# Patient Record
Sex: MALE | Race: BLACK OR AFRICAN AMERICAN | NOT HISPANIC OR LATINO | URBAN - METROPOLITAN AREA
[De-identification: names, ages, dates, MRNs, and addresses within clinical notes are randomized per-mention and may not be internally consistent; named-entity substitution may affect disease eponyms.]

---

## 2017-03-24 ENCOUNTER — EMERGENCY (EMERGENCY)
Facility: HOSPITAL | Age: 62
LOS: 1 days | Discharge: HOME | End: 2017-03-24
Attending: EMERGENCY MEDICINE
Payer: MEDICAID

## 2017-03-24 VITALS
RESPIRATION RATE: 18 BRPM | HEART RATE: 86 BPM | OXYGEN SATURATION: 98 % | DIASTOLIC BLOOD PRESSURE: 86 MMHG | SYSTOLIC BLOOD PRESSURE: 120 MMHG | TEMPERATURE: 98 F

## 2017-03-24 VITALS
SYSTOLIC BLOOD PRESSURE: 104 MMHG | HEIGHT: 76 IN | RESPIRATION RATE: 16 BRPM | DIASTOLIC BLOOD PRESSURE: 68 MMHG | TEMPERATURE: 98 F | HEART RATE: 103 BPM | OXYGEN SATURATION: 95 % | WEIGHT: 265 LBS

## 2017-03-24 DIAGNOSIS — E11.65 TYPE 2 DIABETES MELLITUS WITH HYPERGLYCEMIA: ICD-10-CM

## 2017-03-24 DIAGNOSIS — I10 ESSENTIAL (PRIMARY) HYPERTENSION: ICD-10-CM

## 2017-03-24 DIAGNOSIS — E78.5 HYPERLIPIDEMIA, UNSPECIFIED: ICD-10-CM

## 2017-03-24 DIAGNOSIS — F10.129 ALCOHOL ABUSE WITH INTOXICATION, UNSPECIFIED: ICD-10-CM

## 2017-03-24 LAB
ALBUMIN SERPL ELPH-MCNC: 3.8 G/DL — SIGNIFICANT CHANGE UP (ref 3.5–5)
ALP SERPL-CCNC: 126 U/L — HIGH (ref 40–120)
ALT FLD-CCNC: 49 U/L DA — SIGNIFICANT CHANGE UP (ref 10–60)
ANION GAP SERPL CALC-SCNC: 12 MMOL/L — SIGNIFICANT CHANGE UP (ref 5–17)
AST SERPL-CCNC: 22 U/L — SIGNIFICANT CHANGE UP (ref 10–40)
BILIRUB SERPL-MCNC: 0.3 MG/DL — SIGNIFICANT CHANGE UP (ref 0.2–1.2)
BUN SERPL-MCNC: 31 MG/DL — HIGH (ref 7–18)
CALCIUM SERPL-MCNC: 8.9 MG/DL — SIGNIFICANT CHANGE UP (ref 8.4–10.5)
CHLORIDE SERPL-SCNC: 98 MMOL/L — SIGNIFICANT CHANGE UP (ref 96–108)
CO2 SERPL-SCNC: 25 MMOL/L — SIGNIFICANT CHANGE UP (ref 22–31)
CREAT SERPL-MCNC: 1.67 MG/DL — HIGH (ref 0.5–1.3)
GLUCOSE SERPL-MCNC: 430 MG/DL — HIGH (ref 70–99)
HCT VFR BLD CALC: 43 % — SIGNIFICANT CHANGE UP (ref 39–50)
HGB BLD-MCNC: 14.3 G/DL — SIGNIFICANT CHANGE UP (ref 13–17)
MAGNESIUM SERPL-MCNC: 1.9 MG/DL — SIGNIFICANT CHANGE UP (ref 1.8–2.4)
MCHC RBC-ENTMCNC: 29.1 PG — SIGNIFICANT CHANGE UP (ref 27–34)
MCHC RBC-ENTMCNC: 33.2 GM/DL — SIGNIFICANT CHANGE UP (ref 32–36)
MCV RBC AUTO: 87.4 FL — SIGNIFICANT CHANGE UP (ref 80–100)
PLATELET # BLD AUTO: 228 K/UL — SIGNIFICANT CHANGE UP (ref 150–400)
POTASSIUM SERPL-MCNC: 3.7 MMOL/L — SIGNIFICANT CHANGE UP (ref 3.5–5.3)
POTASSIUM SERPL-SCNC: 3.7 MMOL/L — SIGNIFICANT CHANGE UP (ref 3.5–5.3)
PROT SERPL-MCNC: 7.6 G/DL — SIGNIFICANT CHANGE UP (ref 6–8.3)
RBC # BLD: 4.92 M/UL — SIGNIFICANT CHANGE UP (ref 4.2–5.8)
RBC # FLD: 12 % — SIGNIFICANT CHANGE UP (ref 10.3–14.5)
SODIUM SERPL-SCNC: 135 MMOL/L — SIGNIFICANT CHANGE UP (ref 135–145)
WBC # BLD: 5.5 K/UL — SIGNIFICANT CHANGE UP (ref 3.8–10.5)
WBC # FLD AUTO: 5.5 K/UL — SIGNIFICANT CHANGE UP (ref 3.8–10.5)

## 2017-03-24 PROCEDURE — 99285 EMERGENCY DEPT VISIT HI MDM: CPT | Mod: 25

## 2017-03-24 PROCEDURE — 80053 COMPREHEN METABOLIC PANEL: CPT

## 2017-03-24 PROCEDURE — 83735 ASSAY OF MAGNESIUM: CPT

## 2017-03-24 PROCEDURE — 96372 THER/PROPH/DIAG INJ SC/IM: CPT

## 2017-03-24 PROCEDURE — 93005 ELECTROCARDIOGRAM TRACING: CPT

## 2017-03-24 PROCEDURE — 99284 EMERGENCY DEPT VISIT MOD MDM: CPT | Mod: 25

## 2017-03-24 PROCEDURE — 85027 COMPLETE CBC AUTOMATED: CPT

## 2017-03-24 RX ORDER — INSULIN HUMAN 100 [IU]/ML
2 INJECTION, SOLUTION SUBCUTANEOUS ONCE
Qty: 0 | Refills: 0 | Status: COMPLETED | OUTPATIENT
Start: 2017-03-24 | End: 2017-03-24

## 2017-03-24 RX ORDER — SODIUM CHLORIDE 9 MG/ML
1000 INJECTION INTRAMUSCULAR; INTRAVENOUS; SUBCUTANEOUS ONCE
Qty: 0 | Refills: 0 | Status: COMPLETED | OUTPATIENT
Start: 2017-03-24 | End: 2017-03-24

## 2017-03-24 RX ADMIN — SODIUM CHLORIDE 1000 MILLILITER(S): 9 INJECTION INTRAMUSCULAR; INTRAVENOUS; SUBCUTANEOUS at 06:14

## 2017-03-24 RX ADMIN — INSULIN HUMAN 2 UNIT(S): 100 INJECTION, SOLUTION SUBCUTANEOUS at 07:18

## 2017-03-24 NOTE — ED ADULT NURSE NOTE - OBJECTIVE STATEMENT
Patient picked up by police on street and brought to [address]. Patient is intoxicated, not agitated, cooperative, face flushed.

## 2017-03-24 NOTE — ED PROVIDER NOTE - NS ED MD SCRIBE ATTENDING SCRIBE SECTIONS
PAST MEDICAL/SURGICAL/SOCIAL HISTORY/HIV/VITAL SIGNS( Pullset)/PHYSICAL EXAM/REVIEW OF SYSTEMS/DISPOSITION/HISTORY OF PRESENT ILLNESS

## 2017-03-24 NOTE — ED PROVIDER NOTE - OBJECTIVE STATEMENT
62 y/o M pt with PMHx HTN, DM, HLD BIB police c/o ETOH intoxication. Pt reports not feeling well in the precinct and was brought into ED for further evaluation. Pt denies fever, chills, pain, trauma, or any  other complaints. NKDA.

## 2018-03-12 ENCOUNTER — EMERGENCY (EMERGENCY)
Facility: HOSPITAL | Age: 63
LOS: 0 days | Discharge: HOME | End: 2018-03-12
Attending: EMERGENCY MEDICINE
Payer: MEDICAID

## 2018-03-12 VITALS
TEMPERATURE: 98 F | RESPIRATION RATE: 18 BRPM | SYSTOLIC BLOOD PRESSURE: 145 MMHG | HEIGHT: 76 IN | WEIGHT: 250 LBS | DIASTOLIC BLOOD PRESSURE: 97 MMHG | HEART RATE: 85 BPM | OXYGEN SATURATION: 99 %

## 2018-03-12 DIAGNOSIS — I10 ESSENTIAL (PRIMARY) HYPERTENSION: ICD-10-CM

## 2018-03-12 DIAGNOSIS — E78.5 HYPERLIPIDEMIA, UNSPECIFIED: ICD-10-CM

## 2018-03-12 DIAGNOSIS — M25.532 PAIN IN LEFT WRIST: ICD-10-CM

## 2018-03-12 DIAGNOSIS — M79.643 PAIN IN UNSPECIFIED HAND: ICD-10-CM

## 2018-03-12 DIAGNOSIS — E11.9 TYPE 2 DIABETES MELLITUS WITHOUT COMPLICATIONS: ICD-10-CM

## 2018-03-12 PROCEDURE — 93010 ELECTROCARDIOGRAM REPORT: CPT

## 2018-03-12 PROCEDURE — 99283 EMERGENCY DEPT VISIT LOW MDM: CPT

## 2018-03-12 PROCEDURE — 73110 X-RAY EXAM OF WRIST: CPT | Mod: 26,LT

## 2018-03-12 RX ORDER — OXYCODONE AND ACETAMINOPHEN 5; 325 MG/1; MG/1
1 TABLET ORAL ONCE
Qty: 0 | Refills: 0 | Status: DISCONTINUED | OUTPATIENT
Start: 2018-03-12 | End: 2018-03-12

## 2018-03-12 RX ADMIN — OXYCODONE AND ACETAMINOPHEN 1 TABLET(S): 5; 325 TABLET ORAL at 12:01

## 2018-03-12 NOTE — ED PROVIDER NOTE - CONSTITUTIONAL, MLM
normal... Well appearing, well nourished, awake, alert, oriented to person, place, time/situation and in no apparent distress. Speaking in clear full sentences no nasal flaring no shoulders retractions, holding his left wrist

## 2018-03-12 NOTE — ED PROVIDER NOTE - UPPER EXTREMITY EXAM, LEFT
no swelling no deformity no erythema, + tender to palp left 1st metacarpal, Pt is able to make strong fist/TENDERNESS

## 2018-03-12 NOTE — ED PROVIDER NOTE - OBJECTIVE STATEMENT
62 years old male here c/o pain to left wrist radiates up to left arm for two weeks increases pain to move left wrist. Pt denies associated dizziness, sob, chest pain, cough, nausea, vomiting. Pt also denies recent hx of trauma, headache, blurred visions, light sensitivities, focal/distal weakness or numbness, fever, chills, abd pain.

## 2018-09-19 ENCOUNTER — EMERGENCY (EMERGENCY)
Facility: HOSPITAL | Age: 63
LOS: 0 days | Discharge: HOME | End: 2018-09-19
Attending: EMERGENCY MEDICINE
Payer: MEDICAID

## 2018-09-19 VITALS
DIASTOLIC BLOOD PRESSURE: 83 MMHG | OXYGEN SATURATION: 99 % | RESPIRATION RATE: 18 BRPM | HEART RATE: 99 BPM | SYSTOLIC BLOOD PRESSURE: 126 MMHG

## 2018-09-19 VITALS
RESPIRATION RATE: 18 BRPM | TEMPERATURE: 98 F | HEIGHT: 76 IN | OXYGEN SATURATION: 98 % | DIASTOLIC BLOOD PRESSURE: 98 MMHG | WEIGHT: 265 LBS | SYSTOLIC BLOOD PRESSURE: 134 MMHG | HEART RATE: 101 BPM

## 2018-09-19 DIAGNOSIS — S61.412A LACERATION WITHOUT FOREIGN BODY OF LEFT HAND, INITIAL ENCOUNTER: ICD-10-CM
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
## 2018-09-19 DIAGNOSIS — Z79.4 LONG TERM (CURRENT) USE OF INSULIN: ICD-10-CM

## 2018-09-19 DIAGNOSIS — I10 ESSENTIAL (PRIMARY) HYPERTENSION: ICD-10-CM

## 2018-09-19 DIAGNOSIS — E78.5 HYPERLIPIDEMIA, UNSPECIFIED: ICD-10-CM

## 2018-09-19 DIAGNOSIS — E11.9 TYPE 2 DIABETES MELLITUS WITHOUT COMPLICATIONS: ICD-10-CM
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
## 2018-09-19 DIAGNOSIS — S52.201A UNSPECIFIED FRACTURE OF SHAFT OF RIGHT ULNA, INITIAL ENCOUNTER FOR CLOSED FRACTURE: ICD-10-CM
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
## 2018-09-19 PROCEDURE — 99284 EMERGENCY DEPT VISIT MOD MDM: CPT | Mod: 25

## 2018-09-19 PROCEDURE — 73090 X-RAY EXAM OF FOREARM: CPT | Mod: 26,RT,76

## 2018-09-19 PROCEDURE — 73130 X-RAY EXAM OF HAND: CPT | Mod: 26,LT

## 2018-09-19 PROCEDURE — 12001 RPR S/N/AX/GEN/TRNK 2.5CM/<: CPT

## 2018-09-19 RX ORDER — OXYCODONE HYDROCHLORIDE 5 MG/1
1 TABLET ORAL
Qty: 20 | Refills: 0 | OUTPATIENT
Start: 2018-09-19 | End: 2018-09-23

## 2018-09-19 RX ORDER — MORPHINE SULFATE 50 MG/1
4 CAPSULE, EXTENDED RELEASE ORAL ONCE
Qty: 0 | Refills: 0 | Status: DISCONTINUED | OUTPATIENT
Start: 2018-09-19 | End: 2018-09-19

## 2018-09-19 RX ORDER — IBUPROFEN 200 MG
1 TABLET ORAL
Qty: 15 | Refills: 0 | OUTPATIENT
Start: 2018-09-19 | End: 2018-09-23

## 2018-09-19 RX ORDER — TETANUS TOXOID, REDUCED DIPHTHERIA TOXOID AND ACELLULAR PERTUSSIS VACCINE, ADSORBED 5; 2.5; 8; 8; 2.5 [IU]/.5ML; [IU]/.5ML; UG/.5ML; UG/.5ML; UG/.5ML
0.5 SUSPENSION INTRAMUSCULAR ONCE
Qty: 0 | Refills: 0 | Status: COMPLETED | OUTPATIENT
Start: 2018-09-19 | End: 2018-09-19

## 2018-09-19 RX ADMIN — TETANUS TOXOID, REDUCED DIPHTHERIA TOXOID AND ACELLULAR PERTUSSIS VACCINE, ADSORBED 0.5 MILLILITER(S): 5; 2.5; 8; 8; 2.5 SUSPENSION INTRAMUSCULAR at 19:44

## 2018-09-19 RX ADMIN — MORPHINE SULFATE 4 MILLIGRAM(S): 50 CAPSULE, EXTENDED RELEASE ORAL at 19:41

## 2018-09-19 NOTE — ED PROVIDER NOTE - OBJECTIVE STATEMENT
64yo male with pmh HTN, HL, DM, presents with left dorsal hand lac/pain and rt forearm pain s/p trip and fall while running to go to bathroom.  Pt broke fall with arms, denies head strike, LOC, AC.  Unknown tetanus. Pt reports glass was on sidewalk and cut his hand.    ROS: No fever/chills. No photophobia/eye pain/changes in vision, No ear pain/sore throat/dysphagia, No chest pain/palpitations. No SOB/cough/stridor. No abdominal pain, N/V/D, no black/bloody bm. No dysuria/frequency/discharge, No headache. No Dizziness.  + LAC.  No numbness/tingling/weakness.

## 2018-09-19 NOTE — ED PROVIDER NOTE - PRINCIPAL DIAGNOSIS
Closed fracture of shaft of ulna, unspecified fracture morphology, unspecified laterality, initial encounter

## 2018-09-19 NOTE — CONSULT NOTE ADULT - SUBJECTIVE AND OBJECTIVE BOX
Pt 63 y Right Hand dominated Male s/p MF complicated by right forearm pain. No LOC, NO HH. Patient was running to the bathroom inside his house earlier this evening when he tripped and fell onto his right outstretched arm. Patient reported having pain in his right forearm immediately after the incident. Pt denies numbness, reports some tingling. Pt reports having a laceration to his left hand. No other injuries or complaints at this time.         PAST MEDICAL & SURGICAL HISTORY:  HLD (hyperlipidemia)  HTN (hypertension)  DM (diabetes mellitus)  No significant past surgical history    Home Medications:  enalapril 10 mg oral tablet: 1 tab(s) orally once a day (19 Sep 2018 19:23)  metformin: 500 milligram(s) orally 2 times a day (19 Sep 2018 19:23)  naproxen 500 mg oral tablet: 1 tab(s) orally 2 times a day (19 Sep 2018 19:23)  tramadol 50 mg oral tablet: 1 tab(s) orally every 4 hours (19 Sep 2018 19:23)      Allergies    No Known Allergies    Intolerances      Imaging:    XR R Forearm: proximal to middle third oblique fracture of the Ulna minimally displaced   XR L Hand: negative for any fractures or dislocations.     PE:    Vital Signs Last 24 Hrs  T(C): 36.8 (19 Sep 2018 19:09), Max: 36.8 (19 Sep 2018 19:09)  T(F): 98.2 (19 Sep 2018 19:09), Max: 98.2 (19 Sep 2018 19:09)  HR: 99 (19 Sep 2018 21:08) (99 - 101)  BP: 126/83 (19 Sep 2018 21:08) (126/83 - 134/98)  RR: 18 (19 Sep 2018 21:08) (18 - 18)  SpO2: 99% (19 Sep 2018 21:08) (98% - 99%)      RUE:  No Gross Deformity  Skin intact, no ecchymosis or erythema, mild swelling present over proximal third forearm   TTP over the Proximal third dorsal forearm   SILT C5-S1  EHL/FHL/GSC/TA intact   No instability around the elbow with passive varus and valgus stress  Full passive and active ROM of the elbow  Full passive and active ROM of the wrist  Radial pulse 2+  Compartments soft and compressible    Left Hand: Superficial Dorsal laceration 4cm       Secondary Survey: No TTP over bony prominences, SILT, palpable pulses, full/painless range of motion, compartments soft         Procedure:    Patient was placed in Sugar Tongue splint. All bony prominences were well padded. Patient was n/v intact post procedure. Post Procedure X-Rays performed demonstrated good alignment of the splint. Patient tolerated procedure well. No complications.

## 2018-09-19 NOTE — CONSULT NOTE ADULT - ASSESSMENT
A/P: 63 Y M s/p MF c/b R Proximal to Middle third Oblique Ulnar Fracture       Analgesia  Non weight bearing RUE in splint and sling  Patient is to follow up with Dr. Ho as outpatient for further management.   Explained with patient the possible need for operative fixation for this fracture pattern. This decision will be made as outpatient.  Explained to the patient to monitor for signs of increased pain with movement of fingers, increased numbness or tingling of Right fingers, to go to the nearest ER. Patient acknowledged understanding.  No surgical intervention at this time  Ortho Stable    Will discuss with attending and advise if plan changes.

## 2018-09-19 NOTE — ED PROVIDER NOTE - MEDICAL DECISION MAKING DETAILS
Lac repaired by AUDREY Gallo. ortho consulted, splinted, and fu outpt. Discussed results and outcome of testing with the patient.  Patient given copy of available results. Patient advised to please follow up with their PMD within the next 24 hours and return to the Emergency Department for worsening symptoms or any other concerns.

## 2018-09-19 NOTE — ED ADULT NURSE NOTE - OBJECTIVE STATEMENT
S/P fall, pt stated, he was running and tripped fall on glass. laceration to left hand, bleeding controlled, and right arm pain.

## 2018-09-19 NOTE — ED PROVIDER NOTE - CARE PLAN
Principal Discharge DX:	Closed fracture of shaft of ulna, unspecified fracture morphology, unspecified laterality, initial encounter  Secondary Diagnosis:	Laceration of hand

## 2018-09-19 NOTE — ED ADULT TRIAGE NOTE - CHIEF COMPLAINT QUOTE
as per pt " I was running to use the bathroom, tripped and fell to the ground  landing on my right arm and the left hand got cut by a broken glass on the ground."

## 2018-09-20 PROBLEM — I10 ESSENTIAL (PRIMARY) HYPERTENSION: Chronic | Status: ACTIVE | Noted: 2017-03-24

## 2018-09-20 PROBLEM — E11.9 TYPE 2 DIABETES MELLITUS WITHOUT COMPLICATIONS: Chronic | Status: ACTIVE | Noted: 2017-03-24

## 2018-09-20 PROBLEM — E78.5 HYPERLIPIDEMIA, UNSPECIFIED: Chronic | Status: ACTIVE | Noted: 2017-03-24

## 2018-10-05 ENCOUNTER — OUTPATIENT (OUTPATIENT)
Dept: OUTPATIENT SERVICES | Facility: HOSPITAL | Age: 63
LOS: 1 days | Discharge: HOME | End: 2018-10-05
Payer: MEDICAID

## 2018-10-05 VITALS
RESPIRATION RATE: 17 BRPM | TEMPERATURE: 98 F | HEIGHT: 76 IN | HEART RATE: 89 BPM | DIASTOLIC BLOOD PRESSURE: 91 MMHG | OXYGEN SATURATION: 98 % | WEIGHT: 264.33 LBS | SYSTOLIC BLOOD PRESSURE: 132 MMHG

## 2018-10-05 DIAGNOSIS — I10 ESSENTIAL (PRIMARY) HYPERTENSION: ICD-10-CM

## 2018-10-05 DIAGNOSIS — E11.9 TYPE 2 DIABETES MELLITUS WITHOUT COMPLICATIONS: ICD-10-CM

## 2018-10-05 DIAGNOSIS — Z01.818 ENCOUNTER FOR OTHER PREPROCEDURAL EXAMINATION: ICD-10-CM

## 2018-10-05 DIAGNOSIS — E78.5 HYPERLIPIDEMIA, UNSPECIFIED: ICD-10-CM

## 2018-10-05 DIAGNOSIS — S52.231A DISPLACED OBLIQUE FRACTURE OF SHAFT OF RIGHT ULNA, INITIAL ENCOUNTER FOR CLOSED FRACTURE: ICD-10-CM

## 2018-10-05 DIAGNOSIS — M25.539 PAIN IN UNSPECIFIED WRIST: ICD-10-CM

## 2018-10-05 DIAGNOSIS — Z98.890 OTHER SPECIFIED POSTPROCEDURAL STATES: Chronic | ICD-10-CM

## 2018-10-05 DIAGNOSIS — M50.20 OTHER CERVICAL DISC DISPLACEMENT, UNSPECIFIED CERVICAL REGION: Chronic | ICD-10-CM

## 2018-10-05 LAB
ANION GAP SERPL CALC-SCNC: 9 MMOL/L — SIGNIFICANT CHANGE UP (ref 5–17)
APTT BLD: 32.1 SEC — SIGNIFICANT CHANGE UP (ref 27.5–37.4)
BASOPHILS # BLD AUTO: 0.03 K/UL — SIGNIFICANT CHANGE UP (ref 0–0.2)
BASOPHILS NFR BLD AUTO: 0.4 % — SIGNIFICANT CHANGE UP (ref 0–2)
BUN SERPL-MCNC: 21 MG/DL — SIGNIFICANT CHANGE UP (ref 7–23)
CALCIUM SERPL-MCNC: 8.7 MG/DL — SIGNIFICANT CHANGE UP (ref 8.5–10.1)
CHLORIDE SERPL-SCNC: 108 MMOL/L — SIGNIFICANT CHANGE UP (ref 96–108)
CO2 SERPL-SCNC: 25 MMOL/L — SIGNIFICANT CHANGE UP (ref 22–31)
CREAT SERPL-MCNC: 1.46 MG/DL — HIGH (ref 0.5–1.3)
EOSINOPHIL # BLD AUTO: 0.14 K/UL — SIGNIFICANT CHANGE UP (ref 0–0.5)
EOSINOPHIL NFR BLD AUTO: 2.1 % — SIGNIFICANT CHANGE UP (ref 0–6)
GLUCOSE SERPL-MCNC: 156 MG/DL — HIGH (ref 70–99)
HCT VFR BLD CALC: 40.4 % — SIGNIFICANT CHANGE UP (ref 39–50)
HCV AB S/CO SERPL IA: 0.08 S/CO — SIGNIFICANT CHANGE UP
HCV AB SERPL-IMP: SIGNIFICANT CHANGE UP
HGB BLD-MCNC: 13.3 G/DL — SIGNIFICANT CHANGE UP (ref 13–17)
HIV 1 & 2 AB SERPL IA.RAPID: SIGNIFICANT CHANGE UP
IMM GRANULOCYTES NFR BLD AUTO: 0.4 % — SIGNIFICANT CHANGE UP (ref 0–1.5)
INR BLD: 0.99 RATIO — SIGNIFICANT CHANGE UP (ref 0.88–1.16)
LYMPHOCYTES # BLD AUTO: 1.86 K/UL — SIGNIFICANT CHANGE UP (ref 1–3.3)
LYMPHOCYTES # BLD AUTO: 27.5 % — SIGNIFICANT CHANGE UP (ref 13–44)
MCHC RBC-ENTMCNC: 29.2 PG — SIGNIFICANT CHANGE UP (ref 27–34)
MCHC RBC-ENTMCNC: 32.9 GM/DL — SIGNIFICANT CHANGE UP (ref 32–36)
MCV RBC AUTO: 88.8 FL — SIGNIFICANT CHANGE UP (ref 80–100)
MONOCYTES # BLD AUTO: 0.66 K/UL — SIGNIFICANT CHANGE UP (ref 0–0.9)
MONOCYTES NFR BLD AUTO: 9.7 % — SIGNIFICANT CHANGE UP (ref 2–14)
NEUTROPHILS # BLD AUTO: 4.05 K/UL — SIGNIFICANT CHANGE UP (ref 1.8–7.4)
NEUTROPHILS NFR BLD AUTO: 59.9 % — SIGNIFICANT CHANGE UP (ref 43–77)
PLATELET # BLD AUTO: 266 K/UL — SIGNIFICANT CHANGE UP (ref 150–400)
POTASSIUM SERPL-MCNC: 3.5 MMOL/L — SIGNIFICANT CHANGE UP (ref 3.5–5.3)
POTASSIUM SERPL-SCNC: 3.5 MMOL/L — SIGNIFICANT CHANGE UP (ref 3.5–5.3)
PROTHROM AB SERPL-ACNC: 10.8 SEC — SIGNIFICANT CHANGE UP (ref 9.8–12.7)
RBC # BLD: 4.55 M/UL — SIGNIFICANT CHANGE UP (ref 4.2–5.8)
RBC # FLD: 13.6 % — SIGNIFICANT CHANGE UP (ref 10.3–14.5)
SODIUM SERPL-SCNC: 142 MMOL/L — SIGNIFICANT CHANGE UP (ref 135–145)
WBC # BLD: 6.77 K/UL — SIGNIFICANT CHANGE UP (ref 3.8–10.5)
WBC # FLD AUTO: 6.77 K/UL — SIGNIFICANT CHANGE UP (ref 3.8–10.5)

## 2018-10-05 PROCEDURE — 93010 ELECTROCARDIOGRAM REPORT: CPT | Mod: NC

## 2018-10-05 NOTE — H&P PST ADULT - ASSESSMENT
right ulnar fracture right ulnar fracture  CAPRINI SCORE    AGE RELATED RISK FACTORS                                                       MOBILITY RELATED FACTORS  [ ] Age 41-60 years                                            (1 Point)                  [ ] Bed rest                                                        (1 Point)  [x ] Age: 61-74 years                                           (2 Points)                [ ] Plaster cast                                                   (2 Points)  [ ] Age= 75 years                                              (3 Points)                 [ ] Bed bound for more than 72 hours                   (2 Points)    DISEASE RELATED RISK FACTORS                                               GENDER SPECIFIC FACTORS  [ ] Edema in the lower extremities                       (1 Point)                  [ ] Pregnancy                                                     (1 Point)  [ ] Varicose veins                                               (1 Point)                  [ ] Post-partum < 6 weeks                                   (1 Point)             [x ] BMI > 25 Kg/m2                                            (1 Point)                  [ ] Hormonal therapy  or oral contraception            (1 Point)                 [ ] Sepsis (in the previous month)                        (1 Point)                  [ ] History of pregnancy complications  [ ] Pneumonia or serious lung disease                                               [ ] Unexplained or recurrent                       (1 Point)           (in the previous month)                               (1 Point)  [ ] Abnormal pulmonary function test                     (1 Point)                 SURGERY RELATED RISK FACTORS  [ ] Acute myocardial infarction                              (1 Point)                 [ ]  Section                                            (1 Point)  [ ] Congestive heart failure (in the previous month)  (1 Point)                 [ ] Minor surgery                                                 (1 Point)   [ ] Inflammatory bowel disease                             (1 Point)                 [x ] Arthroscopic surgery                                        (2 Points)  [ ] Central venous access                                    (2 Points)                [ ] General surgery lasting more than 45 minutes   (2 Points)       [ ] Stroke (in the previous month)                          (5 Points)               [ ] Elective arthroplasty                                        (5 Points)                                                                                                                                               HEMATOLOGY RELATED FACTORS                                                 TRAUMA RELATED RISK FACTORS  [ ] Prior episodes of VTE                                     (3 Points)                 [ ] Fracture of the hip, pelvis, or leg                       (5 Points)  [ ] Positive family history for VTE                         (3 Points)                 [ ] Acute spinal cord injury (in the previous month)  (5 Points)  [ ] Prothrombin 82793 A                                      (3 Points)                 [ ] Paralysis  (less than 1 month)                          (5 Points)  [ ] Factor V Leiden                                             (3 Points)                 [ ] Multiple Trauma within 1 month                         (5 Points)  [ ] Lupus anticoagulants                                     (3 Points)                                                           [ ] Anticardiolipin antibodies                                (3 Points)                                                       [ ] High homocysteine in the blood                      (3 Points)                                             [ ] Other congenital or acquired thrombophilia       (3 Points)                                                [ ] Heparin induced thrombocytopenia                  (3 Points)                                          Total Score [    5      ]

## 2018-10-05 NOTE — H&P PST ADULT - NSANTHOSAYNRD_GEN_A_CORE
denies/No. SEAN screening performed.  STOP BANG Legend: 0-2 = LOW Risk; 3-4 = INTERMEDIATE Risk; 5-8 = HIGH Risk

## 2018-10-05 NOTE — H&P PST ADULT - HISTORY OF PRESENT ILLNESS
64 yo male - PMH - HTN, DM, HLD - s/o fall 9/21/2018 - sustained right ulna fracture- scheduled for open reduction internal fixation of right ulnar fracture

## 2018-10-10 ENCOUNTER — TRANSCRIPTION ENCOUNTER (OUTPATIENT)
Age: 63
End: 2018-10-10

## 2018-10-11 ENCOUNTER — TRANSCRIPTION ENCOUNTER (OUTPATIENT)
Age: 63
End: 2018-10-11

## 2018-10-11 ENCOUNTER — INPATIENT (INPATIENT)
Facility: HOSPITAL | Age: 63
LOS: 0 days | Discharge: HOME | End: 2018-10-12
Attending: ORTHOPAEDIC SURGERY | Admitting: ORTHOPAEDIC SURGERY
Payer: MEDICAID

## 2018-10-11 VITALS
SYSTOLIC BLOOD PRESSURE: 123 MMHG | HEART RATE: 78 BPM | WEIGHT: 265 LBS | DIASTOLIC BLOOD PRESSURE: 80 MMHG | RESPIRATION RATE: 18 BRPM | HEIGHT: 72 IN | OXYGEN SATURATION: 95 % | TEMPERATURE: 98 F
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
## 2018-10-11 DIAGNOSIS — E78.5 HYPERLIPIDEMIA, UNSPECIFIED: ICD-10-CM

## 2018-10-11 DIAGNOSIS — S52.251A DISPLACED COMMINUTED FRACTURE OF SHAFT OF ULNA, RIGHT ARM, INITIAL ENCOUNTER FOR CLOSED FRACTURE: ICD-10-CM
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
## 2018-10-11 DIAGNOSIS — M50.20 OTHER CERVICAL DISC DISPLACEMENT, UNSPECIFIED CERVICAL REGION: Chronic | ICD-10-CM

## 2018-10-11 DIAGNOSIS — I10 ESSENTIAL (PRIMARY) HYPERTENSION: ICD-10-CM

## 2018-10-11 DIAGNOSIS — Z79.84 LONG TERM (CURRENT) USE OF ORAL HYPOGLYCEMIC DRUGS: ICD-10-CM

## 2018-10-11 DIAGNOSIS — E66.9 OBESITY, UNSPECIFIED: ICD-10-CM

## 2018-10-11 DIAGNOSIS — E11.9 TYPE 2 DIABETES MELLITUS WITHOUT COMPLICATIONS: ICD-10-CM

## 2018-10-11 DIAGNOSIS — Z98.890 OTHER SPECIFIED POSTPROCEDURAL STATES: Chronic | ICD-10-CM

## 2018-10-11 LAB — GLUCOSE BLDC GLUCOMTR-MCNC: 122 MG/DL — HIGH (ref 70–99)

## 2018-10-11 PROCEDURE — 73090 X-RAY EXAM OF FOREARM: CPT | Mod: 26,RT

## 2018-10-11 RX ORDER — DOCUSATE SODIUM 100 MG
100 CAPSULE ORAL THREE TIMES A DAY
Qty: 0 | Refills: 0 | Status: DISCONTINUED | OUTPATIENT
Start: 2018-10-11 | End: 2018-10-12

## 2018-10-11 RX ORDER — HYDROMORPHONE HYDROCHLORIDE 2 MG/ML
0.5 INJECTION INTRAMUSCULAR; INTRAVENOUS; SUBCUTANEOUS
Qty: 0 | Refills: 0 | Status: DISCONTINUED | OUTPATIENT
Start: 2018-10-11 | End: 2018-10-11

## 2018-10-11 RX ORDER — HYDROMORPHONE HYDROCHLORIDE 2 MG/ML
0.5 INJECTION INTRAMUSCULAR; INTRAVENOUS; SUBCUTANEOUS EVERY 4 HOURS
Qty: 0 | Refills: 0 | Status: DISCONTINUED | OUTPATIENT
Start: 2018-10-11 | End: 2018-10-12

## 2018-10-11 RX ORDER — ONDANSETRON 8 MG/1
4 TABLET, FILM COATED ORAL ONCE
Qty: 0 | Refills: 0 | Status: DISCONTINUED | OUTPATIENT
Start: 2018-10-11 | End: 2018-10-11

## 2018-10-11 RX ORDER — SODIUM CHLORIDE 9 MG/ML
1000 INJECTION, SOLUTION INTRAVENOUS
Qty: 0 | Refills: 0 | Status: DISCONTINUED | OUTPATIENT
Start: 2018-10-11 | End: 2018-10-11

## 2018-10-11 RX ORDER — PANTOPRAZOLE SODIUM 20 MG/1
40 TABLET, DELAYED RELEASE ORAL
Qty: 0 | Refills: 0 | Status: DISCONTINUED | OUTPATIENT
Start: 2018-10-11 | End: 2018-10-12

## 2018-10-11 RX ORDER — OXYCODONE HYDROCHLORIDE 5 MG/1
5 TABLET ORAL EVERY 4 HOURS
Qty: 0 | Refills: 0 | Status: DISCONTINUED | OUTPATIENT
Start: 2018-10-11 | End: 2018-10-12

## 2018-10-11 RX ORDER — HYDROMORPHONE HYDROCHLORIDE 2 MG/ML
1 INJECTION INTRAMUSCULAR; INTRAVENOUS; SUBCUTANEOUS
Qty: 0 | Refills: 0 | Status: DISCONTINUED | OUTPATIENT
Start: 2018-10-11 | End: 2018-10-11

## 2018-10-11 RX ORDER — OXYCODONE AND ACETAMINOPHEN 5; 325 MG/1; MG/1
1 TABLET ORAL
Qty: 42 | Refills: 0
Start: 2018-10-11 | End: 2018-10-17

## 2018-10-11 RX ORDER — FENOFIBRATE,MICRONIZED 130 MG
145 CAPSULE ORAL DAILY
Qty: 0 | Refills: 0 | Status: DISCONTINUED | OUTPATIENT
Start: 2018-10-11 | End: 2018-10-12

## 2018-10-11 RX ORDER — ACETAMINOPHEN 500 MG
650 TABLET ORAL EVERY 6 HOURS
Qty: 0 | Refills: 0 | Status: DISCONTINUED | OUTPATIENT
Start: 2018-10-11 | End: 2018-10-12

## 2018-10-11 RX ORDER — DIPHENHYDRAMINE HCL 50 MG
25 CAPSULE ORAL AT BEDTIME
Qty: 0 | Refills: 0 | Status: DISCONTINUED | OUTPATIENT
Start: 2018-10-11 | End: 2018-10-12

## 2018-10-11 RX ORDER — LOSARTAN POTASSIUM 100 MG/1
100 TABLET, FILM COATED ORAL DAILY
Qty: 0 | Refills: 0 | Status: DISCONTINUED | OUTPATIENT
Start: 2018-10-11 | End: 2018-10-12

## 2018-10-11 RX ORDER — OXYCODONE HYDROCHLORIDE 5 MG/1
10 TABLET ORAL EVERY 4 HOURS
Qty: 0 | Refills: 0 | Status: DISCONTINUED | OUTPATIENT
Start: 2018-10-11 | End: 2018-10-12

## 2018-10-11 RX ORDER — CEFAZOLIN SODIUM 1 G
2000 VIAL (EA) INJECTION EVERY 8 HOURS
Qty: 0 | Refills: 0 | Status: COMPLETED | OUTPATIENT
Start: 2018-10-11 | End: 2018-10-11

## 2018-10-11 RX ORDER — ONDANSETRON 8 MG/1
4 TABLET, FILM COATED ORAL EVERY 6 HOURS
Qty: 0 | Refills: 0 | Status: DISCONTINUED | OUTPATIENT
Start: 2018-10-11 | End: 2018-10-12

## 2018-10-11 RX ORDER — FENOFIBRATE,MICRONIZED 130 MG
1 CAPSULE ORAL
Qty: 0 | Refills: 0 | COMMUNITY

## 2018-10-11 RX ORDER — HYDROCHLOROTHIAZIDE 25 MG
12.5 TABLET ORAL DAILY
Qty: 0 | Refills: 0 | Status: DISCONTINUED | OUTPATIENT
Start: 2018-10-11 | End: 2018-10-12

## 2018-10-11 RX ORDER — SODIUM CHLORIDE 9 MG/ML
3 INJECTION INTRAMUSCULAR; INTRAVENOUS; SUBCUTANEOUS EVERY 8 HOURS
Qty: 0 | Refills: 0 | Status: DISCONTINUED | OUTPATIENT
Start: 2018-10-11 | End: 2018-10-11

## 2018-10-11 RX ADMIN — Medication 145 MILLIGRAM(S): at 13:44

## 2018-10-11 RX ADMIN — OXYCODONE HYDROCHLORIDE 10 MILLIGRAM(S): 5 TABLET ORAL at 14:40

## 2018-10-11 RX ADMIN — HYDROMORPHONE HYDROCHLORIDE 0.5 MILLIGRAM(S): 2 INJECTION INTRAMUSCULAR; INTRAVENOUS; SUBCUTANEOUS at 10:28

## 2018-10-11 RX ADMIN — OXYCODONE HYDROCHLORIDE 10 MILLIGRAM(S): 5 TABLET ORAL at 18:27

## 2018-10-11 RX ADMIN — HYDROMORPHONE HYDROCHLORIDE 1 MILLIGRAM(S): 2 INJECTION INTRAMUSCULAR; INTRAVENOUS; SUBCUTANEOUS at 10:43

## 2018-10-11 RX ADMIN — PANTOPRAZOLE SODIUM 40 MILLIGRAM(S): 20 TABLET, DELAYED RELEASE ORAL at 18:27

## 2018-10-11 RX ADMIN — SODIUM CHLORIDE 3 MILLILITER(S): 9 INJECTION INTRAMUSCULAR; INTRAVENOUS; SUBCUTANEOUS at 07:26

## 2018-10-11 RX ADMIN — OXYCODONE HYDROCHLORIDE 10 MILLIGRAM(S): 5 TABLET ORAL at 13:44

## 2018-10-11 RX ADMIN — Medication 100 MILLIGRAM(S): at 23:02

## 2018-10-11 RX ADMIN — Medication 100 MILLIGRAM(S): at 16:01

## 2018-10-11 RX ADMIN — SODIUM CHLORIDE 100 MILLILITER(S): 9 INJECTION, SOLUTION INTRAVENOUS at 10:28

## 2018-10-11 RX ADMIN — HYDROMORPHONE HYDROCHLORIDE 1 MILLIGRAM(S): 2 INJECTION INTRAMUSCULAR; INTRAVENOUS; SUBCUTANEOUS at 10:42

## 2018-10-11 RX ADMIN — OXYCODONE HYDROCHLORIDE 10 MILLIGRAM(S): 5 TABLET ORAL at 23:00

## 2018-10-11 NOTE — PHYSICAL THERAPY INITIAL EVALUATION ADULT - CRITERIA FOR SKILLED THERAPEUTIC INTERVENTIONS
home with outpatient PT/predicted duration of therapy intervention/functional limitations in following categories/therapy frequency/impairments found/anticipated discharge recommendation

## 2018-10-11 NOTE — BRIEF OPERATIVE NOTE - PROCEDURE
<<-----Click on this checkbox to enter Procedure Open reduction and internal fixation (ORIF) of fracture of right ulna  10/11/2018    Active  [id number]

## 2018-10-11 NOTE — DISCHARGE NOTE ADULT - CARE PLAN
Principal Discharge DX:	Ulna fracture  Goal:	Return to baseline ADLs  Assessment and plan of treatment:	1.	Pain Control  2.	Non-Weight Bearing Right Upper Extremity, with assistive devices as needed  3.	DVT Prophylaxis - encourage ambulation  4.	PT as needed  5.	Follow up with Dr. Ho as outpatient in 10-14 days after discharge from the hospital or rehab. Call office for appointment.   6.	Staple removal and repeat x-rays on  Post-Op Day 14  7.	Ice/Elevate affected area as needed  8.	Keep Splint Clean and dry.

## 2018-10-11 NOTE — DISCHARGE NOTE ADULT - CARE PROVIDER_API CALL
Gopal Ho (DO), Orthopaedic Surgery  [address] [place] [address]  Phone: [phone]  Fax: [phone]

## 2018-10-11 NOTE — DISCHARGE NOTE ADULT - MEDICATION SUMMARY - MEDICATIONS TO TAKE
I will START or STAY ON the medications listed below when I get home from the hospital:    Percocet 5/325 oral tablet  -- 1 tab(s) by mouth every 4 hours, As Needed for Pain MDD:6  -- Caution federal law prohibits the transfer of this drug to any person other  than the person for whom it was prescribed.  May cause drowsiness.  Alcohol may intensify this effect.  Use care when operating dangerous machinery.  This prescription cannot be refilled.  This product contains acetaminophen.  Do not use  with any other product containing acetaminophen to prevent possible liver damage.  Using more of this medication than prescribed may cause serious breathing problems.    -- Indication: For severe pain    enalapril 10 mg oral tablet  -- 1 tab(s) by mouth once a day  -- Indication: For home med    metformin  -- 500 milligram(s) by mouth 2 times a day  -- Indication: For home med    losartan-hydrochlorothiazide 100mg-12.5mg oral tablet  -- 1 tab(s) by mouth once a day  -- Indication: For home med    omeprazole 40 mg oral delayed release capsule  -- 1 cap(s) by mouth once a day  -- Indication: For home med

## 2018-10-11 NOTE — DISCHARGE NOTE ADULT - HOSPITAL COURSE
The patient is a 63 year old M status post Open Reduction Surgical Fixation of a Right ulna shaft Fracture after being admitted for elective surgery. The patient was medically optimized for the previously mentioned surgical procedure as outatient. The patient was taken to the operating room on 10/11/18. Prophylactic antibiotics were started before the procedure and continued for 24 hours.  There were no complications during the procedure and patient tolerated the procedure well.  The patient was transferred to recovery room in stable condition and subsequently to surgical floor.  Patient was placed on sequential compression devices and encouraged ambulation for blood clot prevention. All home medications were continued.  The patient received physical therapy daily and daily labs were followed.  The splint was kept clean, dry, intact.  The rest of the hospital stay was unremarkable. The patient was discharged in stable condition to follow up as outpatient.

## 2018-10-11 NOTE — DISCHARGE NOTE ADULT - PATIENT PORTAL LINK FT
You can access the [facility] Patient Portal, offered by [facility], by registering with the following website: [email or web address]

## 2018-10-11 NOTE — DISCHARGE NOTE ADULT - MEDICATION SUMMARY - MEDICATIONS TO STOP TAKING
I will STOP taking the medications listed below when I get home from the hospital:    naproxen 500 mg oral tablet  -- 1 tab(s) by mouth 2 times a day    tramadol 50 mg oral tablet  -- 1 tab(s) by mouth every 4 hours    Naprosyn 500 mg oral tablet  -- 1 tab(s) by mouth 2 times a day   -- Check with your doctor before becoming pregnant.  May cause drowsiness or dizziness.  Obtain medical advice before taking any non-prescription drugs as some may affect the action of this medication.  Take with food or milk.     mg oral tablet  -- 1 tab(s) by mouth every 8 hours PRN for pain  -- Do not take this drug if you are pregnant.  It is very important that you take or use this exactly as directed.  Do not skip doses or discontinue unless directed by your doctor.  May cause drowsiness or dizziness.  Obtain medical advice before taking any non-prescription drugs as some may affect the action of this medication.  Take with food or milk.    acetaminophen-oxyCODONE 325 mg-5 mg oral tablet  -- 1 tab(s) by mouth every 6 hours PRN for severe pain MDD:4  -- Caution federal law prohibits the transfer of this drug to any person other  than the person for whom it was prescribed.  May cause drowsiness.  Alcohol may intensify this effect.  Use care when operating dangerous machinery.  This prescription cannot be refilled.  This product contains acetaminophen.  Do not use  with any other product containing acetaminophen to prevent possible liver damage.  Using more of this medication than prescribed may cause serious breathing problems.

## 2018-10-11 NOTE — PROGRESS NOTE ADULT - SUBJECTIVE AND OBJECTIVE BOX
Orthopedic Post-op Check    Pt S/E at bedside, tolerated procedure well, pain controlled    Vital Signs Last 24 Hrs  T(C): 37.1 (11 Oct 2018 07:22), Max: 37.1 (11 Oct 2018 07:22)  T(F): 98.7 (11 Oct 2018 07:22), Max: 98.7 (11 Oct 2018 07:22)  HR: 81 (11 Oct 2018 10:33) (67 - 84)  BP: 144/88 (11 Oct 2018 10:33) (123/80 - 144/88)  BP(mean): --  RR: 18 (11 Oct 2018 10:33) (17 - 18)  SpO2: 95% (11 Oct 2018 10:33) (95% - 98%)    Gen: NAD    Right Upper Extremity:  Splint clean dry intact  +AIN/PIN/M/R/U/Msc/Ax  SILT C5-T1  +Radial Pulse  Compartments soft  No calf TTP B/L

## 2018-10-11 NOTE — PROGRESS NOTE ADULT - ASSESSMENT
A/P: 63M s/p R ulna ORIF POD 0  Analgesia  DVT ppx - SCDs, encourage ambulation  NWB RUE In splint  PT/OT  ancef x24 hrs  DC planning - home tomorrow

## 2018-10-11 NOTE — DISCHARGE NOTE ADULT - PLAN OF CARE
Return to baseline ADLs 1.	Pain Control  2.	Non-Weight Bearing Right Upper Extremity, with assistive devices as needed  3.	DVT Prophylaxis - encourage ambulation  4.	PT as needed  5.	Follow up with Dr. Ho as outpatient in 10-14 days after discharge from the hospital or rehab. Call office for appointment.   6.	Staple removal and repeat x-rays on  Post-Op Day 14  7.	Ice/Elevate affected area as needed  8.	Keep Splint Clean and dry.

## 2018-10-12 VITALS
SYSTOLIC BLOOD PRESSURE: 154 MMHG | OXYGEN SATURATION: 96 % | HEART RATE: 75 BPM | TEMPERATURE: 97 F | DIASTOLIC BLOOD PRESSURE: 91 MMHG | RESPIRATION RATE: 18 BRPM

## 2018-10-12 LAB
ANION GAP SERPL CALC-SCNC: 10 MMOL/L — SIGNIFICANT CHANGE UP (ref 5–17)
BASOPHILS # BLD AUTO: 0.02 K/UL — SIGNIFICANT CHANGE UP (ref 0–0.2)
BASOPHILS NFR BLD AUTO: 0.2 % — SIGNIFICANT CHANGE UP (ref 0–2)
BUN SERPL-MCNC: 29 MG/DL — HIGH (ref 7–23)
CALCIUM SERPL-MCNC: 8.6 MG/DL — SIGNIFICANT CHANGE UP (ref 8.5–10.1)
CHLORIDE SERPL-SCNC: 104 MMOL/L — SIGNIFICANT CHANGE UP (ref 96–108)
CO2 SERPL-SCNC: 28 MMOL/L — SIGNIFICANT CHANGE UP (ref 22–31)
CREAT SERPL-MCNC: 1.63 MG/DL — HIGH (ref 0.5–1.3)
EOSINOPHIL # BLD AUTO: 0.03 K/UL — SIGNIFICANT CHANGE UP (ref 0–0.5)
EOSINOPHIL NFR BLD AUTO: 0.3 % — SIGNIFICANT CHANGE UP (ref 0–6)
GLUCOSE BLDC GLUCOMTR-MCNC: 242 MG/DL — HIGH (ref 70–99)
GLUCOSE SERPL-MCNC: 225 MG/DL — HIGH (ref 70–99)
HCT VFR BLD CALC: 40.1 % — SIGNIFICANT CHANGE UP (ref 39–50)
HGB BLD-MCNC: 12.9 G/DL — LOW (ref 13–17)
IMM GRANULOCYTES NFR BLD AUTO: 0.4 % — SIGNIFICANT CHANGE UP (ref 0–1.5)
LYMPHOCYTES # BLD AUTO: 19.3 % — SIGNIFICANT CHANGE UP (ref 13–44)
LYMPHOCYTES # BLD AUTO: 2.25 K/UL — SIGNIFICANT CHANGE UP (ref 1–3.3)
MCHC RBC-ENTMCNC: 29.5 PG — SIGNIFICANT CHANGE UP (ref 27–34)
MCHC RBC-ENTMCNC: 32.2 GM/DL — SIGNIFICANT CHANGE UP (ref 32–36)
MCV RBC AUTO: 91.8 FL — SIGNIFICANT CHANGE UP (ref 80–100)
MONOCYTES # BLD AUTO: 1.08 K/UL — HIGH (ref 0–0.9)
MONOCYTES NFR BLD AUTO: 9.3 % — SIGNIFICANT CHANGE UP (ref 2–14)
NEUTROPHILS # BLD AUTO: 8.23 K/UL — HIGH (ref 1.8–7.4)
NEUTROPHILS NFR BLD AUTO: 70.5 % — SIGNIFICANT CHANGE UP (ref 43–77)
PLATELET # BLD AUTO: 260 K/UL — SIGNIFICANT CHANGE UP (ref 150–400)
POTASSIUM SERPL-MCNC: 3.7 MMOL/L — SIGNIFICANT CHANGE UP (ref 3.5–5.3)
POTASSIUM SERPL-SCNC: 3.7 MMOL/L — SIGNIFICANT CHANGE UP (ref 3.5–5.3)
RBC # BLD: 4.37 M/UL — SIGNIFICANT CHANGE UP (ref 4.2–5.8)
RBC # FLD: 13.2 % — SIGNIFICANT CHANGE UP (ref 10.3–14.5)
SODIUM SERPL-SCNC: 142 MMOL/L — SIGNIFICANT CHANGE UP (ref 135–145)
WBC # BLD: 11.66 K/UL — HIGH (ref 3.8–10.5)
WBC # FLD AUTO: 11.66 K/UL — HIGH (ref 3.8–10.5)

## 2018-10-12 RX ORDER — INSULIN LISPRO 100/ML
VIAL (ML) SUBCUTANEOUS
Qty: 0 | Refills: 0 | Status: DISCONTINUED | OUTPATIENT
Start: 2018-10-12 | End: 2018-10-12

## 2018-10-12 RX ORDER — INSULIN LISPRO 100/ML
VIAL (ML) SUBCUTANEOUS AT BEDTIME
Qty: 0 | Refills: 0 | Status: DISCONTINUED | OUTPATIENT
Start: 2018-10-12 | End: 2018-10-12

## 2018-10-12 RX ORDER — DEXTROSE 50 % IN WATER 50 %
12.5 SYRINGE (ML) INTRAVENOUS ONCE
Qty: 0 | Refills: 0 | Status: DISCONTINUED | OUTPATIENT
Start: 2018-10-12 | End: 2018-10-12

## 2018-10-12 RX ORDER — SODIUM CHLORIDE 9 MG/ML
1000 INJECTION, SOLUTION INTRAVENOUS
Qty: 0 | Refills: 0 | Status: DISCONTINUED | OUTPATIENT
Start: 2018-10-12 | End: 2018-10-12

## 2018-10-12 RX ORDER — DEXTROSE 50 % IN WATER 50 %
25 SYRINGE (ML) INTRAVENOUS ONCE
Qty: 0 | Refills: 0 | Status: DISCONTINUED | OUTPATIENT
Start: 2018-10-12 | End: 2018-10-12

## 2018-10-12 RX ORDER — GLUCAGON INJECTION, SOLUTION 0.5 MG/.1ML
1 INJECTION, SOLUTION SUBCUTANEOUS ONCE
Qty: 0 | Refills: 0 | Status: DISCONTINUED | OUTPATIENT
Start: 2018-10-12 | End: 2018-10-12

## 2018-10-12 RX ORDER — DEXTROSE 50 % IN WATER 50 %
15 SYRINGE (ML) INTRAVENOUS ONCE
Qty: 0 | Refills: 0 | Status: DISCONTINUED | OUTPATIENT
Start: 2018-10-12 | End: 2018-10-12

## 2018-10-12 RX ADMIN — OXYCODONE HYDROCHLORIDE 10 MILLIGRAM(S): 5 TABLET ORAL at 04:33

## 2018-10-12 RX ADMIN — Medication 2: at 08:05

## 2018-10-12 RX ADMIN — Medication 10 MILLIGRAM(S): at 05:31

## 2018-10-12 RX ADMIN — OXYCODONE HYDROCHLORIDE 10 MILLIGRAM(S): 5 TABLET ORAL at 08:05

## 2018-10-12 RX ADMIN — OXYCODONE HYDROCHLORIDE 10 MILLIGRAM(S): 5 TABLET ORAL at 03:33

## 2018-10-12 RX ADMIN — Medication 12.5 MILLIGRAM(S): at 05:31

## 2018-10-12 RX ADMIN — Medication 100 MILLIGRAM(S): at 05:31

## 2018-10-12 RX ADMIN — PANTOPRAZOLE SODIUM 40 MILLIGRAM(S): 20 TABLET, DELAYED RELEASE ORAL at 05:32

## 2018-10-12 RX ADMIN — LOSARTAN POTASSIUM 100 MILLIGRAM(S): 100 TABLET, FILM COATED ORAL at 05:31

## 2018-10-12 RX ADMIN — OXYCODONE HYDROCHLORIDE 10 MILLIGRAM(S): 5 TABLET ORAL at 09:00

## 2018-10-12 RX ADMIN — OXYCODONE HYDROCHLORIDE 10 MILLIGRAM(S): 5 TABLET ORAL at 00:05

## 2018-10-12 NOTE — PROGRESS NOTE ADULT - ASSESSMENT
A/P: 63M s/p R ulna ORIF POD 1  Analgesia  DVT ppx - SCDs, encourage ambulation  NWB RUE In splint  PT/OT  ancef x24 hrs  DC planning - home today

## 2018-10-12 NOTE — PROGRESS NOTE ADULT - SUBJECTIVE AND OBJECTIVE BOX
patient seen and examined  Mild Rt forearm pain    PE  Splint/dressing D/C/I  NVI MARY/PRIETO  FROM all fingers Rt hand

## 2018-10-12 NOTE — PROGRESS NOTE ADULT - ASSESSMENT
S/P ORIF RT ulna Fx    Plan;  NWBA RUE  Pain managements  DVT prophylaxis  Ortho stable, may D/C home and F/U as outpatient

## 2018-10-12 NOTE — PROGRESS NOTE ADULT - SUBJECTIVE AND OBJECTIVE BOX
Pt S/E at bedside, no acute events overnight, pain controlled    Vital Signs Last 24 Hrs  T(C): 36.2 (12 Oct 2018 01:00), Max: 37.1 (11 Oct 2018 07:22)  T(F): 97.1 (12 Oct 2018 01:00), Max: 98.7 (11 Oct 2018 07:22)  HR: 71 (12 Oct 2018 01:00) (67 - 84)  BP: 126/95 (12 Oct 2018 01:00) (114/82 - 144/88)  BP(mean): --  RR: 17 (12 Oct 2018 01:00) (16 - 19)  SpO2: 97% (12 Oct 2018 01:00) (94% - 98%)      Gen: NAD    Right Upper Extremity:  Splint clean dry intact  +AIN/PIN/M/R/U/Msc/Ax  SILT C5-T1  +Radial Pulse  Compartments soft  No calf TTP B/L

## 2018-10-16 DIAGNOSIS — E11.9 TYPE 2 DIABETES MELLITUS WITHOUT COMPLICATIONS: ICD-10-CM

## 2018-10-16 DIAGNOSIS — E78.5 HYPERLIPIDEMIA, UNSPECIFIED: ICD-10-CM

## 2018-10-16 DIAGNOSIS — I10 ESSENTIAL (PRIMARY) HYPERTENSION: ICD-10-CM
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
## 2018-10-16 DIAGNOSIS — Z79.84 LONG TERM (CURRENT) USE OF ORAL HYPOGLYCEMIC DRUGS: ICD-10-CM
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
## 2018-10-16 DIAGNOSIS — S52.251A DISPLACED COMMINUTED FRACTURE OF SHAFT OF ULNA, RIGHT ARM, INITIAL ENCOUNTER FOR CLOSED FRACTURE: ICD-10-CM

## 2018-10-16 DIAGNOSIS — E66.9 OBESITY, UNSPECIFIED: ICD-10-CM

## 2019-03-21 NOTE — ASU PREOP CHECKLIST - TO WHOM
marbin/shannan Complex Repair And V-Y Plasty Text: The defect edges were debeveled with a #15 scalpel blade.  The primary defect was closed partially with a complex linear closure.  Given the location of the remaining defect, shape of the defect and the proximity to free margins a V-Y plasty was deemed most appropriate for complete closure of the defect.  Using a sterile surgical marker, an appropriate advancement flap was drawn incorporating the defect and placing the expected incisions within the relaxed skin tension lines where possible.    The area thus outlined was incised deep to adipose tissue with a #15 scalpel blade.  The skin margins were undermined to an appropriate distance in all directions utilizing iris scissors.

## 2019-12-10 NOTE — H&P PST ADULT - ATTENDING COMMENTS
ED HPI GENERAL MEDICAL PROBLEM





- General


Chief Complaint: ENT Problem


Stated Complaint: VOMITTING


Time Seen by Provider: 12/10/19 18:31


Source of Information: Reports: Patient


History Limitations: Reports: No Limitations





- History of Present Illness


INITIAL COMMENTS - FREE TEXT/NARRATIVE: 





Resents reporting a three-day history of sore throat with fever the highest 

103.9. Also a little runny nose cough. She vomited once today. She does not 

smoke she did not have a flu shot. Otherwise healthy without chronic medical 

problems. Sexually active, not on birth control, LMP 29th 2019.


  ** Headache


Pain Score (Numeric/FACES): 7





- Related Data


 Allergies











Allergy/AdvReac Type Severity Reaction Status Date / Time


 


Penicillins Allergy  Anaphylactic Verified 12/10/19 18:29





   Shock  











Home Meds: 


 Home Meds





Diclofenac Sodium [Voltaren] 75 mg PO BIDMEALS PRN #20 tab.ec 12/10/19 [Rx]











Past Medical History





- Infectious Disease History


Infectious Disease History: Reports: None





- Past Surgical History


HEENT Surgical History: Reports: Adenoidectomy, Tonsillectomy





Social & Family History





- Family History


Family Medical History: Noncontributory





- Tobacco Use


Smoking Status *Q: Never Smoker





- Caffeine Use


Caffeine Use: Reports: Soda





- Recreational Drug Use


Recreational Drug Use: No





ED ROS ENT





- Review of Systems


Review Of Systems: Comprehensive ROS is negative, except as noted in HPI.





ED EXAM, ENT





- Physical Exam


Exam: See Below


Exam Limited By: No Limitations


General Appearance: Alert, No Apparent Distress


Ears: Normal External Exam, Normal TMs


Nose: Normal Inspection


Mouth/Throat: Normal Inspection, Pharyngeal Erythema (mild)


Head: Atraumatic, Normocephalic


Neck: Normal Inspection.  No: Lymphadenopathy (L), Lymphadenopathy (R)


Respiratory/Chest: No Respiratory Distress, Lungs Clear, Normal Breath Sounds


Cardiovascular: Normal Peripheral Pulses, Regular Rate, Rhythm, No Murmur


Back: Normal Inspection


Extremities: Normal Inspection


Neurological: Alert, Oriented


Psychiatric: Normal Affect, Normal Mood


Skin: Warm, Dry, Intact, Normal Color, No Rash


Lymphatic: No Adenopathy





Course





- Vital Signs


Last Recorded V/S: 


 Last Vital Signs











Temp  36.1 C   12/10/19 18:31


 


Pulse  102 H  12/10/19 18:31


 


Resp  16   12/10/19 18:30


 


BP  112/74   12/10/19 18:31


 


Pulse Ox  97   12/10/19 18:31














- Orders/Labs/Meds


Orders: 


 Active Orders 24 hr











 Category Date Time Status


 


 CULTURE STREP A CONFIRMATION [RM] Stat Lab  12/10/19 19:26 Results


 


 STREP SCRN A RAPID W CULT CONF [RM] Stat Lab  12/10/19 19:26 Received











Meds: 


Medications














Discontinued Medications














Generic Name Dose Route Start Last Admin





  Trade Name Freq  PRN Reason Stop Dose Admin


 


Ketorolac Tromethamine  60 mg  12/10/19 19:58  





  Toradol  IM  12/10/19 19:59  





  ONETIME ONE   





     





     





     





     














- Re-Assessments/Exams


Free Text/Narrative Re-Assessment/Exam: 





12/10/19 19:32


Laughing, conversing and playing on her phone during her entire ER visit








Departure





- Departure


Time of Disposition: 19:59


Disposition: Home, Self-Care 01


Condition: Good


Clinical Impression: 


 Pharyngitis








- Discharge Information


Prescriptions: 


Diclofenac Sodium [Voltaren] 75 mg PO BIDMEALS PRN #20 tab.ec


 PRN Reason: Pain


Referrals: 


PCP,Not In Area [Primary Care Provider] - 


[facility] [Outside]


[facility] [Outside]


Forms:  ED Department Discharge


Additional Instructions: 


The following information is given to patients seen in the emergency department 

who are being discharged to home. This information is to outline your options 

for follow-up care. We provide all patients seen in our emergency department 

with a follow-up referral.





The need for follow-up, as well as the timing and circumstances, are variable 

depending upon the specifics of your emergency department visit.





If you don't have a primary care physician on staff, we will provide you with a 

referral. We always advise you to contact your personal physician following an 

emergency department visit to inform them of the circumstance of the visit and 

for follow-up with them and/or the need for any referrals to a consulting 

specialist.





The emergency department will also refer you to a specialist when appropriate. 

This referral assures that you have the opportunity for follow-up care with a 

specialist. All of these measure are taken in an effort to provide you with 

optimal care, which includes your follow-up.





Under all circumstances we always encourage you to contact your private 

physician who remains a resource for coordinating your care. When calling for 

follow-up care, please make the office aware that this follow-up is from your 

recent emergency room visit. If for any reason you are refused follow-up, 

please contact the [facility] Emergency 

Department at [phone] and asked to speak to the emergency department 

charge nurse.





1.  Diclofenac twice daily as needed for pain


2.  Saltwater gargles every 2 hours as needed for pain


3.  Follow up in primary care





Sepsis Event Note





- Evaluation


Sepsis Screening Result: No Definite Risk





- Focused Exam


Vital Signs: 


 Vital Signs











  Temp Pulse Pulse Resp BP Pulse Ox


 


 12/10/19 18:31  36.1 C   102 H   112/74  97


 


 12/10/19 18:30  36.4 C  94   16  112/74  97











Date Exam was Performed: 12/10/19


Time Exam was Performed: 19:59





- My Orders


Last 24 Hours: 


My Active Orders





12/10/19 19:26


CULTURE STREP A CONFIRMATION [RM] Stat 


STREP SCRN A RAPID W CULT CONF [RM] Stat 














- Assessment/Plan


Last 24 Hours: 


My Active Orders





12/10/19 19:26


CULTURE STREP A CONFIRMATION [RM] Stat 


STREP SCRN A RAPID W CULT CONF [RM] Stat I agree with the note

## 2021-06-29 NOTE — ED ADULT TRIAGE NOTE - HEIGHT IN INCHES
Date of Service:  6/29/2021    PCP: Garrett Rivas MD     Chief Complaint:   BPH, Urinary retention, Gross Hematuria     History of Present Illness:  The patient is a 85 year old male who was last seen on 03/29/2021.     The patient was seen by [facility] in the past.  He was last seen by Dr. Wilson in 2016.  He has a history of BPH as well as elevated PSAs.  He has had multiple prostate biopsies in the past for persistently elevated PSA levels.  His last level was 27. He had a slow stream voiding symptoms for several years.  He hadn't  seen a urologist since.       He returned on 01/04/2021 for cystoscopy and discussed possible TURP/GreenLight laser of the prostate.  He noted that over the past 24 hours there had not been much drainage from the catheter.  He had leaking around it.  Denied any fever chills.  At time of last visit the patient had close to 1800 cc in the bladder.  He was not in pain at that time.  Prior to prior visit, he had been experiencing dribbling and incontinence for a month now. He goes through 6-8 depends a day.       Patient returned on 03/03/2021 post-op PVP on 1-8-21 and was recently seen in the emergency department for urinary retention.  They tried to clear his zuniga and were unsuccessful and ended up just replacing it.  He states he was recently hospitalized for a fall and has only been out of the hospital for 5 days.  He is back on his blood thinners.  Discussed his options.     Patient returned to the office on 03/29/2021 for a follow up. The patient stated that his catheter was removed about a week ago. Patient complained about dysuria otherwise is satisfied with his voiding.     The patient returns to the office today after starting Finasteride 5 mg daily stating his flow is very good. Patient continues on Flomax 0.4 mg bid. Denies any dysuria or recent infections. Patient complains of nocturia 3-4 hours apart. His current voiding complaints include incomplete emptying,  frequency, urgency, and nocturia x3. His AUA score is 8, with a bother score of 2.       s.       Medications:  Current Outpatient Medications   Medication Sig Dispense Refill   • finasteride (PROSCAR) 5 MG tablet TAKE 1 TABLET BY MOUTH EVERY DAY 30 tablet 3   • tamsulosin (FLOMAX) 0.4 MG Cap Take 2 capsules by mouth daily after a meal. 60 capsule 3   • acetaminophen-codeine (TYLENOL NO.3) 300-30 MG per tablet Take 1-2 tablets by mouth every 6 hours as needed for Pain. 16 tablet 0   • cefadroxil (DURICEF) 500 MG capsule Take 1 capsule by mouth 1 time. NDC 25064-669-51  LOT#[id number]  EXP: 3/31/2023 1 capsule 0   • AMIODarone (PACERONE) 200 MG tablet Take 200 mg by mouth daily.     • amLODIPine (NORVASC) 5 MG tablet Take 5 mg by mouth daily.     • furosemide (LASIX) 40 MG tablet Take 40 mg by mouth daily.     • hydroxychloroquine (PLAQUENIL) 200 MG tablet Take by mouth daily.     • lisinopril (ZESTRIL) 20 MG tablet Take 20 mg by mouth daily.     • pravastatin (PRAVACHOL) 40 MG tablet Take 40 mg by mouth daily.     • warfarin (COUMADIN) 2 MG tablet Take 2 mg by mouth daily.       No current facility-administered medications for this visit.       Allergies:  ALLERGIES:  No Known Allergies    Past Medical History:   Past Medical History:   Diagnosis Date   • Abnormal PSA    • BPH (benign prostatic hyperplasia)    • Chronic prostatitis    • Sciatica     Chronic lumbar backache with arthritis       Family History:  No family history on file.    Surgical History:  Past Surgical History:   Procedure Laterality Date   • Joint replacement     • Prostate biopsy  02/13/1996    Negative biopsy x 3   :    Social History:  Social History     Socioeconomic History   • Marital status: /Civil Union     Spouse name: Not on file   • Number of children: Not on file   • Years of education: Not on file   • Highest education level: Not on file   Occupational History   • Not on file   Tobacco Use   • Smoking status: Former Smoker    • Smokeless tobacco: Never Used   Substance and Sexual Activity   • Alcohol use: Not Currently   • Drug use: Not on file   • Sexual activity: Not on file   Other Topics Concern   • Not on file   Social History Narrative   • Not on file     Social Determinants of Health     Financial Resource Strain:    • Social Determinants: Financial Resource Strain:    Food Insecurity:    • Social Determinants: Food Insecurity:    Transportation Needs:    • Lack of Transportation (Medical):    • Lack of Transportation (Non-Medical):    Physical Activity:    • Days of Exercise per Week:    • Minutes of Exercise per Session:    Stress:    • Social Determinants: Stress:    Social Connections:    • Social Determinants: Social Connections:    Intimate Partner Violence: Not At Risk   • Social Determinants: Intimate Partner Violence Past Fear: No   • Social Determinants: Intimate Partner Violence Current Fear: No        Physical Exam:  There were no vitals taken for this visit.    Constitutional:  Well developed, well nourished, afebrile.    In-Office Testing  No results found for this visit on 06/29/21.    PSA:  No results found for: PSA    Lab Results  URINE, BACTERIAL CULTURE  Order: [id number]  Status:  Final result   Visible to patient:  Yes (Patient Portal) Dx:  Urinary retention; Cloudy urine  Specimen Information: Urine         0 Result Notes     1 Patient Communication  REFLEXIVE URINE CULTURE >100,000 CFU/mL Proteus mirabilisAbnormal           Resulting Agency: [facility]   Susceptibility    Proteus mirabilis    Antibiotic Interpretation Value Comment   AMPICILLIN Susceptible <=2 ug/mL    AMPICILLIN/SULBACTAM Susceptible <=2 ug/mL    PIPERACILLIN/TAZOBAC Susceptible <=4 ug/mL    CEFAZOLIN (URINE) Susceptible <=4 ug/mL If susceptible, cefazolin predicts activity for other oral cephalosporins (cefaclor, cefdinir, cefpodoxime, cefprozil, cefuroxime, cephalexin, and loracarbef) when used for uncomplicated UTIs due to E. coli, K.  pneumo, and P. mirabilis.   CEFAZOLIN  <=4 ug/mL A cefazolin DENAE result of <=4 cannot be used to differentiate between susceptible and intermediate isolates.  Consider alternative therapy based on clinical response and severity of infection. This result should be used when considering treatment for any infection other than an uncomplicated UTI.   CEFUROXIME - AXETIL Susceptible <=1 ug/mL    CEFOXITIN Susceptible <=4 ug/mL    CEFTRIAXONE Susceptible <=1 ug/mL    AZTREONAM Susceptible <=1 ug/mL    AMIKACIN Susceptible <=2 ug/mL    GENTAMICIN Resistant >=16 ug/mL    CIPROFLOXACIN Susceptible <=0.25 ug/mL    TRIMETH / SULFA Susceptible <=20 ug/mL          Specimen Collected: 03/29/21 13:00 Last Resulted: 03/31/21 11:05               Radiology Results      Assessment and Plan:    Urinary retention with overflow incontinence, BPH, history of elevated PSA- Patient at with a long history of elevated PSA and BPH.  He was followed by [facility] in passes multiple negative biopsies.  He has overflow incontinence was found have 700 cc in the   Patient underwent a Photovaporization of his prostate on 01/08/2021  The patient has failed voiding trials the postop period.  Colon catheter has been out for several months.     Reports good flow and emptying well since starting Finasteride 5 mg daily. residual of 20 cc. Discussed options. Continue Finasteride 5 mg daily. Decrease Flomax to 0.4 mg once daily.      -Follow up in 5-6 months     I had a brief counseling session with the patient concerning the diagnosis and treatment options.     I performed a brief review of the patient's medical records.       I reviewed the data with patient, including diagnosis, prognosis, and treatment.    Tests reviewed: PVR, Urinalysis and Urine Culture    Tests ordered:  PVR, Uroflow and Urinalysis    On 6/29/2021, IGianfranco scribed the services personally performed by Geovanni Barney Jr., MD    The documentation recorded by the  scribe accurately and completely reflects the service(s) I personally performed and the decisions made by me.      Geovanni Barney Jr., MD [facility]  Department of Urology  [facility]  [phone]     4

## 2021-11-11 ENCOUNTER — EMERGENCY (EMERGENCY)
Facility: HOSPITAL | Age: 66
LOS: 1 days | Discharge: HOME | End: 2021-11-11
Attending: EMERGENCY MEDICINE | Admitting: EMERGENCY MEDICINE
Payer: MEDICAID

## 2021-11-11 VITALS
HEIGHT: 76 IN | OXYGEN SATURATION: 97 % | WEIGHT: 259.93 LBS | DIASTOLIC BLOOD PRESSURE: 80 MMHG | RESPIRATION RATE: 18 BRPM | TEMPERATURE: 98 F | HEART RATE: 77 BPM | SYSTOLIC BLOOD PRESSURE: 141 MMHG

## 2021-11-11 DIAGNOSIS — Z98.890 OTHER SPECIFIED POSTPROCEDURAL STATES: Chronic | ICD-10-CM

## 2021-11-11 DIAGNOSIS — M50.20 OTHER CERVICAL DISC DISPLACEMENT, UNSPECIFIED CERVICAL REGION: Chronic | ICD-10-CM

## 2021-11-11 PROCEDURE — 99285 EMERGENCY DEPT VISIT HI MDM: CPT

## 2021-11-12 VITALS
TEMPERATURE: 98 F | HEART RATE: 64 BPM | DIASTOLIC BLOOD PRESSURE: 94 MMHG | OXYGEN SATURATION: 98 % | SYSTOLIC BLOOD PRESSURE: 150 MMHG | RESPIRATION RATE: 18 BRPM

## 2021-11-12 LAB
ALBUMIN SERPL ELPH-MCNC: 4.4 G/DL — SIGNIFICANT CHANGE UP (ref 3.3–5)
ALP SERPL-CCNC: 68 U/L — SIGNIFICANT CHANGE UP (ref 40–120)
ALT FLD-CCNC: 23 U/L — SIGNIFICANT CHANGE UP (ref 10–45)
ANION GAP SERPL CALC-SCNC: 12 MMOL/L — SIGNIFICANT CHANGE UP (ref 5–17)
APPEARANCE UR: CLEAR — SIGNIFICANT CHANGE UP
AST SERPL-CCNC: 17 U/L — SIGNIFICANT CHANGE UP (ref 10–40)
BASE EXCESS BLDV CALC-SCNC: 3.7 MMOL/L — HIGH (ref -2–2)
BASOPHILS # BLD AUTO: 0.05 K/UL — SIGNIFICANT CHANGE UP (ref 0–0.2)
BASOPHILS NFR BLD AUTO: 0.7 % — SIGNIFICANT CHANGE UP (ref 0–2)
BILIRUB SERPL-MCNC: 0.4 MG/DL — SIGNIFICANT CHANGE UP (ref 0.2–1.2)
BILIRUB UR-MCNC: NEGATIVE — SIGNIFICANT CHANGE UP
BUN SERPL-MCNC: 24 MG/DL — HIGH (ref 7–23)
CA-I SERPL-SCNC: 1.22 MMOL/L — SIGNIFICANT CHANGE UP (ref 1.15–1.33)
CALCIUM SERPL-MCNC: 9.3 MG/DL — SIGNIFICANT CHANGE UP (ref 8.4–10.5)
CHLORIDE BLDV-SCNC: 105 MMOL/L — SIGNIFICANT CHANGE UP (ref 96–108)
CHLORIDE SERPL-SCNC: 103 MMOL/L — SIGNIFICANT CHANGE UP (ref 96–108)
CO2 BLDV-SCNC: 33 MMOL/L — HIGH (ref 22–26)
CO2 SERPL-SCNC: 26 MMOL/L — SIGNIFICANT CHANGE UP (ref 22–31)
COLOR SPEC: YELLOW — SIGNIFICANT CHANGE UP
CREAT SERPL-MCNC: 1.64 MG/DL — HIGH (ref 0.5–1.3)
DIFF PNL FLD: NEGATIVE — SIGNIFICANT CHANGE UP
EOSINOPHIL # BLD AUTO: 0.21 K/UL — SIGNIFICANT CHANGE UP (ref 0–0.5)
EOSINOPHIL NFR BLD AUTO: 3.1 % — SIGNIFICANT CHANGE UP (ref 0–6)
GAS PNL BLDV: 142 MMOL/L — SIGNIFICANT CHANGE UP (ref 136–145)
GAS PNL BLDV: SIGNIFICANT CHANGE UP
GAS PNL BLDV: SIGNIFICANT CHANGE UP
GLUCOSE BLDV-MCNC: 109 MG/DL — HIGH (ref 70–99)
GLUCOSE SERPL-MCNC: 112 MG/DL — HIGH (ref 70–99)
GLUCOSE UR QL: NEGATIVE — SIGNIFICANT CHANGE UP
HCO3 BLDV-SCNC: 31 MMOL/L — HIGH (ref 22–29)
HCT VFR BLD CALC: 43.8 % — SIGNIFICANT CHANGE UP (ref 39–50)
HCT VFR BLDA CALC: 44 % — SIGNIFICANT CHANGE UP (ref 39–51)
HGB BLD CALC-MCNC: 14.5 G/DL — SIGNIFICANT CHANGE UP (ref 12.6–17.4)
HGB BLD-MCNC: 14 G/DL — SIGNIFICANT CHANGE UP (ref 13–17)
IMM GRANULOCYTES NFR BLD AUTO: 0.3 % — SIGNIFICANT CHANGE UP (ref 0–1.5)
KETONES UR-MCNC: NEGATIVE — SIGNIFICANT CHANGE UP
LACTATE BLDV-MCNC: 0.7 MMOL/L — SIGNIFICANT CHANGE UP (ref 0.7–2)
LEUKOCYTE ESTERASE UR-ACNC: NEGATIVE — SIGNIFICANT CHANGE UP
LIDOCAIN IGE QN: 22 U/L — SIGNIFICANT CHANGE UP (ref 7–60)
LYMPHOCYTES # BLD AUTO: 1.88 K/UL — SIGNIFICANT CHANGE UP (ref 1–3.3)
LYMPHOCYTES # BLD AUTO: 27.7 % — SIGNIFICANT CHANGE UP (ref 13–44)
MAGNESIUM SERPL-MCNC: 1.9 MG/DL — SIGNIFICANT CHANGE UP (ref 1.6–2.6)
MCHC RBC-ENTMCNC: 29.5 PG — SIGNIFICANT CHANGE UP (ref 27–34)
MCHC RBC-ENTMCNC: 32 GM/DL — SIGNIFICANT CHANGE UP (ref 32–36)
MCV RBC AUTO: 92.4 FL — SIGNIFICANT CHANGE UP (ref 80–100)
MONOCYTES # BLD AUTO: 0.73 K/UL — SIGNIFICANT CHANGE UP (ref 0–0.9)
MONOCYTES NFR BLD AUTO: 10.8 % — SIGNIFICANT CHANGE UP (ref 2–14)
NEUTROPHILS # BLD AUTO: 3.9 K/UL — SIGNIFICANT CHANGE UP (ref 1.8–7.4)
NEUTROPHILS NFR BLD AUTO: 57.4 % — SIGNIFICANT CHANGE UP (ref 43–77)
NITRITE UR-MCNC: NEGATIVE — SIGNIFICANT CHANGE UP
NRBC # BLD: 0 /100 WBCS — SIGNIFICANT CHANGE UP (ref 0–0)
PCO2 BLDV: 56 MMHG — HIGH (ref 42–55)
PH BLDV: 7.35 — SIGNIFICANT CHANGE UP (ref 7.32–7.43)
PH UR: 6.5 — SIGNIFICANT CHANGE UP (ref 5–8)
PLATELET # BLD AUTO: 230 K/UL — SIGNIFICANT CHANGE UP (ref 150–400)
PO2 BLDV: 17 MMHG — LOW (ref 25–45)
POTASSIUM BLDV-SCNC: 3.9 MMOL/L — SIGNIFICANT CHANGE UP (ref 3.5–5.1)
POTASSIUM SERPL-MCNC: 3.8 MMOL/L — SIGNIFICANT CHANGE UP (ref 3.5–5.3)
POTASSIUM SERPL-SCNC: 3.8 MMOL/L — SIGNIFICANT CHANGE UP (ref 3.5–5.3)
PROT SERPL-MCNC: 7.5 G/DL — SIGNIFICANT CHANGE UP (ref 6–8.3)
PROT UR-MCNC: 100 — SIGNIFICANT CHANGE UP
RBC # BLD: 4.74 M/UL — SIGNIFICANT CHANGE UP (ref 4.2–5.8)
RBC # FLD: 13.4 % — SIGNIFICANT CHANGE UP (ref 10.3–14.5)
SAO2 % BLDV: 21 % — LOW (ref 67–88)
SARS-COV-2 RNA SPEC QL NAA+PROBE: SIGNIFICANT CHANGE UP
SODIUM SERPL-SCNC: 141 MMOL/L — SIGNIFICANT CHANGE UP (ref 135–145)
SP GR SPEC: 1.03 — HIGH (ref 1.01–1.02)
UROBILINOGEN FLD QL: ABNORMAL
WBC # BLD: 6.79 K/UL — SIGNIFICANT CHANGE UP (ref 3.8–10.5)
WBC # FLD AUTO: 6.79 K/UL — SIGNIFICANT CHANGE UP (ref 3.8–10.5)

## 2021-11-12 PROCEDURE — 83690 ASSAY OF LIPASE: CPT

## 2021-11-12 PROCEDURE — 84295 ASSAY OF SERUM SODIUM: CPT

## 2021-11-12 PROCEDURE — 99284 EMERGENCY DEPT VISIT MOD MDM: CPT | Mod: 25

## 2021-11-12 PROCEDURE — 82947 ASSAY GLUCOSE BLOOD QUANT: CPT

## 2021-11-12 PROCEDURE — 74177 CT ABD & PELVIS W/CONTRAST: CPT | Mod: 26,MA

## 2021-11-12 PROCEDURE — 82803 BLOOD GASES ANY COMBINATION: CPT

## 2021-11-12 PROCEDURE — 87635 SARS-COV-2 COVID-19 AMP PRB: CPT

## 2021-11-12 PROCEDURE — 84132 ASSAY OF SERUM POTASSIUM: CPT

## 2021-11-12 PROCEDURE — 74177 CT ABD & PELVIS W/CONTRAST: CPT | Mod: MA

## 2021-11-12 PROCEDURE — 80053 COMPREHEN METABOLIC PANEL: CPT

## 2021-11-12 PROCEDURE — 85014 HEMATOCRIT: CPT

## 2021-11-12 PROCEDURE — 85018 HEMOGLOBIN: CPT

## 2021-11-12 PROCEDURE — 81001 URINALYSIS AUTO W/SCOPE: CPT

## 2021-11-12 PROCEDURE — 85025 COMPLETE CBC W/AUTO DIFF WBC: CPT

## 2021-11-12 PROCEDURE — 82330 ASSAY OF CALCIUM: CPT

## 2021-11-12 PROCEDURE — 83605 ASSAY OF LACTIC ACID: CPT

## 2021-11-12 PROCEDURE — 82435 ASSAY OF BLOOD CHLORIDE: CPT

## 2021-11-12 PROCEDURE — 83735 ASSAY OF MAGNESIUM: CPT

## 2021-11-12 NOTE — ED PROVIDER NOTE - OBJECTIVE STATEMENT
67yo M Hx of HTN, HLD, DM presenting with complaints of abdominal pain. reports 6 days of L sided burning abdominal pain. no f/c, n/v/d, urinary symptoms, seen by pmd who started him on augmentin for possible "renal inflammation."

## 2021-11-12 NOTE — ED PROVIDER NOTE - PATIENT PORTAL LINK FT
You can access the FollowMyHealth Patient Portal offered by [facility] by registering at the following website: [email or web address]. By joining [facility]’s FollowMyHealth portal, you will also be able to view your health information using other applications (apps) compatible with our system.

## 2021-11-12 NOTE — ED PROVIDER NOTE - NSFOLLOWUPINSTRUCTIONS_ED_ALL_ED_FT
you were seen in the emergency department today for abdominal pain.   you had blood work and imaging performed with no emergent findings.   please follow up with your primary care doctor. take your results with you so that you can review them with your doctor.   please continue taking all prescribed home medications.   return to the emergency department for any new or worsening symptoms.     You were seen today in the emergency room for abdominal pain. Although the testing done today indicates that your pain is not from an acute emergency, your pain could still represent a more serious problem. Most commonly, the pain you are having is likely not something serious and will resolve in a few days, however testing was done today based on the symptoms that you currently have; so if you develop new or worsening symptoms this could be a sign of a problem that was not tested for and means you should come back to the emergency room or see your doctor urgently. You need to follow up with your doctor in the next 48-72 hours. If you develop any new or worsening symptoms you need to return immediately to the emergency department. If you experience any of the following please come right back to the emergency room: severe nausea and vomiting with inability to tolerate eating, severe worsening of your pain, a new fever, new bleeding in stool or vomit, confusion, new numbness or weakness, passing out.

## 2021-11-12 NOTE — ED PROVIDER NOTE - CLINICAL SUMMARY MEDICAL DECISION MAKING FREE TEXT BOX
pt with 6 days of left sided burning abd pain no n/v/d, f/c. started on augmentin by pmd for "kidney inflammation"? possible gastritis, diverticulitis, colitis. will obtain labs, CTAP. reassess.

## 2021-11-12 NOTE — ED ADULT NURSE NOTE - OBJECTIVE STATEMENT
66y male, AAOx3, PMH DM2 on metformin and insulin, complaining of abd pain x3 days, worsening prompting ED visit,

## 2021-11-12 NOTE — ED PROVIDER NOTE - ATTENDING CONTRIBUTION TO CARE
Pt w "burning" of L side of abd for several weeks, constant. on exam, pt is well appearing, no acute distress, unremarkable vitals, distended but non-tender abd. will obtain labs, ua, ct to r/o acute pathology, re-eval.

## 2021-11-13 PROBLEM — E66.9 OBESITY, UNSPECIFIED: Chronic | Status: ACTIVE | Noted: 2018-10-05

## 2021-11-17 NOTE — ED POST DISCHARGE NOTE - OTHER COMMUNICATION
11/22: recommendation was to correlate with UA which was done, no further intervention required - Betty Lay PA-C

## 2021-11-17 NOTE — ED POST DISCHARGE NOTE - DETAILS
11/17/21 - [facility] for 1522.  [place] 11/19: Attempted to contact pt, could not LVM mailbox full - Erika Omer PA-C 11/22: Attempted to contact pt, could not LVM mailbox full -

## 2021-11-17 NOTE — ED POST DISCHARGE NOTE - RESULT SUMMARY
Circumferential bladder thickening with clean UA but no culture, called to inform pt and make sure he follows up with PCP.

## 2021-12-13 NOTE — ED PROVIDER NOTE - PHYSICAL EXAMINATION
Gen: Alert, Well appearing. NAD    Head: NC, AT, PERRL, EOMI, normal lids/conjunctiva   ENT: Bilateral TM WNL, normal hearing, patent oropharynx without erythema/exudate, uvula midline  Neck: supple, no tenderness/meningismus/JVD   Pulm: Bilateral clear BS, normal resp effort, no wheeze/stridor/retractions  CV: RRR, no M/R/G, +dist pulses   Abd: soft, NT/ND, +BS, no guarding/rebound tenderness  Mskel: ++ tender mid forearm. no gross deformity to shoulder, elbow, wrist. sensation and rom intact. pulses intact.   Skin: dorsal hand lac, cr itnact. from of wrist and elbow.   Neuro: AAOx3, no sensory/motor deficits, CN 2-12 intact
no

## 2023-04-30 NOTE — ED ADULT NURSE NOTE - CAS ELECT INFOMATION PROVIDED
Shift: [id number]  Orientation/Cognitive: AOx4  Observation Goals (Met/ Not Met): partially met, see notes below.  Mobility Level/Assist Equipment: SBA  Fall Risk (Y/N): yes  Behavior Concerns: calm and cooperative  Pain Management: denies pain  Tele/VS/O2: VS stable and on room air. NSR  ABNL Lab/BG: CBC c PC and BMP - pending collection; Blood culture - in process.  Diet: regular  Bowel/Bladder: continent BB  Skin Concerns: none  Drains/Devices: PIV SL  Tests/Procedures for next shift: Echo Complete  Anticipated DC date & active delays: TBD      Observation Goals:    -diagnostic tests and consults completed and resulted - not yet, awaiting Pulmonology consult.    -vital signs normal or at patient baseline - VS stable and on room air.    -safe disposition plan has been identified - not yet.    -Nurse to notify provider when observation goals have been met and patient is ready for discharge. - not yet.   DC instructions

## 2023-06-09 ENCOUNTER — INPATIENT (INPATIENT)
Facility: HOSPITAL | Age: 68
LOS: 4 days | Discharge: HOME HEALTH | End: 2023-06-14
Attending: STUDENT IN AN ORGANIZED HEALTH CARE EDUCATION/TRAINING PROGRAM | Admitting: STUDENT IN AN ORGANIZED HEALTH CARE EDUCATION/TRAINING PROGRAM
Payer: MEDICARE

## 2023-06-09 VITALS
OXYGEN SATURATION: 100 % | SYSTOLIC BLOOD PRESSURE: 172 MMHG | DIASTOLIC BLOOD PRESSURE: 115 MMHG | RESPIRATION RATE: 18 BRPM | HEART RATE: 74 BPM | TEMPERATURE: 99 F

## 2023-06-09 DIAGNOSIS — M50.20 OTHER CERVICAL DISC DISPLACEMENT, UNSPECIFIED CERVICAL REGION: Chronic | ICD-10-CM

## 2023-06-09 DIAGNOSIS — Z98.890 OTHER SPECIFIED POSTPROCEDURAL STATES: Chronic | ICD-10-CM

## 2023-06-09 DIAGNOSIS — R47.1 DYSARTHRIA AND ANARTHRIA: ICD-10-CM

## 2023-06-09 LAB
ALBUMIN SERPL ELPH-MCNC: 4.7 G/DL — SIGNIFICANT CHANGE UP (ref 3.3–5)
ALP SERPL-CCNC: 86 U/L — SIGNIFICANT CHANGE UP (ref 40–120)
ALT FLD-CCNC: 18 U/L — SIGNIFICANT CHANGE UP (ref 4–41)
ANION GAP SERPL CALC-SCNC: 10 MMOL/L — SIGNIFICANT CHANGE UP (ref 7–14)
APTT BLD: 32.6 SEC — SIGNIFICANT CHANGE UP (ref 27–36.3)
AST SERPL-CCNC: 18 U/L — SIGNIFICANT CHANGE UP (ref 4–40)
BASOPHILS # BLD AUTO: 0.03 K/UL — SIGNIFICANT CHANGE UP (ref 0–0.2)
BASOPHILS NFR BLD AUTO: 0.4 % — SIGNIFICANT CHANGE UP (ref 0–2)
BILIRUB SERPL-MCNC: 0.4 MG/DL — SIGNIFICANT CHANGE UP (ref 0.2–1.2)
BUN SERPL-MCNC: 16 MG/DL — SIGNIFICANT CHANGE UP (ref 7–23)
CALCIUM SERPL-MCNC: 9.2 MG/DL — SIGNIFICANT CHANGE UP (ref 8.4–10.5)
CHLORIDE SERPL-SCNC: 106 MMOL/L — SIGNIFICANT CHANGE UP (ref 98–107)
CO2 SERPL-SCNC: 27 MMOL/L — SIGNIFICANT CHANGE UP (ref 22–31)
CREAT SERPL-MCNC: 1.46 MG/DL — HIGH (ref 0.5–1.3)
EGFR: 53 ML/MIN/1.73M2 — LOW
EOSINOPHIL # BLD AUTO: 0.15 K/UL — SIGNIFICANT CHANGE UP (ref 0–0.5)
EOSINOPHIL NFR BLD AUTO: 2 % — SIGNIFICANT CHANGE UP (ref 0–6)
GLUCOSE SERPL-MCNC: 129 MG/DL — HIGH (ref 70–99)
HCT VFR BLD CALC: 41.3 % — SIGNIFICANT CHANGE UP (ref 39–50)
HGB BLD-MCNC: 13.8 G/DL — SIGNIFICANT CHANGE UP (ref 13–17)
IANC: 4.84 K/UL — SIGNIFICANT CHANGE UP (ref 1.8–7.4)
IMM GRANULOCYTES NFR BLD AUTO: 0.5 % — SIGNIFICANT CHANGE UP (ref 0–0.9)
INR BLD: 0.96 RATIO — SIGNIFICANT CHANGE UP (ref 0.88–1.16)
LYMPHOCYTES # BLD AUTO: 1.92 K/UL — SIGNIFICANT CHANGE UP (ref 1–3.3)
LYMPHOCYTES # BLD AUTO: 25.2 % — SIGNIFICANT CHANGE UP (ref 13–44)
MCHC RBC-ENTMCNC: 28.9 PG — SIGNIFICANT CHANGE UP (ref 27–34)
MCHC RBC-ENTMCNC: 33.4 GM/DL — SIGNIFICANT CHANGE UP (ref 32–36)
MCV RBC AUTO: 86.6 FL — SIGNIFICANT CHANGE UP (ref 80–100)
MONOCYTES # BLD AUTO: 0.64 K/UL — SIGNIFICANT CHANGE UP (ref 0–0.9)
MONOCYTES NFR BLD AUTO: 8.4 % — SIGNIFICANT CHANGE UP (ref 2–14)
NEUTROPHILS # BLD AUTO: 4.84 K/UL — SIGNIFICANT CHANGE UP (ref 1.8–7.4)
NEUTROPHILS NFR BLD AUTO: 63.5 % — SIGNIFICANT CHANGE UP (ref 43–77)
NRBC # BLD: 0 /100 WBCS — SIGNIFICANT CHANGE UP (ref 0–0)
NRBC # FLD: 0 K/UL — SIGNIFICANT CHANGE UP (ref 0–0)
PLATELET # BLD AUTO: 233 K/UL — SIGNIFICANT CHANGE UP (ref 150–400)
POTASSIUM SERPL-MCNC: 3.8 MMOL/L — SIGNIFICANT CHANGE UP (ref 3.5–5.3)
POTASSIUM SERPL-SCNC: 3.8 MMOL/L — SIGNIFICANT CHANGE UP (ref 3.5–5.3)
PROT SERPL-MCNC: 7.4 G/DL — SIGNIFICANT CHANGE UP (ref 6–8.3)
PROTHROM AB SERPL-ACNC: 11.1 SEC — SIGNIFICANT CHANGE UP (ref 10.5–13.4)
RBC # BLD: 4.77 M/UL — SIGNIFICANT CHANGE UP (ref 4.2–5.8)
RBC # FLD: 13.2 % — SIGNIFICANT CHANGE UP (ref 10.3–14.5)
SODIUM SERPL-SCNC: 143 MMOL/L — SIGNIFICANT CHANGE UP (ref 135–145)
TROPONIN T, HIGH SENSITIVITY RESULT: 39 NG/L — SIGNIFICANT CHANGE UP
WBC # BLD: 7.62 K/UL — SIGNIFICANT CHANGE UP (ref 3.8–10.5)
WBC # FLD AUTO: 7.62 K/UL — SIGNIFICANT CHANGE UP (ref 3.8–10.5)

## 2023-06-09 PROCEDURE — 99223 1ST HOSP IP/OBS HIGH 75: CPT

## 2023-06-09 PROCEDURE — 99285 EMERGENCY DEPT VISIT HI MDM: CPT

## 2023-06-09 PROCEDURE — 70498 CT ANGIOGRAPHY NECK: CPT | Mod: 26,MA

## 2023-06-09 PROCEDURE — 0042T: CPT | Mod: MA

## 2023-06-09 PROCEDURE — 70496 CT ANGIOGRAPHY HEAD: CPT | Mod: 26,MA

## 2023-06-09 RX ORDER — CLOPIDOGREL BISULFATE 75 MG/1
300 TABLET, FILM COATED ORAL ONCE
Refills: 0 | Status: COMPLETED | OUTPATIENT
Start: 2023-06-09 | End: 2023-06-09

## 2023-06-09 RX ORDER — ASPIRIN/CALCIUM CARB/MAGNESIUM 324 MG
81 TABLET ORAL ONCE
Refills: 0 | Status: COMPLETED | OUTPATIENT
Start: 2023-06-09 | End: 2023-06-09

## 2023-06-09 RX ADMIN — CLOPIDOGREL BISULFATE 300 MILLIGRAM(S): 75 TABLET, FILM COATED ORAL at 22:36

## 2023-06-09 RX ADMIN — Medication 81 MILLIGRAM(S): at 22:37

## 2023-06-09 NOTE — ED PROVIDER NOTE - NSICDXPASTSURGICALHX_GEN_ALL_CORE_FT
PAST SURGICAL HISTORY:  Cervical disc herniation     History of arthroscopy of right shoulder     No significant past surgical history

## 2023-06-09 NOTE — H&P ADULT - HISTORY OF PRESENT ILLNESS
67 y/o M with PMH of HTN, HLD, Gout, T2DM, peripheral neuropathy presents with R facial droop and speech difficulty. Pt reports onset of garbled speech a few days ago while playing cards however also notes he was drunk at the time so attributed it to that. States his symptoms improved a bit and over the next few days he thought his speech difficulty was related to his dentures. Today wife and daughter noted his speech was slurred and urged him to come in. No past neurologic history. Denies any headache, visual deficits, fever, chills, chest pain, abdominal pain, numbness/tingling throughout extremities. Lives at home with wife and independent with ADLs. Never been on antiplatelets (aspirin/plavix) or anticoag (eliquis/lovenox/xarelto). Pt admits to having about 5 drinks of vodka a few times a week however some weeks has drinks every day. Denies symptoms of withdraw when he goes periods without drinking.

## 2023-06-09 NOTE — ED PROVIDER NOTE - CLINICAL SUMMARY MEDICAL DECISION MAKING FREE TEXT BOX
68 p/w dysarthria LKN 12pm  concern for ischemic stroke, metabolic derangement, infection   code stroke activated  ct, cta, labs, neuro cx  not tpa candidate at this time, likely not LVO.

## 2023-06-09 NOTE — ED PROVIDER NOTE - PHYSICAL EXAMINATION
Vital signs reviewed  GENERAL: Patient nontoxic appearing, NAD  HEAD: NCAT  EYES: Anicteric  ENT: MMM  NECK: Supple, non tender  RESPIRATORY: Normal respiratory effort. CTA B/L. No wheezing, rales, rhonchi  CARDIOVASCULAR: Regular rate and rhythm  ABDOMEN: Soft. Nondistended. Nontender. No guarding or rebound. No CVA tenderness.  MUSCULOSKELETAL/EXTREMITIES: Brisk cap refill. 2+ radial pulses. No leg edema.  SKIN:  Warm and dry  MENTAL STATUS: AAOx3  LANG/SPEECH: R lower facial,   RUE extremity arm drift compared to L  mild dysarthria  cn 2-12 intact

## 2023-06-09 NOTE — H&P ADULT - PROBLEM SELECTOR PLAN 4
-on 40 units of lantus and oral hypoglycemics  -c/w 25 units at bedtime, low ISS before meals and at bedtime  -CC diet   -trend FS  -f/u [id number]

## 2023-06-09 NOTE — H&P ADULT - PROBLEM SELECTOR PLAN 1
-pt p/w right facial droop and dysarthria c/f CVA  -CTA H/N no acute pathology  -appreciate neuro recs. s/p Aspirin 81 and Plavix 300mg loading dose. c/w [id number] for 3 months followed by ASA 81 alone per ALAN  -started on Atorvastatin 80, titrate to LDL<70  -Pending MRI brain w/o contrast   -f/u TTE with bubble study  - will likely need ILR, monitor on tele  - Neurochecks and Vital signs q4h  - Permissive HTN up to 220/120 mmHg for first 24 hours after the symptom onset followed by gradual normotension.   - BGM goals 140-180  - PT/OT evaluation  -passed dysphagia screen, started on diet   -Stroke education provided

## 2023-06-09 NOTE — H&P ADULT - NSHPLABSRESULTS_GEN_ALL_CORE
(06-09 @ 19:05)                      13.8  7.62 )-----------( 233                 41.3    Neutrophils = 4.84 (63.5%)  Lymphocytes = 1.92 (25.2%)  Eosinophils = 0.15 (2.0%)  Basophils = 0.03 (0.4%)  Monocytes = 0.64 (8.4%)  Bands = --%    06-09    143  |  106  |  16  ----------------------------<  129<H>  3.8   |  27  |  1.46<H>    Ca    9.2      09 Jun 2023 19:05    TPro  7.4  /  Alb  4.7  /  TBili  0.4  /  DBili  x   /  AST  18  /  ALT  18  /  AlkPhos  86  06-09    ( 09 Jun 2023 19:05 )   PT: 11.1 sec;   INR: 0.96 ratio;       PTT:32.6 sec      < from: CT Angio Neck Stroke Protocol w/ IV Cont (06.09.23 @ 19:17) >      IMPRESSION:    CTA Neck: No flow-limiting stenosis in the extracranial carotid or   vertebral arteries.    CTA Head: Multifocal stenosis anterior and posterior circulation, as   described in detail above. No evidence of large vessel occlusion,   aneurysm or malformation visualized.    CT Perfusion: No predicted core infarct. Areas at risk demarcated   throughout the bilateral cerebral hemispheres and cerebellum, without   distinct territorial distribution, likely artifactual.    < end of copied text >        Labs personally reviewed  Imaging reviewed  EKG: NSR with 1st degree av block. no acute st changes

## 2023-06-09 NOTE — H&P ADULT - NSHPREVIEWOFSYSTEMS_GEN_ALL_CORE
CONSTITUTIONAL:  +fatigue No weight loss, fever, chills  HEENT:  Eyes:  No visual loss, blurred vision, double vision or yellow sclerae. Ears, Nose, Throat:  No hearing loss, sneezing, congestion, runny nose or sore throat.  SKIN:  No rash or itching.  CARDIOVASCULAR:  No chest pain, chest pressure or chest discomfort. No palpitations or edema.  RESPIRATORY:  No shortness of breath, cough or sputum.  GASTROINTESTINAL:  No anorexia, nausea, vomiting or diarrhea. No abdominal pain or blood.  GENITOURINARY:  Denies hematuria, dysuria.   NEUROLOGICAL:  +facial droop +dysarthria No headache, dizziness, syncope, paralysis, ataxia, numbness or tingling in the extremities. No change in bowel or bladder control.  MUSCULOSKELETAL:  No muscle, back pain, joint pain or stiffness.  PSYCHIATRIC:  No history of depression or anxiety.  ENDOCRINOLOGIC:  No reports of sweating, cold or heat intolerance. No polyuria or polydipsia.  ALLERGIES:  No history of asthma, hives, eczema or rhinitis.

## 2023-06-09 NOTE — ED ADULT TRIAGE NOTE - CHIEF COMPLAINT QUOTE
c/o sudden onset slurred speech and facial asymmetry. noted pt to have  slurred speech ataxia during the onset to finger test and  right sided facial droop. last seen normal 1330 today, evaluated by Dr. Muñoz in triage, Code stroke called pt sent to CT for studies.

## 2023-06-09 NOTE — H&P ADULT - ASSESSMENT
69 y/o M with PMH of HTN, HLD, Gout, T2DM, peripheral neuropathy presents with R facial droop and speech difficulty admitted for r/o CVA

## 2023-06-09 NOTE — H&P ADULT - PROBLEM SELECTOR PLAN 2
-unknown baseline function. Suspect likely CKD  -monitor in the setting of contrast administration  -avoid nephrotoxins  -trend BMP

## 2023-06-09 NOTE — CONSULT NOTE ADULT - ATTENDING COMMENTS
Exam:  No aphasia.  Right UMN type facial palsy.  Mild dysarthria.  Right HP  Right arm pronator drift and mild clumsiness.    Right: Delt 4-->4+, Triceps 4+. HR 4+    < from: CT Angio Neck Stroke Protocol w/ IV Cont (06.09.23 @ 19:17) >    CTA Neck: No flow-limiting stenosis in the extracranial carotid or   vertebral arteries.    CTA Head: Multifocal stenosis anterior and posterior circulation, as   described in detail above. No evidence of large vessel occlusion,   aneurysm or malformation visualized.    CT Perfusion: No predicted core infarct. Areas at risk demarcated   throughout the bilateral cerebral hemispheres and cerebellum, without   distinct territorial distribution, likely artifactual.    < end of copied text >    < from: CT Brain Stroke Protocol (06.09.23 @ 19:16) >    IMPRESSION:    No evidence of acute territorial infarct, hemorrhage or mass effect.    Chronic right frontal infarct, and additional findings described in   detail above.    < end of copied text >    A/P  Mr. Oakes is a 67 yo man with multiple vascular risk factors with new stroke clinically - dysarthria, R HP.  Mechanism clinically is likely lacunar small vessel disease but he has significant intracranial large artery atherosclerosis.   I agree with work up and management as above.   I counselled the patient regarding control of vascular risk factors.  Thank you.

## 2023-06-09 NOTE — CONSULT NOTE ADULT - SUBJECTIVE AND OBJECTIVE BOX
HPI:  68 y.o. RH M with PMH of HTN, HLD, Gout, T2DM, peripheral neuropathy presented to [facility] ED as a code stroke for R facial droop and speech difficulty. LKN 05:30 hr on 6/9/23. Woke up normal. Around noon time, patient noticed that his speech was slurred. Wife overheard him speak to one of his friends on the phone and noticed that it did not make sense. Brought to ED due to persisting symptom. No past neurologic history. Denies any chest pain, abdominal pain, numbness/tingling throughout extremities. Lives at home with wife and independent with ADLs. Never been on antiplatelets (aspirin/plavix) or anticoag (eliquis/lovenox/xarelto).    (Stroke only)  NIHSS: 9 (R lower facial, RUE & L sided drift, mild dysarthria, RUE ataxia)  MRS: 0    REVIEW OF SYSTEMS    A 10-system ROS was performed and is negative except for those items noted above and/or in the HPI.    PAST MEDICAL & SURGICAL HISTORY:  DM (diabetes mellitus)      HTN (hypertension)      HLD (hyperlipidemia)      Obese      HTN (hypertension)      Cervical disc herniation      History of arthroscopy of right shoulder      No significant past surgical history        FAMILY HISTORY:    SOCIAL HISTORY: as noted above    MEDICATIONS (HOME):  Home Medications:  enalapril 10 mg oral tablet: 1 tab(s) orally once a day (11 Oct 2018 07:14)  losartan-hydrochlorothiazide 100mg-12.5mg oral tablet: 1 tab(s) orally once a day (11 Oct 2018 07:14)  metformin: 500 milligram(s) orally 2 times a day (11 Oct 2018 07:14)  omeprazole 40 mg oral delayed release capsule: 1 cap(s) orally once a day (11 Oct 2018 07:14)    MEDICATIONS  (STANDING):    MEDICATIONS  (PRN):    ALLERGIES/INTOLERANCES:  Allergies  No Known Allergies    Intolerances    VITALS & EXAMINATION:  Vital Signs Last 24 Hrs  T(C): 37 (09 Jun 2023 18:44), Max: 37 (09 Jun 2023 18:44)  T(F): 98.6 (09 Jun 2023 18:44), Max: 98.6 (09 Jun 2023 18:44)  HR: 74 (09 Jun 2023 18:44) (74 - 74)  BP: 172/115 (09 Jun 2023 18:44) (172/115 - 172/115)  BP(mean): --  RR: 18 (09 Jun 2023 18:44) (18 - 18)  SpO2: 100% (09 Jun 2023 18:44) (100% - 100%)    Parameters below as of 09 Jun 2023 18:44  Patient On (Oxygen Delivery Method): room air        General:  Constitutional: Obese Male, appears stated age, in no apparent distress including pain  Head: Normocephalic & atraumatic.    Neurological (>12):  MS: Awake, alert, oriented to person, place, situation, time. Normal affect. Follows all commands.    Language: Speech is clear, fluent with good repetition & comprehension (able to name objects ring, watch, phone)    CNs: VFF. EOMI no nystagmus, no diplopia. V1-3 intact to LT, well developed masseter muscles b/l. R lower facial droop. full eye closure strength b/l. Hearing grossly normal (rubbing fingers) b/l. nHead turning & shoulder shrug intact b/l. Tongue midline, normal movements, no atrophy.    Motor: Normal muscle bulk & tone. No noticeable tremor or seizure. No pronator drift.              Deltoid	Biceps	Triceps	Wrist	Finger ABd	   R	5	5	5	5	5		5 	  L	5	5	5	5	5		5    	H-Flex	H-Ext	H-ABd	H-ADd	K-Flex	K-Ext	D-Flex	P-Flex  R	5	5	5	5	5	5	5	5 	   L	5	5	5	5	5	5	5	5	     Sensation: Intact to LT b/l throughout.     Cortical: Extinction on DSS (neglect): none    Reflexes:              Biceps(C5)       BR(C6)     Triceps(C7)               Patellar(L4)    Achilles(S1)    Plantar Resp  R	2	          2	             2		        2		    2		Down   L	2	          2	             2		        2		    2		Down     Coordination: RUE FTN dysmetria    Gait: Normal Romberg. No postural instability. Normal stance and tandem gait.     LABORATORY:  CBC   Chem       LFTs   Coagulopathy   Lipid Panel   A1c   Cardiac enzymes     U/A   CSF  Immunological  Other    STUDIES & IMAGING:  Studies (EKG, EEG, EMG, etc):     Radiology (XR, CT, MR, U/S, TTE/JESSICA): HPI:  68 y.o. RH M with PMH of HTN, HLD, Gout, T2DM, peripheral neuropathy presented to [facility] ED as a code stroke for R facial droop and speech difficulty. LKN 05:30 hr on 6/9/23. Woke up normal. Around noon time, patient noticed that his speech was slurred. Wife overheard him speak to one of his friends on the phone and noticed that it did not make sense. Brought to ED due to persisting symptom. No past neurologic history. Denies any headache, visual deficits, fever, chills, chest pain, abdominal pain, numbness/tingling throughout extremities. Lives at home with wife and independent with ADLs. Never been on antiplatelets (aspirin/plavix) or anticoag (eliquis/lovenox/xarelto). Noted that he was slurring his speech 2 days ago suddenly but was consuming alcohol with a friend.     (Stroke only)  NIHSS: 9 (R lower facial, RUE & L sided drift, mild dysarthria, RUE ataxia) -> NIHSS: 6 upon reassssment improved in answering month and age & LLE drift resolved  MRS: 0    REVIEW OF SYSTEMS    A 10-system ROS was performed and is negative except for those items noted above and/or in the HPI.    PAST MEDICAL & SURGICAL HISTORY:  DM (diabetes mellitus)      HTN (hypertension)      HLD (hyperlipidemia)      Obese      HTN (hypertension)      Cervical disc herniation      History of arthroscopy of right shoulder      No significant past surgical history        FAMILY HISTORY:    SOCIAL HISTORY: as noted above    MEDICATIONS (HOME):  Home Medications:  enalapril 10 mg oral tablet: 1 tab(s) orally once a day (11 Oct 2018 07:14)  losartan-hydrochlorothiazide 100mg-12.5mg oral tablet: 1 tab(s) orally once a day (11 Oct 2018 07:14)  metformin: 500 milligram(s) orally 2 times a day (11 Oct 2018 07:14)  omeprazole 40 mg oral delayed release capsule: 1 cap(s) orally once a day (11 Oct 2018 07:14)    MEDICATIONS  (STANDING):    MEDICATIONS  (PRN):    ALLERGIES/INTOLERANCES:  Allergies  No Known Allergies    Intolerances    VITALS & EXAMINATION:  Vital Signs Last 24 Hrs  T(C): 37 (09 Jun 2023 18:44), Max: 37 (09 Jun 2023 18:44)  T(F): 98.6 (09 Jun 2023 18:44), Max: 98.6 (09 Jun 2023 18:44)  HR: 74 (09 Jun 2023 18:44) (74 - 74)  BP: 172/115 (09 Jun 2023 18:44) (172/115 - 172/115)  BP(mean): --  RR: 18 (09 Jun 2023 18:44) (18 - 18)  SpO2: 100% (09 Jun 2023 18:44) (100% - 100%)    Parameters below as of 09 Jun 2023 18:44  Patient On (Oxygen Delivery Method): room air        General:  Constitutional: Obese Male, appears stated age, in no apparent distress including pain  Head: Normocephalic & atraumatic.    Neurological (>12):  MS: Eyes open. Responds well to verbal stimuli. Awake, alert, oriented to person, place, situation, time. Normal affect. Follows all commands.    Language: Speech with mild to moderate dysarthria. Fluent with good repetition & comprehension (able to name objects ring, watch, phone)    CNs: VFF. EOMI no nystagmus, no diplopia. V1-3 intact to LT, well developed masseter muscles b/l. R lower facial droop. full eye closure strength b/l. Hearing grossly normal (rubbing fingers) b/l. Head turning & shoulder shrug intact b/l. Tongue midline, normal movements, no atrophy.    Motor: Normal muscle bulk & tone. No noticeable tremor or seizure. RUE drift. Initially b/l LE drift, but resolved LLE drift.               Deltoid	Biceps	Triceps	Wrist	Finger ABd	   R	5	5	5	5	5		5 	  L	5	5	5	5	5		5    	H-FlexD-Flex	P-Flex  R	4+	5	5		   L	5	5	5		     Sensation: Intact to LT b/l throughout.     Cortical: Extinction on DSS (neglect): none    Reflexes:              Biceps(C5)       BR(C6)   Patellar(L4)    Achilles(S1)    Plantar Resp  R	2	          2	                 3+	             1		  Up  L	2	          2	                 2		  1		Down     Coordination: RUE FTN dysmetria    Gait: Normal Romberg. No postural instability. Normal stance and tandem gait.     LABORATORY:  CBC   Chem       LFTs   Coagulopathy   Lipid Panel   A1c   Cardiac enzymes     U/A   CSF  Immunological  Other    STUDIES & IMAGING:  Studies (EKG, EEG, EMG, etc):     Radiology (XR, CT, MR, U/S, TTE/JSESICA):  < from: CT Brain Stroke Protocol (06.09.23 @ 19:16) >    No evidence of acute territorial infarct, hemorrhage or mass effect.    Chronic right frontal infarct, and additional findings described in   detail above.

## 2023-06-09 NOTE — H&P ADULT - PROBLEM SELECTOR PLAN 3
-no h/o of w/d and no evidence of withdrawal on exam. As such, will watch off ciwa for now  -counseled extensively on need to d/c alcohol use  -start on MV, folic acid, thiamine  -states he can stop drinking on his own, declined additional services

## 2023-06-09 NOTE — ED PROVIDER NOTE - OBJECTIVE STATEMENT
68  HTN, HLD, Gout, T2DM, peripheral neuropathy presented to [facility] ED   as a code stroke for R facial droop and speech difficulty. LKN 05:30 hr on 6/9/23. Woke up normal. Around noon time, patient noticed that his speech was slurred. Wife overheard him speak to one of his friends on the phone and noticed that it did not make sense.  code stroke activated, brought to ER 68  HTN, HLD, Gout, T2DM, peripheral neuropathy presented to [facility] ED   as a code stroke for R facial droop and speech difficulty. Woke up normal.   LKN Around noon time, patient noticed that his speech was slurred. PTA Wife overheard him speak to one of his friends on the phone and noticed that it did not make sense. code stroke activated once patient in ER

## 2023-06-09 NOTE — STROKE CODE NOTE - NIH STROKE SCALE: 1A. LEVEL OF CONSCIOUSNESS, QM
Writer spoke to Dr. Lundberg with bili results.  4.8 at 27 hrs,LR.  No new orders.    (0) Alert; keenly responsive

## 2023-06-09 NOTE — CONSULT NOTE ADULT - ASSESSMENT
68 y.o. RH M with PMH of HTN, HLD, Gout, T2DM, peripheral neuropathy presented to [facility] ED as a code stroke for R facial droop and speech difficulty. LKN 05:30 hr on 6/9/23. Initial NIHSS: 9 (R lower facial, RUE & L sided drift, mild dysarthria, RUE ataxia) -> NIHSS: 6 upon reassssment improved in answering month and age & LLE drift resolved. mRS 0. Neuro exam revealing 4+/5 R hip flexion, and 3+ R patellar and R upgoing toe. CTH showing chronic R frontal infarct, no acute findings. CTA awaiting read but with bilateral, worse on R>L intracranial atherosclerotic disease.    Not a thrombolytic candidate given outside the window  Not a thrombectomy candidate given no LVO    Impression: R ataxic hemiparesis associated with mild to moderate dysarthria of unclear etiology. Localization is L hemispheric dysfunction. Ddx include acute ischemic stroke vs r/o toxic metabolic vs r/o infectious    Recommendations:  [] Medicine admission for stroke evaluation  [] Aspirin 81 and Plavix 300mg loading dose then ASA81/[id number] for 3 months followed [facility]SA [facility]  [] Atorvastatin 80, titrate to LDL<70  [] DVT prophylaxis  [] MRI brain w/o contrast   [] TTE  [] +/- ILR   [] HbA1C and Lipid Panel  [] Telemonitoring;  Neurochecks and Vital signs per unit protocol  [] Permissive HTN up to 220/120 mmHg for first 24 hours after the symptom onset followed by gradual normotension.   [] BGM goals 140-180  [] PT/OT evaluation  [] NPO until clears Dysphagia screen, otherwise Swallow evaluation  [] Stroke education provided    Case discussed with Telestroke attending Dr. Nagel  Case to be seen with neurology attending Dr. Cosby in AM   68 y.o. RH M with PMH of HTN, HLD, Gout, T2DM, peripheral neuropathy presented to [facility] ED as a code stroke for R facial droop and speech difficulty. LKN 05:30 hr on 6/9/23. Initial NIHSS: 9 (R lower facial, RUE & L sided drift, mild dysarthria, RUE ataxia) -> NIHSS: 6 upon reassssment improved in answering month and age & LLE drift resolved. mRS 0. Neuro exam revealing 4+/5 R hip flexion, and 3+ R patellar and R upgoing toe. CTH showing chronic R frontal infarct, no acute findings. CTA awaiting read but with bilateral, worse on R>L intracranial atherosclerotic disease.    Not a thrombolytic candidate given outside the window  Not a thrombectomy candidate given no LVO    Impression: R ataxic hemiparesis associated with mild to moderate dysarthria of unclear etiology. Localization is L hemispheric dysfunction. Ddx include acute ischemic stroke.    Recommendations:  [] Medicine admission for stroke evaluation  [] Aspirin 81 and Plavix 300mg loading dose then ASA81/[id number] for 3 months followed by ASA 81 alone per [facility]  [] Atorvastatin 80, titrate to LDL<70  [] DVT prophylaxis  [] MRI brain w/o contrast   [] TTE  [] +/- ILR   [] HbA1C and Lipid Panel  [] Telemonitoring;  Neurochecks and Vital signs per unit protocol  [] Permissive HTN up to 220/120 mmHg for first 24 hours after the symptom onset followed by gradual normotension.   [] BGM goals 140-180  [] PT/OT evaluation  [] NPO until clears Dysphagia screen, otherwise Swallow evaluation  [] Stroke education provided    Case discussed with Telestroke attending Dr. Nagel  Case to be seen with neurology attending Dr. Cosby in AM

## 2023-06-09 NOTE — ED ADULT NURSE NOTE - NSFALLUNIVINTERV_ED_ALL_ED
Bed/Stretcher in lowest position, wheels locked, appropriate side rails in place/Call bell, personal items and telephone in reach/Instruct patient to call for assistance before getting out of bed/chair/stretcher/Non-slip footwear applied when patient is off stretcher/[place] to call system/Physically safe environment - no spills, clutter or unnecessary equipment/Purposeful proactive rounding/Room/bathroom lighting operational, light cord in reach

## 2023-06-09 NOTE — H&P ADULT - NSHPPHYSICALEXAM_GEN_ALL_CORE
GENERAL APPEARANCE: Well developed, alert and cooperative. NAD.   HEENT:  PERRL, EOMI. External auditory canals normal, hearing grossly intact.  NECK: Neck supple, non-tender   CARDIAC: Normal S1 and S2. No S3, S4 or murmurs. Rhythm is regular.  LUNGS: Clear to auscultation and percussion without rales, rhonchi, wheezing or diminished breath sounds.  ABDOMEN: Positive bowel sounds. Soft, distended, nontender. No guarding or rebound.   MUSCULOSKELETAL: ROM intact spine and extremities. No joint erythema or tenderness.   BACK: normal posture, no spinal deformity, symmetry of spinal muscles, without tenderness  EXTREMITIES: No significant deformity or joint abnormality. No edema. Peripheral pulses intact. No varicosities.  NEUROLOGICAL: Right facial droop. Dysarthric speech.  Strength and sensation symmetric and intact throughout.   SKIN: Skin normal color, texture and turgor with no lesions or eruptions.  PSYCHIATRIC: AOx3.Normal affect and behavior.

## 2023-06-09 NOTE — ED PROVIDER NOTE - NSICDXPASTMEDICALHX_GEN_ALL_CORE_FT
PAST MEDICAL HISTORY:  DM (diabetes mellitus)     HLD (hyperlipidemia)     HTN (hypertension)     HTN (hypertension)     Obese

## 2023-06-09 NOTE — H&P ADULT - TIME BILLING
I have spent a total of 80 minutes or greater to prepare to see the patient, obtaining and reviewing history, physical examination, explaining the diagnosis, prognosis and treatment plan with the patient/family/caregiver. I also have spent the time ordering studies and testing, interpreting results, medicine reconciliation, subspecialty consultation and documentation as above.

## 2023-06-09 NOTE — ED PROVIDER NOTE - ATTENDING CONTRIBUTION TO CARE
The patient is a 68y Obese Male who has a past medical and surgery history of DM HTN HLD Obese c disc herniation PTED with LKN 11-12 with rt facial and difficulty speaking good extremity strength    Vital Signs   PE: as described; my additions and exceptions are noted in the chart    DATA:  EKG: pending at time of evaluation  LAB: Pending at time of evaluation    IMPRESSION/RISK:  Dx= ?CVA with face speech findings  Consideration include Seen by neuro recc admission and stroke w/u   Plan  serial exams  will followup reccs  at minimum admission for MRI/MRA will also need cardiac monitoring ECHO and probable carotid duplex  TBA

## 2023-06-09 NOTE — ED PROVIDER NOTE - NS ED ROS FT
Constitutional: No fever  Eyes:  No visual changes  ENMT:  No neck pain  Cardiac:  No chest pain  Respiratory:  No SOB  GI:  No  abdominal pain.  :  No hematuria  MS:  No back pain.  Neuro:  +ataxia, +speech troubles   Skin:  No skin rash

## 2023-06-10 DIAGNOSIS — I10 ESSENTIAL (PRIMARY) HYPERTENSION: ICD-10-CM

## 2023-06-10 DIAGNOSIS — F10.10 ALCOHOL ABUSE, UNCOMPLICATED: ICD-10-CM

## 2023-06-10 DIAGNOSIS — F10.19 ALCOHOL ABUSE WITH UNSPECIFIED ALCOHOL-INDUCED DISORDER: ICD-10-CM

## 2023-06-10 DIAGNOSIS — Z29.9 ENCOUNTER FOR PROPHYLACTIC MEASURES, UNSPECIFIED: ICD-10-CM

## 2023-06-10 DIAGNOSIS — R63.8 OTHER SYMPTOMS AND SIGNS CONCERNING FOOD AND FLUID INTAKE: ICD-10-CM

## 2023-06-10 DIAGNOSIS — I63.9 CEREBRAL INFARCTION, UNSPECIFIED: ICD-10-CM

## 2023-06-10 DIAGNOSIS — N28.9 DISORDER OF KIDNEY AND URETER, UNSPECIFIED: ICD-10-CM

## 2023-06-10 DIAGNOSIS — E11.9 TYPE 2 DIABETES MELLITUS WITHOUT COMPLICATIONS: ICD-10-CM

## 2023-06-10 DIAGNOSIS — M10.9 GOUT, UNSPECIFIED: ICD-10-CM

## 2023-06-10 DIAGNOSIS — N17.9 ACUTE KIDNEY FAILURE, UNSPECIFIED: ICD-10-CM

## 2023-06-10 DIAGNOSIS — G62.9 POLYNEUROPATHY, UNSPECIFIED: ICD-10-CM

## 2023-06-10 DIAGNOSIS — E78.5 HYPERLIPIDEMIA, UNSPECIFIED: ICD-10-CM

## 2023-06-10 LAB
A1C WITH ESTIMATED AVERAGE GLUCOSE RESULT: 7.9 % — HIGH (ref 4–5.6)
ANION GAP SERPL CALC-SCNC: 11 MMOL/L — SIGNIFICANT CHANGE UP (ref 7–14)
BUN SERPL-MCNC: 15 MG/DL — SIGNIFICANT CHANGE UP (ref 7–23)
CALCIUM SERPL-MCNC: 8.9 MG/DL — SIGNIFICANT CHANGE UP (ref 8.4–10.5)
CHLORIDE SERPL-SCNC: 107 MMOL/L — SIGNIFICANT CHANGE UP (ref 98–107)
CHOLEST SERPL-MCNC: 188 MG/DL — SIGNIFICANT CHANGE UP
CO2 SERPL-SCNC: 22 MMOL/L — SIGNIFICANT CHANGE UP (ref 22–31)
CREAT SERPL-MCNC: 1.4 MG/DL — HIGH (ref 0.5–1.3)
EGFR: 55 ML/MIN/1.73M2 — LOW
ESTIMATED AVERAGE GLUCOSE: 180 — SIGNIFICANT CHANGE UP
GLUCOSE BLDC GLUCOMTR-MCNC: 135 MG/DL — HIGH (ref 70–99)
GLUCOSE BLDC GLUCOMTR-MCNC: 142 MG/DL — HIGH (ref 70–99)
GLUCOSE BLDC GLUCOMTR-MCNC: 164 MG/DL — HIGH (ref 70–99)
GLUCOSE BLDC GLUCOMTR-MCNC: 176 MG/DL — HIGH (ref 70–99)
GLUCOSE BLDC GLUCOMTR-MCNC: 194 MG/DL — HIGH (ref 70–99)
GLUCOSE BLDC GLUCOMTR-MCNC: 217 MG/DL — HIGH (ref 70–99)
GLUCOSE SERPL-MCNC: 195 MG/DL — HIGH (ref 70–99)
HCT VFR BLD CALC: 40.6 % — SIGNIFICANT CHANGE UP (ref 39–50)
HDLC SERPL-MCNC: 51 MG/DL — SIGNIFICANT CHANGE UP
HGB BLD-MCNC: 13.5 G/DL — SIGNIFICANT CHANGE UP (ref 13–17)
LIPID PNL WITH DIRECT LDL SERPL: 85 MG/DL — SIGNIFICANT CHANGE UP
MAGNESIUM SERPL-MCNC: 1.8 MG/DL — SIGNIFICANT CHANGE UP (ref 1.6–2.6)
MCHC RBC-ENTMCNC: 29.4 PG — SIGNIFICANT CHANGE UP (ref 27–34)
MCHC RBC-ENTMCNC: 33.3 GM/DL — SIGNIFICANT CHANGE UP (ref 32–36)
MCV RBC AUTO: 88.5 FL — SIGNIFICANT CHANGE UP (ref 80–100)
NON HDL CHOLESTEROL: 137 MG/DL — HIGH
NRBC # BLD: 0 /100 WBCS — SIGNIFICANT CHANGE UP (ref 0–0)
NRBC # FLD: 0 K/UL — SIGNIFICANT CHANGE UP (ref 0–0)
PHOSPHATE SERPL-MCNC: 2.7 MG/DL — SIGNIFICANT CHANGE UP (ref 2.5–4.5)
PLATELET # BLD AUTO: 215 K/UL — SIGNIFICANT CHANGE UP (ref 150–400)
POTASSIUM SERPL-MCNC: 3.5 MMOL/L — SIGNIFICANT CHANGE UP (ref 3.5–5.3)
POTASSIUM SERPL-SCNC: 3.5 MMOL/L — SIGNIFICANT CHANGE UP (ref 3.5–5.3)
RBC # BLD: 4.59 M/UL — SIGNIFICANT CHANGE UP (ref 4.2–5.8)
RBC # FLD: 13.3 % — SIGNIFICANT CHANGE UP (ref 10.3–14.5)
SODIUM SERPL-SCNC: 140 MMOL/L — SIGNIFICANT CHANGE UP (ref 135–145)
TRIGL SERPL-MCNC: 258 MG/DL — HIGH
TSH SERPL-MCNC: 1.62 UIU/ML — SIGNIFICANT CHANGE UP (ref 0.27–4.2)
WBC # BLD: 6.78 K/UL — SIGNIFICANT CHANGE UP (ref 3.8–10.5)
WBC # FLD AUTO: 6.78 K/UL — SIGNIFICANT CHANGE UP (ref 3.8–10.5)

## 2023-06-10 PROCEDURE — 99233 SBSQ HOSP IP/OBS HIGH 50: CPT

## 2023-06-10 PROCEDURE — 99223 1ST HOSP IP/OBS HIGH 75: CPT

## 2023-06-10 RX ORDER — INSULIN GLARGINE 100 [IU]/ML
25 INJECTION, SOLUTION SUBCUTANEOUS ONCE
Refills: 0 | Status: COMPLETED | OUTPATIENT
Start: 2023-06-10 | End: 2023-06-10

## 2023-06-10 RX ORDER — SODIUM CHLORIDE 9 MG/ML
1000 INJECTION, SOLUTION INTRAVENOUS
Refills: 0 | Status: DISCONTINUED | OUTPATIENT
Start: 2023-06-10 | End: 2023-06-14

## 2023-06-10 RX ORDER — CARVEDILOL PHOSPHATE 80 MG/1
1 CAPSULE, EXTENDED RELEASE ORAL
Refills: 0 | DISCHARGE

## 2023-06-10 RX ORDER — LOSARTAN/HYDROCHLOROTHIAZIDE 100MG-25MG
1 TABLET ORAL
Qty: 0 | Refills: 0 | DISCHARGE

## 2023-06-10 RX ORDER — INSULIN LISPRO 100/ML
VIAL (ML) SUBCUTANEOUS
Refills: 0 | Status: DISCONTINUED | OUTPATIENT
Start: 2023-06-10 | End: 2023-06-14

## 2023-06-10 RX ORDER — ATORVASTATIN CALCIUM 80 MG/1
80 TABLET, FILM COATED ORAL AT BEDTIME
Refills: 0 | Status: DISCONTINUED | OUTPATIENT
Start: 2023-06-10 | End: 2023-06-14

## 2023-06-10 RX ORDER — GABAPENTIN 400 MG/1
1 CAPSULE ORAL
Refills: 0 | DISCHARGE

## 2023-06-10 RX ORDER — THIAMINE MONONITRATE (VIT B1) 100 MG
100 TABLET ORAL DAILY
Refills: 0 | Status: DISCONTINUED | OUTPATIENT
Start: 2023-06-10 | End: 2023-06-14

## 2023-06-10 RX ORDER — DEXTROSE 50 % IN WATER 50 %
25 SYRINGE (ML) INTRAVENOUS ONCE
Refills: 0 | Status: DISCONTINUED | OUTPATIENT
Start: 2023-06-10 | End: 2023-06-14

## 2023-06-10 RX ORDER — ASPIRIN/CALCIUM CARB/MAGNESIUM 324 MG
81 TABLET ORAL DAILY
Refills: 0 | Status: DISCONTINUED | OUTPATIENT
Start: 2023-06-10 | End: 2023-06-14

## 2023-06-10 RX ORDER — CLOPIDOGREL BISULFATE 75 MG/1
75 TABLET, FILM COATED ORAL DAILY
Refills: 0 | Status: DISCONTINUED | OUTPATIENT
Start: 2023-06-10 | End: 2023-06-14

## 2023-06-10 RX ORDER — GABAPENTIN 400 MG/1
300 CAPSULE ORAL
Refills: 0 | Status: DISCONTINUED | OUTPATIENT
Start: 2023-06-10 | End: 2023-06-14

## 2023-06-10 RX ORDER — INSULIN GLARGINE 100 [IU]/ML
25 INJECTION, SOLUTION SUBCUTANEOUS AT BEDTIME
Refills: 0 | Status: DISCONTINUED | OUTPATIENT
Start: 2023-06-10 | End: 2023-06-14

## 2023-06-10 RX ORDER — INSULIN LISPRO 100/ML
VIAL (ML) SUBCUTANEOUS AT BEDTIME
Refills: 0 | Status: DISCONTINUED | OUTPATIENT
Start: 2023-06-10 | End: 2023-06-14

## 2023-06-10 RX ORDER — AMLODIPINE BESYLATE 2.5 MG/1
5 TABLET ORAL DAILY
Refills: 0 | Status: DISCONTINUED | OUTPATIENT
Start: 2023-06-10 | End: 2023-06-11

## 2023-06-10 RX ORDER — FENOFIBRATE,MICRONIZED 130 MG
145 CAPSULE ORAL DAILY
Refills: 0 | Status: DISCONTINUED | OUTPATIENT
Start: 2023-06-10 | End: 2023-06-14

## 2023-06-10 RX ORDER — DEXTROSE 50 % IN WATER 50 %
15 SYRINGE (ML) INTRAVENOUS ONCE
Refills: 0 | Status: DISCONTINUED | OUTPATIENT
Start: 2023-06-10 | End: 2023-06-14

## 2023-06-10 RX ORDER — FOLIC ACID 0.8 MG
1 TABLET ORAL DAILY
Refills: 0 | Status: DISCONTINUED | OUTPATIENT
Start: 2023-06-10 | End: 2023-06-14

## 2023-06-10 RX ORDER — ALLOPURINOL 300 MG
200 TABLET ORAL DAILY
Refills: 0 | Status: DISCONTINUED | OUTPATIENT
Start: 2023-06-10 | End: 2023-06-14

## 2023-06-10 RX ORDER — CARVEDILOL PHOSPHATE 80 MG/1
12.5 CAPSULE, EXTENDED RELEASE ORAL EVERY 12 HOURS
Refills: 0 | Status: DISCONTINUED | OUTPATIENT
Start: 2023-06-10 | End: 2023-06-14

## 2023-06-10 RX ORDER — ENOXAPARIN SODIUM 100 MG/ML
40 INJECTION SUBCUTANEOUS EVERY 24 HOURS
Refills: 0 | Status: DISCONTINUED | OUTPATIENT
Start: 2023-06-10 | End: 2023-06-14

## 2023-06-10 RX ORDER — DEXTROSE 50 % IN WATER 50 %
12.5 SYRINGE (ML) INTRAVENOUS ONCE
Refills: 0 | Status: DISCONTINUED | OUTPATIENT
Start: 2023-06-10 | End: 2023-06-14

## 2023-06-10 RX ORDER — FENOFIBRATE,MICRONIZED 130 MG
1 CAPSULE ORAL
Refills: 0 | DISCHARGE

## 2023-06-10 RX ORDER — PANTOPRAZOLE SODIUM 20 MG/1
40 TABLET, DELAYED RELEASE ORAL
Refills: 0 | Status: DISCONTINUED | OUTPATIENT
Start: 2023-06-10 | End: 2023-06-14

## 2023-06-10 RX ORDER — GLUCAGON INJECTION, SOLUTION 0.5 MG/.1ML
1 INJECTION, SOLUTION SUBCUTANEOUS ONCE
Refills: 0 | Status: DISCONTINUED | OUTPATIENT
Start: 2023-06-10 | End: 2023-06-14

## 2023-06-10 RX ADMIN — GABAPENTIN 300 MILLIGRAM(S): 400 CAPSULE ORAL at 17:14

## 2023-06-10 RX ADMIN — Medication 1: at 17:23

## 2023-06-10 RX ADMIN — Medication 1 MILLIGRAM(S): at 08:53

## 2023-06-10 RX ADMIN — Medication 81 MILLIGRAM(S): at 08:53

## 2023-06-10 RX ADMIN — Medication 145 MILLIGRAM(S): at 08:53

## 2023-06-10 RX ADMIN — ENOXAPARIN SODIUM 40 MILLIGRAM(S): 100 INJECTION SUBCUTANEOUS at 18:26

## 2023-06-10 RX ADMIN — INSULIN GLARGINE 25 UNIT(S): 100 INJECTION, SOLUTION SUBCUTANEOUS at 02:14

## 2023-06-10 RX ADMIN — PANTOPRAZOLE SODIUM 40 MILLIGRAM(S): 20 TABLET, DELAYED RELEASE ORAL at 07:01

## 2023-06-10 RX ADMIN — Medication 100 MILLIGRAM(S): at 08:53

## 2023-06-10 RX ADMIN — Medication 1: at 13:46

## 2023-06-10 RX ADMIN — ATORVASTATIN CALCIUM 80 MILLIGRAM(S): 80 TABLET, FILM COATED ORAL at 22:58

## 2023-06-10 RX ADMIN — INSULIN GLARGINE 25 UNIT(S): 100 INJECTION, SOLUTION SUBCUTANEOUS at 22:58

## 2023-06-10 RX ADMIN — GABAPENTIN 300 MILLIGRAM(S): 400 CAPSULE ORAL at 07:01

## 2023-06-10 RX ADMIN — Medication 1 TABLET(S): at 08:53

## 2023-06-10 RX ADMIN — CLOPIDOGREL BISULFATE 75 MILLIGRAM(S): 75 TABLET, FILM COATED ORAL at 08:53

## 2023-06-10 RX ADMIN — Medication 200 MILLIGRAM(S): at 08:53

## 2023-06-10 NOTE — PATIENT PROFILE ADULT - SURGICAL SITE INCISION
General Sunscreen Counseling: I recommend a broad spectrum sunscreen with a SPF of 30 or higher.  I explained that SPF 30 sunscreens block approximately 97 percent of the sun's harmful rays.  Sunscreens should be applied at least 15 minutes prior to expected sun exposure and then every 2 hours after that as long as sun exposure continues. If swimming or exercising sunscreen should be reapplied every 45 minutes to an hour after getting wet or sweating.  I also recommend a lip balm with a sunscreen as well. Sun protective clothing can be used in lieu of sunscreen but must be worn the entire time you are exposed to the sun's rays.
Detail Level: Detailed
no

## 2023-06-10 NOTE — PROGRESS NOTE ADULT - PROBLEM SELECTOR PLAN 4
-on 40 units of lantus and oral hypoglycemics  -c/w 25 units at bedtime, low ISS before meals and at bedtime  -CC diet   -trend FS  -f/u [id number] - A1C 7.9  - home meds: glyburide-metformin 10-1000mg BID, basaglar 40U bedtime  - c/w lantus 25U bedtime  - AM FSG appropriate  - carb consistent diet

## 2023-06-10 NOTE — PROGRESS NOTE ADULT - PROBLEM SELECTOR PLAN 5
-hold home meds given need for permissive HTN as above - home meds: HCTZ 25mg daily, amlodipine 5mg daily, losartan 50mg daily, coreg 12.5mg BID  - now s/p 24h permissive HTN -- goal gradual normotension   - c/w home amlodipine and coreg  - hold HCTZ/losartan in setting of elevated Cr, as above -- restart as indicated

## 2023-06-10 NOTE — PROGRESS NOTE ADULT - ASSESSMENT
69 y/o M with PMH of HTN, HLD, Gout, T2DM, peripheral neuropathy presents with R facial droop and speech difficulty admitted for r/o CVA Pt is a 67 yo M with PMH HTN, HLD, gout, and T2D (c/b peripheral neuropathy) p/w acute slurred speech, facial asymmetry, and ataxia x few hours. Stroke code called on arrival with NIHSS 9, which self resolved without tPA. CTH with chronic R frontal infarct, CTA h/n with MF stenosis anterior and posterior circulation, CTP neg. Neuro and EP following and pending MR brain and TTE +/- ILR.

## 2023-06-10 NOTE — PROGRESS NOTE ADULT - PROBLEM SELECTOR PLAN 1
-pt p/w right facial droop and dysarthria c/f CVA  -CTA H/N no acute pathology  -appreciate neuro recs. s/p Aspirin 81 and Plavix 300mg loading dose. c/w ASA81/[id number] for 3 months followed by ASA 81 alone per ALAN  -started on Atorvastatin 80, titrate to LDL<70  -Pending MRI brain w/o contrast   -f/u TTE with bubble study  - will likely need ILR, monitor on tele  - Neurochecks and Vital signs q4h  - Permissive HTN up to 220/120 mmHg for first 24 hours after the symptom onset followed by gradual normotension.   - BGM goals 140-180  - PT/OT evaluation  -passed dysphagia screen, started on diet   -Stroke education provided - pt p/w acute onset slurred speech, facial asymmetry, and ataxia x few hours  - hemodynamically stable on arrival  - stroke code called with NIHSS 9 -- symptoms spontaneously resolved, did not receive tPA  - CTH neg acute findings, with chronic R frontal infarct  - CTA h/n with MF stenosis anterior and posterior circulation  - CTP neg  - s/p plavix load  - neuro following, recommending daily ASA/statin/plavix; plavix can be dc'd after 3 weeks  - TSH WNL, A1C 7.9, f/u lipid profile  - PT/OT rec home no needs  - f/u MR brain  - f/u TTE  - EP consulted for ILR  - continue to monitor on tele  - s/p 24h permissive HTN -- now goal gradual normotension

## 2023-06-10 NOTE — OCCUPATIONAL THERAPY INITIAL EVALUATION ADULT - PERTINENT HX OF CURRENT PROBLEM, REHAB EVAL
68 year old male with history of HTN, HLD, Gout, T2DM, peripheral neuropathy presents with right facial droop and speech difficulty. CT brain revealing chronic right frontal infarct. Admitted to r/o acute CVA.

## 2023-06-10 NOTE — PROGRESS NOTE ADULT - PROBLEM SELECTOR PLAN 6
DVT ppx: lovenox - c/w home fenofibrate 145mg daily  - started on atorvastatin 80mg daily  - f/u lipid panel

## 2023-06-10 NOTE — PROGRESS NOTE ADULT - SUBJECTIVE AND OBJECTIVE BOX
[facility]  Fabienne DO Elizabeth  Available on MS Teams  Pager: [phone]    Patient is a 68y old  Male who presents with a chief complaint of dysarthria (10 José Miguel 2023 13:46)    Subjective:  Pt seen and examined at bedside resting comfortably. Wife and family bedside. Says he is back to normal. Hoping to be able to go home soon. Understands he needs to remain inpt for further w/u in setting of c/f stroke. No acute complaints.    REVIEW OF SYSTEMS:    CONSTITUTIONAL: No weakness, fevers or chills.   EYES/ENT: No visual changes;  No vertigo or throat pain   NECK: No pain or stiffness  RESPIRATORY: No cough, wheezing, hemoptysis; No shortness of breath  CARDIOVASCULAR: No chest pain or palpitations  GASTROINTESTINAL: No abdominal or epigastric pain. No nausea, vomiting, or hematemesis; No diarrhea or constipation. No melena or hematochezia.  GENITOURINARY: No dysuria, frequency or hematuria  NEUROLOGICAL: No numbness or weakness  SKIN: No itching, burning, rashes, or lesions   All other review of systems is negative unless indicated above.    VITAL SIGNS:  T(C): 36.6 (06-10-23 @ 16:37), Max: 37 (06-09-23 @ 18:44)  T(F): 97.9 (06-10-23 @ 16:37), Max: 98.6 (06-09-23 @ 18:44)  HR: 74 (06-10-23 @ 16:37) (68 - 83)  BP: 163/107 (06-10-23 @ 16:37) (142/88 - 177/119)  BP(mean): --  RR: 20 (06-10-23 @ 16:37) (14 - 21)  SpO2: 100% (06-10-23 @ 16:37) (96% - 100%)  Wt(kg): --    PHYSICAL EXAM:  Constitutional: resting comfortably in bed; NAD  Head: NC/AT  Eyes: PERRL, EOMI, anicteric sclera  ENT: no nasal discharge; MMM  Neck: supple; no JVD  Respiratory: CTA B/L; no W/R/R  Cardiac: +S1/S2; RRR; no M/R/G  Gastrointestinal: soft, NT/ND; no rebound or guarding; +BSx4  Extremities: WWP, no clubbing or cyanosis; no peripheral edema  Musculoskeletal: NROM x4; no joint swelling, tenderness or erythema; mm strength 5/5 throughout  Vascular: 2+ radial, DP/PT pulses B/L  Dermatologic: skin warm, dry and intact; no rashes, wounds, or scars  Neurologic: AAOx3; CNII-XII grossly intact; no focal deficits; sensation intact and equal in all extremities  Psychiatric: affect and characteristics of appearance, verbalizations, behaviors are appropriate    MEDICATIONS  (STANDING):  allopurinol 200 milliGRAM(s) Oral daily  amLODIPine   Tablet 5 milliGRAM(s) Oral daily  aspirin enteric coated 81 milliGRAM(s) Oral daily  atorvastatin 80 milliGRAM(s) Oral at bedtime  clopidogrel Tablet 75 milliGRAM(s) Oral daily  dextrose 5%. 1000 milliLiter(s) (100 mL/Hr) IV Continuous <Continuous>  dextrose 5%. 1000 milliLiter(s) (50 mL/Hr) IV Continuous <Continuous>  dextrose 50% Injectable 25 Gram(s) IV Push once  dextrose 50% Injectable 12.5 Gram(s) IV Push once  dextrose 50% Injectable 25 Gram(s) IV Push once  enoxaparin Injectable 40 milliGRAM(s) SubCutaneous every 24 hours  fenofibrate Tablet 145 milliGRAM(s) Oral daily  folic acid 1 milliGRAM(s) Oral daily  gabapentin 300 milliGRAM(s) Oral two times a day  glucagon  Injectable 1 milliGRAM(s) IntraMuscular once  insulin glargine Injectable (LANTUS) 25 Unit(s) SubCutaneous at bedtime  insulin lispro (ADMELOG) corrective regimen sliding scale   SubCutaneous three times a day before meals  insulin lispro (ADMELOG) corrective regimen sliding scale   SubCutaneous at bedtime  multivitamin 1 Tablet(s) Oral daily  pantoprazole    Tablet 40 milliGRAM(s) Oral before breakfast  thiamine 100 milliGRAM(s) Oral daily    MEDICATIONS  (PRN):  dextrose Oral Gel 15 Gram(s) Oral once PRN Blood Glucose LESS THAN 70 milliGRAM(s)/deciliter    LABS:                        13.5   6.78  )-----------( 215      ( 10 José Miguel 2023 14:45 )             40.6     06-10    140  |  107  |  15  ----------------------------<  195<H>  3.5   |  22  |  1.40<H>    Ca    8.9      10 José Miguel 2023 14:45  Phos  2.7     06-10  Mg     1.80     06-10    TPro  7.4  /  Alb  4.7  /  TBili  0.4  /  DBili  x   /  AST  18  /  ALT  18  /  AlkPhos  86  06-09    PT/INR - ( 09 Jun 2023 19:05 )   PT: 11.1 sec;   INR: 0.96 ratio         PTT - ( 09 Jun 2023 19:05 )  PTT:32.6 sec    CAPILLARY BLOOD GLUCOSE  130 (09 Jun 2023 19:04)      POCT Blood Glucose.: 194 mg/dL (10 José Miguel 2023 17:16)      RADIOLOGY & ADDITIONAL TESTS: Reviewed.

## 2023-06-10 NOTE — ED ADULT NURSE REASSESSMENT NOTE - NS ED NURSE REASSESS COMMENT FT1
Pt a&ox4, NSR on cardiac monitor, Denies CP, SOB, dyspnea, or pain at this time. Respirations are even and unlabored, no signs of respiratory distress.

## 2023-06-10 NOTE — PROGRESS NOTE ADULT - PROBLEM SELECTOR PLAN 3
-no h/o of w/d and no evidence of withdrawal on exam. As such, will watch off ciwa for now  -counseled extensively on need to d/c alcohol use  -start on MV, folic acid, thiamine  -states he can stop drinking on his own, declined additional services - pt reports drinking 3-4x/wk, typically 4-5 drinks with vodka at a time; denies hx w/d   - does not appear to be in w/d  - no indication for CIWA at present  - educated on safe drinking habits and encouraged cutting back - pt in contemplative state

## 2023-06-10 NOTE — PROGRESS NOTE ADULT - PROBLEM SELECTOR PLAN 9
- F: none  - E: replete K<4, Mg<2  - N: DASH/TLC, carb consistent  - D: lovenox 40mg q24h  - G: protonix 40mg daily    code: full  dispo: pending medical optimization, PT recs home no needs

## 2023-06-10 NOTE — PROGRESS NOTE ADULT - PROBLEM SELECTOR PLAN 2
-unknown baseline function. Suspect likely CKD  -monitor in the setting of contrast administration  -avoid nephrotoxins  -trend BMP - Cr 1.46---1.4  - unknown baseline; denies hx kidney dysfunction  - suspect component of CKD in setting of T2D  - continue to trend -- if not improving, can check UA and urine lytes  - avoid nephrotoxic agents and renally dose medications  - monitor I&O

## 2023-06-10 NOTE — CONSULT NOTE ADULT - SUBJECTIVE AND OBJECTIVE BOX
EP Attending  HISTORY OF PRESENT ILLNESS: HPI:  69 y/o M with PMH of HTN, HLD, Gout, T2DM, peripheral neuropathy presents with R facial droop and speech difficulty. Pt reports onset of garbled speech a few days ago while playing cards however also notes he was drunk at the time so attributed it to that. States his symptoms improved a bit and over the next few days he thought his speech difficulty was related to his dentures. Today wife and daughter noted his speech was slurred and urged him to come in. No past neurologic history. Denies any headache, visual deficits, fever, chills, chest pain, abdominal pain, numbness/tingling throughout extremities. Lives at home with wife and independent with ADLs. Never been on antiplatelets (aspirin/plavix) or anticoag (eliquis/lovenox/xarelto). Pt admits to having about 5 drinks of vodka a few times a week however some weeks has drinks every day. Denies symptoms of withdraw when he goes periods without drinking. (09 Jun 2023 22:51)    Mr Oakes is known to Dr Michelle, for management of HTN and LE PAD.  He had episodes of unclear speech while drinking ,and again while he had teeth out of his mouth... didn't realize these were TIA episodes until he woke up the next morning (after 2-3 days of waxing/waning symptoms) and his speech seemed altered to his family members. He also noticed subtle facial droop. He is able to make intelligible statements, and understands language well. His right arm feels weak.  A 10 pt ROS is otherwise negative.    PAST MEDICAL & SURGICAL HISTORY:  DM (diabetes mellitus)  HTN (hypertension)  HLD (hyperlipidemia)  Obese  HTN (hypertension)  Cervical disc herniation  History of arthroscopy of right shoulder  No significant past surgical history      MEDICATIONS  (STANDING):  allopurinol 200 milliGRAM(s) Oral daily  amLODIPine   Tablet 5 milliGRAM(s) Oral daily  aspirin enteric coated 81 milliGRAM(s) Oral daily  atorvastatin 80 milliGRAM(s) Oral at bedtime  clopidogrel Tablet 75 milliGRAM(s) Oral daily  dextrose 5%. 1000 milliLiter(s) (100 mL/Hr) IV Continuous <Continuous>  dextrose 5%. 1000 milliLiter(s) (50 mL/Hr) IV Continuous <Continuous>  dextrose 50% Injectable 25 Gram(s) IV Push once  dextrose 50% Injectable 12.5 Gram(s) IV Push once  dextrose 50% Injectable 25 Gram(s) IV Push once  enoxaparin Injectable 40 milliGRAM(s) SubCutaneous every 24 hours  fenofibrate Tablet 145 milliGRAM(s) Oral daily  folic acid 1 milliGRAM(s) Oral daily  gabapentin 300 milliGRAM(s) Oral two times a day  glucagon  Injectable 1 milliGRAM(s) IntraMuscular once  insulin glargine Injectable (LANTUS) 25 Unit(s) SubCutaneous at bedtime  insulin lispro (ADMELOG) corrective regimen sliding scale   SubCutaneous three times a day before meals  insulin lispro (ADMELOG) corrective regimen sliding scale   SubCutaneous at bedtime  multivitamin 1 Tablet(s) Oral daily  pantoprazole    Tablet 40 milliGRAM(s) Oral before breakfast  thiamine 100 milliGRAM(s) Oral daily    Allergies    No Known Allergies    Intolerances    FAMILY HISTORY:  No pertinent family history in first degree relatives      Non-contributary for premature coronary disease or sudden cardiac death    SOCIAL HISTORY:    [ x] Non-smoker  [ ] Smoker  [ ] Alcohol      PHYSICAL EXAM:  T(C): 36.6 (06-10-23 @ 17:01), Max: 36.9 (06-10-23 @ 10:21)  HR: 85 (06-10-23 @ 18:33) (68 - 85)  BP: 168/94 (06-10-23 @ 18:33) (142/88 - 177/119)  RR: 16 (06-10-23 @ 18:33) (14 - 21)  SpO2: 98% (06-10-23 @ 18:33) (96% - 100%)  Wt(kg): --    Appearance: Normal appearing adult male in no acute distress. lower facial droop.	  HEENT:   Normal oral mucosa, PERRL, EOMI	  Lymphatic: No lymphadenopathy , no edema  Cardiovascular: Normal S1 S2, No JVD, No murmurs , Peripheral pulses palpable 2+ bilaterally  Respiratory: Lungs clear to auscultation, normal effort 	  Gastrointestinal:  Soft, Non-tender, + BS	  Skin: No rashes, No ecchymoses, No cyanosis, warm to touch  Musculoskeletal: Normal range of motion, normal strength  Psychiatry:  Mood & affect appropriate      TELEMETRY: NSR	    ECG: NSR  < from: CT Angio Neck Stroke Protocol w/ IV Cont (06.09.23 @ 19:17) >  CT ANGIOGRAPHY BRAIN:  Internal carotid arteries: Calcifications of the supraclinoid segments   bilaterally, without significant stenosis.  Anterior cerebral arteries: Moderate-severe segmental stenosis mid A2   segment right KYM. Left KYM is patent without flow-limiting stenosis.  Middle cerebral arteries: Moderate focal stenosis of the distal left M1   segment. Moderate severe focal stenosis proximal M2 branch left anterior   sylvian fissure. Partially calcified plaque M2 branch left posterior   sylvian fissure, resulting in mild-moderate segmental stenosis. Calcified   plaque right M2 branch posterior sylvian fissure, resulting in   mild-moderate focal stenosis. Moderate-severe segmental stenosis M2   branch right anterior sylvian fissure.  Posterior cerebral arteries: Mild-moderate segmental stenosis P1 segment   RIGHT posterior cerebral artery and moderate-severe segmental stenosis   ipsilateral P2 segment proximally mild-moderate segmental stenosis   proximal P2 segment LEFT posterior cerebral artery and moderate-severe   segmental stenosis ipsilateral P2-pituitary junction.  Vertebrobasilar: Patent without stenosis.    < end of copied text >  	  LABS:	 	                          13.5   6.78  )-----------( 215      ( 10 José Miguel 2023 14:45 )             40.6     06-10    140  |  107  |  15  ----------------------------<  195<H>  3.5   |  22  |  1.40<H>    Ca    8.9      10 José Miguel 2023 14:45  Phos  2.7     06-10  Mg     1.80     06-10    TPro  7.4  /  Alb  4.7  /  TBili  0.4  /  DBili  x   /  AST  18  /  ALT  18  /  AlkPhos  86  06-09  TSH: Thyroid Stimulating Hormone, Serum: 1.62 uIU/mL (06-10 @ 14:45)      ASSESSMENT/PLAN: Mr Oakes is a pleasant 68y Male known to our practice for management of HTN and LE nonobstructive PAD.  He presents with TIA --> CVA symptoms.  His CTA of the brain shows multifocal stenoses.  Please repeat an echocardiogram.  Will discuss inpatient vs outpatient ILR placement (the next time he comes to the office).  Continue aspirin, plavix and statin for stroke secondary prevention. Continue allopurinol for gout.  Continue insulin for diabetes mellitus.  Will follow with you.      Chinmay Lopez M.D.  Cardiac Electrophysiology    office [phone]  pager [phone]

## 2023-06-10 NOTE — PATIENT PROFILE ADULT - FALL HARM RISK - UNIVERSAL INTERVENTIONS
Bed in lowest position, wheels locked, appropriate side rails in place/Call bell, personal items and telephone in reach/Instruct patient to call for assistance before getting out of bed or chair/Non-slip footwear when patient is out of bed/[place] to call system/Physically safe environment - no spills, clutter or unnecessary equipment/Purposeful Proactive Rounding/Room/bathroom lighting operational, light cord in reach

## 2023-06-11 LAB
ANION GAP SERPL CALC-SCNC: 14 MMOL/L — SIGNIFICANT CHANGE UP (ref 7–14)
APPEARANCE UR: CLEAR — SIGNIFICANT CHANGE UP
BACTERIA # UR AUTO: NEGATIVE — SIGNIFICANT CHANGE UP
BILIRUB UR-MCNC: NEGATIVE — SIGNIFICANT CHANGE UP
BUN SERPL-MCNC: 17 MG/DL — SIGNIFICANT CHANGE UP (ref 7–23)
CALCIUM SERPL-MCNC: 9.1 MG/DL — SIGNIFICANT CHANGE UP (ref 8.4–10.5)
CHLORIDE SERPL-SCNC: 106 MMOL/L — SIGNIFICANT CHANGE UP (ref 98–107)
CO2 SERPL-SCNC: 23 MMOL/L — SIGNIFICANT CHANGE UP (ref 22–31)
COLOR SPEC: YELLOW — SIGNIFICANT CHANGE UP
CREAT ?TM UR-MCNC: 239 MG/DL — SIGNIFICANT CHANGE UP
CREAT SERPL-MCNC: 1.43 MG/DL — HIGH (ref 0.5–1.3)
DIFF PNL FLD: NEGATIVE — SIGNIFICANT CHANGE UP
EGFR: 54 ML/MIN/1.73M2 — LOW
EPI CELLS # UR: 1 /HPF — SIGNIFICANT CHANGE UP (ref 0–5)
GLUCOSE BLDC GLUCOMTR-MCNC: 137 MG/DL — HIGH (ref 70–99)
GLUCOSE BLDC GLUCOMTR-MCNC: 148 MG/DL — HIGH (ref 70–99)
GLUCOSE BLDC GLUCOMTR-MCNC: 158 MG/DL — HIGH (ref 70–99)
GLUCOSE BLDC GLUCOMTR-MCNC: 169 MG/DL — HIGH (ref 70–99)
GLUCOSE SERPL-MCNC: 138 MG/DL — HIGH (ref 70–99)
GLUCOSE UR QL: ABNORMAL
HCT VFR BLD CALC: 43.1 % — SIGNIFICANT CHANGE UP (ref 39–50)
HGB BLD-MCNC: 14.2 G/DL — SIGNIFICANT CHANGE UP (ref 13–17)
HYALINE CASTS # UR AUTO: 1 /LPF — SIGNIFICANT CHANGE UP (ref 0–7)
KETONES UR-MCNC: ABNORMAL
LEUKOCYTE ESTERASE UR-ACNC: NEGATIVE — SIGNIFICANT CHANGE UP
MAGNESIUM SERPL-MCNC: 2 MG/DL — SIGNIFICANT CHANGE UP (ref 1.6–2.6)
MCHC RBC-ENTMCNC: 29.3 PG — SIGNIFICANT CHANGE UP (ref 27–34)
MCHC RBC-ENTMCNC: 32.9 GM/DL — SIGNIFICANT CHANGE UP (ref 32–36)
MCV RBC AUTO: 89 FL — SIGNIFICANT CHANGE UP (ref 80–100)
NITRITE UR-MCNC: NEGATIVE — SIGNIFICANT CHANGE UP
NRBC # BLD: 0 /100 WBCS — SIGNIFICANT CHANGE UP (ref 0–0)
NRBC # FLD: 0 K/UL — SIGNIFICANT CHANGE UP (ref 0–0)
PH UR: 6.5 — SIGNIFICANT CHANGE UP (ref 5–8)
PHOSPHATE SERPL-MCNC: 3.2 MG/DL — SIGNIFICANT CHANGE UP (ref 2.5–4.5)
PLATELET # BLD AUTO: 210 K/UL — SIGNIFICANT CHANGE UP (ref 150–400)
POTASSIUM SERPL-MCNC: 3.5 MMOL/L — SIGNIFICANT CHANGE UP (ref 3.5–5.3)
POTASSIUM SERPL-SCNC: 3.5 MMOL/L — SIGNIFICANT CHANGE UP (ref 3.5–5.3)
PROT ?TM UR-MCNC: 114 MG/DL — SIGNIFICANT CHANGE UP
PROT UR-MCNC: ABNORMAL
PROT/CREAT UR-RTO: 0.5 RATIO — HIGH (ref 0–0.2)
RBC # BLD: 4.84 M/UL — SIGNIFICANT CHANGE UP (ref 4.2–5.8)
RBC # FLD: 13.2 % — SIGNIFICANT CHANGE UP (ref 10.3–14.5)
RBC CASTS # UR COMP ASSIST: 4 /HPF — SIGNIFICANT CHANGE UP (ref 0–4)
SODIUM SERPL-SCNC: 143 MMOL/L — SIGNIFICANT CHANGE UP (ref 135–145)
SODIUM UR-SCNC: 162 MMOL/L — SIGNIFICANT CHANGE UP
SP GR SPEC: 1.03 — SIGNIFICANT CHANGE UP (ref 1.01–1.05)
UROBILINOGEN FLD QL: ABNORMAL
UUN UR-MCNC: 1048.2 MG/DL — SIGNIFICANT CHANGE UP
WBC # BLD: 7.35 K/UL — SIGNIFICANT CHANGE UP (ref 3.8–10.5)
WBC # FLD AUTO: 7.35 K/UL — SIGNIFICANT CHANGE UP (ref 3.8–10.5)
WBC UR QL: 1 /HPF — SIGNIFICANT CHANGE UP (ref 0–5)

## 2023-06-11 PROCEDURE — 99232 SBSQ HOSP IP/OBS MODERATE 35: CPT

## 2023-06-11 RX ORDER — AMLODIPINE BESYLATE 2.5 MG/1
5 TABLET ORAL ONCE
Refills: 0 | Status: COMPLETED | OUTPATIENT
Start: 2023-06-11 | End: 2023-06-11

## 2023-06-11 RX ORDER — AMLODIPINE BESYLATE 2.5 MG/1
10 TABLET ORAL DAILY
Refills: 0 | Status: DISCONTINUED | OUTPATIENT
Start: 2023-06-12 | End: 2023-06-14

## 2023-06-11 RX ADMIN — Medication 200 MILLIGRAM(S): at 13:03

## 2023-06-11 RX ADMIN — Medication 100 MILLIGRAM(S): at 13:05

## 2023-06-11 RX ADMIN — AMLODIPINE BESYLATE 5 MILLIGRAM(S): 2.5 TABLET ORAL at 05:51

## 2023-06-11 RX ADMIN — Medication 1 MILLIGRAM(S): at 13:04

## 2023-06-11 RX ADMIN — Medication 145 MILLIGRAM(S): at 13:03

## 2023-06-11 RX ADMIN — AMLODIPINE BESYLATE 5 MILLIGRAM(S): 2.5 TABLET ORAL at 10:53

## 2023-06-11 RX ADMIN — CLOPIDOGREL BISULFATE 75 MILLIGRAM(S): 75 TABLET, FILM COATED ORAL at 13:05

## 2023-06-11 RX ADMIN — ATORVASTATIN CALCIUM 80 MILLIGRAM(S): 80 TABLET, FILM COATED ORAL at 21:40

## 2023-06-11 RX ADMIN — Medication 81 MILLIGRAM(S): at 13:03

## 2023-06-11 RX ADMIN — GABAPENTIN 300 MILLIGRAM(S): 400 CAPSULE ORAL at 05:51

## 2023-06-11 RX ADMIN — PANTOPRAZOLE SODIUM 40 MILLIGRAM(S): 20 TABLET, DELAYED RELEASE ORAL at 05:51

## 2023-06-11 RX ADMIN — CARVEDILOL PHOSPHATE 12.5 MILLIGRAM(S): 80 CAPSULE, EXTENDED RELEASE ORAL at 05:51

## 2023-06-11 RX ADMIN — ENOXAPARIN SODIUM 40 MILLIGRAM(S): 100 INJECTION SUBCUTANEOUS at 19:17

## 2023-06-11 RX ADMIN — CARVEDILOL PHOSPHATE 12.5 MILLIGRAM(S): 80 CAPSULE, EXTENDED RELEASE ORAL at 19:16

## 2023-06-11 RX ADMIN — Medication 1: at 13:00

## 2023-06-11 RX ADMIN — INSULIN GLARGINE 25 UNIT(S): 100 INJECTION, SOLUTION SUBCUTANEOUS at 21:41

## 2023-06-11 RX ADMIN — Medication 1 TABLET(S): at 13:05

## 2023-06-11 RX ADMIN — GABAPENTIN 300 MILLIGRAM(S): 400 CAPSULE ORAL at 19:16

## 2023-06-11 NOTE — PROGRESS NOTE ADULT - PROBLEM SELECTOR PLAN 2
- Cr 1.46---1.4  - unknown baseline; denies hx kidney dysfunction  - suspect component of CKD in setting of T2D  - continue to trend -- if not improving, can check UA and urine lytes  - avoid nephrotoxic agents and renally dose medications  - monitor I&O - Cr 1.46---1.4  - unknown baseline; denies hx kidney dysfunction  - suspect component of CKD in setting of T2D  - continue to trend  - FeNa 0.7% c/w pre-renal etiology  - avoid nephrotoxic agents and renally dose medications  - monitor I&O

## 2023-06-11 NOTE — PROGRESS NOTE ADULT - ASSESSMENT
Pt is a 69 yo M with PMH HTN, HLD, gout, and T2D (c/b peripheral neuropathy) p/w acute slurred speech, facial asymmetry, and ataxia x few hours. Stroke code called on arrival with NIHSS 9, which self resolved without tPA. CTH with chronic R frontal infarct, CTA h/n with MF stenosis anterior and posterior circulation, CTP neg. Neuro and EP following and pending MR brain and TTE +/- ILR.

## 2023-06-11 NOTE — PROGRESS NOTE ADULT - PROBLEM SELECTOR PLAN 5
- home meds: HCTZ 25mg daily, amlodipine 5mg daily, losartan 50mg daily, coreg 12.5mg BID  - now s/p 24h permissive HTN -- goal gradual normotension   - c/w home amlodipine and coreg  - hold HCTZ/losartan in setting of elevated Cr, as above -- restart as indicated

## 2023-06-11 NOTE — PROGRESS NOTE ADULT - SUBJECTIVE AND OBJECTIVE BOX
EP Attending  HISTORY OF PRESENT ILLNESS: HPI:  67 y/o M with PMH of HTN, HLD, Gout, T2DM, peripheral neuropathy presents with R facial droop and speech difficulty. Pt reports onset of garbled speech a few days ago while playing cards however also notes he was drunk at the time so attributed it to that. States his symptoms improved a bit and over the next few days he thought his speech difficulty was related to his dentures. Today wife and daughter noted his speech was slurred and urged him to come in. No past neurologic history. Denies any headache, visual deficits, fever, chills, chest pain, abdominal pain, numbness/tingling throughout extremities. Lives at home with wife and independent with ADLs. Never been on antiplatelets (aspirin/plavix) or anticoag (eliquis/lovenox/xarelto). Pt admits to having about 5 drinks of vodka a few times a week however some weeks has drinks every day. Denies symptoms of withdraw when he goes periods without drinking. (09 Jun 2023 22:51)    Mr Oakes is known to Dr Michelle, for management of HTN and LE PAD.  He had episodes of unclear speech while drinking ,and again while he had teeth out of his mouth... didn't realize these were TIA episodes until he woke up the next morning (after 2-3 days of waxing/waning symptoms) and his speech seemed altered to his family members. He also noticed subtle facial droop. He is able to make intelligible statements, and understands language well. His right arm feels weak.  A 10 pt ROS is otherwise negative.  Date of service 6/11- resting in bed, no angina or palpitations, awaiting echo and updated neuro imaging.    PAST MEDICAL & SURGICAL HISTORY:  DM (diabetes mellitus)  HTN (hypertension)  HLD (hyperlipidemia)  Obese  HTN (hypertension)  Cervical disc herniation  History of arthroscopy of right shoulder  No significant past surgical history      MEDICATIONS  (STANDING):  allopurinol 200 milliGRAM(s) Oral daily  amLODIPine   Tablet 5 milliGRAM(s) Oral daily  aspirin enteric coated 81 milliGRAM(s) Oral daily  atorvastatin 80 milliGRAM(s) Oral at bedtime  clopidogrel Tablet 75 milliGRAM(s) Oral daily  dextrose 5%. 1000 milliLiter(s) (100 mL/Hr) IV Continuous <Continuous>  dextrose 5%. 1000 milliLiter(s) (50 mL/Hr) IV Continuous <Continuous>  dextrose 50% Injectable 25 Gram(s) IV Push once  dextrose 50% Injectable 12.5 Gram(s) IV Push once  dextrose 50% Injectable 25 Gram(s) IV Push once  enoxaparin Injectable 40 milliGRAM(s) SubCutaneous every 24 hours  fenofibrate Tablet 145 milliGRAM(s) Oral daily  folic acid 1 milliGRAM(s) Oral daily  gabapentin 300 milliGRAM(s) Oral two times a day  glucagon  Injectable 1 milliGRAM(s) IntraMuscular once  insulin glargine Injectable (LANTUS) 25 Unit(s) SubCutaneous at bedtime  insulin lispro (ADMELOG) corrective regimen sliding scale   SubCutaneous three times a day before meals  insulin lispro (ADMELOG) corrective regimen sliding scale   SubCutaneous at bedtime  multivitamin 1 Tablet(s) Oral daily  pantoprazole    Tablet 40 milliGRAM(s) Oral before breakfast  thiamine 100 milliGRAM(s) Oral daily    Allergies    No Known Allergies    Intolerances    FAMILY HISTORY:  No pertinent family history in first degree relatives      Non-contributary for premature coronary disease or sudden cardiac death    SOCIAL HISTORY:    [ x] Non-smoker  [ ] Smoker  [ ] Alcohol      PHYSICAL EXAM:  T(C): 36.6 (06-10-23 @ 17:01), Max: 36.9 (06-10-23 @ 10:21)  HR: 85 (06-10-23 @ 18:33) (68 - 85)  BP: 168/94 (06-10-23 @ 18:33) (142/88 - 177/119)  RR: 16 (06-10-23 @ 18:33) (14 - 21)  SpO2: 98% (06-10-23 @ 18:33) (96% - 100%)  Wt(kg): --    Appearance: Normal appearing adult male in no acute distress. lower facial droop.	  HEENT:   Normal oral mucosa, PERRL, EOMI	  Lymphatic: No lymphadenopathy , no edema  Cardiovascular: Normal S1 S2, No JVD, No murmurs , Peripheral pulses palpable 2+ bilaterally  Respiratory: Lungs clear to auscultation, normal effort 	  Gastrointestinal:  Soft, Non-tender, + BS	  Skin: No rashes, No ecchymoses, No cyanosis, warm to touch  Musculoskeletal: Normal range of motion, normal strength  Psychiatry:  Mood & affect appropriate    TELEMETRY: NSR	    ECG: NSR  < from: CT Angio Neck Stroke Protocol w/ IV Cont (06.09.23 @ 19:17) >  CT ANGIOGRAPHY BRAIN:  Internal carotid arteries: Calcifications of the supraclinoid segments   bilaterally, without significant stenosis.  Anterior cerebral arteries: Moderate-severe segmental stenosis mid A2   segment right KYM. Left KYM is patent without flow-limiting stenosis.  Middle cerebral arteries: Moderate focal stenosis of the distal left M1   segment. Moderate severe focal stenosis proximal M2 branch left anterior   sylvian fissure. Partially calcified plaque M2 branch left posterior   sylvian fissure, resulting in mild-moderate segmental stenosis. Calcified   plaque right M2 branch posterior sylvian fissure, resulting in   mild-moderate focal stenosis. Moderate-severe segmental stenosis M2   branch right anterior sylvian fissure.  Posterior cerebral arteries: Mild-moderate segmental stenosis P1 segment   RIGHT posterior cerebral artery and moderate-severe segmental stenosis   ipsilateral P2 segment proximally mild-moderate segmental stenosis   proximal P2 segment LEFT posterior cerebral artery and moderate-severe   segmental stenosis ipsilateral P2-pituitary junction.  Vertebrobasilar: Patent without stenosis.    < end of copied text >    ASSESSMENT/PLAN: Mr Oakes is a pleasant 68y Male known to our practice for management of HTN and LE nonobstructive PAD.  He presents with TIA --> CVA symptoms.  His CTA of the brain shows multifocal stenoses.  Please repeat an echocardiogram.  Will discuss inpatient vs outpatient ILR placement (the next time he comes to the office).  Continue aspirin, plavix and statin for stroke secondary prevention. Continue allopurinol for gout.  Continue insulin for diabetes mellitus.  Will follow with you.    Chinmay Lopez M.D.  Cardiac Electrophysiology    office [phone]  pager [phone]

## 2023-06-11 NOTE — PROGRESS NOTE ADULT - PROBLEM SELECTOR PLAN 1
- pt p/w acute onset slurred speech, facial asymmetry, and ataxia x few hours  - hemodynamically stable on arrival  - stroke code called with NIHSS 9 -- symptoms spontaneously resolved, did not receive tPA  - CTH neg acute findings, with chronic R frontal infarct  - CTA h/n with MF stenosis anterior and posterior circulation  - CTP neg  - s/p plavix load  - neuro following, recommending daily ASA/statin/plavix; plavix can be dc'd after 3 weeks  - TSH WNL, A1C 7.9, f/u lipid profile  - PT/OT rec home no needs  - f/u MR brain  - f/u TTE  - EP consulted for ILR  - continue to monitor on tele  - s/p 24h permissive HTN -- now goal gradual normotension

## 2023-06-11 NOTE — PROGRESS NOTE ADULT - PROBLEM SELECTOR PLAN 4
- A1C 7.9  - home meds: glyburide-metformin 10-1000mg BID, basaglar 40U bedtime  - c/w lantus 25U bedtime  - AM FSG appropriate  - carb consistent diet

## 2023-06-11 NOTE — PROGRESS NOTE ADULT - SUBJECTIVE AND OBJECTIVE BOX
[facility]  Fabienne Johnson   Available on MS Teams  Pager: [phone]    Patient is a 68y old  Male who presents with a chief complaint of dysarthria (10 José Miguel 2023 13:46)    Subjective:        REVIEW OF SYSTEMS:    CONSTITUTIONAL: No weakness, fevers or chills.   EYES/ENT: No visual changes;  No vertigo or throat pain   NECK: No pain or stiffness  RESPIRATORY: No cough, wheezing, hemoptysis; No shortness of breath  CARDIOVASCULAR: No chest pain or palpitations  GASTROINTESTINAL: No abdominal or epigastric pain. No nausea, vomiting, or hematemesis; No diarrhea or constipation. No melena or hematochezia.  GENITOURINARY: No dysuria, frequency or hematuria  NEUROLOGICAL: No numbness or weakness  SKIN: No itching, burning, rashes, or lesions   All other review of systems is negative unless indicated above.    VITAL SIGNS:  T(C): 36.6 (06-10-23 @ 16:37), Max: 37 (06-09-23 @ 18:44)  T(F): 97.9 (06-10-23 @ 16:37), Max: 98.6 (06-09-23 @ 18:44)  HR: 74 (06-10-23 @ 16:37) (68 - 83)  BP: 163/107 (06-10-23 @ 16:37) (142/88 - 177/119)  BP(mean): --  RR: 20 (06-10-23 @ 16:37) (14 - 21)  SpO2: 100% (06-10-23 @ 16:37) (96% - 100%)  Wt(kg): --    PHYSICAL EXAM:  Constitutional: resting comfortably in bed; NAD  Head: NC/AT  Eyes: PERRL, EOMI, anicteric sclera  ENT: no nasal discharge; MMM  Neck: supple; no JVD  Respiratory: CTA B/L; no W/R/R  Cardiac: +S1/S2; RRR; no M/R/G  Gastrointestinal: soft, NT/ND; no rebound or guarding; +BSx4  Extremities: WWP, no clubbing or cyanosis; no peripheral edema  Musculoskeletal: NROM x4; no joint swelling, tenderness or erythema; mm strength 5/5 throughout  Vascular: 2+ radial, DP/PT pulses B/L  Dermatologic: skin warm, dry and intact; no rashes, wounds, or scars  Neurologic: AAOx3; CNII-XII grossly intact; no focal deficits; sensation intact and equal in all extremities  Psychiatric: affect and characteristics of appearance, verbalizations, behaviors are appropriate    MEDICATIONS  (STANDING):  allopurinol 200 milliGRAM(s) Oral daily  amLODIPine   Tablet 5 milliGRAM(s) Oral daily  aspirin enteric coated 81 milliGRAM(s) Oral daily  atorvastatin 80 milliGRAM(s) Oral at bedtime  clopidogrel Tablet 75 milliGRAM(s) Oral daily  dextrose 5%. 1000 milliLiter(s) (100 mL/Hr) IV Continuous <Continuous>  dextrose 5%. 1000 milliLiter(s) (50 mL/Hr) IV Continuous <Continuous>  dextrose 50% Injectable 25 Gram(s) IV Push once  dextrose 50% Injectable 12.5 Gram(s) IV Push once  dextrose 50% Injectable 25 Gram(s) IV Push once  enoxaparin Injectable 40 milliGRAM(s) SubCutaneous every 24 hours  fenofibrate Tablet 145 milliGRAM(s) Oral daily  folic acid 1 milliGRAM(s) Oral daily  gabapentin 300 milliGRAM(s) Oral two times a day  glucagon  Injectable 1 milliGRAM(s) IntraMuscular once  insulin glargine Injectable (LANTUS) 25 Unit(s) SubCutaneous at bedtime  insulin lispro (ADMELOG) corrective regimen sliding scale   SubCutaneous three times a day before meals  insulin lispro (ADMELOG) corrective regimen sliding scale   SubCutaneous at bedtime  multivitamin 1 Tablet(s) Oral daily  pantoprazole    Tablet 40 milliGRAM(s) Oral before breakfast  thiamine 100 milliGRAM(s) Oral daily    MEDICATIONS  (PRN):  dextrose Oral Gel 15 Gram(s) Oral once PRN Blood Glucose LESS THAN 70 milliGRAM(s)/deciliter    LABS:                        13.5   6.78  )-----------( 215      ( 10 José Miguel 2023 14:45 )             40.6     06-10    140  |  107  |  15  ----------------------------<  195<H>  3.5   |  22  |  1.40<H>    Ca    8.9      10 José Miguel 2023 14:45  Phos  2.7     06-10  Mg     1.80     06-10    TPro  7.4  /  Alb  4.7  /  TBili  0.4  /  DBili  x   /  AST  18  /  ALT  18  /  AlkPhos  86  06-09    PT/INR - ( 09 Jun 2023 19:05 )   PT: 11.1 sec;   INR: 0.96 ratio         PTT - ( 09 Jun 2023 19:05 )  PTT:32.6 sec    CAPILLARY BLOOD GLUCOSE  130 (09 Jun 2023 19:04)      POCT Blood Glucose.: 194 mg/dL (10 José Miguel 2023 17:16)      RADIOLOGY & ADDITIONAL TESTS: Reviewed.       [facility]  Fabienne DO Elizabeth  Available on MS Teams  Pager: [phone]    Patient is a 68y old  Male who presents with a chief complaint of dysarthria (10 José Miguel 2023 13:46)    Subjective:  Pt seen and examined at bedside in no acute distress, feels well. Daughter is coming to visit later today. Denies acute complaints. Hopeful to go home tomorrow.    REVIEW OF SYSTEMS:    CONSTITUTIONAL: No weakness, fevers or chills.   EYES/ENT: No visual changes;  No vertigo or throat pain   NECK: No pain or stiffness  RESPIRATORY: No cough, wheezing, hemoptysis; No shortness of breath  CARDIOVASCULAR: No chest pain or palpitations  GASTROINTESTINAL: No abdominal or epigastric pain. No nausea, vomiting, or hematemesis; No diarrhea or constipation. No melena or hematochezia.  GENITOURINARY: No dysuria, frequency or hematuria  NEUROLOGICAL: No numbness or weakness  SKIN: No itching, burning, rashes, or lesions   All other review of systems is negative unless indicated above.    Vital Signs Last 24 Hrs  T(C): 36.8 (2023 13:15), Max: 36.9 (2023 05:45)  T(F): 98.2 (2023 13:15), Max: 98.5 (2023 05:45)  HR: 74 (2023 13:15) (68 - 85)  BP: 149/111 (2023 13:15) (142/106 - 168/94)  BP(mean): --  RR: 18 (2023 13:15) (16 - 18)  SpO2: 99% (2023 13:15) (95% - 99%)    Parameters below as of 2023 13:15  Patient On (Oxygen Delivery Method): room air    PHYSICAL EXAM:  Constitutional: resting comfortably in bed; NAD  Head: NC/AT  Eyes: PERRL, EOMI, anicteric sclera  ENT: no nasal discharge; MMM  Neck: supple; no JVD  Respiratory: CTA B/L; no W/R/R  Cardiac: +S1/S2; RRR; no M/R/G  Gastrointestinal: soft, NT/ND; no rebound or guarding; +BSx4  Extremities: WWP, no clubbing or cyanosis; no peripheral edema  Musculoskeletal: NROM x4; no joint swelling, tenderness or erythema; mm strength 5/5 throughout  Vascular: 2+ radial, DP/PT pulses B/L  Dermatologic: skin warm, dry and intact; no rashes, wounds, or scars  Neurologic: AAOx3; CNII-XII grossly intact; no focal deficits; sensation intact and equal in all extremities  Psychiatric: affect and characteristics of appearance, verbalizations, behaviors are appropriate    MEDICATIONS  (STANDING):  allopurinol 200 milliGRAM(s) Oral daily  amLODIPine   Tablet 5 milliGRAM(s) Oral daily  aspirin enteric coated 81 milliGRAM(s) Oral daily  atorvastatin 80 milliGRAM(s) Oral at bedtime  clopidogrel Tablet 75 milliGRAM(s) Oral daily  dextrose 5%. 1000 milliLiter(s) (100 mL/Hr) IV Continuous <Continuous>  dextrose 5%. 1000 milliLiter(s) (50 mL/Hr) IV Continuous <Continuous>  dextrose 50% Injectable 25 Gram(s) IV Push once  dextrose 50% Injectable 12.5 Gram(s) IV Push once  dextrose 50% Injectable 25 Gram(s) IV Push once  enoxaparin Injectable 40 milliGRAM(s) SubCutaneous every 24 hours  fenofibrate Tablet 145 milliGRAM(s) Oral daily  folic acid 1 milliGRAM(s) Oral daily  gabapentin 300 milliGRAM(s) Oral two times a day  glucagon  Injectable 1 milliGRAM(s) IntraMuscular once  insulin glargine Injectable (LANTUS) 25 Unit(s) SubCutaneous at bedtime  insulin lispro (ADMELOG) corrective regimen sliding scale   SubCutaneous three times a day before meals  insulin lispro (ADMELOG) corrective regimen sliding scale   SubCutaneous at bedtime  multivitamin 1 Tablet(s) Oral daily  pantoprazole    Tablet 40 milliGRAM(s) Oral before breakfast  thiamine 100 milliGRAM(s) Oral daily    MEDICATIONS  (PRN):  dextrose Oral Gel 15 Gram(s) Oral once PRN Blood Glucose LESS THAN 70 milliGRAM(s)/deciliter    LABS:                        14.2   7.35  )-----------( 210      ( 2023 05:45 )             43.1     06-11    143  |  106  |  17  ----------------------------<  138<H>  3.5   |  23  |  1.43<H>    Ca    9.1      2023 05:45  Phos  3.2     06-11  Mg     2.00     06-11    TPro  7.4  /  Alb  4.7  /  TBili  0.4  /  DBili  x   /  AST  18  /  ALT  18  /  AlkPhos  86  06-09    PT/INR - ( 2023 19:05 )   PT: 11.1 sec;   INR: 0.96 ratio         PTT - ( 2023 19:05 )  PTT:32.6 sec  Urinalysis Basic - ( 2023 13:17 )    Color: Yellow / Appearance: Clear / S.030 / pH: x  Gluc: x / Ketone: Trace  / Bili: Negative / Urobili: 6 mg/dL   Blood: x / Protein: 100 mg/dL / Nitrite: Negative   Leuk Esterase: Negative / RBC: 4 /HPF / WBC 1 /HPF   Sq Epi: x / Non Sq Epi: x / Bacteria: Negative      CAPILLARY BLOOD GLUCOSE  130 (2023 19:04)      POCT Blood Glucose.: 158 mg/dL (2023 12:34)      RADIOLOGY & ADDITIONAL TESTS: Reviewed.

## 2023-06-11 NOTE — PROGRESS NOTE ADULT - PROBLEM SELECTOR PLAN 3
- pt reports drinking 3-4x/wk, typically 4-5 drinks with vodka at a time; denies hx w/d   - does not appear to be in w/d  - no indication for CIWA at present  - educated on safe drinking habits and encouraged cutting back - pt in contemplative state

## 2023-06-12 DIAGNOSIS — N18.30 CHRONIC KIDNEY DISEASE, STAGE 3 UNSPECIFIED: ICD-10-CM

## 2023-06-12 LAB
ANION GAP SERPL CALC-SCNC: 14 MMOL/L — SIGNIFICANT CHANGE UP (ref 7–14)
BUN SERPL-MCNC: 21 MG/DL — SIGNIFICANT CHANGE UP (ref 7–23)
CALCIUM SERPL-MCNC: 9.1 MG/DL — SIGNIFICANT CHANGE UP (ref 8.4–10.5)
CHLORIDE SERPL-SCNC: 106 MMOL/L — SIGNIFICANT CHANGE UP (ref 98–107)
CO2 SERPL-SCNC: 20 MMOL/L — LOW (ref 22–31)
CREAT SERPL-MCNC: 1.55 MG/DL — HIGH (ref 0.5–1.3)
EGFR: 49 ML/MIN/1.73M2 — LOW
GLUCOSE BLDC GLUCOMTR-MCNC: 117 MG/DL — HIGH (ref 70–99)
GLUCOSE BLDC GLUCOMTR-MCNC: 152 MG/DL — HIGH (ref 70–99)
GLUCOSE BLDC GLUCOMTR-MCNC: 179 MG/DL — HIGH (ref 70–99)
GLUCOSE BLDC GLUCOMTR-MCNC: 201 MG/DL — HIGH (ref 70–99)
GLUCOSE SERPL-MCNC: 126 MG/DL — HIGH (ref 70–99)
MAGNESIUM SERPL-MCNC: 2 MG/DL — SIGNIFICANT CHANGE UP (ref 1.6–2.6)
PHOSPHATE SERPL-MCNC: 3.7 MG/DL — SIGNIFICANT CHANGE UP (ref 2.5–4.5)
POTASSIUM SERPL-MCNC: 3.9 MMOL/L — SIGNIFICANT CHANGE UP (ref 3.5–5.3)
POTASSIUM SERPL-SCNC: 3.9 MMOL/L — SIGNIFICANT CHANGE UP (ref 3.5–5.3)
SODIUM SERPL-SCNC: 140 MMOL/L — SIGNIFICANT CHANGE UP (ref 135–145)

## 2023-06-12 PROCEDURE — 99233 SBSQ HOSP IP/OBS HIGH 50: CPT

## 2023-06-12 PROCEDURE — 93306 TTE W/DOPPLER COMPLETE: CPT | Mod: 26

## 2023-06-12 RX ADMIN — Medication 145 MILLIGRAM(S): at 11:12

## 2023-06-12 RX ADMIN — Medication 1 TABLET(S): at 11:12

## 2023-06-12 RX ADMIN — ATORVASTATIN CALCIUM 80 MILLIGRAM(S): 80 TABLET, FILM COATED ORAL at 21:13

## 2023-06-12 RX ADMIN — AMLODIPINE BESYLATE 10 MILLIGRAM(S): 2.5 TABLET ORAL at 07:04

## 2023-06-12 RX ADMIN — Medication 1 MILLIGRAM(S): at 11:12

## 2023-06-12 RX ADMIN — CLOPIDOGREL BISULFATE 75 MILLIGRAM(S): 75 TABLET, FILM COATED ORAL at 11:12

## 2023-06-12 RX ADMIN — INSULIN GLARGINE 25 UNIT(S): 100 INJECTION, SOLUTION SUBCUTANEOUS at 21:13

## 2023-06-12 RX ADMIN — Medication 200 MILLIGRAM(S): at 11:12

## 2023-06-12 RX ADMIN — GABAPENTIN 300 MILLIGRAM(S): 400 CAPSULE ORAL at 17:41

## 2023-06-12 RX ADMIN — Medication 1: at 12:06

## 2023-06-12 RX ADMIN — PANTOPRAZOLE SODIUM 40 MILLIGRAM(S): 20 TABLET, DELAYED RELEASE ORAL at 07:04

## 2023-06-12 RX ADMIN — Medication 81 MILLIGRAM(S): at 11:12

## 2023-06-12 RX ADMIN — CARVEDILOL PHOSPHATE 12.5 MILLIGRAM(S): 80 CAPSULE, EXTENDED RELEASE ORAL at 17:41

## 2023-06-12 RX ADMIN — CARVEDILOL PHOSPHATE 12.5 MILLIGRAM(S): 80 CAPSULE, EXTENDED RELEASE ORAL at 07:04

## 2023-06-12 RX ADMIN — GABAPENTIN 300 MILLIGRAM(S): 400 CAPSULE ORAL at 07:04

## 2023-06-12 RX ADMIN — Medication 100 MILLIGRAM(S): at 11:12

## 2023-06-12 RX ADMIN — Medication 1: at 17:36

## 2023-06-12 RX ADMIN — ENOXAPARIN SODIUM 40 MILLIGRAM(S): 100 INJECTION SUBCUTANEOUS at 17:41

## 2023-06-12 NOTE — PROGRESS NOTE ADULT - SUBJECTIVE AND OBJECTIVE BOX
EP Attending  HISTORY OF PRESENT ILLNESS: HPI:  67 y/o M with PMH of HTN, HLD, Gout, T2DM, peripheral neuropathy presents with R facial droop and speech difficulty. Pt reports onset of garbled speech a few days ago while playing cards however also notes he was drunk at the time so attributed it to that. States his symptoms improved a bit and over the next few days he thought his speech difficulty was related to his dentures. Today wife and daughter noted his speech was slurred and urged him to come in. No past neurologic history. Denies any headache, visual deficits, fever, chills, chest pain, abdominal pain, numbness/tingling throughout extremities. Lives at home with wife and independent with ADLs. Never been on antiplatelets (aspirin/plavix) or anticoag (eliquis/lovenox/xarelto). Pt admits to having about 5 drinks of vodka a few times a week however some weeks has drinks every day. Denies symptoms of withdraw when he goes periods without drinking. (09 Jun 2023 22:51)    Mr Oakes is known to Dr Michelle, for management of HTN and LE PAD.  He had episodes of unclear speech while drinking ,and again while he had teeth out of his mouth... didn't realize these were TIA episodes until he woke up the next morning (after 2-3 days of waxing/waning symptoms) and his speech seemed altered to his family members. He also noticed subtle facial droop. He is able to make intelligible statements, and understands language well. His right arm feels weak.  A 10 pt ROS is otherwise negative.  6/11- resting in bed, no angina or palpitations, awaiting echo and updated neuro imaging.  Date of service 6/12- resting comfortably. no new complaints. awaiting mri brain.    PAST MEDICAL & SURGICAL HISTORY:  DM (diabetes mellitus)  HTN (hypertension)  HLD (hyperlipidemia)  Obese  HTN (hypertension)  Cervical disc herniation  History of arthroscopy of right shoulder  No significant past surgical history    allopurinol 200 milliGRAM(s) Oral daily  amLODIPine   Tablet 10 milliGRAM(s) Oral daily  aspirin enteric coated 81 milliGRAM(s) Oral daily  atorvastatin 80 milliGRAM(s) Oral at bedtime  carvedilol 12.5 milliGRAM(s) Oral every 12 hours  clopidogrel Tablet 75 milliGRAM(s) Oral daily  dextrose 5%. 1000 milliLiter(s) IV Continuous <Continuous>  dextrose 5%. 1000 milliLiter(s) IV Continuous <Continuous>  dextrose 50% Injectable 25 Gram(s) IV Push once  dextrose 50% Injectable 12.5 Gram(s) IV Push once  dextrose 50% Injectable 25 Gram(s) IV Push once  dextrose Oral Gel 15 Gram(s) Oral once PRN  enoxaparin Injectable 40 milliGRAM(s) SubCutaneous every 24 hours  fenofibrate Tablet 145 milliGRAM(s) Oral daily  folic acid 1 milliGRAM(s) Oral daily  gabapentin 300 milliGRAM(s) Oral two times a day  glucagon  Injectable 1 milliGRAM(s) IntraMuscular once  insulin glargine Injectable (LANTUS) 25 Unit(s) SubCutaneous at bedtime  insulin lispro (ADMELOG) corrective regimen sliding scale   SubCutaneous three times a day before meals  insulin lispro (ADMELOG) corrective regimen sliding scale   SubCutaneous at bedtime  multivitamin 1 Tablet(s) Oral daily  pantoprazole    Tablet 40 milliGRAM(s) Oral before breakfast  thiamine 100 milliGRAM(s) Oral daily                            14.2   7.35  )-----------( 210      ( 11 Jun 2023 05:45 )             43.1       06-12    140  |  106  |  21  ----------------------------<  126<H>  3.9   |  20<L>  |  1.55<H>    Ca    9.1      12 Jun 2023 04:40  Phos  3.7     06-12  Mg     2.00     06-12    T(C): 36.9 (06-12-23 @ 13:30), Max: 37.4 (06-11-23 @ 17:52)  HR: 65 (06-12-23 @ 13:30) (65 - 81)  BP: 120/89 (06-12-23 @ 13:30) (119/78 - 157/110)  RR: 18 (06-12-23 @ 13:30) (17 - 18)  SpO2: 97% (06-12-23 @ 13:30) (96% - 100%)  Wt(kg): --    I&O's Summary    Appearance: Normal appearing adult male in no acute distress. lower facial droop.	  HEENT:   Normal oral mucosa, PERRL, EOMI	  Lymphatic: No lymphadenopathy , no edema  Cardiovascular: Normal S1 S2, No JVD, No murmurs , Peripheral pulses palpable 2+ bilaterally  Respiratory: Lungs clear to auscultation, normal effort 	  Gastrointestinal:  Soft, Non-tender, + BS	  Skin: No rashes, No ecchymoses, No cyanosis, warm to touch  Musculoskeletal: Normal range of motion, normal strength  Psychiatry:  Mood & affect appropriate    TELEMETRY: NSR	    ECG: NSR  < from: CT Angio Neck Stroke Protocol w/ IV Cont (06.09.23 @ 19:17) >  CT ANGIOGRAPHY BRAIN:  Internal carotid arteries: Calcifications of the supraclinoid segments   bilaterally, without significant stenosis.  Anterior cerebral arteries: Moderate-severe segmental stenosis mid A2   segment right KYM. Left KYM is patent without flow-limiting stenosis.  Middle cerebral arteries: Moderate focal stenosis of the distal left M1   segment. Moderate severe focal stenosis proximal M2 branch left anterior   sylvian fissure. Partially calcified plaque M2 branch left posterior   sylvian fissure, resulting in mild-moderate segmental stenosis. Calcified   plaque right M2 branch posterior sylvian fissure, resulting in   mild-moderate focal stenosis. Moderate-severe segmental stenosis M2   branch right anterior sylvian fissure.  Posterior cerebral arteries: Mild-moderate segmental stenosis P1 segment   RIGHT posterior cerebral artery and moderate-severe segmental stenosis   ipsilateral P2 segment proximally mild-moderate segmental stenosis   proximal P2 segment LEFT posterior cerebral artery and moderate-severe   segmental stenosis ipsilateral P2-pituitary junction.  Vertebrobasilar: Patent without stenosis.    < end of copied text >    ASSESSMENT/PLAN: Mr Oakes is a pleasant 68y Male known to our practice for management of HTN and LE nonobstructive PAD.  He presents with TIA --> CVA symptoms.  His CTA of the brain shows multifocal stenoses.  Please repeat an echocardiogram.  Will discuss inpatient vs outpatient ILR placement (the next time he comes to the office).  Continue aspirin, plavix and statin for stroke secondary prevention. Continue allopurinol for gout.  Continue insulin for diabetes mellitus.  Will follow with you.    Chinmay Loepz M.D.  Cardiac Electrophysiology    office [phone]  pager [phone]

## 2023-06-12 NOTE — PROGRESS NOTE ADULT - PROBLEM SELECTOR PLAN 9
Diet : DASH/TLC, carb consistent  DVT ppx: lovenox 40mg q24h    code: full  dispo: pending medical optimization (Imaging) , PT recs home no needs

## 2023-06-12 NOTE — PROGRESS NOTE ADULT - PROBLEM SELECTOR PLAN 3
Called PCP's office on 6/12 and baseline (Cr 1.7/1.8 as per labs done in July & December 2022 done at PCP office, (- [phone] office # )  pt also saw nephrologist () but last F/u over 2 yrs ago   On admission, Cr 1.46-1.4-> 1.5    - continue to trend  - avoid nephrotoxic agents and renally dose medications  - monitor I&O  - Follow up outpt with Nephrologist after discharge

## 2023-06-12 NOTE — PROGRESS NOTE ADULT - PROBLEM SELECTOR PLAN 1
- pt p/w acute onset slurred speech, facial asymmetry, and ataxia x few hours  - hemodynamically stable on arrival  - stroke code called with NIHSS 9 -- symptoms spontaneously resolved, did not receive tPA  - CTH neg acute findings, with chronic R frontal infarct  - CTA h/n with MF stenosis anterior and posterior circulation  - CTP neg  - s/p plavix load  - neuro following, recommending daily ASA/statin/plavix; plavix can be dc'd after 3 weeks  - TSH WNL, A1C 7.9, f/u lipid profile  - PT/OT rec home no needs  - f/u MR brain-> as per neuro, needs MRI inpt prior to discharge   - EP consulted for ILR and will decide inpt vs. outpt after hospital discharge   - continue to monitor on tele  - s/p 24h permissive HTN -- now goal gradual normotension

## 2023-06-12 NOTE — CONSULT NOTE ADULT - SUBJECTIVE AND OBJECTIVE BOX
C A R D I O L O G Y  *********************    DATE OF SERVICE: 06-12-23    HISTORY OF PRESENT ILLNESS: HPI:  Pt is a 69 y/o M with PMH of HTN, HLD, Gout, T2DM, peripheral neuropathy presents with R facial droop and speech difficulty. Pt reports onset of garbled speech a few days ago while playing cards however also notes he was drunk at the time so attributed it to that. States his symptoms improved a bit and over the next few days he thought his speech difficulty was related to his dentures. Wife and daughter noted his speech was slurred and urged him to come in. No past neurologic history. Denies any headache, visual deficits, fever, chills, chest pain, abdominal pain, numbness/tingling throughout extremities. Lives at home with wife and independent with ADLs. Never been on antiplatelets (aspirin/plavix) or anticoag (eliquis/lovenox/xarelto). Pt admits to having about 5 drinks of vodka a few times a week however some weeks has drinks every day. Denies symptoms of withdraw when he goes periods without drinking.     CT head showed multifocal stenosis EP discussing possile ILR. HE denies any compalints this AM    PAST MEDICAL & SURGICAL HISTORY:  DM (diabetes mellitus)  HTN (hypertension)  HLD (hyperlipidemia)  Obese  HTN (hypertension)  Cervical disc herniation  History of arthroscopy of right shoulder  No significant past surgical history    MEDICATIONS:  MEDICATIONS  (STANDING):  allopurinol 200 milliGRAM(s) Oral daily  amLODIPine   Tablet 10 milliGRAM(s) Oral daily  aspirin enteric coated 81 milliGRAM(s) Oral daily  atorvastatin 80 milliGRAM(s) Oral at bedtime  carvedilol 12.5 milliGRAM(s) Oral every 12 hours  clopidogrel Tablet 75 milliGRAM(s) Oral daily  dextrose 5%. 1000 milliLiter(s) (100 mL/Hr) IV Continuous <Continuous>  dextrose 5%. 1000 milliLiter(s) (50 mL/Hr) IV Continuous <Continuous>  dextrose 50% Injectable 25 Gram(s) IV Push once  dextrose 50% Injectable 12.5 Gram(s) IV Push once  dextrose 50% Injectable 25 Gram(s) IV Push once  enoxaparin Injectable 40 milliGRAM(s) SubCutaneous every 24 hours  fenofibrate Tablet 145 milliGRAM(s) Oral daily  folic acid 1 milliGRAM(s) Oral daily  gabapentin 300 milliGRAM(s) Oral two times a day  glucagon  Injectable 1 milliGRAM(s) IntraMuscular once  insulin glargine Injectable (LANTUS) 25 Unit(s) SubCutaneous at bedtime  insulin lispro (ADMELOG) corrective regimen sliding scale   SubCutaneous three times a day before meals  insulin lispro (ADMELOG) corrective regimen sliding scale   SubCutaneous at bedtime  multivitamin 1 Tablet(s) Oral daily  pantoprazole    Tablet 40 milliGRAM(s) Oral before breakfast  thiamine 100 milliGRAM(s) Oral daily      Allergies: No Known Allergies    FAMILY HISTORY: No pertinent family history in first degree relatives      SOCIAL HISTORY:    [X ] Non-smoker  [ ] Smoker  [ ] Alcohol    FLU VACCINE THIS YEAR STARTS IN AUGUST:  [ ] Yes    [ ] No    IF OVER 65 HAVE YOU EVER HAD A PNA VACCINE:  [ ] Yes    [ ] No       [ ] N/A      REVIEW OF SYSTEMS:  [ ]chest pain  [  ]shortness of breath  [  ]palpitations  [  ]syncope  [ ]near syncope [ ]upper extremity weakness   [ ] lower extremity weakness  [  ]diplopia  [  ]altered mental status   [  ]fevers  [ ]chills [ ]nausea  [ ]vomiting  [  ]dysphagia    [ ]abdominal pain  [ ]melena  [ ]BRBPR    [  ]epistaxis  [  ]rash    [ ]lower extremity edema    [X] All others negative	  [ ] Unable to obtain      LABS:	 	    CARDIAC MARKERS:                              14.2   7.35  )-----------( 210      ( 11 Jun 2023 05:45 )             43.1     Hb Trend:     06-12    140  |  106  |  21  ----------------------------<  126<H>  3.9   |  20<L>  |  1.55<H>    Ca    9.1      12 Jun 2023 04:40  Phos  3.7     06-12  Mg     2.00     06-12      Creatinine Trend: 1.55<--, 1.43<--, 1.40<--, 1.46<--      PHYSICAL EXAM:  T(C): 36.8 (06-12-23 @ 09:23), Max: 37.4 (06-11-23 @ 17:52)  HR: 72 (06-12-23 @ 09:23) (65 - 81)  BP: 119/78 (06-12-23 @ 09:23) (119/78 - 157/110)  RR: 18 (06-12-23 @ 09:23) (17 - 18)  SpO2: 97% (06-12-23 @ 09:23) (96% - 100%)  Wt(kg): --     I&O's Summary      General:  Alert and Oriented * 3.   Head: Normocephalic and atraumatic.   Neck: No JVD. No bruits. Supple. Does not appear to be enlarged.   Cardiovascular: + S1,S2 ; RRR Soft systolic murmur at the left lower sternal border. No rubs noted.    Lungs: CTA b/l. No rhonchi, rales or wheezes.   Abdomen: + BS, soft. Non tender. Non distended. No rebound. No guarding.   Extremities: No clubbing/cyanosis/edema.   Neurologic: Moves all four extremities. Full range of motion.   Skin: Warm and moist. The patient's skin has normal elasticity and good skin turgor.   Psychiatric: Appropriate mood and affect.  Musculoskeletal: Normal range of motion, normal strength       TELEMETRY: 	  NSR    ECG:  	NSR      < from: CT Angio Neck Stroke Protocol w/ IV Cont (06.09.23 @ 19:17) >  CT ANGIOGRAPHY BRAIN:  Internal carotid arteries: Calcifications of the supraclinoid segments bilaterally, without significant stenosis.  Anterior cerebral arteries: Moderate-severe segmental stenosis mid A2 segment right KYM. Left KYM is patent without flow-limiting stenosis.  Middle cerebral arteries: Moderate focal stenosis of the distal left M1   segment. Moderate severe focal stenosis proximal M2 branch left anterior sylvian fissure. Partially calcified plaque M2 branch left posterior   sylvian fissure, resulting in mild-moderate segmental stenosis. Calcified plaque right M2 branch posterior sylvian fissure, resulting in   mild-moderate focal stenosis. Moderate-severe segmental stenosis M2  branch right anterior sylvian fissure.  Posterior cerebral arteries: Mild-moderate segmental stenosis P1 segment   RIGHT posterior cerebral artery and moderate-severe segmental stenosis   ipsilateral P2 segment proximally mild-moderate segmental stenosis   proximal P2 segment LEFT posterior cerebral artery and moderate-severe   segmental stenosis ipsilateral P2-pituitary junction.  Vertebrobasilar: Patent without stenosis.    CONCLUSIONS:  1. Mitral annular calcification, otherwise normal mitral  valve. Mild-moderate mitral regurgitation.  2. Calcified trileaflet aortic valve with normal opening.  Mild-moderate aortic regurgitation.  3. Mildly dilated left atrium.  LA volume index = 37 cc/m2.  4. Normal left ventricular internal dimensions and wall  thicknesses.  5. Mild global left ventricular systolic dysfunction.  Endocardial visualization enhanced with intravenous  injection of echo contrast (Definity).  6. Normal right ventricular size and function.  7. A bubble study was performed with the intravenous  injection of agitated saline.  Following contrast  injection, no obvious bubbles were seen in the left heart.    ASSESSMENT/PLAN: 	Pt is a 69 y/o M with PMH of HTN, HLD, Gout, T2DM, peripheral neuropathy presents with R facial droop and speech difficulty.     ECHO with LOW normal LVEF, c/w ASA, Plavix, Statin, would avoid cath right now due to recent neurological event, lack of chest pain and no ischemia on EKG  Goal now normotension, C/w coreg and amlodipine  Follow up EP plans for GEMMA Marks MD  Pager: [phone]        C A R D I O L O G Y  *********************    DATE OF SERVICE: 06-12-23    HISTORY OF PRESENT ILLNESS: HPI:  Pt is a 67 y/o M with PMH of HTN, HLD, Gout, T2DM, peripheral neuropathy presents with R facial droop and speech difficulty. Pt reports onset of garbled speech a few days ago while playing cards however also notes he was drunk at the time so attributed it to that. States his symptoms improved a bit and over the next few days he thought his speech difficulty was related to his dentures. Wife and daughter noted his speech was slurred and urged him to come in. No past neurologic history. Denies any headache, visual deficits, fever, chills, chest pain, abdominal pain, numbness/tingling throughout extremities. Lives at home with wife and independent with ADLs. Never been on antiplatelets (aspirin/plavix) or anticoag (eliquis/lovenox/xarelto). Pt admits to having about 5 drinks of vodka a few times a week however some weeks has drinks every day. Denies symptoms of withdraw when he goes periods without drinking.     CT head showed multifocal stenosis EP discussing possile ILR. HE denies any compalints this AM    PAST MEDICAL & SURGICAL HISTORY:  DM (diabetes mellitus)  HTN (hypertension)  HLD (hyperlipidemia)  Obese  HTN (hypertension)  Cervical disc herniation  History of arthroscopy of right shoulder  No significant past surgical history    MEDICATIONS:  MEDICATIONS  (STANDING):  allopurinol 200 milliGRAM(s) Oral daily  amLODIPine   Tablet 10 milliGRAM(s) Oral daily  aspirin enteric coated 81 milliGRAM(s) Oral daily  atorvastatin 80 milliGRAM(s) Oral at bedtime  carvedilol 12.5 milliGRAM(s) Oral every 12 hours  clopidogrel Tablet 75 milliGRAM(s) Oral daily  dextrose 5%. 1000 milliLiter(s) (100 mL/Hr) IV Continuous <Continuous>  dextrose 5%. 1000 milliLiter(s) (50 mL/Hr) IV Continuous <Continuous>  dextrose 50% Injectable 25 Gram(s) IV Push once  dextrose 50% Injectable 12.5 Gram(s) IV Push once  dextrose 50% Injectable 25 Gram(s) IV Push once  enoxaparin Injectable 40 milliGRAM(s) SubCutaneous every 24 hours  fenofibrate Tablet 145 milliGRAM(s) Oral daily  folic acid 1 milliGRAM(s) Oral daily  gabapentin 300 milliGRAM(s) Oral two times a day  glucagon  Injectable 1 milliGRAM(s) IntraMuscular once  insulin glargine Injectable (LANTUS) 25 Unit(s) SubCutaneous at bedtime  insulin lispro (ADMELOG) corrective regimen sliding scale   SubCutaneous three times a day before meals  insulin lispro (ADMELOG) corrective regimen sliding scale   SubCutaneous at bedtime  multivitamin 1 Tablet(s) Oral daily  pantoprazole    Tablet 40 milliGRAM(s) Oral before breakfast  thiamine 100 milliGRAM(s) Oral daily      Allergies: No Known Allergies    FAMILY HISTORY: No pertinent family history in first degree relatives      SOCIAL HISTORY:    [X ] Non-smoker  [ ] Smoker  [ ] Alcohol    FLU VACCINE THIS YEAR STARTS IN AUGUST:  [ ] Yes    [ ] No    IF OVER 65 HAVE YOU EVER HAD A PNA VACCINE:  [ ] Yes    [ ] No       [ ] N/A      REVIEW OF SYSTEMS:  [ ]chest pain  [  ]shortness of breath  [  ]palpitations  [  ]syncope  [ ]near syncope [ ]upper extremity weakness   [ ] lower extremity weakness  [  ]diplopia  [  ]altered mental status   [  ]fevers  [ ]chills [ ]nausea  [ ]vomiting  [  ]dysphagia    [ ]abdominal pain  [ ]melena  [ ]BRBPR    [  ]epistaxis  [  ]rash    [ ]lower extremity edema    [X] All others negative	  [ ] Unable to obtain      LABS:	 	    CARDIAC MARKERS:                              14.2   7.35  )-----------( 210      ( 11 Jun 2023 05:45 )             43.1     Hb Trend:     06-12    140  |  106  |  21  ----------------------------<  126<H>  3.9   |  20<L>  |  1.55<H>    Ca    9.1      12 Jun 2023 04:40  Phos  3.7     06-12  Mg     2.00     06-12      Creatinine Trend: 1.55<--, 1.43<--, 1.40<--, 1.46<--      PHYSICAL EXAM:  T(C): 36.8 (06-12-23 @ 09:23), Max: 37.4 (06-11-23 @ 17:52)  HR: 72 (06-12-23 @ 09:23) (65 - 81)  BP: 119/78 (06-12-23 @ 09:23) (119/78 - 157/110)  RR: 18 (06-12-23 @ 09:23) (17 - 18)  SpO2: 97% (06-12-23 @ 09:23) (96% - 100%)  Wt(kg): --     I&O's Summary      General:  Alert and Oriented * 3.   Head: Normocephalic and atraumatic.   Neck: No JVD. No bruits. Supple. Does not appear to be enlarged.   Cardiovascular: + S1,S2 ; RRR Soft systolic murmur at the left lower sternal border. No rubs noted.    Lungs: CTA b/l. No rhonchi, rales or wheezes.   Abdomen: + BS, soft. Non tender. Non distended. No rebound. No guarding.   Extremities: No clubbing/cyanosis/edema.   Neurologic: Moves all four extremities. Full range of motion.   Skin: Warm and moist. The patient's skin has normal elasticity and good skin turgor.   Psychiatric: Appropriate mood and affect.  Musculoskeletal: Normal range of motion, normal strength       TELEMETRY: 	  NSR    ECG:  	NSR      < from: CT Angio Neck Stroke Protocol w/ IV Cont (06.09.23 @ 19:17) >  CT ANGIOGRAPHY BRAIN:  Internal carotid arteries: Calcifications of the supraclinoid segments bilaterally, without significant stenosis.  Anterior cerebral arteries: Moderate-severe segmental stenosis mid A2 segment right KYM. Left KYM is patent without flow-limiting stenosis.  Middle cerebral arteries: Moderate focal stenosis of the distal left M1   segment. Moderate severe focal stenosis proximal M2 branch left anterior sylvian fissure. Partially calcified plaque M2 branch left posterior   sylvian fissure, resulting in mild-moderate segmental stenosis. Calcified plaque right M2 branch posterior sylvian fissure, resulting in   mild-moderate focal stenosis. Moderate-severe segmental stenosis M2  branch right anterior sylvian fissure.  Posterior cerebral arteries: Mild-moderate segmental stenosis P1 segment   RIGHT posterior cerebral artery and moderate-severe segmental stenosis   ipsilateral P2 segment proximally mild-moderate segmental stenosis   proximal P2 segment LEFT posterior cerebral artery and moderate-severe   segmental stenosis ipsilateral P2-pituitary junction.  Vertebrobasilar: Patent without stenosis.    CONCLUSIONS:  1. Mitral annular calcification, otherwise normal mitral  valve. Mild-moderate mitral regurgitation.  2. Calcified trileaflet aortic valve with normal opening.  Mild-moderate aortic regurgitation.  3. Mildly dilated left atrium.  LA volume index = 37 cc/m2.  4. Normal left ventricular internal dimensions and wall  thicknesses.  5. Mild global left ventricular systolic dysfunction.  Endocardial visualization enhanced with intravenous  injection of echo contrast (Definity).  6. Normal right ventricular size and function.  7. A bubble study was performed with the intravenous  injection of agitated saline.  Following contrast  injection, no obvious bubbles were seen in the left heart.    ASSESSMENT/PLAN: 	Pt is a 67 y/o M with PMH of HTN, HLD, Gout, T2DM, peripheral neuropathy presents with R facial droop and speech difficulty.     ECHO with LOW normal LVEF, c/w ASA, Plavix, Statin, would avoid cath right now due to recent neurological event, lack of chest pain and no ischemia on EKG  Goal now normotension, C/w coreg and amlodipine  Follow up EP plans for ILR  NO PFO seen on bubble study      Zeinab Marks MD  Pager: [phone]

## 2023-06-12 NOTE — PROGRESS NOTE ADULT - SUBJECTIVE AND OBJECTIVE BOX
Patient is a 68y old  Male who presents with a chief complaint of dysarthria (2023 16:29)      SUBJECTIVE / OVERNIGHT EVENTS:    No events overnight. This AM, patient without n/v/d/cp/sob.  patient states he feels a little weak today overall but denies any other complaints.     MEDICATIONS  (STANDING):  allopurinol 200 milliGRAM(s) Oral daily  amLODIPine   Tablet 10 milliGRAM(s) Oral daily  aspirin enteric coated 81 milliGRAM(s) Oral daily  atorvastatin 80 milliGRAM(s) Oral at bedtime  carvedilol 12.5 milliGRAM(s) Oral every 12 hours  clopidogrel Tablet 75 milliGRAM(s) Oral daily  dextrose 5%. 1000 milliLiter(s) (50 mL/Hr) IV Continuous <Continuous>  dextrose 5%. 1000 milliLiter(s) (100 mL/Hr) IV Continuous <Continuous>  dextrose 50% Injectable 25 Gram(s) IV Push once  dextrose 50% Injectable 12.5 Gram(s) IV Push once  dextrose 50% Injectable 25 Gram(s) IV Push once  enoxaparin Injectable 40 milliGRAM(s) SubCutaneous every 24 hours  fenofibrate Tablet 145 milliGRAM(s) Oral daily  folic acid 1 milliGRAM(s) Oral daily  gabapentin 300 milliGRAM(s) Oral two times a day  glucagon  Injectable 1 milliGRAM(s) IntraMuscular once  insulin glargine Injectable (LANTUS) 25 Unit(s) SubCutaneous at bedtime  insulin lispro (ADMELOG) corrective regimen sliding scale   SubCutaneous three times a day before meals  insulin lispro (ADMELOG) corrective regimen sliding scale   SubCutaneous at bedtime  multivitamin 1 Tablet(s) Oral daily  pantoprazole    Tablet 40 milliGRAM(s) Oral before breakfast  thiamine 100 milliGRAM(s) Oral daily    MEDICATIONS  (PRN):  dextrose Oral Gel 15 Gram(s) Oral once PRN Blood Glucose LESS THAN 70 milliGRAM(s)/deciliter      PHYSICAL EXAM:  T(C): 36.8 (23 @ 17:30), Max: 36.9 (23 @ 13:30)  HR: 75 (23 @ 17:30) (65 - 77)  BP: 149/104 (23 @ 17:30) (119/78 - 157/110)  RR: 18 (23 @ 17:30) (17 - 18)  SpO2: 98% (23 @ 17:30) (96% - 100%)  I&O's Summary    GENERAL: NAD, elderly male resting in bed.   HEAD:  Atraumatic, Normocephalic, MMM  CHEST/LUNG: No use of accessory muscles, CTAB, breathing non-labored  COR: RR, no mrcg  ABD: Soft, ND/NT, +BS  PSYCH: AAOx3  NEUROLOGY: No aphasia. Right UMN type facial palsy. Mild dysarthria.  Right HP, Right arm pronator drift and mild clumsiness. moving all extremities  SKIN: No rashes or lesions  EXT: No LE edema noted B/L     LABS:  CAPILLARY BLOOD GLUCOSE      POCT Blood Glucose.: 152 mg/dL (2023 17:18)  POCT Blood Glucose.: 179 mg/dL (2023 11:39)  POCT Blood Glucose.: 117 mg/dL (2023 08:22)  POCT Blood Glucose.: 169 mg/dL (2023 20:53)                          14.2   7.35  )-----------( 210      ( 2023 05:45 )             43.1     06-12    140  |  106  |  21  ----------------------------<  126<H>  3.9   |  20<L>  |  1.55<H>    Ca    9.1      2023 04:40  Phos  3.7     06-12  Mg     2.00     06-12            Urinalysis Basic - ( 2023 13:17 )    Color: Yellow / Appearance: Clear / S.030 / pH: x  Gluc: x / Ketone: Trace  / Bili: Negative / Urobili: 6 mg/dL   Blood: x / Protein: 100 mg/dL / Nitrite: Negative   Leuk Esterase: Negative / RBC: 4 /HPF / WBC 1 /HPF   Sq Epi: x / Non Sq Epi: x / Bacteria: Negative          RADIOLOGY & ADDITIONAL TESTS:    Telemetry Personally Reviewed -     Imaging Personally Reviewed -     Imaging Reviewed -     Consultant(s) Notes Reviewed -       Care Discussed with Consultants/Other Providers -

## 2023-06-13 ENCOUNTER — TRANSCRIPTION ENCOUNTER (OUTPATIENT)
Age: 68
End: 2023-06-13

## 2023-06-13 LAB
ANION GAP SERPL CALC-SCNC: 13 MMOL/L — SIGNIFICANT CHANGE UP (ref 7–14)
BUN SERPL-MCNC: 22 MG/DL — SIGNIFICANT CHANGE UP (ref 7–23)
CALCIUM SERPL-MCNC: 8.9 MG/DL — SIGNIFICANT CHANGE UP (ref 8.4–10.5)
CHLORIDE SERPL-SCNC: 106 MMOL/L — SIGNIFICANT CHANGE UP (ref 98–107)
CO2 SERPL-SCNC: 22 MMOL/L — SIGNIFICANT CHANGE UP (ref 22–31)
CREAT SERPL-MCNC: 1.49 MG/DL — HIGH (ref 0.5–1.3)
EGFR: 51 ML/MIN/1.73M2 — LOW
GLUCOSE BLDC GLUCOMTR-MCNC: 155 MG/DL — HIGH (ref 70–99)
GLUCOSE BLDC GLUCOMTR-MCNC: 181 MG/DL — HIGH (ref 70–99)
GLUCOSE BLDC GLUCOMTR-MCNC: 187 MG/DL — HIGH (ref 70–99)
GLUCOSE BLDC GLUCOMTR-MCNC: 208 MG/DL — HIGH (ref 70–99)
GLUCOSE SERPL-MCNC: 126 MG/DL — HIGH (ref 70–99)
HCT VFR BLD CALC: 41.7 % — SIGNIFICANT CHANGE UP (ref 39–50)
HGB BLD-MCNC: 13.6 G/DL — SIGNIFICANT CHANGE UP (ref 13–17)
MAGNESIUM SERPL-MCNC: 2 MG/DL — SIGNIFICANT CHANGE UP (ref 1.6–2.6)
MCHC RBC-ENTMCNC: 29.1 PG — SIGNIFICANT CHANGE UP (ref 27–34)
MCHC RBC-ENTMCNC: 32.6 GM/DL — SIGNIFICANT CHANGE UP (ref 32–36)
MCV RBC AUTO: 89.1 FL — SIGNIFICANT CHANGE UP (ref 80–100)
NRBC # BLD: 0 /100 WBCS — SIGNIFICANT CHANGE UP (ref 0–0)
NRBC # FLD: 0 K/UL — SIGNIFICANT CHANGE UP (ref 0–0)
PHOSPHATE SERPL-MCNC: 3.8 MG/DL — SIGNIFICANT CHANGE UP (ref 2.5–4.5)
PLATELET # BLD AUTO: 205 K/UL — SIGNIFICANT CHANGE UP (ref 150–400)
POTASSIUM SERPL-MCNC: 3.8 MMOL/L — SIGNIFICANT CHANGE UP (ref 3.5–5.3)
POTASSIUM SERPL-SCNC: 3.8 MMOL/L — SIGNIFICANT CHANGE UP (ref 3.5–5.3)
RBC # BLD: 4.68 M/UL — SIGNIFICANT CHANGE UP (ref 4.2–5.8)
RBC # FLD: 13.2 % — SIGNIFICANT CHANGE UP (ref 10.3–14.5)
SODIUM SERPL-SCNC: 141 MMOL/L — SIGNIFICANT CHANGE UP (ref 135–145)
WBC # BLD: 6.49 K/UL — SIGNIFICANT CHANGE UP (ref 3.8–10.5)
WBC # FLD AUTO: 6.49 K/UL — SIGNIFICANT CHANGE UP (ref 3.8–10.5)

## 2023-06-13 PROCEDURE — 99233 SBSQ HOSP IP/OBS HIGH 50: CPT

## 2023-06-13 PROCEDURE — 70551 MRI BRAIN STEM W/O DYE: CPT | Mod: 26

## 2023-06-13 RX ORDER — LOSARTAN POTASSIUM 100 MG/1
50 TABLET, FILM COATED ORAL AT BEDTIME
Refills: 0 | Status: DISCONTINUED | OUTPATIENT
Start: 2023-06-13 | End: 2023-06-14

## 2023-06-13 RX ADMIN — Medication 2: at 17:28

## 2023-06-13 RX ADMIN — ATORVASTATIN CALCIUM 80 MILLIGRAM(S): 80 TABLET, FILM COATED ORAL at 21:13

## 2023-06-13 RX ADMIN — LOSARTAN POTASSIUM 50 MILLIGRAM(S): 100 TABLET, FILM COATED ORAL at 21:13

## 2023-06-13 RX ADMIN — CLOPIDOGREL BISULFATE 75 MILLIGRAM(S): 75 TABLET, FILM COATED ORAL at 12:37

## 2023-06-13 RX ADMIN — Medication 1: at 12:36

## 2023-06-13 RX ADMIN — Medication 100 MILLIGRAM(S): at 12:36

## 2023-06-13 RX ADMIN — Medication 81 MILLIGRAM(S): at 12:36

## 2023-06-13 RX ADMIN — GABAPENTIN 300 MILLIGRAM(S): 400 CAPSULE ORAL at 17:28

## 2023-06-13 RX ADMIN — Medication 200 MILLIGRAM(S): at 12:37

## 2023-06-13 RX ADMIN — Medication 1 MILLIGRAM(S): at 12:36

## 2023-06-13 RX ADMIN — Medication 145 MILLIGRAM(S): at 12:36

## 2023-06-13 RX ADMIN — CARVEDILOL PHOSPHATE 12.5 MILLIGRAM(S): 80 CAPSULE, EXTENDED RELEASE ORAL at 17:28

## 2023-06-13 RX ADMIN — AMLODIPINE BESYLATE 10 MILLIGRAM(S): 2.5 TABLET ORAL at 05:44

## 2023-06-13 RX ADMIN — ENOXAPARIN SODIUM 40 MILLIGRAM(S): 100 INJECTION SUBCUTANEOUS at 17:28

## 2023-06-13 RX ADMIN — INSULIN GLARGINE 25 UNIT(S): 100 INJECTION, SOLUTION SUBCUTANEOUS at 22:56

## 2023-06-13 RX ADMIN — PANTOPRAZOLE SODIUM 40 MILLIGRAM(S): 20 TABLET, DELAYED RELEASE ORAL at 05:44

## 2023-06-13 RX ADMIN — CARVEDILOL PHOSPHATE 12.5 MILLIGRAM(S): 80 CAPSULE, EXTENDED RELEASE ORAL at 05:44

## 2023-06-13 RX ADMIN — GABAPENTIN 300 MILLIGRAM(S): 400 CAPSULE ORAL at 05:44

## 2023-06-13 RX ADMIN — Medication 1: at 09:00

## 2023-06-13 RX ADMIN — Medication 1 TABLET(S): at 12:36

## 2023-06-13 NOTE — PROGRESS NOTE ADULT - SUBJECTIVE AND OBJECTIVE BOX
Patient is a 68y old  Male who presents with a chief complaint of dysarthria (13 Jun 2023 14:40)      SUBJECTIVE / OVERNIGHT EVENTS:    No events overnight. This AM, patient without n/v/d/cp/sob.      MEDICATIONS  (STANDING):  allopurinol 200 milliGRAM(s) Oral daily  amLODIPine   Tablet 10 milliGRAM(s) Oral daily  aspirin enteric coated 81 milliGRAM(s) Oral daily  atorvastatin 80 milliGRAM(s) Oral at bedtime  carvedilol 12.5 milliGRAM(s) Oral every 12 hours  clopidogrel Tablet 75 milliGRAM(s) Oral daily  dextrose 5%. 1000 milliLiter(s) (100 mL/Hr) IV Continuous <Continuous>  dextrose 5%. 1000 milliLiter(s) (50 mL/Hr) IV Continuous <Continuous>  dextrose 50% Injectable 25 Gram(s) IV Push once  dextrose 50% Injectable 12.5 Gram(s) IV Push once  dextrose 50% Injectable 25 Gram(s) IV Push once  enoxaparin Injectable 40 milliGRAM(s) SubCutaneous every 24 hours  fenofibrate Tablet 145 milliGRAM(s) Oral daily  folic acid 1 milliGRAM(s) Oral daily  gabapentin 300 milliGRAM(s) Oral two times a day  glucagon  Injectable 1 milliGRAM(s) IntraMuscular once  insulin glargine Injectable (LANTUS) 25 Unit(s) SubCutaneous at bedtime  insulin lispro (ADMELOG) corrective regimen sliding scale   SubCutaneous three times a day before meals  insulin lispro (ADMELOG) corrective regimen sliding scale   SubCutaneous at bedtime  multivitamin 1 Tablet(s) Oral daily  pantoprazole    Tablet 40 milliGRAM(s) Oral before breakfast  thiamine 100 milliGRAM(s) Oral daily    MEDICATIONS  (PRN):  dextrose Oral Gel 15 Gram(s) Oral once PRN Blood Glucose LESS THAN 70 milliGRAM(s)/deciliter      PHYSICAL EXAM:  T(C): 36.6 (06-13-23 @ 12:41), Max: 36.8 (06-12-23 @ 17:30)  HR: 73 (06-13-23 @ 12:41) (67 - 101)  BP: 138/103 (06-13-23 @ 12:41) (121/79 - 149/104)  RR: 17 (06-13-23 @ 12:41) (17 - 18)  SpO2: 100% (06-13-23 @ 12:41) (97% - 100%)  I&O's Summary    GENERAL: NAD, well-developed  HEAD:  Atraumatic, Normocephalic, MMM  CHEST/LUNG: No use of accessory muscles, CTAB, breathing non-labored  COR: RR, no mrcg  ABD: Soft, ND/NT, +BS  PSYCH: AAOx3  NEUROLOGY: CN II-XII grossly intact, moving all extremities  SKIN: No rashes or lesions  EXT: wwp, no cce    LABS:  CAPILLARY BLOOD GLUCOSE      POCT Blood Glucose.: 181 mg/dL (13 Jun 2023 12:10)  POCT Blood Glucose.: 155 mg/dL (13 Jun 2023 08:56)  POCT Blood Glucose.: 201 mg/dL (12 Jun 2023 21:09)  POCT Blood Glucose.: 152 mg/dL (12 Jun 2023 17:18)                          13.6   6.49  )-----------( 205      ( 13 Jun 2023 05:30 )             41.7     06-13    141  |  106  |  22  ----------------------------<  126<H>  3.8   |  22  |  1.49<H>    Ca    8.9      13 Jun 2023 05:30  Phos  3.8     06-13  Mg     2.00     06-13                  RADIOLOGY & ADDITIONAL TESTS:    Telemetry Personally Reviewed -     Imaging Personally Reviewed -     Imaging Reviewed -     Consultant(s) Notes Reviewed -       Care Discussed with Consultants/Other Providers -  Patient is a 68y old  Male who presents with a chief complaint of dysarthria (13 Jun 2023 14:40)      SUBJECTIVE / OVERNIGHT EVENTS:    No events overnight. This AM, patient without n/v/d/cp/sob.  Patient reports feeling better today than yesterday. Denies any acute complaints     MEDICATIONS  (STANDING):  allopurinol 200 milliGRAM(s) Oral daily  amLODIPine   Tablet 10 milliGRAM(s) Oral daily  aspirin enteric coated 81 milliGRAM(s) Oral daily  atorvastatin 80 milliGRAM(s) Oral at bedtime  carvedilol 12.5 milliGRAM(s) Oral every 12 hours  clopidogrel Tablet 75 milliGRAM(s) Oral daily  dextrose 5%. 1000 milliLiter(s) (100 mL/Hr) IV Continuous <Continuous>  dextrose 5%. 1000 milliLiter(s) (50 mL/Hr) IV Continuous <Continuous>  dextrose 50% Injectable 25 Gram(s) IV Push once  dextrose 50% Injectable 12.5 Gram(s) IV Push once  dextrose 50% Injectable 25 Gram(s) IV Push once  enoxaparin Injectable 40 milliGRAM(s) SubCutaneous every 24 hours  fenofibrate Tablet 145 milliGRAM(s) Oral daily  folic acid 1 milliGRAM(s) Oral daily  gabapentin 300 milliGRAM(s) Oral two times a day  glucagon  Injectable 1 milliGRAM(s) IntraMuscular once  insulin glargine Injectable (LANTUS) 25 Unit(s) SubCutaneous at bedtime  insulin lispro (ADMELOG) corrective regimen sliding scale   SubCutaneous three times a day before meals  insulin lispro (ADMELOG) corrective regimen sliding scale   SubCutaneous at bedtime  multivitamin 1 Tablet(s) Oral daily  pantoprazole    Tablet 40 milliGRAM(s) Oral before breakfast  thiamine 100 milliGRAM(s) Oral daily    MEDICATIONS  (PRN):  dextrose Oral Gel 15 Gram(s) Oral once PRN Blood Glucose LESS THAN 70 milliGRAM(s)/deciliter      PHYSICAL EXAM:  T(C): 36.6 (06-13-23 @ 12:41), Max: 36.8 (06-12-23 @ 17:30)  HR: 73 (06-13-23 @ 12:41) (67 - 101)  BP: 138/103 (06-13-23 @ 12:41) (121/79 - 149/104)  RR: 17 (06-13-23 @ 12:41) (17 - 18)  SpO2: 100% (06-13-23 @ 12:41) (97% - 100%)  I&O's Summary    GENERAL: NAD, elderly male resting in bed.   HEAD:  Atraumatic, Normocephalic, MMM  CHEST/LUNG: No use of accessory muscles, CTAB, breathing non-labored  COR: RR, no mrcg  ABD: Soft, ND/NT, +BS  PSYCH: AAOx3  NEUROLOGY: No aphasia. Right UMN type facial palsy. Mild dysarthria.  Right HP, Right arm pronator drift and mild clumsiness. moving all extremities  SKIN: No rashes or lesions  EXT: No LE edema noted B/L     LABS:  CAPILLARY BLOOD GLUCOSE      POCT Blood Glucose.: 181 mg/dL (13 Jun 2023 12:10)  POCT Blood Glucose.: 155 mg/dL (13 Jun 2023 08:56)  POCT Blood Glucose.: 201 mg/dL (12 Jun 2023 21:09)  POCT Blood Glucose.: 152 mg/dL (12 Jun 2023 17:18)                          13.6   6.49  )-----------( 205      ( 13 Jun 2023 05:30 )             41.7     06-13    141  |  106  |  22  ----------------------------<  126<H>  3.8   |  22  |  1.49<H>    Ca    8.9      13 Jun 2023 05:30  Phos  3.8     06-13  Mg     2.00     06-13                  RADIOLOGY & ADDITIONAL TESTS:    Telemetry Personally Reviewed -     Imaging Personally Reviewed -     Imaging Reviewed -     Consultant(s) Notes Reviewed -       Care Discussed with Consultants/Other Providers -

## 2023-06-13 NOTE — PROGRESS NOTE ADULT - PROBLEM SELECTOR PLAN 4
- A1C 7.9  - home meds: glyburide-metformin 10-1000mg BID, basaglar 40U bedtime  - c/w lantus 25U bedtime  - AM FSG appropriate  - carb consistent diet A1C 7.9  home meds: glyburide-metformin 10-1000mg BID, basaglar 40U bedtime  BG controlled     - c/w lantus 25U bedtime  - AM FSG appropriate  - carb consistent diet

## 2023-06-13 NOTE — PROGRESS NOTE ADULT - SUBJECTIVE AND OBJECTIVE BOX
Date of service 06/13/2023    chief complaint: Slurred Speech    extended hpi: Pt is a 69 y/o M with PMH of HTN, HLD, Gout, T2DM, peripheral neuropathy presents with R facial droop and speech difficulty. Pt reports onset of garbled speech a few days ago while playing cards however also notes he was drunk at the time so attributed it to that. States his symptoms improved a bit and over the next few days he thought his speech difficulty was related to his dentures. Wife and daughter noted his speech was slurred and urged him to come in. No past neurologic history. Denies any headache, visual deficits, fever, chills, chest pain, abdominal pain, numbness/tingling throughout extremities. Lives at home with wife and independent with ADLs. Never been on antiplatelets (aspirin/plavix) or anticoag (eliquis/lovenox/xarelto). Pt admits to having about 5 drinks of vodka a few times a week however some weeks has drinks every day. Denies symptoms of withdraw when he goes periods without drinking.     CT head showed multifocal stenosis EP discussing possile ILR. HE denies any compalints this AM      DATE OF SERVICE: 06-13-23    Patient denies chest pain or shortness of breath.   Review of symptoms otherwise negative.    MEDICATIONS:  allopurinol 200 milliGRAM(s) Oral daily  amLODIPine   Tablet 10 milliGRAM(s) Oral daily  aspirin enteric coated 81 milliGRAM(s) Oral daily  atorvastatin 80 milliGRAM(s) Oral at bedtime  carvedilol 12.5 milliGRAM(s) Oral every 12 hours  clopidogrel Tablet 75 milliGRAM(s) Oral daily  dextrose 5%. 1000 milliLiter(s) IV Continuous <Continuous>  dextrose 5%. 1000 milliLiter(s) IV Continuous <Continuous>  dextrose 50% Injectable 25 Gram(s) IV Push once  dextrose 50% Injectable 12.5 Gram(s) IV Push once  dextrose 50% Injectable 25 Gram(s) IV Push once  dextrose Oral Gel 15 Gram(s) Oral once PRN  enoxaparin Injectable 40 milliGRAM(s) SubCutaneous every 24 hours  fenofibrate Tablet 145 milliGRAM(s) Oral daily  folic acid 1 milliGRAM(s) Oral daily  gabapentin 300 milliGRAM(s) Oral two times a day  glucagon  Injectable 1 milliGRAM(s) IntraMuscular once  insulin glargine Injectable (LANTUS) 25 Unit(s) SubCutaneous at bedtime  insulin lispro (ADMELOG) corrective regimen sliding scale   SubCutaneous three times a day before meals  insulin lispro (ADMELOG) corrective regimen sliding scale   SubCutaneous at bedtime  multivitamin 1 Tablet(s) Oral daily  pantoprazole    Tablet 40 milliGRAM(s) Oral before breakfast  thiamine 100 milliGRAM(s) Oral daily      LABS:                        13.6   6.49  )-----------( 205      ( 13 Jun 2023 05:30 )             41.7       Hemoglobin: 13.6 g/dL (06-13 @ 05:30)  Hemoglobin: 14.2 g/dL (06-11 @ 05:45)  Hemoglobin: 13.5 g/dL (06-10 @ 14:45)  Hemoglobin: 13.8 g/dL (06-09 @ 19:05)      06-13    141  |  106  |  22  ----------------------------<  126<H>  3.8   |  22  |  1.49<H>    Ca    8.9      13 Jun 2023 05:30  Phos  3.8     06-13  Mg     2.00     06-13      Creatinine Trend: 1.49<--, 1.55<--, 1.43<--, 1.40<--, 1.46<--    COAGS:           PHYSICAL EXAM:  T(C): 36.6 (06-13-23 @ 12:41), Max: 36.8 (06-12-23 @ 17:30)  HR: 73 (06-13-23 @ 12:41) (67 - 101)  BP: 138/103 (06-13-23 @ 12:41) (121/79 - 149/104)  RR: 17 (06-13-23 @ 12:41) (17 - 18)  SpO2: 100% (06-13-23 @ 12:41) (97% - 100%)  Wt(kg): --    I&O's Summary            General:  Alert and Oriented * 3.   Head: Normocephalic and atraumatic.   Neck: No JVD. No bruits. Supple. Does not appear to be enlarged.   Cardiovascular: + S1,S2 ; RRR Soft systolic murmur at the left lower sternal border. No rubs noted.    Lungs: CTA b/l. No rhonchi, rales or wheezes.   Abdomen: + BS, soft. Non tender. Non distended. No rebound. No guarding.   Extremities: No clubbing/cyanosis/edema.   Neurologic: Moves all four extremities. Full range of motion.   Skin: Warm and moist. The patient's skin has normal elasticity and good skin turgor.   Psychiatric: Appropriate mood and affect.  Musculoskeletal: Normal range of motion, normal strength        TELEMETRY: 	  NSR    ECG:  	NSR      < from: CT Angio Neck Stroke Protocol w/ IV Cont (06.09.23 @ 19:17) >  CT ANGIOGRAPHY BRAIN:  Internal carotid arteries: Calcifications of the supraclinoid segments bilaterally, without significant stenosis.  Anterior cerebral arteries: Moderate-severe segmental stenosis mid A2 segment right KYM. Left KYM is patent without flow-limiting stenosis.  Middle cerebral arteries: Moderate focal stenosis of the distal left M1   segment. Moderate severe focal stenosis proximal M2 branch left anterior sylvian fissure. Partially calcified plaque M2 branch left posterior   sylvian fissure, resulting in mild-moderate segmental stenosis. Calcified plaque right M2 branch posterior sylvian fissure, resulting in   mild-moderate focal stenosis. Moderate-severe segmental stenosis M2  branch right anterior sylvian fissure.  Posterior cerebral arteries: Mild-moderate segmental stenosis P1 segment   RIGHT posterior cerebral artery and moderate-severe segmental stenosis   ipsilateral P2 segment proximally mild-moderate segmental stenosis   proximal P2 segment LEFT posterior cerebral artery and moderate-severe   segmental stenosis ipsilateral P2-pituitary junction.  Vertebrobasilar: Patent without stenosis.    CONCLUSIONS:  1. Mitral annular calcification, otherwise normal mitral  valve. Mild-moderate mitral regurgitation.  2. Calcified trileaflet aortic valve with normal opening.  Mild-moderate aortic regurgitation.  3. Mildly dilated left atrium.  LA volume index = 37 cc/m2.  4. Normal left ventricular internal dimensions and wall  thicknesses.  5. Mild global left ventricular systolic dysfunction.  Endocardial visualization enhanced with intravenous  injection of echo contrast (Definity).  6. Normal right ventricular size and function.  7. A bubble study was performed with the intravenous  injection of agitated saline.  Following contrast  injection, no obvious bubbles were seen in the left heart.    ASSESSMENT/PLAN: 	Pt is a 69 y/o M with PMH of HTN, HLD, Gout, T2DM, peripheral neuropathy presents with R facial droop and speech difficulty.     ECHO with LOW normal LVEF, c/w ASA, Plavix, Statin, would avoid cath right now due to recent CVAt, lack of chest pain and no ischemia on EKG  Goal now normotension, C/w coreg and amlodipine  Follow up EP plans for ILR  NO PFO seen on bubble study      Zeinab Marks MD  Pager: [phone]

## 2023-06-13 NOTE — PROGRESS NOTE ADULT - SUBJECTIVE AND OBJECTIVE BOX
HISTORY OF PRESENT ILLNESS: HPI:  69 y/o M with PMH of HTN, HLD, Gout, T2DM, peripheral neuropathy presents with R facial droop and speech difficulty. Pt reports onset of garbled speech a few days ago while playing cards however also notes he was drunk at the time so attributed it to that. States his symptoms improved a bit and over the next few days he thought his speech difficulty was related to his dentures. Today wife and daughter noted his speech was slurred and urged him to come in. No past neurologic history. Denies any headache, visual deficits, fever, chills, chest pain, abdominal pain, numbness/tingling throughout extremities. Lives at home with wife and independent with ADLs. Never been on antiplatelets (aspirin/plavix) or anticoag (eliquis/lovenox/xarelto). Pt admits to having about 5 drinks of vodka a few times a week however some weeks has drinks every day. Denies symptoms of withdraw when he goes periods without drinking. (09 Jun 2023 22:51)    Mr Oakes is known to Dr Michelle, for management of HTN and LE PAD.  He had episodes of unclear speech while drinking ,and again while he had teeth out of his mouth... didn't realize these were TIA episodes until he woke up the next morning (after 2-3 days of waxing/waning symptoms) and his speech seemed altered to his family members. He also noticed subtle facial droop. He is able to make intelligible statements, and understands language well. His right arm feels weak.  A 10 pt ROS is otherwise negative.  6/11- resting in bed, no angina or palpitations, awaiting echo and updated neuro imaging.  6/12- resting comfortably. no new complaints. awaiting mri brain.  Date of service 6/13/23- no pain, palps or SOB      PAST MEDICAL & SURGICAL HISTORY:  DM (diabetes mellitus)  HTN (hypertension)  HLD (hyperlipidemia)  Obese  HTN (hypertension)  Cervical disc herniation  History of arthroscopy of right shoulder  No significant past surgical history    Review of Systems:   Constitutional: [ ] fevers, [ ] chills.   Skin: [ ] dry skin. [ ] rashes.  Psychiatric: [ ] depression, [ ] anxiety.   Gastrointestinal: [ ] BRBPR, [ ] melena.   Neurological: [ ] confusion. [ ] seizures. [ ] shuffling gait.   Ears,Nose,Mouth and Throat: [ ] ear pain [ ] sore throat.   Eyes: [ ] diplopia.   Respiratory: [ ] hemoptysis. [ ] shortness of breath  Cardiovascular: See HPI above  Hematologic/Lymphatic: [ ] anemia. [ ] painful nodes. [ ] prolonged bleeding.   Genitourinary: [ ] hematuria. [ ] flank pain.   Endocrine: [ ] significant change in weight. [ ] intolerance to heat and cold.     Review of systems [ ] otherwise negative, [ ] otherwise unable to obtain    FH: no family history of sudden cardiac death in first degree relatives    SH: [ ] tobacco, [ ] alcohol, [ ] drugs    allopurinol 200 milliGRAM(s) Oral daily  amLODIPine   Tablet 10 milliGRAM(s) Oral daily  aspirin enteric coated 81 milliGRAM(s) Oral daily  atorvastatin 80 milliGRAM(s) Oral at bedtime  carvedilol 12.5 milliGRAM(s) Oral every 12 hours  clopidogrel Tablet 75 milliGRAM(s) Oral daily  enoxaparin Injectable 40 milliGRAM(s) SubCutaneous every 24 hours  fenofibrate Tablet 145 milliGRAM(s) Oral daily  folic acid 1 milliGRAM(s) Oral daily  gabapentin 300 milliGRAM(s) Oral two times a day  glucagon  Injectable 1 milliGRAM(s) IntraMuscular once  insulin glargine Injectable (LANTUS) 25 Unit(s) SubCutaneous at bedtime  insulin lispro (ADMELOG) corrective regimen sliding scale   SubCutaneous at bedtime  insulin lispro (ADMELOG) corrective regimen sliding scale   SubCutaneous three times a day before meals  multivitamin 1 Tablet(s) Oral daily  pantoprazole    Tablet 40 milliGRAM(s) Oral before breakfast  thiamine 100 milliGRAM(s) Oral daily                            13.6   6.49  )-----------( 205      ( 13 Jun 2023 05:30 )             41.7       06-13    141  |  106  |  22  ----------------------------<  126<H>  3.8   |  22  |  1.49<H>    Ca    8.9      13 Jun 2023 05:30  Phos  3.8     06-13  Mg     2.00     06-13      T(C): 36.6 (06-13-23 @ 12:41), Max: 36.9 (06-12-23 @ 13:30)  HR: 73 (06-13-23 @ 12:41) (65 - 101)  BP: 138/103 (06-13-23 @ 12:41) (120/89 - 149/104)  RR: 17 (06-13-23 @ 12:41) (17 - 18)  SpO2: 100% (06-13-23 @ 12:41) (97% - 100%)  Wt(kg): --      Appearance: Normal appearing adult male in no acute distress. lower facial droop.	  HEENT:   Normal oral mucosa, PERRL, EOMI	  Lymphatic: No lymphadenopathy , no edema  Cardiovascular: Normal S1 S2, No JVD, No murmurs , Peripheral pulses palpable 2+ bilaterally  Respiratory: Lungs clear to auscultation, normal effort 	  Gastrointestinal:  Soft, Non-tender, + BS	  Skin: No rashes, No ecchymoses, No cyanosis, warm to touch  Musculoskeletal: Normal range of motion, normal strength  Psychiatry:  Mood & affect appropriate    TELEMETRY: NSR	    ECG: NSR    < from: CT Angio Neck Stroke Protocol w/ IV Cont (06.09.23 @ 19:17) >  CT ANGIOGRAPHY BRAIN:  Internal carotid arteries: Calcifications of the supraclinoid segments   bilaterally, without significant stenosis.  Anterior cerebral arteries: Moderate-severe segmental stenosis mid A2   segment right KYM. Left KYM is patent without flow-limiting stenosis.  Middle cerebral arteries: Moderate focal stenosis of the distal left M1   segment. Moderate severe focal stenosis proximal M2 branch left anterior   sylvian fissure. Partially calcified plaque M2 branch left posterior   sylvian fissure, resulting in mild-moderate segmental stenosis. Calcified   plaque right M2 branch posterior sylvian fissure, resulting in   mild-moderate focal stenosis. Moderate-severe segmental stenosis M2   branch right anterior sylvian fissure.  Posterior cerebral arteries: Mild-moderate segmental stenosis P1 segment   RIGHT posterior cerebral artery and moderate-severe segmental stenosis   ipsilateral P2 segment proximally mild-moderate segmental stenosis   proximal P2 segment LEFT posterior cerebral artery and moderate-severe   segmental stenosis ipsilateral P2-pituitary junction.  Vertebrobasilar: Patent without stenosis.  < end of copied text >    < from: TTE with Doppler (w/Cont) (06.12.23 @ 07:48) >  CONCLUSIONS:  1. Mitral annular calcification, otherwise normal mitral  valve. Mild-moderate mitral regurgitation.  2. Calcified trileaflet aortic valve with normal opening.  Mild-moderate aortic regurgitation.  3. Mildly dilated left atrium.  LA volume index = 37 cc/m2.  4. Normal left ventricular internal dimensions and wall  thicknesses.  5. Mild global left ventricular systolic dysfunction.  Endocardial visualization enhanced with intravenous  injection of echo contrast (Definity).  6. Normal right ventricular size and function.  7. A bubble study was performed with the intravenous  injection of agitated saline.  Following contrast  injection, no obvious bubbles were seen in the left heart.  ------------------------------------------------------------------------  Confirmed on  6/12/2023 - 09:24:02 by Ausetn Hinds M.D.,  [facility], Counts include 234 beds at the [facility]    < end of copied text >      < from: MR Head No Cont (06.13.23 @ 08:54) >  IMPRESSION:    1. Left internal capsule posterior limb acute infarction    2. Right frontal and right basal ganglia remote infarctions    3. Ischemic white matter disease upper range typical for age    4. Diffuse brain volume loss typical for age    5. Posterior circulation circumferential signal intensity abnormality   does not appear to be artifactual and may reflect arteritis versus   atherosclerosis. Mild dolichoectasia of the posterior circulation    < end of copied text >    ASSESSMENT/PLAN: Mr Oakes is a pleasant 68y Male known to our practice for management of HTN and LE nonobstructive PAD. presenting with CVA symptoms.  -His CTA of the brain shows multifocal stenoses.  -MRI brain with acute infarct as well as remote infarcts  -repeat echo as noted with mild global LV dysfxn.  -F/U neuro  -Will discuss inpatient vs outpatient ILR placement (the next time he comes to the office).  -Continue aspirin, plavix and statin for stroke secondary prevention. Continue allopurinol for gout.  Continue insulin for diabetes mellitus.  Will follow with you.

## 2023-06-13 NOTE — DISCHARGE NOTE PROVIDER - NSDCCPTREATMENT_GEN_ALL_CORE_FT
PRINCIPAL PROCEDURE  Procedure: Brain MRI  Findings and Treatment: BRAIN:   The brain acute infarction within the left internal capsule   posterior limb.  This acute infarct demonstrates diffusion restriction.    This infarct is brightly hyperintense on diffusion weighted images,   intermediate hyperintense on the long TR images, differentially   hypointense on ADC images and largely inconspicuous on the remaining pulse sequences.  There is no associated positive mass effect or volume loss.  No associated hemorrhage is identified.  No acute cerebral cortical infarct is found. Remote infarctions present in the right frontal operculum with cortical and subcortical   encephalomalacia. Gliotic white matter changes extending to the ependymal margin of and ex vacuo dilated frontal horn of the right lateral ventricle. Remote deep infarction is present in the right anterior putamen No intracranial hemorrhage is recognized.  No mass effect is found in the brain.   The brain also demonstrates moderately extensive patchy and confluent lesions within the cerebral hemispheric white matter that suggest ischemic white matter disease. These lesions are hyperintense on the long TR images, otherwise inconspicuous. Follow-up may be greatest in the centrum semiovale and periatrial white matter.  VESSELS:   The vertebral and internal carotid arteries demonstrate   expected flow voids indicating their patency.  The vertebral arteries and basilar artery demonstrate concentric peripheral hyperintensity on the  T2-weighted images surrounding the central flow void. The basilar artery demonstrate mild fusiform dilatation up to 0.5 cm. Posterior circulation is mildly tortuous. 1. Left internal capsule posterior limb acute infarction. 2. Right frontal and right basal ganglia remote infarctions 3. Ischemic white matter disease upper range typical for age. 4. Diffuse brain volume loss typical for age  5. Posterior circulation circumferential signal intensity abnormality   does not appear to be artifactual and may reflect arteritis versus   atherosclerosis. Mild dolichoecta      SECONDARY PROCEDURE  Procedure: CT, head  Findings and Treatment: FINDINGS:  No evidence of acute intracranial hemorrhage, mass effect, hydrocephalus,   or midline shift.  No extra-axial collections.  Chronic right frontal infarct with extension into the ipsilateral basal   ganglia. Mild-moderate parenchymal atrophy. Patchy hypoattenuation   periventricular and juxtacortical white matter bilateral cerebral   hemispheres; a nonspecific finding which statistically reflects chronic   microvascular ischemic change.  Visualized paranasal sinuses are clear. The mastoid cells are well   aerated. No acute intraorbital pathology.  No aggressive calvarial lesion.  IMPRESSION:  No evidence of acute territorial infarct, hemorrhage or mass effect.  Chronic right frontal infarct, and additional findings described in   detail above.      Procedure: CTA head w/w/o contrast  Findings and Treatment: Thoracic aorta and branch vessels: Patent without flow-limiting stenosis.   No evidence of dissection.  Right carotid system: No hemodynamically significant stenosis using   NASCET criteria. No evidence of dissection.  Left carotid system: No hemodynamically significant stenosis using NASCET   criteria.  No evidence of dissection.  Vertebral arteries: No flow-limiting stenosis.  No evidence of dissection.  Soft tissues of the neck: Unremarkable.  Visualized spine: Multilevel degenerative change.  Visualized upper chest: Unremarkable.  CT ANGIOGRAPHY BRAIN:  Internal carotid arteries: Calcifications of the supraclinoid segments   bilaterally, without significant stenosis.  Anterior cerebral arteries: Moderate-severe segmental stenosis mid A2 segment right KYM. Left KYM is patent without flow-limiting stenosis. Middle cerebral arteries: Moderate focal stenosis of the distal left M1 segment. Moderate severe focal stenosis proximal M2 branch left anterior sylvian fissure. Partially calcified plaque M2 branch left posterior sylvian fissure, resulting in mild-moderate segmental stenosis. Calcified plaque right M2 branch posterior sylvian fissure, resulting in mild-moderate focal stenosis. Moderate-severe segmental stenosis M2 branch right anterior sylvian fissure.  Posterior cerebral arteries: Mild-moderate segmental stenosis P1 segment RIGHT posterior cerebral artery and moderate-severe segmental stenosis ipsilateral P2 segment proximally mild-moderate segmental stenosis proximal P2 segment LEFT posterior cerebral artery and moderate-severe segmental stenosis ipsilateral P2-pituitary junction. Vertebrobasilar: Patent without stenosis.  Dural venous sinuses: Grossly patent. nterpretation: Areas at risk demarcated throughout the bilateral cerebral hemispheres and cerebellum, without distinct territorial distribution.  CTA Neck: No flow-limiting stenosis in the extracranial carotid or   vertebral arteries.   CTA Head: Multifocal stenosis anterior and posterior circulation, as   described in

## 2023-06-13 NOTE — PROGRESS NOTE ADULT - PROBLEM SELECTOR PLAN 1
- pt p/w acute onset slurred speech, facial asymmetry, and ataxia x few hours  - hemodynamically stable on arrival  - stroke code called with NIHSS 9 -- symptoms spontaneously resolved, did not receive tPA  - CTH neg acute findings, with chronic R frontal infarct  - CTA h/n with MF stenosis anterior and posterior circulation  - CTP neg  - s/p plavix load  - neuro following, recommending daily ASA/statin/plavix; plavix can be dc'd after 3 weeks  - TSH WNL, A1C 7.9, f/u lipid profile  - PT/OT rec home no needs  - f/u MR brain-> as per neuro, needs MRI inpt prior to discharge   - EP consulted for ILR and will decide inpt vs. outpt after hospital discharge   - continue to monitor on tele  - s/p 24h permissive HTN -- now goal gradual normotension - pt p/w acute onset slurred speech, facial asymmetry, and ataxia x few hours  - hemodynamically stable on arrival  - stroke code called with NIHSS 9 -- symptoms spontaneously resolved, did not receive tPA  - CTH neg acute findings, with chronic R frontal infarct  CTA h/n with MF stenosis anterior and posterior circulation  CTP neg  s/p plavix load, TSH WNL, A1C 7.9, f/u lipid profile    - neuro following, recommending daily ASA/statin/plavix; plavix can be dc'd after 3 weeks  - f/u MR brain-> as per neuro, needs MRI inpt prior to discharge. done this am and follow up resport   - EP consulted for ILR and will go for ILR in am   - continue to monitor on tele  - s/p 24h permissive HTN -- now goal gradual normotension  - PT/OT rec home no needs

## 2023-06-13 NOTE — DISCHARGE NOTE PROVIDER - NSDCCPCAREPLAN_GEN_ALL_CORE_FT
PRINCIPAL DISCHARGE DIAGNOSIS  Diagnosis: CVA (cerebrovascular accident)  Assessment and Plan of Treatment: You had  CTH which was neg for acute findings, with chronic R frontal infarct. CTA h/n with MF stenosis anterior and posterior circulation  your hgb A1C 7.9.   You were seen by neuro following, recommending daily ASA/statin/plavix; plavix can be dc'd after 3 weeks  - f/u MR brain-> as per neuro, needs MRI inpt prior to discharge. done this am and follow up resport   You had loop recorder placed this morning and you will need to follow up in the office.    You were evaluated by PT/OT and had no PT needs.  You have a follow up appointment with PCP.  on 6/16/2023 at 11:30am.      SECONDARY DISCHARGE DIAGNOSES  Diagnosis: Mild alcohol use disorder  Assessment and Plan of Treatment:     Diagnosis: Type 2 diabetes mellitus  Assessment and Plan of Treatment:     Diagnosis: HTN (hypertension)  Assessment and Plan of Treatment:     Diagnosis: Stage 3 chronic kidney disease  Assessment and Plan of Treatment:      PRINCIPAL DISCHARGE DIAGNOSIS  Diagnosis: CVA (cerebrovascular accident)  Assessment and Plan of Treatment: You were admitted for CVA and your CT head showed chronic R frontal infarct. CTA head and neck showed  with MF stenosis anterior and posterior circulation. You had MRI [facility] showed   You were seen by neurology and started on Aspirin, Atorvastatin and Plavix. Please continue aspirin daily and take Plavix daily for 3 months, STOP PLAVIX AFTER 3 MONTHS.   You had loop recorder placed this morning and you will need to follow up in the cardiologist/electrophysiologist office within 1-3 days.   You were evaluated by PT/OT and you demonstrated no PT needs.  You will need to follow up with Neurologist within one week of discharge.   You have a follow up appointment with your PCP,  ,  on 6/16/2023 at 11:30am.  Go to the nearest ED if you develop worsening symptoms, shortness of breath, weakness/numbness/tingling that has worsened or new, difficulty talking/walking, vision changes, chest pain.      SECONDARY DISCHARGE DIAGNOSES  Diagnosis: Stage 3 chronic kidney disease  Assessment and Plan of Treatment: You have history of kidney disease and you NEED to follow up with nephrologist, , within one week of discharge. Please follow up with PCP on 6/16 for repeat blood work to montior your kidney function especially since you are on Metformin and need to monitor your kidney function.    Diagnosis: Mild alcohol use disorder  Assessment and Plan of Treatment: Please avoid drinking alcohol as discussed and follow up with [facility] substance use clinic or use resources online such as AA.   Follow up with your PCP for more information.    Diagnosis: Type 2 diabetes mellitus  Assessment and Plan of Treatment: your hgb A1C 7.9 and you can continue the following medications: Lantus 32 units at bedtime, Metformin but STOP Glyburide.   You were started on a new medication to help with diabetes management. I Discussed it with your PCP and you NEED to follow up in the office on 6/16 at 11:30am for further management.   You should also follow up with endocrinologist and please call to make the appointment.   Please see your PCP  to have your A1c checked every 3 months. You will need to return at least once each year to have your feet checked. You will need an eye exam once a year to check for retinopathy. You will also need urine tests every year to check for kidney problems. You may need tests to monitor for heart disease such as an EKG, stress test, blood pressure monitoring, and blood tests. Write down your questions so you remember to ask them during your visits.  You will need to check your blood sugar level at least 3 times each day if you are on insulin. If you check your blood sugar level before a meal , it should be between 80 and 130 mg/dL. If you check your blood sugar level 1 to 2 hours after a meal , it should be less than 180 mg/dL.  Your blood sugar level is too low if it goes below 70 mg/dL. If the level is too low, eat or drink 15 grams of fast-acting carbohydrates, such as 1/2 cup fruit juice or 4 oz. regular soda. Check your blood sugar level 15 minutes later. If the level is still low (less than 100 mg/dL), drink another serving.   Do not skip meals. Your blood sugar level may drop too low if you have taken diabetes medicine and do not eat.  Please seek medical attention immediately if:  You have severe abdominal pain, or the pain spreads to your back. You may also be vomiting.  You have trouble staying awake or focusing.  You are shaking or sweating.  You have blurred or double vision.  Your breath has a fruity, sweet smell.  Your breathing is deep and labored, or rapid and shallow.  Your heartbeat is fast and weak.      Diagnosis: HTN (hypertension)  Assessment and Plan of Treatment: You can continue Amlodipine but STOP Losartan and Hydrochlorothiazide. Continue Coreg.   Please follow up with your cardiologist within one week of discharge. follow up with your PCP on 6/16 at 11:30 am for BP check and further BP management.   Follow a low sodium/DASH diet     PRINCIPAL DISCHARGE DIAGNOSIS  Diagnosis: CVA (cerebrovascular accident)  Assessment and Plan of Treatment: You were admitted for CVA and your CT head showed chronic R frontal infarct. CTA head and neck showed  with MF stenosis anterior and posterior circulation. You had MRI [facility] showed   You were seen by neurology and started on Aspirin, Atorvastatin and Plavix. Please continue aspirin daily and take Plavix daily for 3 months, STOP PLAVIX AFTER 3 MONTHS.   You had loop recorder placed this morning and you will need to follow up in the cardiologist/electrophysiologist office within 1-3 days.   You were evaluated by PT/OT and you demonstrated no PT needs.  You will need to follow up with Neurologist within one week of discharge.   You have a follow up appointment with your PCP,  ,  on 6/16/2023 at 11:30am.  Go to the nearest ED if you develop worsening symptoms, shortness of breath, weakness/numbness/tingling that has worsened or new, difficulty talking/walking, vision changes, chest pain.      SECONDARY DISCHARGE DIAGNOSES  Diagnosis: Stage 3 chronic kidney disease  Assessment and Plan of Treatment: You have history of kidney disease and you NEED to follow up with nephrologist, , within one week of discharge. Please follow up with PCP on 6/16 for repeat blood work to montior your kidney function especially since you are on Metformin and need to monitor your kidney function.    Diagnosis: Mild alcohol use disorder  Assessment and Plan of Treatment: Please avoid drinking alcohol as discussed and follow up with [facility] substance use clinic or use resources online such as AA.   Follow up with your PCP for more information.    Diagnosis: Type 2 diabetes mellitus  Assessment and Plan of Treatment: your hgb A1C 7.9 and you can continue the following medications: Lantus 32 units at bedtime, Metformin but STOP Glyburide.   You were started on a new medication called Victoza to help with diabetes management. I Discussed it with your PCP and you NEED to follow up in the office on 6/16 at 11:30am for further management.   You should also follow up with endocrinologist and please call to make the appointment.   GI effects are the most common adverse reactions associated with glucagon-like peptide 1 receptor agonists, mainly diarrhea, nausea, and vomiting (Ref). Constipation, dyspepsia, and abdominal pain may also occur. GI effects tend to decrease over time. Please see PCP for further management   Please see your PCP  to have your A1c checked every 3 months . You will need to return at least once each year to have your feet checked. You will need an eye exam once a year to check for retinopathy.  You will need to check your blood sugar level at least 3 times each day if you are on insulin. If you check your blood sugar level before a meal , it should be between 80 and 130 mg/dL. If you check your blood sugar level 1 to 2 hours after a meal , it should be less than 180 mg/dL.  Your blood sugar level is too low if it goes below 70 mg/dL. If the level is too low, eat or drink 15 grams of fast-acting carbohydrates, such as 1/2 cup fruit juice or 4 oz. regular soda. Check your blood sugar level 15 minutes later. If the level is still low (less than 100 mg/dL), drink another serving.   Do not skip meals. Your blood sugar level may drop too low if you have taken diabetes medicine and do not eat.  Please seek medical attention immediately if:  You have severe abdominal pain, or the pain spreads to your back. You may also be vomiting.  You have trouble staying awake or focusing.  You are shaking or sweating.  You have blurred or double vision.  Your breath has a fruity, sweet smell.  Your breathing is deep and labored, or rapid and shallow.  Your heartbeat is fast and weak.    Diagnosis: HTN (hypertension)  Assessment and Plan of Treatment: You can continue Amlodipine but STOP Losartan and Hydrochlorothiazide. Continue Coreg.   Please follow up with your cardiologist within one week of discharge. follow up with your PCP on 6/16 at 11:30 am for BP check and further BP management.   Follow a low sodium/DASH diet     PRINCIPAL DISCHARGE DIAGNOSIS  Diagnosis: CVA (cerebrovascular accident)  Assessment and Plan of Treatment: You were admitted for CVA and your CT head showed chronic R frontal infarct. CTA head and neck showed  with MF stenosis anterior and posterior circulation. You had MRI [facility] showed   You were seen by neurology and started on Aspirin, Atorvastatin and Plavix. Please continue aspirin daily and take Plavix daily for 3 months, STOP PLAVIX AFTER 3 MONTHS.   You had loop recorder placed this morning and you will need to follow up in the cardiologist/electrophysiologist office within 1-3 days.   You were evaluated by PT/OT and you demonstrated no PT needs.  You will need to follow up with Neurologist within one week of discharge.   You have a follow up appointment with your PCP,  ,  on 6/16/2023 at 11:30am.  Go to the nearest ED if you develop worsening symptoms, shortness of breath, weakness/numbness/tingling that has worsened or new, difficulty talking/walking, vision changes, chest pain.      SECONDARY DISCHARGE DIAGNOSES  Diagnosis: Stage 3 chronic kidney disease  Assessment and Plan of Treatment: You have history of kidney disease and you NEED to follow up with nephrologist, , within one week of discharge. Please follow up with PCP on 6/16 for repeat blood work to montior your kidney function especially since you are on Metformin and need to monitor your kidney function.    Diagnosis: Mild alcohol use disorder  Assessment and Plan of Treatment: Please avoid drinking alcohol as discussed and follow up with [facility] substance use clinic or use resources online such as AA.   Follow up with your PCP for more information.    Diagnosis: Type 2 diabetes mellitus  Assessment and Plan of Treatment: your hgb A1C 7.9 and you can continue the following medications: Lantus 32 units at bedtime, Metformin but STOP Glyburide.   You need to be started on a new medication called Victoza to help with diabetes management. I Discussed it with your PCP and you NEED to follow up in the office on 6/16 at 11:30am for further management. Your PCP will start once approved by insurance.   You should also follow up with endocrinologist and please call to make the appointment.   GI effects are the most common adverse reactions associated with glucagon-like peptide 1 receptor agonists, mainly diarrhea, nausea, and vomiting (Ref). Constipation, dyspepsia, and abdominal pain may also occur. GI effects tend to decrease over time. Please see PCP for further management   Please see your PCP  to have your A1c checked every 3 months . You will need to return at least once each year to have your feet checked. You will need an eye exam once a year to check for retinopathy.  You will need to check your blood sugar level at least 3 times each day if you are on insulin. If you check your blood sugar level before a meal , it should be between 80 and 130 mg/dL. If you check your blood sugar level 1 to 2 hours after a meal , it should be less than 180 mg/dL.  Your blood sugar level is too low if it goes below 70 mg/dL. If the level is too low, eat or drink 15 grams of fast-acting carbohydrates, such as 1/2 cup fruit juice or 4 oz. regular soda. Check your blood sugar level 15 minutes later. If the level is still low (less than 100 mg/dL), drink another serving.  Do not skip meals. Your blood sugar level may drop too low if you have taken diabetes medicine and do not eat.  Please seek medical attention immediately if:  You have severe abdominal pain, or the pain spreads to your back. You may also be vomiting.  You have trouble staying awake or focusing.  You are shaking or sweating.  You have blurred or double vision.  Your breath has a fruity, sweet smell.  Your breathing is deep and labored, or rapid and sha    Diagnosis: HTN (hypertension)  Assessment and Plan of Treatment: You can continue Amlodipine but STOP Losartan and Hydrochlorothiazide. Continue Coreg.   Please follow up with your cardiologist within one week of discharge. follow up with your PCP on 6/16 at 11:30 am for BP check and further BP management.   Follow a low sodium/DASH diet     PRINCIPAL DISCHARGE DIAGNOSIS  Diagnosis: CVA (cerebrovascular accident)  Assessment and Plan of Treatment: You were admitted for CVA and your CT head showed chronic R frontal infarct. CTA head and neck showed  with MF stenosis anterior and posterior circulation. You had MRI [facility] showed   You were seen by neurology and started on Aspirin, Atorvastatin and Plavix. Please continue aspirin daily and take Plavix daily for 3 months, STOP PLAVIX AFTER 3 MONTHS.   You had loop recorder placed this morning and you will need to follow up in the cardiologist/electrophysiologist office within 1-3 days.   You were evaluated by PT/OT and you demonstrated no PT needs.  You will need to follow up with Neurologist within one week of discharge.   You have a follow up appointment with your PCP,  ,  on 6/16/2023 at 11:30am.  Go to the nearest ED if you develop worsening symptoms, shortness of breath, weakness/numbness/tingling that has worsened or new, difficulty talking/walking, vision changes, chest pain.      SECONDARY DISCHARGE DIAGNOSES  Diagnosis: Stage 3 chronic kidney disease  Assessment and Plan of Treatment: You have history of kidney disease and you NEED to follow up with nephrologist, , within one week of discharge. Please follow up with PCP on 6/16 for repeat blood work to montior your kidney function especially since you are on Metformin and need to monitor your kidney function.    Diagnosis: Mild alcohol use disorder  Assessment and Plan of Treatment: Please avoid drinking alcohol as discussed and follow up with [facility] substance use clinic or use resources online such as AA.   Follow up with your PCP for more information.    Diagnosis: Type 2 diabetes mellitus  Assessment and Plan of Treatment: your hgb A1C 7.9 and you can continue the following medications: Lantus 32 units at bedtime, Metformin but STOP Glyburide.   You are being started on a new medication called Victoza to help with diabetes management. I Discussed it with your PCP and you NEED to follow up in the office on 6/16 at 11:30am for further management.   You should also follow up with endocrinologist and please call to make the appointment.   GI effects are the most common adverse reactions associated with glucagon-like peptide 1 receptor agonists, Victoza,  mainly diarrhea, nausea, and vomiting (Ref). Constipation, dyspepsia, and abdominal pain may also occur. GI effects tend to decrease over time. Please see PCP for further management   Please see your PCP  to have your A1c checked every 3 months . You will need to return at least once each year to have your feet checked. You will need an eye exam once a year to check for retinopathy.  You will need to check your blood sugar level at least 3 times each day if you are on insulin. If you check your blood sugar level before a meal , it should be between 80 and 130 mg/dL. If you check your blood sugar level 1 to 2 hours after a meal , it should be less than 180 mg/dL.  Your blood sugar level is too low if it goes below 70 mg/dL. If the level is too low, eat or drink 15 grams of fast-acting carbohydrates, such as 1/2 cup fruit juice or 4 oz. regular soda. Check your blood sugar level 15 minutes later. If the level is still low (less than 100 mg/dL), drink another serving.  Do not skip meals. Your blood sugar level may drop too low if you have taken diabetes medicine and do not eat.  Please seek medical attention immediately if:  You have severe abdominal pain, or the pain spreads to your back. You may also be vomiting.  You have trouble staying awake or focusing.  You are shaking or sweating.  You have blurred or double vision.  Your breath has a fruity, sweet smell.  Your breathing is deep and labored, or rapid and sha    Diagnosis: HTN (hypertension)  Assessment and Plan of Treatment: You can continue Amlodipine but STOP Losartan and Hydrochlorothiazide. Continue Coreg.   Please follow up with your cardiologist within one week of discharge. follow up with your PCP on 6/16 at 11:30 am for BP check and further BP management.   Follow a low sodium/DASH diet

## 2023-06-13 NOTE — PROGRESS NOTE ADULT - ATTENDING COMMENTS
I saw and examined the patient together with Dr. Gonsalves.   Patient endorses improving symptoms of R sided weakness.    We discussed results from his MRI.    The acute stroke does appear small vessel in etiology, however, there is a chronic R frontal stroke that appears embolic.      On exam:   Awake, alert, oriented, has good insight, normal fluency.   Gaze is conjugate eye movements full.  R sided facial weakness, NLF flattening.  Trace dysarthria.  Tongue midline.   MOTOR: normal bulk and tone.  No drift.  4+/5 R biceps and triceps, diminished finger tapping on the R.    SENSORY: intact to light touch symmetrically.  No extinction.   COORDINATION: ataxic on FNF on the R, somewhat out of proportion to the weakness.     MRI brain images reviewed: diffusion restriction in the L thalamocapsular region.  FLAIR hyperintensity in the R frontal cortex.  FLAIR hyperintensities, confluent, advanced, throughout the periventricular white matter.     AP: 68 year old man with HTN, HLD, DM, gout, peripheral neuropathy, intracranial atherosclerosis, chronic R frontal stroke, presenting with R sided weakness.  On exam, has weakness in the R face, and arm, as well as ataxia on R arm.  Imaging showing an acute small vessel stroke, and a chronic embolic stroke, as well as advanced microvascular disease.  Vessel imaging showing multifocal intracranial atherosclerotic disease.     Need to optimize vascular risk factors, and brief DAPT for secondary stroke prevention.  Will continue DAPT 3 months in light of intracranial atherosclerosis.  After 3 months, ASA alone.   Continue statin.    Tight glycemic control, goal <7.0%.   Additionally, needs a loop recorder to detect any arrhythmia that may account for the chronic embolic stroke in the R frontal cortex.    EP recommendations are appreciated.   Outpatient neurology follow up.    Please page or call neurology with any neurological changes, or other issues we can help address.

## 2023-06-13 NOTE — DISCHARGE NOTE PROVIDER - ATTENDING DISCHARGE PHYSICAL EXAMINATION:
GENERAL: NAD, elderly male resting in bed.   HEAD:  Atraumatic, Normocephalic, MMM  CHEST/LUNG: No use of accessory muscles, CTAB, breathing non-labored  COR: RR, no mrcg  ABD: Soft, ND/NT, +BS  PSYCH: AAOx3  NEUROLOGY: No aphasia. Right UMN type facial palsy. Mild dysarthria. Right HP, Right arm pronator drift and mild clumsiness. moving all extremities  SKIN: No rashes or lesions  EXT: No LE edema noted B/L

## 2023-06-13 NOTE — DISCHARGE NOTE PROVIDER - HOSPITAL COURSE
Pt is a 69 yo M with PMH HTN, HLD, gout, and T2D (c/b peripheral neuropathy) p/w acute slurred speech, facial asymmetry, and ataxia x few hours. Stroke code called on arrival with NIHSS 9, which self resolved without tPA. CTH with chronic R frontal infarct, CTA h/n with MF stenosis anterior and posterior circulation, CTP neg. Neuro and EP following and pending MR brain and TTE +/- ILR.       Problem/Plan - 1:  ·  Problem: CVA (cerebrovascular accident).   ·  Plan: - pt p/w acute onset slurred speech, facial asymmetry, and ataxia x few hours  - hemodynamically stable on arrival  - stroke code called with NIHSS 9 -- symptoms spontaneously resolved, did not receive tPA  - CTH neg acute findings, with chronic R frontal infarct  CTA h/n with MF stenosis anterior and posterior circulation  CTP neg  s/p plavix load, TSH WNL, A1C 7.9, f/u lipid profile    - neuro following, recommending daily ASA/statin/plavix; plavix can be dc'd after 3 weeks  - f/u MR brain-> as per neuro, needs MRI inpt prior to discharge. done this am and follow up resport   - EP consulted for ILR and will go for ILR in am   - continue to monitor on tele  - s/p 24h permissive HTN -- now goal gradual normotension  - PT/OT rec home no needs.    Problem/Plan - 2:  ·  Problem: Mild alcohol use disorder.   ·  Plan: pt reports drinking 3-4x/wk, typically 4-5 drinks with vodka at a time; denies hx w/d   does not appear to be in w/d    - no indication for CIWA at present  - educated on safe drinking habits and encouraged cutting back - pt in contemplative state.    Problem/Plan - 3:  ·  Problem: Stage 3 chronic kidney disease.   ·  Plan: Called PCP's office on 6/12 and baseline (Cr 1.7/1.8 as per labs done in July & December 2022 done at PCP office, (- [phone] office # )  pt also saw nephrologist () but last F/u over 2 yrs ago   On admission, Cr 1.46-1.4-> 1.5-> 1.49    - continue to trend  - avoid nephrotoxic agents and renally dose medications  - monitor I&O  - Follow up outpt with Nephrologist after discharge.    Problem/Plan - 4:  ·  Problem: Type 2 diabetes mellitus.   ·  Plan: A1C 7.9  home meds: glyburide-metformin 10-1000mg BID, basaglar 40U bedtime  BG controlled     - c/w lantus 25U bedtime  - AM FSG appropriate  - carb consistent diet.    Problem/Plan - 5:  ·  Problem: HTN (hypertension).   ·  Plan: home meds: HCTZ 25mg daily, amlodipine 5mg daily, losartan 50mg daily, coreg 12.5mg BID  now s/p 24h permissive HTN -- goal gradual normotension     - c/w home amlodipine and coreg  - will restart Losartan. Can hold HCTZ in setting of elevated Cr.    Problem/Plan - 6:  ·  Problem: HLD (hyperlipidemia).   ·  Plan: - c/w home fenofibrate 145mg daily  - started on atorvastatin 80mg daily.    Problem/Plan - 7:  ·  Problem: Gout.   ·  Plan: - c/w home allopurinol 200mg daily.    Problem/Plan - 8:  ·  Problem: Peripheral neuropathy.   ·  Plan: in the setting of T2D, as above    - c/w home gabapentin 300mg BID.    Problem/Plan - 9:  ·  Problem: Nutrition, metabolism, and development symptoms.   ·  Plan: Diet : DASH/TLC, carb consistent  DVT ppx: lovenox 40mg q24h     Pt is a 69 yo M with PMH HTN, HLD, gout, and T2D (c/b peripheral neuropathy) p/w acute slurred speech, facial asymmetry, and ataxia x few hours. Stroke code called on arrival with NIHSS 9, which self resolved without tPA. CTH with chronic R frontal infarct, CTA h/n with MF stenosis anterior and posterior circulation, CTP neg. Neuro and EP following and pending MR brain and TTE +/- ILR.     Pt admitted with CVA (cerebrovascular accident).   ·  Plan: - pt p/w acute onset slurred speech, facial asymmetry, and ataxia x few hours  - hemodynamically stable on arrival  - stroke code called with NIHSS 9 -- symptoms spontaneously resolved, did not receive tPA  - CTH neg acute findings, with chronic R frontal infarct  CTA h/n with MF stenosis anterior and posterior circulation  CTP neg  s/p plavix load, TSH WNL, A1C 7.9, f/u lipid profile    - neuro following, recommending daily ASA/statin/plavix; plavix can be dc'd after 3 weeks  - f/u MR brain-> as per neuro, needs MRI inpt prior to discharge. done this am and follow up resport   - EP consulted for ILR and will go for ILR in am   - continue to monitor on tele  - s/p 24h permissive HTN -- now goal gradual normotension  - PT/OT rec home no needs.    Problem/Plan - 2:  ·  Problem: Mild alcohol use disorder.   ·  Plan: pt reports drinking 3-4x/wk, typically 4-5 drinks with vodka at a time; denies hx w/d   does not appear to be in w/d    - no indication for CIWA at present  - educated on safe drinking habits and encouraged cutting back - pt in contemplative state.    Problem/Plan - 3:  ·  Problem: Stage 3 chronic kidney disease.   ·  Plan: Called PCP's office on 6/12 and baseline (Cr 1.7/1.8 as per labs done in July & December 2022 done at PCP office, (- [phone] office # )  pt also saw nephrologist () but last F/u over 2 yrs ago   On admission, Cr 1.46-1.4-> 1.5-> 1.49    - continue to trend  - avoid nephrotoxic agents and renally dose medications  - monitor I&O  - Follow up outpt with Nephrologist after discharge.    Problem/Plan - 4:  ·  Problem: Type 2 diabetes mellitus.   ·  Plan: A1C 7.9  home meds: glyburide-metformin 10-1000mg BID, basaglar 40U bedtime  BG controlled     - c/w lantus 25U bedtime  - AM FSG appropriate  - carb consistent diet.    Problem/Plan - 5:  ·  Problem: HTN (hypertension).   ·  Plan: home meds: HCTZ 25mg daily, amlodipine 5mg daily, losartan 50mg daily, coreg 12.5mg BID  now s/p 24h permissive HTN -- goal gradual normotension     - c/w home amlodipine and coreg  - will restart Losartan. Can hold HCTZ in setting of elevated Cr.    Problem/Plan - 6:  ·  Problem: HLD (hyperlipidemia).   ·  Plan: - c/w home fenofibrate 145mg daily  - started on atorvastatin 80mg daily.    Problem/Plan - 7:  ·  Problem: Gout.   ·  Plan: - c/w home allopurinol 200mg daily.    Problem/Plan - 8:  ·  Problem: Peripheral neuropathy.   ·  Plan: in the setting of T2D, as above    - c/w home gabapentin 300mg BID.    Problem/Plan - 9:  ·  Problem: Nutrition, metabolism, and development symptoms.   ·  Plan: Diet : DASH/TLC, carb consistent  DVT ppx: lovenox 40mg q24h     Pt is a 69 yo M with PMH HTN, HLD, gout, and T2D (c/b peripheral neuropathy) p/w acute slurred speech, facial asymmetry, and ataxia x few hours. Stroke code called on arrival with NIHSS 9, which self resolved without tPA. CTH with chronic R frontal infarct, CTA h/n with MF stenosis anterior and posterior circulation, CTP neg. Neuro and EP following and pending MR brain and TTE +/- ILR.     Pt admitted with CVA (cerebrovascular accident).    pt p/w acute onset slurred speech, facial asymmetry, and ataxia x few hours  - hemodynamically stable on arrival  stroke code called with NIHSS 9 -- symptoms spontaneously resolved, did not receive tPA  CTH neg acute findings, with chronic R frontal infarct  CTA h/n with MF stenosis anterior and posterior circulation  CTP neg  s/p plavix load, TSH WNL, A1C 7.9, f/u lipid profile    Neuro following, recommending daily ASA/statin/plavix; plavix can be dc'd after 3 months   - f/u MR brain whcih showed   - EP consulted for ILR and will go for ILR in am   - continue to monitor on tele  - s/p 24h permissive HTN -- now goal gradual normotension  - PT/OT rec home no needs.    Problem/Plan - 2:  ·  Problem: Mild alcohol use disorder.   ·  Plan: pt reports drinking 3-4x/wk, typically 4-5 drinks with vodka at a time; denies hx w/d   does not appear to be in w/d    - no indication for CIWA at present  - educated on safe drinking habits and encouraged cutting back - pt in contemplative state.    Problem/Plan - 3:  ·  Problem: Stage 3 chronic kidney disease.   ·  Plan: Called PCP's office on 6/12 and baseline (Cr 1.7/1.8 as per labs done in July & December 2022 done at PCP office, (- [phone] office # )  pt also saw nephrologist () but last F/u over 2 yrs ago   On admission, Cr 1.46-1.4-> 1.5-> 1.49    - continue to trend  - avoid nephrotoxic agents and renally dose medications  - monitor I&O  - Follow up outpt with Nephrologist after discharge.    Problem/Plan - 4:  ·  Problem: Type 2 diabetes mellitus.   ·  Plan: A1C 7.9  home meds: glyburide-metformin 10-1000mg BID, basaglar 40U bedtime  BG controlled     - c/w lantus 25U bedtime  - AM FSG appropriate  - carb consistent diet.    Problem/Plan - 5:  ·  Problem: HTN (hypertension).   ·  Plan: home meds: HCTZ 25mg daily, amlodipine 5mg daily, losartan 50mg daily, coreg 12.5mg BID  now s/p 24h permissive HTN -- goal gradual normotension     - c/w home amlodipine and coreg  - will restart Losartan. Can hold HCTZ in setting of elevated Cr.    Problem/Plan - 6:  ·  Problem: HLD (hyperlipidemia).   ·  Plan: - c/w home fenofibrate 145mg daily  - started on atorvastatin 80mg daily.    Problem/Plan - 7:  ·  Problem: Gout.   ·  Plan: - c/w home allopurinol 200mg daily.    Problem/Plan - 8:  ·  Problem: Peripheral neuropathy.   ·  Plan: in the setting of T2D, as above    - c/w home gabapentin 300mg BID.    Problem/Plan - 9:  ·  Problem: Nutrition, metabolism, and development symptoms.   ·  Plan: Diet : DASH/TLC, carb consistent  DVT ppx: lovenox 40mg q24h     Pt is a 67 yo M with PMH HTN, HLD, gout, and T2D (c/b peripheral neuropathy) p/w acute slurred speech, facial asymmetry, and ataxia x few hours. Stroke code called on arrival with NIHSS 9, which self resolved without tPA. CTH with chronic R frontal infarct, CTA h/n with MF stenosis anterior and posterior circulation, CTP neg. Neuro and EP following and pending MR brain and TTE +/- ILR.     Pt admitted with CVA (cerebrovascular accident).    pt p/w acute onset slurred speech, facial asymmetry, and ataxia x few hours  - hemodynamically stable on arrival  stroke code called with NIHSS 9 -- symptoms spontaneously resolved, did not receive tPA  CTH neg acute findings, with chronic R frontal infarct  CTA h/n with MF stenosis anterior and posterior circulation  CTP neg  s/p plavix load, TSH WNL, A1C 7.9, f/u lipid profile  Pt needs Tight glycemic control, goal <7.0%.     Neuro following, recommending daily ASA/statin/plavix; plavix can be dc'd after 3 months   - f/u MR brain which showed Acute L posterior limb of IC infarct, chronic R frontal and basal ganglia infarcts  As per Neuro, pt with  R ataxic hemiparesis, mild to moderate 2/2 acute L posterior limb of IC infarct on MRI, mechanism small vessel disease.   Chronic R frontal infarct likely embolic appearing,  EP consulted for ILR and s/p ILR on 6/14  continue to monitor on tele  s/p 24h permissive HTN -- now goal gradual normotension  PT/OT rec home no needs.    pt with Mild alcohol use disorder.   pt reports drinking 3-4x/wk, typically 4-5 drinks with vodka at a time; denies hx w/d   does not appear to be in w/d    no indication for CIWA at present  educated on safe drinking habits and encouraged cutting back - pt in contemplative state.    Problem/Plan - 3:  ·  Problem: Stage 3 chronic kidney disease.   ·  Plan: Called PCP's office on 6/12 and baseline (Cr 1.7/1.8 as per labs done in July & December 2022 done at PCP office, (- [phone]- office # )  pt also saw nephrologist () but last F/u over 2 yrs ago   On admission, Cr 1.46-1.4-> 1.5-> 1.49    - continue to trend  - avoid nephrotoxic agents and renally dose medications  - monitor I&O  - Follow up outpt with Nephrologist after discharge.    Type 2 diabetes mellitus.   ·  Plan: A1C 7.9  home meds: glyburide-metformin 10-1000mg BID, basaglar 40U bedtime  BG controlled     Continue lantus 32U bedtime and Metformin. STOP Glyburide. Follow carb consistent diet.   Please follow up with PCP for Victoza for starting another medications.     Pt with HTN (hypertension) and initially stopped home meds: HCTZ 25mg daily, amlodipine 5mg daily, losartan 50mg daily, coreg 12.5mg BID  now s/p permissive HTN -- goal gradual normotension   Continue home amlodipine and coreg  STOP Losartan and HCTZ in setting of elevated Cr.     HLD (hyperlipidemia).   Continue home fenofibrate 145mg daily  Continue Atorvastatin 80mg daily.    Gout.   Continue home allopurinol 200mg daily.    Pt with Peripheral neuropathy in the setting of T2D, as above  Continue home gabapentin 300mg BID.    DVT ppx: lovenox 40mg q24h      Patient was seen and evaluated today.He reports feeling better and denies any acute complaints at this time.   Discussed discharge medications, plan and outpatient follow up with patient and daughter at bedside.    All questions answered and patient in agreement with discharge plan.   Patient is hemodynamically stable for discharge with outpatient follow up with PCP, Neurology, EP cards and Cardiology.    Pt is a 67 yo M with PMH HTN, HLD, gout, and T2D (c/b peripheral neuropathy) p/w acute slurred speech, facial asymmetry, and ataxia x few hours. Stroke code called on arrival with NIHSS 9, which self resolved without tPA. CTH with chronic R frontal infarct, CTA h/n with MF stenosis anterior and posterior circulation, CTP neg. Neuro and EP following and pending MR brain and TTE +/- ILR.     Pt admitted with CVA (cerebrovascular accident).    pt p/w acute onset slurred speech, facial asymmetry, and ataxia x few hours  - hemodynamically stable on arrival  stroke code called with NIHSS 9 -- symptoms spontaneously resolved, did not receive tPA  CTH neg acute findings, with chronic R frontal infarct  CTA h/n with MF stenosis anterior and posterior circulation  CTP neg  s/p plavix load, TSH WNL, A1C 7.9, f/u lipid profile  Pt needs Tight glycemic control, goal <7.0%.     Neuro following, recommending daily ASA/statin/plavix; plavix can be dc'd after 3 months   - f/u MR brain which showed Acute L posterior limb of IC infarct, chronic R frontal and basal ganglia infarcts  As per Neuro, pt with  R ataxic hemiparesis, mild to moderate 2/2 acute L posterior limb of IC infarct on MRI, mechanism small vessel disease.   Chronic R frontal infarct likely embolic appearing,  EP consulted for ILR and s/p ILR on 6/14  continue to monitor on tele  s/p 24h permissive HTN -- now goal gradual normotension  PT/OT rec home no needs.    pt with Mild alcohol use disorder.   pt reports drinking 3-4x/wk, typically 4-5 drinks with vodka at a time; denies hx w/d   does not appear to be in w/d    no indication for CIWA at present  educated on safe drinking habits and encouraged cutting back - pt in contemplative state.    Problem/Plan - 3:  ·  Problem: Stage 3 chronic kidney disease.   ·  Plan: Called PCP's office on 6/12 and baseline (Cr 1.7/1.8 as per labs done in July & December 2022 done at PCP office, (- [phone]- office # )  pt also saw nephrologist () but last F/u over 2 yrs ago   On admission, Cr 1.46-1.4-> 1.5-> 1.49    - continue to trend  - avoid nephrotoxic agents and renally dose medications  - monitor I&O  - Follow up outpt with Nephrologist after discharge.    Type 2 diabetes mellitus.   ·  Plan: A1C 7.9  home meds: glyburide-metformin 10-1000mg BID, basaglar 40U bedtime  BG controlled     Continue lantus 32U bedtime and Metformin. STOP Glyburide. Follow carb consistent diet. Continue Victoza   Please follow up with PCP for starting another medications.     Pt with HTN (hypertension) and initially stopped home meds: HCTZ 25mg daily, amlodipine 5mg daily, losartan 50mg daily, coreg 12.5mg BID  now s/p permissive HTN -- goal gradual normotension   Continue home amlodipine and coreg  STOP Losartan and HCTZ in setting of elevated Cr.     HLD (hyperlipidemia).   Continue home fenofibrate 145mg daily  Continue Atorvastatin 80mg daily.    Gout.   Continue home allopurinol 200mg daily.    Pt with Peripheral neuropathy in the setting of T2D, as above  Continue home gabapentin 300mg BID.    DVT ppx: lovenox 40mg q24h      Patient was seen and evaluated today.He reports feeling better and denies any acute complaints at this time.   Discussed discharge medications, plan and outpatient follow up with patient and daughter at bedside.    All questions answered and patient in agreement with discharge plan.   Patient is hemodynamically stable for discharge with outpatient follow up with PCP, Neurology, EP cards and Cardiology.

## 2023-06-13 NOTE — DISCHARGE NOTE PROVIDER - CARE PROVIDER_API CALL
Madonna Nevarez  Internal Medicine  [address] [facility], [address]  [place] [address]  Phone: [phone]  Fax: [phone]  Follow Up Time: 1 week    Laurel Mobley  NP in [address], [address]  [place] [address]  Phone: [phone]  Fax: [phone]  Follow Up Time: 1 week    Chinmay Lopez  Cardiac Electrophysiology  [address] [place] [address], [address]  [place] [address]  Phone: [phone]  Fax: [phone]  Follow Up Time: 1 week   Madonna Nevarez  Internal Medicine  [address] [facility], [facility] [address]  [place] [address]  Phone: [phone]  Fax: [phone]  Established Patient  Scheduled Appointment: 06/16/2023 11:30 AM    Laurel Mobley  NP in [address], [address]  [place] [address]  Phone: [phone]  Fax: [phone]  Follow Up Time: 1 week    Chinmay Lopez  Cardiac Electrophysiology  [address] [facility], [place] [address]  Phone: [phone]  Fax: [phone]  Follow Up Time: 1 week    Jane Rodriguez  Nephrology  [address] [facility], [address] [address]  Phone: [phone]  Fax: [phone]  Established Patient  Follow Up Time: 1-3 days    Zeinab Marks  Interventional Cardiology  [address] [facility], [address] [address]  Phone: [phone]  Fax: [phone]  Follow Up Time: 1-3 days

## 2023-06-13 NOTE — PROGRESS NOTE ADULT - PROBLEM SELECTOR PLAN 7
- c/w home allopurinol 200mg daily

## 2023-06-13 NOTE — DISCHARGE NOTE PROVIDER - NSDCMRMEDTOKEN_GEN_ALL_CORE_FT
allopurinol 100 mg oral tablet: 2 tab(s) orally once a day  amLODIPine 5 mg oral tablet: 1 tab(s) orally once a day  Basaglar KwikPen 100 units/mL subcutaneous solution: 40 unit(s) subcutaneous once a day (at bedtime)  Coreg 12.5 mg oral tablet: 1 tab(s) orally 2 times a day  fenofibrate 145 mg oral tablet: 1 tab(s) orally once a day  gabapentin 300 mg oral capsule: 1 cap(s) orally 2 times a day  glyburide-metformin 5 mg-500 mg oral tablet: 2 tab(s) orally 2 times a day  hydroCHLOROthiazide 25 mg oral tablet: 1 tab(s) orally once a day  losartan 50 mg oral tablet: 1 tab(s) orally once a day  omeprazole 40 mg oral delayed release capsule: 1 cap(s) orally once a day   allopurinol 100 mg oral tablet: 2 tab(s) orally once a day  amLODIPine 5 mg oral tablet: 1 tab(s) orally once a day  aspirin 81 mg oral delayed release tablet: 1 tab(s) orally once a day  atorvastatin 80 mg oral tablet: 1 tab(s) orally once a day (at bedtime)  Basaglar KwikPen 100 units/mL subcutaneous solution: 40 unit(s) subcutaneous once a day (at bedtime)  clopidogrel 75 mg oral tablet: 1 tab(s) orally once a day for 90 days  Coreg 12.5 mg oral tablet: 1 tab(s) orally 2 times a day  fenofibrate 145 mg oral tablet: 1 tab(s) orally once a day  folic acid 1 mg oral tablet: 1 tab(s) orally once a day  gabapentin 300 mg oral capsule: 1 cap(s) orally 2 times a day  glyburide-metformin 5 mg-500 mg oral tablet: 2 tab(s) orally 2 times a day  Multiple Vitamins oral tablet: 1 tab(s) orally once a day  omeprazole 40 mg oral delayed release capsule: 1 cap(s) orally once a day  pantoprazole 40 mg oral delayed release tablet: 1 tab(s) orally once a day (before a meal)  thiamine 100 mg oral tablet: 1 tab(s) orally once a day   allopurinol 100 mg oral tablet: 2 tab(s) orally once a day  amLODIPine 10 mg oral tablet: 1 tab(s) orally once a day  amLODIPine 5 mg oral tablet: 1 tab(s) orally once a day  aspirin 81 mg oral delayed release tablet: 1 tab(s) orally once a day  atorvastatin 80 mg oral tablet: 1 tab(s) orally once a day (at bedtime)  Basaglar KwikPen 100 units/mL subcutaneous solution: 32 unit(s) subcutaneous once a day (at bedtime)  clopidogrel 75 mg oral tablet: 1 tab(s) orally once a day for 90 days  Coreg 12.5 mg oral tablet: 1 tab(s) orally 2 times a day  fenofibrate 145 mg oral tablet: 1 tab(s) orally once a day  folic acid 1 mg oral tablet: 1 tab(s) orally once a day  gabapentin 300 mg oral capsule: 1 cap(s) orally 2 times a day  glyburide-metformin 5 mg-500 mg oral tablet: 2 tab(s) orally 2 times a day  Multiple Vitamins oral tablet: 1 tab(s) orally once a day  omeprazole 40 mg oral delayed release capsule: 1 cap(s) orally once a day  pantoprazole 40 mg oral delayed release tablet: 1 tab(s) orally once a day (before a meal)  thiamine 100 mg oral tablet: 1 tab(s) orally once a day   allopurinol 100 mg oral tablet: 2 tab(s) orally once a day  amLODIPine 10 mg oral tablet: 1 tab(s) orally once a day  amLODIPine 5 mg oral tablet: 1 tab(s) orally once a day  aspirin 81 mg oral delayed release tablet: 1 tab(s) orally once a day  atorvastatin 80 mg oral tablet: 1 tab(s) orally once a day (at bedtime)  Basaglar KwikPen 100 units/mL subcutaneous solution: 32 unit(s) subcutaneous once a day (at bedtime)  clopidogrel 75 mg oral tablet: 1 tab(s) orally once a day for 90 days  Coreg 12.5 mg oral tablet: 1 tab(s) orally 2 times a day  fenofibrate 145 mg oral tablet: 1 tab(s) orally once a day  folic acid 1 mg oral tablet: 1 tab(s) orally once a day  gabapentin 300 mg oral capsule: 1 cap(s) orally 2 times a day  glyburide-metformin 5 mg-500 mg oral tablet: 2 tab(s) orally 2 times a day  Januvia 100 mg oral tablet: 1 tab(s) orally once a day  Multiple Vitamins oral tablet: 1 tab(s) orally once a day  omeprazole 40 mg oral delayed release capsule: 1 cap(s) orally once a day  Ozempic 2 mg/1.5 mL (0.25 mg or 0.5 mg dose) subcutaneous solution: 0.5 milligram(s) subcutaneously once a week  pantoprazole 40 mg oral delayed release tablet: 1 tab(s) orally once a day (before a meal)  thiamine 100 mg oral tablet: 1 tab(s) orally once a day  Tradjenta 5 mg oral tablet: 1 tab(s) orally once a day  Trulicity Pen 0.75 mg/0.5 mL subcutaneous solution: 0.75 milligram(s) subcutaneously once a week  Victoza 18 mg/3 mL subcutaneous solution: 0.6 milligram(s) subcutaneously once a day After one week at 0.6 mg per day, increase the dose to 1.2 mg daily.  If additional glycemic control is required, increase the dose to 1.8 mg daily after at least one week of treatment with the 1.2 mg daily dose.   allopurinol 100 mg oral tablet: 2 tab(s) orally once a day  amLODIPine 10 mg oral tablet: 1 tab(s) orally once a day  aspirin 81 mg oral delayed release tablet: 1 tab(s) orally once a day  atorvastatin 80 mg oral tablet: 1 tab(s) orally once a day (at bedtime)  Basaglar KwikPen 100 units/mL subcutaneous solution: 32 unit(s) subcutaneous once a day (at bedtime)  clopidogrel 75 mg oral tablet: 1 tab(s) orally once a day for 90 days  Coreg 12.5 mg oral tablet: 1 tab(s) orally 2 times a day  fenofibrate 145 mg oral tablet: 1 tab(s) orally once a day  folic acid 1 mg oral tablet: 1 tab(s) orally once a day  gabapentin 300 mg oral capsule: 1 cap(s) orally 2 times a day  Multiple Vitamins oral tablet: 1 tab(s) orally once a day  pantoprazole 40 mg oral delayed release tablet: 1 tab(s) orally once a day (before a meal)  Pen Needles: Use daily as directed  thiamine 100 mg oral tablet: 1 tab(s) orally once a day  Victoza 18 mg/3 mL subcutaneous solution: 0.6 milligram(s) subcutaneously once a day After one week at 0.6 mg per day, increase the dose to 1.2 mg daily.  Please follow up with your primary care provider for additional dosing adjustments.  If additional glycemic control is required, increase the dose to 1.8 mg daily after at least one week of treatment with the 1.2 mg daily dose.   allopurinol 100 mg oral tablet: 2 tab(s) orally once a day  amLODIPine 10 mg oral tablet: 1 tab(s) orally once a day  aspirin 81 mg oral delayed release tablet: 1 tab(s) orally once a day  atorvastatin 80 mg oral tablet: 1 tab(s) orally once a day (at bedtime)  Basaglar KwikPen 100 units/mL subcutaneous solution: 32 unit(s) subcutaneous once a day (at bedtime)  clopidogrel 75 mg oral tablet: 1 tab(s) orally once a day for 90 days  Coreg 12.5 mg oral tablet: 1 tab(s) orally 2 times a day  fenofibrate 145 mg oral tablet: 1 tab(s) orally once a day  folic acid 1 mg oral tablet: 1 tab(s) orally once a day  gabapentin 300 mg oral capsule: 1 cap(s) orally 2 times a day  Multiple Vitamins oral tablet: 1 tab(s) orally once a day  pantoprazole 40 mg oral delayed release tablet: 1 tab(s) orally once a day (before a meal)  thiamine 100 mg oral tablet: 1 tab(s) orally once a day

## 2023-06-13 NOTE — DISCHARGE NOTE PROVIDER - NSDCFUADDAPPT_GEN_ALL_CORE_FT
Upon discharge, please follow up with Laurel Mobley NP ([phone]) at [address] [place] [address].

## 2023-06-13 NOTE — PROGRESS NOTE ADULT - PROBLEM SELECTOR PLAN 5
- home meds: HCTZ 25mg daily, amlodipine 5mg daily, losartan 50mg daily, coreg 12.5mg BID  - now s/p 24h permissive HTN -- goal gradual normotension   - c/w home amlodipine and coreg  - hold HCTZ/losartan in setting of elevated Cr, as above -- restart as indicated home meds: HCTZ 25mg daily, amlodipine 5mg daily, losartan 50mg daily, coreg 12.5mg BID  now s/p 24h permissive HTN -- goal gradual normotension     - c/w home amlodipine and coreg  - will restart Losartan. Can hold HCTZ in setting of elevated Cr

## 2023-06-13 NOTE — PROGRESS NOTE ADULT - PROBLEM SELECTOR PLAN 8
- in the setting of T2D, as above  - c/w home gabapentin 300mg BID in the setting of T2D, as above    - c/w home gabapentin 300mg BID

## 2023-06-13 NOTE — PROGRESS NOTE ADULT - PROBLEM SELECTOR PLAN 3
Called PCP's office on 6/12 and baseline (Cr 1.7/1.8 as per labs done in July & December 2022 done at PCP office, (- [phone] office # )  pt also saw nephrologist () but last F/u over 2 yrs ago   On admission, Cr 1.46-1.4-> 1.5    - continue to trend  - avoid nephrotoxic agents and renally dose medications  - monitor I&O  - Follow up outpt with Nephrologist after discharge Called PCP's office on 6/12 and baseline (Cr 1.7/1.8 as per labs done in July & December 2022 done at PCP office, (- [phone] office # )  pt also saw nephrologist () but last F/u over 2 yrs ago   On admission, Cr 1.46-1.4-> 1.5-> 1.49    - continue to trend  - avoid nephrotoxic agents and renally dose medications  - monitor I&O  - Follow up outpt with Nephrologist after discharge

## 2023-06-13 NOTE — DISCHARGE NOTE PROVIDER - CARE PROVIDERS DIRECT ADDRESSES
,DirectAddress_Unknown,DirectAddress_Unknown,DirectAddress_Unknown ,DirectAddress_Unknown,DirectAddress_Unknown,DirectAddress_Unknown,DirectAddress_Unknown,DirectAddress_Unknown

## 2023-06-13 NOTE — PROGRESS NOTE ADULT - PROBLEM SELECTOR PLAN 2
- pt reports drinking 3-4x/wk, typically 4-5 drinks with vodka at a time; denies hx w/d   - does not appear to be in w/d  - no indication for CIWA at present  - educated on safe drinking habits and encouraged cutting back - pt in contemplative state pt reports drinking 3-4x/wk, typically 4-5 drinks with vodka at a time; denies hx w/d   does not appear to be in w/d    - no indication for CIWA at present  - educated on safe drinking habits and encouraged cutting back - pt in contemplative state

## 2023-06-13 NOTE — PROGRESS NOTE ADULT - PROBLEM SELECTOR PLAN 9
Diet : DASH/TLC, carb consistent  DVT ppx: lovenox 40mg q24h    code: full  dispo: pending medical optimization (Imaging) , PT recs home no needs Diet : DASH/TLC, carb consistent  DVT ppx: lovenox 40mg q24h    code: full  dispo: pending medical optimization, PT recs home no needs

## 2023-06-13 NOTE — DISCHARGE NOTE PROVIDER - PROVIDER TOKENS
PROVIDER:[TOKEN:[[id number]],FOLLOWUP:[1 week]],PROVIDER:[TOKEN:[[id number]],FOLLOWUP:[1 week]],PROVIDER:[TOKEN:[[id number]],FOLLOWUP:[1 week]] PROVIDER:[TOKEN:[[id number]],SCHEDULEDAPPT:[06/16/2023],SCHEDULEDAPPTTIME:[11:30 AM],ESTABLISHEDPATIENT:[T]],PROVIDER:[TOKEN:[[id number]],FOLLOWUP:[1 week]],PROVIDER:[TOKEN:[[id number]],FOLLOWUP:[1 week]],PROVIDER:[TOKEN:[[id number]],FOLLOWUP:[1-3 days],ESTABLISHEDPATIENT:[T]],PROVIDER:[TOKEN:[[id number]],FOLLOWUP:[1-3 days]]

## 2023-06-13 NOTE — DISCHARGE NOTE PROVIDER - NPI NUMBER (FOR SYSADMIN USE ONLY) :
[[id number]] [[id number]]

## 2023-06-13 NOTE — PROGRESS NOTE ADULT - ASSESSMENT
68M RH with PMH of HTN, HLD, Gout, T2DM, peripheral neuropathy presented to [facility] ED as a code stroke for R facial droop and speech difficulty. LKN 05:30 hr on 6/9/23. Initial NIHSS: 9 (R lower facial, RUE & L sided drift, mild dysarthria, RUE ataxia) -> NIHSS: 6 upon reassssment improved in answering month and age & LLE drift resolved. mRS 0. No thrombolytics or MT. Neuro exam revealing 4+/5 mild R hemiparesis, R facial droop, R dysmetria on FTN. CTH showing chronic R frontal infarct, no acute findings. CTA w/ multifocal stenosis. MRI w/ acute L posterior limb of IC infarct, chronic R frontal and basal ganglia infarcts    Impression: R ataxic hemiparesis, mild to moderate 2/2 acute L posterior limb of IC infarct on MRI, mechanism small vessel disease. 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   [] recommend ILR, EP consulted - possible plan for placement tomorrow AM  [x] HbA1C 7.9  and LDL 85  [] normotension  [x] PT/OT evaluation - no needs  [] Upon discharge, please follow up with Laurel Mobley NP [phone]) at [address] [place] [address].  Please email [email or web address] to schedule an appointment with the stroke NP within 1 week after discharge.    Case seen and discussed with Dr. Christensen 68M RH with PMH of HTN, HLD, Gout, T2DM, peripheral neuropathy presented to [facility] ED as a code stroke for R facial droop and speech difficulty. LKN 05:30 hr on 6/9/23. Initial NIHSS: 9 (R lower facial, RUE & L sided drift, mild dysarthria, RUE ataxia) -> NIHSS: 6 upon reassssment improved in answering month and age & LLE drift resolved. mRS 0. No thrombolytics or MT. Neuro exam revealing 4+/5 mild R hemiparesis, R facial droop, R dysmetria on FTN. CTH showing chronic R frontal infarct, no acute findings. CTA w/ multifocal stenosis. MRI w/ acute L posterior limb of IC infarct, chronic R frontal and basal ganglia infarcts    Impression: R ataxic hemiparesis, mild to moderate 2/2 acute L posterior limb of IC infarct on MRI, mechanism small vessel disease. 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   [] recommend ILR, EP consulted - possible plan for placement tomorrow AM  [x] HbA1C 7.9  and LDL 85  [] normotension  [x] PT/OT evaluation - no needs  [] Upon discharge, please follow up with Laurel Mobley NP [phone]) at [address] [place] [address].  Please email [email or web address] to schedule an appointment with the stroke NP within 1 week after discharge.    Case seen and discussed with Dr. Christensen  Neurology signing off at this time. Please call [phone] for further questions or reconsult

## 2023-06-13 NOTE — PROGRESS NOTE ADULT - SUBJECTIVE AND OBJECTIVE BOX
SUBJECTIVE/INTERVAL HISTORY: Pt seen and examined at bedside with neurology attending. Pt reports that his RUE coordination and weakness have improved, still with R facial droop, confirmed with brother and sister at bedside. Pt ammenable to ILR.     PAST MEDICAL & SURGICAL HISTORY:  DM (diabetes mellitus)      HTN (hypertension)      HLD (hyperlipidemia)      Obese      HTN (hypertension)      Cervical disc herniation      History of arthroscopy of right shoulder      No significant past surgical history        FAMILY HISTORY:  No pertinent family history in first degree relatives        MEDICATIONS (HOME):  Home Medications:  allopurinol 100 mg oral tablet: 2 tab(s) orally once a day (10 José Miguel 2023 00:42)  amLODIPine 5 mg oral tablet: 1 tab(s) orally once a day (10 José Miguel 2023 00:42)  Basaglar KwikPen 100 units/mL subcutaneous solution: 40 unit(s) subcutaneous once a day (at bedtime) (10 José Miguel 2023 00:42)  Coreg 12.5 mg oral tablet: 1 tab(s) orally 2 times a day (10 José Miguel 2023 00:42)  fenofibrate 145 mg oral tablet: 1 tab(s) orally once a day (10 José Miguel 2023 00:42)  gabapentin 300 mg oral capsule: 1 cap(s) orally 2 times a day (10 José Miguel 2023 00:42)  glyburide-metformin 5 mg-500 mg oral tablet: 2 tab(s) orally 2 times a day (10 José Miguel 2023 00:42)  hydroCHLOROthiazide 25 mg oral tablet: 1 tab(s) orally once a day (10 José Miguel 2023 00:42)  losartan 50 mg oral tablet: 1 tab(s) orally once a day (10 José Miguel 2023 00:42)  omeprazole 40 mg oral delayed release capsule: 1 cap(s) orally once a day (10 José Miguel 2023 00:43)    MEDICATIONS  (STANDING):  allopurinol 200 milliGRAM(s) Oral daily  amLODIPine   Tablet 10 milliGRAM(s) Oral daily  aspirin enteric coated 81 milliGRAM(s) Oral daily  atorvastatin 80 milliGRAM(s) Oral at bedtime  carvedilol 12.5 milliGRAM(s) Oral every 12 hours  clopidogrel Tablet 75 milliGRAM(s) Oral daily  dextrose 5%. 1000 milliLiter(s) (100 mL/Hr) IV Continuous <Continuous>  dextrose 5%. 1000 milliLiter(s) (50 mL/Hr) IV Continuous <Continuous>  dextrose 50% Injectable 25 Gram(s) IV Push once  dextrose 50% Injectable 12.5 Gram(s) IV Push once  dextrose 50% Injectable 25 Gram(s) IV Push once  enoxaparin Injectable 40 milliGRAM(s) SubCutaneous every 24 hours  fenofibrate Tablet 145 milliGRAM(s) Oral daily  folic acid 1 milliGRAM(s) Oral daily  gabapentin 300 milliGRAM(s) Oral two times a day  glucagon  Injectable 1 milliGRAM(s) IntraMuscular once  insulin glargine Injectable (LANTUS) 25 Unit(s) SubCutaneous at bedtime  insulin lispro (ADMELOG) corrective regimen sliding scale   SubCutaneous three times a day before meals  insulin lispro (ADMELOG) corrective regimen sliding scale   SubCutaneous at bedtime  multivitamin 1 Tablet(s) Oral daily  pantoprazole    Tablet 40 milliGRAM(s) Oral before breakfast  thiamine 100 milliGRAM(s) Oral daily    MEDICATIONS  (PRN):  dextrose Oral Gel 15 Gram(s) Oral once PRN Blood Glucose LESS THAN 70 milliGRAM(s)/deciliter    ALLERGIES/INTOLERANCES:  Allergies  No Known Allergies    Intolerances    VITALS & EXAMINATION:  Vital Signs Last 24 Hrs  T(C): 36.6 (13 Jun 2023 12:41), Max: 36.9 (12 Jun 2023 13:30)  T(F): 97.9 (13 Jun 2023 12:41), Max: 98.4 (12 Jun 2023 13:30)  HR: 73 (13 Jun 2023 12:41) (65 - 101)  BP: 138/103 (13 Jun 2023 12:41) (120/89 - 149/104)  BP(mean): --  RR: 17 (13 Jun 2023 12:41) (17 - 18)  SpO2: 100% (13 Jun 2023 12:41) (97% - 100%)    Parameters below as of 13 Jun 2023 12:41  Patient On (Oxygen Delivery Method): room air    General:  Constitutional: Obese Male, appears stated age, in no apparent distress including pain  Head: Normocephalic & atraumatic.    Neurological (>12):  MS: Eyes open. Responds well to verbal stimuli. Awake, alert, oriented to person, place, situation, time. Normal affect. Follows all commands.    Language: Speech with mild to moderate dysarthria. Fluent with good repetition & comprehension (able to name objects ring, watch, phone)    CNs: VFF. EOMI no nystagmus, no diplopia. V1-3 intact to LT, well developed masseter muscles b/l. R lower facial droop. full eye closure strength b/l. Hearing grossly normal (rubbing fingers) b/l. Head turning & shoulder shrug intact b/l. Tongue midline, normal movements, no atrophy.    Motor: Normal muscle bulk. No noticeable tremor or seizure. RUE pronator drift.               Deltoid	Biceps	Triceps	Wrist	Finger ABd	   R	4+	4+	4+	4+			5 	  L	5	5	5	5	5		5    	H-FlexD-Flex	P-Flex  R	4+	5	5		   L	5	5	5		     Sensation: Intact to LT b/l throughout.     Cortical: Extinction on DSS (neglect): none    Coordination: RUE FTN dysmetria    Gait: deferred       LABORATORY:  CBC                       13.6   6.49  )-----------( 205      ( 13 Jun 2023 05:30 )             41.7     Chem 06-13    141  |  106  |  22  ----------------------------<  126<H>  3.8   |  22  |  1.49<H>    Ca    8.9      13 Jun 2023 05:30  Phos  3.8     06-13  Mg     2.00     06-13      LFTs   Coagulopathy   Lipid Panel 06-10 Chol 188 LDL -- HDL 51 Trig 258<H>     STUDIES & IMAGING:    mr< from: MR Head No Cont (06.13.23 @ 08:54) >    IMPRESSION:    1. Left internal capsule posterior limb acute infarction    2. Right frontal and right basal ganglia remote infarctions    3. Ischemic white matter disease upper range typical for age    4. Diffuse brain volume loss typical for age    5. Posterior circulation circumferential signal intensity abnormality   does not appear to be artifactual and may reflect arteritis versus   atherosclerosis. Mild dolichoectasia of the posterior circulation    < end of copied text >    < from: CT Angio Brain Stroke Protocol  w/ IV Cont (06.09.23 @ 19:16) >  CT PERFUSION:  FINDINGS: CBF <30: 0 ml  Tmax > 6s: 128 ml  Mismatch volume: 128 ml  Mismatch ratio: Infinite.    Interpretation: Areas at risk demarcated throughout the bilateral   cerebral hemispheres and cerebellum, without distinct territorial   distribution.    IMPRESSION:    CTA Neck: No flow-limiting stenosis in the extracranial carotid or   vertebral arteries.    CTA Head: Multifocal stenosis anterior and posterior circulation, as   described in detail above. No evidence of large vessel occlusion,   aneurysm or malformation visualized.    CT Perfusion: No predicted core infarct. Areas at risk demarcated   throughout the bilateral cerebral hemispheres and cerebellum, without   distinct territorial distribution, likely artifactual.    < end of copied text >

## 2023-06-13 NOTE — DISCHARGE NOTE PROVIDER - NSFOLLOWUPCLINICS_GEN_ALL_ED_FT
[facility]  Endocrinology  [address] [place] [address]  Phone: [phone]  Fax:     [facility]  Neurology  [address] [place] [id number]  Phone: [phone]  Fax:

## 2023-06-13 NOTE — PROGRESS NOTE ADULT - ASSESSMENT
Pt is a 67 yo M with PMH HTN, HLD, gout, and T2D (c/b peripheral neuropathy) p/w acute slurred speech, facial asymmetry, and ataxia x few hours. Stroke code called on arrival with NIHSS 9, which self resolved without tPA. CTH with chronic R frontal infarct, CTA h/n with MF stenosis anterior and posterior circulation, CTP neg. Neuro and EP following and pending MR brain and TTE +/- ILR.

## 2023-06-13 NOTE — PROGRESS NOTE ADULT - NS ATTEND AMEND GEN_ALL_CORE FT
conferred w/ neuro team. while this current stroke may not be embolic, his older strokes do look to be so.  he is recommended for implantable loop recorder placement.  we will proceed tomorrow.  does not need lovenox held, does not need to be NPO.  DC tomorrow.

## 2023-06-14 ENCOUNTER — TRANSCRIPTION ENCOUNTER (OUTPATIENT)
Age: 68
End: 2023-06-14

## 2023-06-14 VITALS
OXYGEN SATURATION: 97 % | HEART RATE: 71 BPM | RESPIRATION RATE: 17 BRPM | DIASTOLIC BLOOD PRESSURE: 101 MMHG | TEMPERATURE: 98 F | SYSTOLIC BLOOD PRESSURE: 140 MMHG

## 2023-06-14 LAB
ANION GAP SERPL CALC-SCNC: 13 MMOL/L — SIGNIFICANT CHANGE UP (ref 7–14)
BUN SERPL-MCNC: 24 MG/DL — HIGH (ref 7–23)
CALCIUM SERPL-MCNC: 9.1 MG/DL — SIGNIFICANT CHANGE UP (ref 8.4–10.5)
CHLORIDE SERPL-SCNC: 106 MMOL/L — SIGNIFICANT CHANGE UP (ref 98–107)
CO2 SERPL-SCNC: 22 MMOL/L — SIGNIFICANT CHANGE UP (ref 22–31)
CREAT SERPL-MCNC: 1.52 MG/DL — HIGH (ref 0.5–1.3)
EGFR: 50 ML/MIN/1.73M2 — LOW
GLUCOSE BLDC GLUCOMTR-MCNC: 126 MG/DL — HIGH (ref 70–99)
GLUCOSE BLDC GLUCOMTR-MCNC: 173 MG/DL — HIGH (ref 70–99)
GLUCOSE SERPL-MCNC: 138 MG/DL — HIGH (ref 70–99)
MAGNESIUM SERPL-MCNC: 2.1 MG/DL — SIGNIFICANT CHANGE UP (ref 1.6–2.6)
PHOSPHATE SERPL-MCNC: 3.5 MG/DL — SIGNIFICANT CHANGE UP (ref 2.5–4.5)
POTASSIUM SERPL-MCNC: 3.8 MMOL/L — SIGNIFICANT CHANGE UP (ref 3.5–5.3)
POTASSIUM SERPL-SCNC: 3.8 MMOL/L — SIGNIFICANT CHANGE UP (ref 3.5–5.3)
SODIUM SERPL-SCNC: 141 MMOL/L — SIGNIFICANT CHANGE UP (ref 135–145)

## 2023-06-14 PROCEDURE — 99239 HOSP IP/OBS DSCHRG MGMT >30: CPT

## 2023-06-14 RX ORDER — SEMAGLUTIDE 0.68 MG/ML
0.5 INJECTION, SOLUTION SUBCUTANEOUS
Qty: 2 | Refills: 0
Start: 2023-06-14 | End: 2023-07-13

## 2023-06-14 RX ORDER — SITAGLIPTIN 50 MG/1
1 TABLET, FILM COATED ORAL
Qty: 30 | Refills: 0
Start: 2023-06-14 | End: 2023-07-13

## 2023-06-14 RX ORDER — FOLIC ACID 0.8 MG
1 TABLET ORAL
Qty: 0 | Refills: 0 | DISCHARGE
Start: 2023-06-14

## 2023-06-14 RX ORDER — LIRAGLUTIDE 6 MG/ML
0.6 INJECTION SUBCUTANEOUS
Qty: 1 | Refills: 0
Start: 2023-06-14 | End: 2023-07-13

## 2023-06-14 RX ORDER — ASPIRIN/CALCIUM CARB/MAGNESIUM 324 MG
1 TABLET ORAL
Qty: 90 | Refills: 0
Start: 2023-06-14 | End: 2023-09-11

## 2023-06-14 RX ORDER — AMLODIPINE BESYLATE 2.5 MG/1
1 TABLET ORAL
Qty: 30 | Refills: 0
Start: 2023-06-14 | End: 2023-07-13

## 2023-06-14 RX ORDER — LOSARTAN POTASSIUM 100 MG/1
1 TABLET, FILM COATED ORAL
Refills: 0 | DISCHARGE

## 2023-06-14 RX ORDER — PANTOPRAZOLE SODIUM 20 MG/1
1 TABLET, DELAYED RELEASE ORAL
Qty: 30 | Refills: 0
Start: 2023-06-14 | End: 2023-07-13

## 2023-06-14 RX ORDER — HYDROCHLOROTHIAZIDE 25 MG
1 TABLET ORAL
Refills: 0 | DISCHARGE

## 2023-06-14 RX ORDER — OMEPRAZOLE 10 MG/1
1 CAPSULE, DELAYED RELEASE ORAL
Qty: 0 | Refills: 0 | DISCHARGE

## 2023-06-14 RX ORDER — AMLODIPINE BESYLATE 2.5 MG/1
1 TABLET ORAL
Refills: 0 | DISCHARGE

## 2023-06-14 RX ORDER — CLOPIDOGREL BISULFATE 75 MG/1
1 TABLET, FILM COATED ORAL
Qty: 90 | Refills: 0
Start: 2023-06-14 | End: 2023-09-11

## 2023-06-14 RX ORDER — DULAGLUTIDE 4.5 MG/.5ML
0.75 INJECTION, SOLUTION SUBCUTANEOUS
Qty: 5 | Refills: 0
Start: 2023-06-14 | End: 2023-07-13

## 2023-06-14 RX ORDER — THIAMINE MONONITRATE (VIT B1) 100 MG
1 TABLET ORAL
Qty: 0 | Refills: 0 | DISCHARGE
Start: 2023-06-14

## 2023-06-14 RX ORDER — ATORVASTATIN CALCIUM 80 MG/1
1 TABLET, FILM COATED ORAL
Qty: 30 | Refills: 0
Start: 2023-06-14 | End: 2023-07-13

## 2023-06-14 RX ORDER — GLYBURIDE-METFORMIN HYDROCHLORIDE 1.25; 25 MG/1; MG/1
2 TABLET ORAL
Refills: 0 | DISCHARGE

## 2023-06-14 RX ORDER — LINAGLIPTIN 5 MG/1
1 TABLET, FILM COATED ORAL
Qty: 30 | Refills: 0
Start: 2023-06-14 | End: 2023-07-13

## 2023-06-14 RX ADMIN — CARVEDILOL PHOSPHATE 12.5 MILLIGRAM(S): 80 CAPSULE, EXTENDED RELEASE ORAL at 05:31

## 2023-06-14 RX ADMIN — Medication 81 MILLIGRAM(S): at 12:19

## 2023-06-14 RX ADMIN — PANTOPRAZOLE SODIUM 40 MILLIGRAM(S): 20 TABLET, DELAYED RELEASE ORAL at 05:31

## 2023-06-14 RX ADMIN — Medication 100 MILLIGRAM(S): at 12:20

## 2023-06-14 RX ADMIN — Medication 1 TABLET(S): at 12:19

## 2023-06-14 RX ADMIN — Medication 1 MILLIGRAM(S): at 12:19

## 2023-06-14 RX ADMIN — Medication 145 MILLIGRAM(S): at 12:20

## 2023-06-14 RX ADMIN — GABAPENTIN 300 MILLIGRAM(S): 400 CAPSULE ORAL at 05:31

## 2023-06-14 RX ADMIN — AMLODIPINE BESYLATE 10 MILLIGRAM(S): 2.5 TABLET ORAL at 05:31

## 2023-06-14 RX ADMIN — Medication 1: at 12:18

## 2023-06-14 RX ADMIN — CLOPIDOGREL BISULFATE 75 MILLIGRAM(S): 75 TABLET, FILM COATED ORAL at 12:20

## 2023-06-14 RX ADMIN — Medication 200 MILLIGRAM(S): at 12:19

## 2023-06-14 NOTE — DISCHARGE NOTE NURSING/CASE MANAGEMENT/SOCIAL WORK - NSDCVIVACCINE_GEN_ALL_CORE_FT
Tdap; 19-Sep-2018 19:44; Magdalena Peralta (ROBERTO); [employer]; [id number] (Exp. Date: 13-Feb-2021); IntraMuscular; Deltoid Right.; 0.5 milliLiter(s); VIS (VIS Published: 09-May-2013, VIS Presented: 19-Sep-2018);

## 2023-06-14 NOTE — DISCHARGE NOTE NURSING/CASE MANAGEMENT/SOCIAL WORK - NSDCFUADDAPPT_GEN_ALL_CORE_FT
Upon discharge, please follow up with Laurel Mobley NP ([phone]) at [address] [place] [address].

## 2023-06-14 NOTE — PROGRESS NOTE ADULT - SUBJECTIVE AND OBJECTIVE BOX
Date of service 06/14/2023    chief complaint: Slurred Speech    extended hpi: Pt is a 67 y/o M with PMH of HTN, HLD, Gout, T2DM, peripheral neuropathy presents with R facial droop and speech difficulty. Pt reports onset of garbled speech a few days ago while playing cards however also notes he was drunk at the time so attributed it to that. States his symptoms improved a bit and over the next few days he thought his speech difficulty was related to his dentures. Wife and daughter noted his speech was slurred and urged him to come in. No past neurologic history. Denies any headache, visual deficits, fever, chills, chest pain, abdominal pain, numbness/tingling throughout extremities. Lives at home with wife and independent with ADLs. Never been on antiplatelets (aspirin/plavix) or anticoag (eliquis/lovenox/xarelto). Pt admits to having about 5 drinks of vodka a few times a week however some weeks has drinks every day. Denies symptoms of withdraw when he goes periods without drinking.         Patient denies chest pain or shortness of breath.   Review of symptoms otherwise negative.    MEDICATIONS:  allopurinol 200 milliGRAM(s) Oral daily  amLODIPine   Tablet 10 milliGRAM(s) Oral daily  aspirin enteric coated 81 milliGRAM(s) Oral daily  atorvastatin 80 milliGRAM(s) Oral at bedtime  carvedilol 12.5 milliGRAM(s) Oral every 12 hours  clopidogrel Tablet 75 milliGRAM(s) Oral daily  dextrose 5%. 1000 milliLiter(s) IV Continuous <Continuous>  dextrose 5%. 1000 milliLiter(s) IV Continuous <Continuous>  dextrose 50% Injectable 25 Gram(s) IV Push once  dextrose 50% Injectable 12.5 Gram(s) IV Push once  dextrose 50% Injectable 25 Gram(s) IV Push once  dextrose Oral Gel 15 Gram(s) Oral once PRN  enoxaparin Injectable 40 milliGRAM(s) SubCutaneous every 24 hours  fenofibrate Tablet 145 milliGRAM(s) Oral daily  folic acid 1 milliGRAM(s) Oral daily  gabapentin 300 milliGRAM(s) Oral two times a day  glucagon  Injectable 1 milliGRAM(s) IntraMuscular once  insulin glargine Injectable (LANTUS) 25 Unit(s) SubCutaneous at bedtime  insulin lispro (ADMELOG) corrective regimen sliding scale   SubCutaneous at bedtime  insulin lispro (ADMELOG) corrective regimen sliding scale   SubCutaneous three times a day before meals  losartan 50 milliGRAM(s) Oral at bedtime  multivitamin 1 Tablet(s) Oral daily  pantoprazole    Tablet 40 milliGRAM(s) Oral before breakfast  thiamine 100 milliGRAM(s) Oral daily      LABS:                        13.6   6.49  )-----------( 205      ( 13 Jun 2023 05:30 )             41.7       Hemoglobin: 13.6 g/dL (06-13 @ 05:30)  Hemoglobin: 14.2 g/dL (06-11 @ 05:45)  Hemoglobin: 13.5 g/dL (06-10 @ 14:45)  Hemoglobin: 13.8 g/dL (06-09 @ 19:05)      06-14    141  |  106  |  24<H>  ----------------------------<  138<H>  3.8   |  22  |  1.52<H>    Ca    9.1      14 Jun 2023 07:27  Phos  3.5     06-14  Mg     2.10     06-14      Creatinine Trend: 1.52<--, 1.49<--, 1.55<--, 1.43<--, 1.40<--, 1.46<--    COAGS:           PHYSICAL EXAM:  T(C): 36.5 (06-14-23 @ 12:07), Max: 37 (06-13-23 @ 17:22)  HR: 71 (06-14-23 @ 12:07) (68 - 74)  BP: 140/101 (06-14-23 @ 12:07) (112/77 - 155/101)  RR: 17 (06-14-23 @ 12:07) (16 - 18)  SpO2: 97% (06-14-23 @ 12:07) (96% - 100%)  Wt(kg): --    I&O's Summary        General:  Alert and Oriented * 3.   Head: Normocephalic and atraumatic.   Neck: No JVD. No bruits. Supple. Does not appear to be enlarged.   Cardiovascular: + S1,S2 ; RRR Soft systolic murmur at the left lower sternal border. No rubs noted.    Lungs: CTA b/l. No rhonchi, rales or wheezes.   Abdomen: + BS, soft. Non tender. Non distended. No rebound. No guarding.   Extremities: No clubbing/cyanosis/edema.   Neurologic: Moves all four extremities. Full range of motion.   Skin: Warm and moist. The patient's skin has normal elasticity and good skin turgor.   Psychiatric: Appropriate mood and affect.  Musculoskeletal: Normal range of motion, normal strength        TELEMETRY: 	  NSR    ECG:  	NSR      < from: CT Angio Neck Stroke Protocol w/ IV Cont (06.09.23 @ 19:17) >  CT ANGIOGRAPHY BRAIN:  Internal carotid arteries: Calcifications of the supraclinoid segments bilaterally, without significant stenosis.  Anterior cerebral arteries: Moderate-severe segmental stenosis mid A2 segment right KYM. Left KYM is patent without flow-limiting stenosis.  Middle cerebral arteries: Moderate focal stenosis of the distal left M1   segment. Moderate severe focal stenosis proximal M2 branch left anterior sylvian fissure. Partially calcified plaque M2 branch left posterior   sylvian fissure, resulting in mild-moderate segmental stenosis. Calcified plaque right M2 branch posterior sylvian fissure, resulting in   mild-moderate focal stenosis. Moderate-severe segmental stenosis M2  branch right anterior sylvian fissure.  Posterior cerebral arteries: Mild-moderate segmental stenosis P1 segment   RIGHT posterior cerebral artery and moderate-severe segmental stenosis   ipsilateral P2 segment proximally mild-moderate segmental stenosis   proximal P2 segment LEFT posterior cerebral artery and moderate-severe   segmental stenosis ipsilateral P2-pituitary junction.  Vertebrobasilar: Patent without stenosis.    CONCLUSIONS:  1. Mitral annular calcification, otherwise normal mitral  valve. Mild-moderate mitral regurgitation.  2. Calcified trileaflet aortic valve with normal opening.  Mild-moderate aortic regurgitation.  3. Mildly dilated left atrium.  LA volume index = 37 cc/m2.  4. Normal left ventricular internal dimensions and wall  thicknesses.  5. Mild global left ventricular systolic dysfunction.  Endocardial visualization enhanced with intravenous  injection of echo contrast (Definity).  6. Normal right ventricular size and function.  7. A bubble study was performed with the intravenous  injection of agitated saline.  Following contrast  injection, no obvious bubbles were seen in the left heart.    ASSESSMENT/PLAN: 	Pt is a 67 y/o M with PMH of HTN, HLD, Gout, T2DM, peripheral neuropathy presents with R facial droop and speech difficulty.     ECHO with LOW normal LVEF, c/w ASA, Plavix, Statin, would avoid cath right now due to recent CVA, lack of chest pain and no ischemia on EKG  Goal now normotension, C/w coreg and amlodipine  S/p ILR with Dr. Broderick  NO PFO seen on bubble study      Zeinab Marks MD  Pager: [phone]        Date of service 06/14/2023    chief complaint: Slurred Speech    extended hpi: Pt is a 67 y/o M with PMH of HTN, HLD, Gout, T2DM, peripheral neuropathy presents with R facial droop and speech difficulty. Pt reports onset of garbled speech a few days ago while playing cards however also notes he was drunk at the time so attributed it to that. States his symptoms improved a bit and over the next few days he thought his speech difficulty was related to his dentures. Wife and daughter noted his speech was slurred and urged him to come in. No past neurologic history. Denies any headache, visual deficits, fever, chills, chest pain, abdominal pain, numbness/tingling throughout extremities. Lives at home with wife and independent with ADLs. Never been on antiplatelets (aspirin/plavix) or anticoag (eliquis/lovenox/xarelto). Pt admits to having about 5 drinks of vodka a few times a week however some weeks has drinks every day. Denies symptoms of withdraw when he goes periods without drinking.         Patient denies chest pain or shortness of breath.   Review of symptoms otherwise negative.    MEDICATIONS:  allopurinol 200 milliGRAM(s) Oral daily  amLODIPine   Tablet 10 milliGRAM(s) Oral daily  aspirin enteric coated 81 milliGRAM(s) Oral daily  atorvastatin 80 milliGRAM(s) Oral at bedtime  carvedilol 12.5 milliGRAM(s) Oral every 12 hours  clopidogrel Tablet 75 milliGRAM(s) Oral daily  dextrose 5%. 1000 milliLiter(s) IV Continuous <Continuous>  dextrose 5%. 1000 milliLiter(s) IV Continuous <Continuous>  dextrose 50% Injectable 25 Gram(s) IV Push once  dextrose 50% Injectable 12.5 Gram(s) IV Push once  dextrose 50% Injectable 25 Gram(s) IV Push once  dextrose Oral Gel 15 Gram(s) Oral once PRN  enoxaparin Injectable 40 milliGRAM(s) SubCutaneous every 24 hours  fenofibrate Tablet 145 milliGRAM(s) Oral daily  folic acid 1 milliGRAM(s) Oral daily  gabapentin 300 milliGRAM(s) Oral two times a day  glucagon  Injectable 1 milliGRAM(s) IntraMuscular once  insulin glargine Injectable (LANTUS) 25 Unit(s) SubCutaneous at bedtime  insulin lispro (ADMELOG) corrective regimen sliding scale   SubCutaneous at bedtime  insulin lispro (ADMELOG) corrective regimen sliding scale   SubCutaneous three times a day before meals  losartan 50 milliGRAM(s) Oral at bedtime  multivitamin 1 Tablet(s) Oral daily  pantoprazole    Tablet 40 milliGRAM(s) Oral before breakfast  thiamine 100 milliGRAM(s) Oral daily      LABS:                        13.6   6.49  )-----------( 205      ( 13 Jun 2023 05:30 )             41.7       Hemoglobin: 13.6 g/dL (06-13 @ 05:30)  Hemoglobin: 14.2 g/dL (06-11 @ 05:45)  Hemoglobin: 13.5 g/dL (06-10 @ 14:45)  Hemoglobin: 13.8 g/dL (06-09 @ 19:05)      06-14    141  |  106  |  24<H>  ----------------------------<  138<H>  3.8   |  22  |  1.52<H>    Ca    9.1      14 Jun 2023 07:27  Phos  3.5     06-14  Mg     2.10     06-14      Creatinine Trend: 1.52<--, 1.49<--, 1.55<--, 1.43<--, 1.40<--, 1.46<--    COAGS:           PHYSICAL EXAM:  T(C): 36.5 (06-14-23 @ 12:07), Max: 37 (06-13-23 @ 17:22)  HR: 71 (06-14-23 @ 12:07) (68 - 74)  BP: 140/101 (06-14-23 @ 12:07) (112/77 - 155/101)  RR: 17 (06-14-23 @ 12:07) (16 - 18)  SpO2: 97% (06-14-23 @ 12:07) (96% - 100%)  Wt(kg): --    I&O's Summary        General:  Alert and Oriented * 3.   Head: Normocephalic and atraumatic.   Neck: No JVD. No bruits. Supple. Does not appear to be enlarged.   Cardiovascular: + S1,S2 ; RRR Soft systolic murmur at the left lower sternal border. No rubs noted.    Lungs: CTA b/l. No rhonchi, rales or wheezes.   Abdomen: + BS, soft. Non tender. Non distended. No rebound. No guarding.   Extremities: No clubbing/cyanosis/edema.   Neurologic: Moves all four extremities. Full range of motion.   Skin: Warm and moist. The patient's skin has normal elasticity and good skin turgor.   Psychiatric: Appropriate mood and affect.  Musculoskeletal: Normal range of motion, normal strength        TELEMETRY: 	  NSR    ECG:  	NSR      < from: CT Angio Neck Stroke Protocol w/ IV Cont (06.09.23 @ 19:17) >  CT ANGIOGRAPHY BRAIN:  Internal carotid arteries: Calcifications of the supraclinoid segments bilaterally, without significant stenosis.  Anterior cerebral arteries: Moderate-severe segmental stenosis mid A2 segment right KYM. Left KYM is patent without flow-limiting stenosis.  Middle cerebral arteries: Moderate focal stenosis of the distal left M1   segment. Moderate severe focal stenosis proximal M2 branch left anterior sylvian fissure. Partially calcified plaque M2 branch left posterior   sylvian fissure, resulting in mild-moderate segmental stenosis. Calcified plaque right M2 branch posterior sylvian fissure, resulting in   mild-moderate focal stenosis. Moderate-severe segmental stenosis M2  branch right anterior sylvian fissure.  Posterior cerebral arteries: Mild-moderate segmental stenosis P1 segment   RIGHT posterior cerebral artery and moderate-severe segmental stenosis   ipsilateral P2 segment proximally mild-moderate segmental stenosis   proximal P2 segment LEFT posterior cerebral artery and moderate-severe   segmental stenosis ipsilateral P2-pituitary junction.  Vertebrobasilar: Patent without stenosis.    CONCLUSIONS:  1. Mitral annular calcification, otherwise normal mitral  valve. Mild-moderate mitral regurgitation.  2. Calcified trileaflet aortic valve with normal opening.  Mild-moderate aortic regurgitation.  3. Mildly dilated left atrium.  LA volume index = 37 cc/m2.  4. Normal left ventricular internal dimensions and wall  thicknesses.  5. Mild global left ventricular systolic dysfunction.  Endocardial visualization enhanced with intravenous  injection of echo contrast (Definity).  6. Normal right ventricular size and function.  7. A bubble study was performed with the intravenous  injection of agitated saline.  Following contrast  injection, no obvious bubbles were seen in the left heart.    ASSESSMENT/PLAN: 	Pt is a 67 y/o M with PMH of HTN, HLD, Gout, T2DM, peripheral neuropathy presents with R facial droop and speech difficulty.     ECHO with LOW normal LVEF, c/w ASA, Plavix, Statin, would avoid cath right now due to recent CVA, lack of chest pain and no ischemia on EKG  Goal now normotension, C/w coreg and amlodipine  S/p ILR with Dr. Broderick  NO PFO seen on bubble study    F/u WIth Dr. Michelle on 06/2022 at 1:30 PM      Zeinab Marks MD  Pager: [phone]

## 2023-06-14 NOTE — PROGRESS NOTE ADULT - SUBJECTIVE AND OBJECTIVE BOX
HISTORY OF PRESENT ILLNESS: HPI:  69 y/o M with PMH of HTN, HLD, Gout, T2DM, peripheral neuropathy presents with R facial droop and speech difficulty. Pt reports onset of garbled speech a few days ago while playing cards however also notes he was drunk at the time so attributed it to that. States his symptoms improved a bit and over the next few days he thought his speech difficulty was related to his dentures. Today wife and daughter noted his speech was slurred and urged him to come in. No past neurologic history. Denies any headache, visual deficits, fever, chills, chest pain, abdominal pain, numbness/tingling throughout extremities. Lives at home with wife and independent with ADLs. Never been on antiplatelets (aspirin/plavix) or anticoag (eliquis/lovenox/xarelto). Pt admits to having about 5 drinks of vodka a few times a week however some weeks has drinks every day. Denies symptoms of withdraw when he goes periods without drinking. (09 Jun 2023 22:51)    Mr Oakes is known to Dr Michelle, for management of HTN and LE PAD.  He had episodes of unclear speech while drinking ,and again while he had teeth out of his mouth... didn't realize these were TIA episodes until he woke up the next morning (after 2-3 days of waxing/waning symptoms) and his speech seemed altered to his family members. He also noticed subtle facial droop. He is able to make intelligible statements, and understands language well. His right arm feels weak.  A 10 pt ROS is otherwise negative.  6/11- resting in bed, no angina or palpitations, awaiting echo and updated neuro imaging.  6/12- resting comfortably. no new complaints. awaiting mri brain.  6/13- no pain, palps or SOB  Date of service 6/14- after conversations with patient, family, children in last 24 hrs, ready for ILR insertion today. after ILR insertion, can go home. feels well, no new issues.    PAST MEDICAL & SURGICAL HISTORY:  DM (diabetes mellitus)  HTN (hypertension)  HLD (hyperlipidemia)  Obese  HTN (hypertension)  Cervical disc herniation  History of arthroscopy of right shoulder  No significant past surgical history    Review of Systems:   Constitutional: [ ] fevers, [ ] chills.   Skin: [ ] dry skin. [ ] rashes.  Psychiatric: [ ] depression, [ ] anxiety.   Gastrointestinal: [ ] BRBPR, [ ] melena.   Neurological: [ ] confusion. [ ] seizures. [ ] shuffling gait.   Ears,Nose,Mouth and Throat: [ ] ear pain [ ] sore throat.   Eyes: [ ] diplopia.   Respiratory: [ ] hemoptysis. [ ] shortness of breath  Cardiovascular: See HPI above  Hematologic/Lymphatic: [ ] anemia. [ ] painful nodes. [ ] prolonged bleeding.   Genitourinary: [ ] hematuria. [ ] flank pain.   Endocrine: [ ] significant change in weight. [ ] intolerance to heat and cold.     Review of systems [ ] otherwise negative, [ ] otherwise unable to obtain    FH: no family history of sudden cardiac death in first degree relatives    SH: [ ] tobacco, [ ] alcohol, [ ] drugs    allopurinol 200 milliGRAM(s) Oral daily  amLODIPine   Tablet 10 milliGRAM(s) Oral daily  aspirin enteric coated 81 milliGRAM(s) Oral daily  atorvastatin 80 milliGRAM(s) Oral at bedtime  carvedilol 12.5 milliGRAM(s) Oral every 12 hours  clopidogrel Tablet 75 milliGRAM(s) Oral daily  dextrose 5%. 1000 milliLiter(s) IV Continuous <Continuous>  dextrose 5%. 1000 milliLiter(s) IV Continuous <Continuous>  dextrose 50% Injectable 25 Gram(s) IV Push once  dextrose 50% Injectable 12.5 Gram(s) IV Push once  dextrose 50% Injectable 25 Gram(s) IV Push once  dextrose Oral Gel 15 Gram(s) Oral once PRN  enoxaparin Injectable 40 milliGRAM(s) SubCutaneous every 24 hours  fenofibrate Tablet 145 milliGRAM(s) Oral daily  folic acid 1 milliGRAM(s) Oral daily  gabapentin 300 milliGRAM(s) Oral two times a day  glucagon  Injectable 1 milliGRAM(s) IntraMuscular once  insulin glargine Injectable (LANTUS) 25 Unit(s) SubCutaneous at bedtime  insulin lispro (ADMELOG) corrective regimen sliding scale   SubCutaneous at bedtime  insulin lispro (ADMELOG) corrective regimen sliding scale   SubCutaneous three times a day before meals  losartan 50 milliGRAM(s) Oral at bedtime  multivitamin 1 Tablet(s) Oral daily  pantoprazole    Tablet 40 milliGRAM(s) Oral before breakfast  thiamine 100 milliGRAM(s) Oral daily                            13.6   6.49  )-----------( 205      ( 13 Jun 2023 05:30 )             41.7       06-14    141  |  106  |  24<H>  ----------------------------<  138<H>  3.8   |  22  |  1.52<H>    Ca    9.1      14 Jun 2023 07:27  Phos  3.5     06-14  Mg     2.10     06-14              T(C): 36.5 (06-14-23 @ 12:07), Max: 37 (06-13-23 @ 17:22)  HR: 71 (06-14-23 @ 12:07) (68 - 74)  BP: 140/101 (06-14-23 @ 12:07) (112/77 - 155/101)  RR: 17 (06-14-23 @ 12:07) (16 - 18)  SpO2: 97% (06-14-23 @ 12:07) (96% - 100%)  Wt(kg): --    I&O's Summary      Appearance: Normal appearing adult male in no acute distress. lower facial droop.	  HEENT:   Normal oral mucosa, PERRL, EOMI	  Lymphatic: No lymphadenopathy , no edema  Cardiovascular: Normal S1 S2, No JVD, No murmurs , Peripheral pulses palpable 2+ bilaterally  Respiratory: Lungs clear to auscultation, normal effort 	  Gastrointestinal:  Soft, Non-tender, + BS	  Skin: No rashes, No ecchymoses, No cyanosis, warm to touch  Musculoskeletal: Normal range of motion, normal strength  Psychiatry:  Mood & affect appropriate    TELEMETRY: NSR	    ECG: NSR    < from: CT Angio Neck Stroke Protocol w/ IV Cont (06.09.23 @ 19:17) >  CT ANGIOGRAPHY BRAIN:  Internal carotid arteries: Calcifications of the supraclinoid segments   bilaterally, without significant stenosis.  Anterior cerebral arteries: Moderate-severe segmental stenosis mid A2   segment right KYM. Left KYM is patent without flow-limiting stenosis.  Middle cerebral arteries: Moderate focal stenosis of the distal left M1   segment. Moderate severe focal stenosis proximal M2 branch left anterior   sylvian fissure. Partially calcified plaque M2 branch left posterior   sylvian fissure, resulting in mild-moderate segmental stenosis. Calcified   plaque right M2 branch posterior sylvian fissure, resulting in   mild-moderate focal stenosis. Moderate-severe segmental stenosis M2   branch right anterior sylvian fissure.  Posterior cerebral arteries: Mild-moderate segmental stenosis P1 segment   RIGHT posterior cerebral artery and moderate-severe segmental stenosis   ipsilateral P2 segment proximally mild-moderate segmental stenosis   proximal P2 segment LEFT posterior cerebral artery and moderate-severe   segmental stenosis ipsilateral P2-pituitary junction.  Vertebrobasilar: Patent without stenosis.  < end of copied text >    < from: TTE with Doppler (w/Cont) (06.12.23 @ 07:48) >  CONCLUSIONS:  1. Mitral annular calcification, otherwise normal mitral  valve. Mild-moderate mitral regurgitation.  2. Calcified trileaflet aortic valve with normal opening.  Mild-moderate aortic regurgitation.  3. Mildly dilated left atrium.  LA volume index = 37 cc/m2.  4. Normal left ventricular internal dimensions and wall  thicknesses.  5. Mild global left ventricular systolic dysfunction.  Endocardial visualization enhanced with intravenous  injection of echo contrast (Definity).  6. Normal right ventricular size and function.  7. A bubble study was performed with the intravenous  injection of agitated saline.  Following contrast  injection, no obvious bubbles were seen in the left heart.  ------------------------------------------------------------------------  Confirmed on  6/12/2023 - 09:24:02 by Austen Hinds M.D.,  [facility], [facility]    < end of copied text >      < from: MR Head No Cont (06.13.23 @ 08:54) >  IMPRESSION:    1. Left internal capsule posterior limb acute infarction    2. Right frontal and right basal ganglia remote infarctions    3. Ischemic white matter disease upper range typical for age    4. Diffuse brain volume loss typical for age    5. Posterior circulation circumferential signal intensity abnormality   does not appear to be artifactual and may reflect arteritis versus   atherosclerosis. Mild dolichoectasia of the posterior circulation    < end of copied text >    ASSESSMENT/PLAN: Mr Oakes is a pleasant 68y Male known to our practice for management of HTN and LE nonobstructive PAD. presenting with CVA symptoms.  -His CTA of the brain shows multifocal stenoses.  -MRI brain with acute infarct as well as remote infarcts  -repeat echo as noted with mild global LV dysfxn.  -Continue aspirin, plavix and statin for stroke secondary prevention. Continue allopurinol for gout.  Continue insulin for diabetes mellitus.  -He is referred now for long term AFib surveillance after multifocal strokes (acute and subacute) of unclear cause.  Prefers ILR over Event Monitoring, for ease of use (no stickers, wires, heavy monitoring box).  He is willing to take anticoagulation if AF is diagnosed.    OK for discharge today.  Has followup w/ Dr Schrader, and will get him followup with general cardiology as well.    Chinmay Lopez M.D.  Cardiac Electrophysiology  [phone]

## 2023-06-14 NOTE — CHART NOTE - NSCHARTNOTEFT_GEN_A_CORE
Pt's daughter Shonna updated and questions answered. Pt's brother and sister at bedside also updated yesterday and questions answered

## 2023-06-14 NOTE — DISCHARGE NOTE NURSING/CASE MANAGEMENT/SOCIAL WORK - PATIENT PORTAL LINK FT
You can access the FollowMyHealth Patient Portal offered by [facility] by registering at the following website: [email or web address]. By joining [employer]’s FollowMyHealth portal, you will also be able to view your health information using other applications (apps) compatible with our system.

## 2023-06-14 NOTE — PROGRESS NOTE ADULT - REASON FOR ADMISSION
dysarthria

## 2023-06-14 NOTE — DISCHARGE NOTE NURSING/CASE MANAGEMENT/SOCIAL WORK - NSDCPEFALRISK_GEN_ALL_CORE
For information on Fall & Injury Prevention, visit: [email or web address] OR  [email or web address] OR  https://www.cdc.gov/steadi/patient.html

## 2023-06-14 NOTE — DISCHARGE NOTE NURSING/CASE MANAGEMENT/SOCIAL WORK - NSSCNAMETXT_GEN_ALL_CORE
[facility] [phone] Nurse to visit on the day following discharge. Other appropriate services to be arranged thereafter. Please contact the home care agency at the above phone number if you have not heard from them by approximately 12 noon on the day after your hospital discharge.

## 2023-06-15 ENCOUNTER — TRANSCRIPTION ENCOUNTER (OUTPATIENT)
Age: 68
End: 2023-06-15

## 2023-06-16 NOTE — CHART NOTE - NSCHARTNOTEFT_GEN_A_CORE
Post-Discharge Medication Review    Patient contacted to offer medication counseling post-discharge. Patient prefers I speak with patient’s wife, Dalia. Spoke with patient’s wife, Dalia at patient's phone number [phone] to complete medication review.    Medication reconciliation completed. Per patient’s wife, patient’s current medications as of 6/16/23 include:  1. Allopurinol 100 mg oral tablet - 2 tablets orally once a day  2. Amlodipine 10 mg oral tablet - 1 tablet orally once a day   3. Aspirin 81 mg oral delayed release tablet - 1 tablet orally once a day  4. Atorvastatin 80 mg oral tablet - 1 tablet orally once a day (at bedtime)  5. Basaglar KwikPen 100 units/mL subcutaneous solution - 32 unit(s) subcutaneous once a day (at bedtime)   6. Clopidogrel 75 mg oral tablet - 1 tablet orally once a day for 90 days  7. Carvedilol 12.5 mg oral tablet - 1 tablet orally 2 times a day  8. Fenofibrate 145 mg oral tablet - 1 tablet orally once a day   9. Folic acid 1 mg oral tablet - 1 tablet orally once a day  -Informed patient’s caregiver that patient may obtain OTC  10. Gabapentin 300 mg oral capsule - 1 capsule orally 2 times a day  11. Multiple Vitamins oral tablet - 1 tablet orally once a day  -Informed patient’s caregiver that patient may obtain OTC  12. Pantoprazole 40 mg oral delayed release tablet - 1 tablet orally once a day (before a meal)  13. Thiamine 100 mg oral tablet - 1 tablet orally once a day  -Informed patient’s caregiver that patient may obtain OTC  14. Victoza 18 mg/3 mL subcutaneous solution - 0.6 milligram(s) subcutaneously once a day. After one week at 0.6 mg per day, increase the dose to 1.2 mg daily. Please follow up with your primary care provider for additional dosing adjustments.  If additional glycemic control is required, increase the dose to 1.8 mg daily after at least one week of treatment with the 1.2 mg daily dose.    Medication name, indication, dose, administration, side effects, and monitoring reviewed for new medications post-discharge with patient’s caregiver. Patient’s wife demonstrated understanding. Counseling offered for all medications.    -Counseled patient’s caregiver on s/s of hypoglycemia and hyperglycemia as well as treatment of hypoglycemia. Counseled patient’s caregiver to have patient maintain log of BG values that he may bring to appointments with provider for further adjustment to medications as needed. Reviewed injection technique for Victoza, proper administration sites, importance of rotating injection sites, and proper storage. Counseled patient’s caregiver on micro/macrovascular complications of uncontrolled diabetes.  -Counseled patient’s caregiver on having patient monitor for s/s of bleeding and when to seek medical attention as patient is on aspirin and clopidogrel  -Counseled patient’s caregiver to have patient maintain log of BP values that he may bring to appointments with providers.    -Of note, per instructions on discharge paperwork and discharge note provider, patient to continue Lantus 32 units at bedtime and metformin as well as initiate Victoza while stopping glyburide upon discharge; however, metformin was not included on patient’s discharge reconciliation, and patient was taking the combination tablet glyburide-metformin 5 mg-500 mg 2 tabs BID at home before admission per the admission med rec. Escalated to ACP team to clarify if patient to continue metformin upon discharge as patient would need separate prescription for metformin.   -In addition, per discharge paperwork, patient is to continue fenofibrate 145 mg tablet – 1 tablet QD (home med per admission med rec) upon discharge. Patient, however, has Stage 3 CKD with last eGFR of 50 mL/min/1.73 m2 (on 6/14/23) per chart. Recommended dosing for patients with CrCl >30 to 80 mL/min is to use lowest available tablet strength (if formulation is not available in a strength </=67 mg, then an alternate formulation should be used) and not to titrate. Escalated to ACP team to discuss possible dose reduction in fenofibrate from fenofibrate 145 mg QD to 48 mg QD based on patient’s renal function as the fenofibrate formulation available as 145 mg is also available as 48 mg.   -Per ACP, as patient is following with PCP today 6/16/23, would recommend he discuss with his provider about metformin and fenofibrate and will defer to PCP to offer prescription. Reached out to patient’s PCP, Dr. Madonna Nevarez, to clarify whether patient to continue metformin and discuss possible dose reduction and dosing recommendations for fenofibrate given patient’s renal function. Per patient’s PCP, she will send prescription for metformin 500 mg – 1 tablet twice daily at this time and discuss with nephrology and that she would discuss fenofibrate use/dosing further with patient’s nephrologist.    Patient had follow-up appointment with PCP post-discharge today 6/16/23. Patient’s wife notes patient has appointment with Neurology on 6/20/23 and Cardiology on 6/22/23. Patient’s wife notes she/patient will call to schedule appointment with Endocrinology, Nephrology, and Cardiac Electrophysiology. Encouraged patient’s wife to have patient follow up with providers and keep appointments and to bring updated medication list to all appointments with providers. Post-Discharge Medication Review    Patient contacted to offer medication counseling post-discharge. Patient prefers I speak with patient’s wife, Dalia. Spoke with patient’s wife, Dalia at patient's phone number [phone] to complete medication review.    Medication reconciliation completed. Per patient’s wife, patient’s current medications as of 6/16/23 include:  1. Allopurinol 100 mg oral tablet - 2 tablets orally once a day  2. Amlodipine 10 mg oral tablet - 1 tablet orally once a day   3. Aspirin 81 mg oral delayed release tablet - 1 tablet orally once a day  4. Atorvastatin 80 mg oral tablet - 1 tablet orally once a day (at bedtime)  5. Basaglar KwikPen 100 units/mL subcutaneous solution - 32 unit(s) subcutaneous once a day (at bedtime)   6. Clopidogrel 75 mg oral tablet - 1 tablet orally once a day for 90 days  7. Carvedilol 12.5 mg oral tablet - 1 tablet orally 2 times a day  8. Fenofibrate 145 mg oral tablet - 1 tablet orally once a day   9. Folic acid 1 mg oral tablet - 1 tablet orally once a day  -Informed patient’s caregiver that patient may obtain OTC  10. Gabapentin 300 mg oral capsule - 1 capsule orally 2 times a day  11. Multiple Vitamins oral tablet - 1 tablet orally once a day  -Informed patient’s caregiver that patient may obtain OTC  12. Pantoprazole 40 mg oral delayed release tablet - 1 tablet orally once a day (before a meal)  13. Thiamine 100 mg oral tablet - 1 tablet orally once a day  -Informed patient’s caregiver that patient may obtain OTC  14. Victoza 18 mg/3 mL subcutaneous solution - 0.6 milligram(s) subcutaneously once a day. After one week at 0.6 mg per day, increase the dose to 1.2 mg daily. Please follow up with your primary care provider for additional dosing adjustments.  If additional glycemic control is required, increase the dose to 1.8 mg daily after at least one week of treatment with the 1.2 mg daily dose.    Medication name, indication, dose, administration, side effects, and monitoring reviewed for new medications post-discharge with patient’s caregiver. Patient’s wife demonstrated understanding. Counseling offered for all medications.    -Counseled patient’s caregiver on s/s of hypoglycemia and hyperglycemia as well as treatment of hypoglycemia. Counseled patient’s caregiver to have patient maintain log of BG values that he may bring to appointments with provider for further adjustment to medications as needed. Reviewed injection technique for Victoza, proper administration sites, importance of rotating injection sites, and proper storage. Counseled patient’s caregiver on micro/macrovascular complications of uncontrolled diabetes.  -Counseled patient’s caregiver on having patient monitor for s/s of bleeding and when to seek medical attention as patient is on aspirin and clopidogrel  -Counseled patient’s caregiver to have patient maintain log of BP values that he may bring to appointments with providers.    -Of note, per instructions on discharge paperwork and discharge note provider, patient to continue Lantus 32 units at bedtime and metformin as well as initiate Victoza while stopping glyburide upon discharge; however, metformin was not included on patient’s discharge reconciliation, and patient was taking the combination tablet glyburide-metformin 5 mg-500 mg 2 tabs BID at home before admission per the admission med rec. Escalated to ACP team to clarify if patient to continue metformin upon discharge as patient would need separate prescription for metformin.   -In addition, per discharge paperwork, patient is to continue fenofibrate 145 mg tablet – 1 tablet QD (home med per admission med rec) upon discharge. Patient, however, has Stage 3 CKD with last eGFR of 50 mL/min/1.73 m2 (on 6/14/23) per chart. Recommended dosing for patients with CrCl >30 to 80 mL/min is to use lowest available tablet strength (if formulation is not available in a strength </=67 mg, then an alternate formulation should be used) and not to titrate. Escalated to ACP team to discuss possible dose reduction in fenofibrate from fenofibrate 145 mg QD to 48 mg QD based on patient’s renal function as the fenofibrate formulation available as 145 mg is also available as 48 mg.   -Per ACP, as patient is following with PCP today 6/16/23, would recommend he discuss with his provider about metformin and fenofibrate and will defer to PCP to offer prescription. Reached out to patient’s PCP, Dr. Madonna Nevarez, to clarify whether patient to continue metformin and discuss possible dose reduction and dosing recommendations for fenofibrate given patient’s renal function. Per patient’s PCP, she will send prescription for metformin 500 mg – 1 tablet twice daily at this time and discuss with nephrology and that she would discuss fenofibrate use/dosing further with patient’s nephrologist. Patient's wife informed that prescription for metformin would be sent to patient's preferred pharmacy per patient's PCP. Patient's wife expressed understanding and noted that she/patient would follow-up with PCP for any questions and for further management/monitoring of patient. No additional questions/concerns at this time.    Patient had follow-up appointment with PCP post-discharge today 6/16/23. Patient’s wife notes patient has appointment with Neurology on 6/20/23 and Cardiology on 6/22/23. Patient’s wife notes she/patient will call to schedule appointment with Endocrinology, Nephrology, and Cardiac Electrophysiology. Encouraged patient’s wife to have patient follow up with providers and keep appointments and to bring updated medication list to all appointments with providers.

## 2023-07-05 NOTE — H&P PST ADULT - NSALCOHOLPROBLEMSRELYN_GEN_A_CORE_SD
"Spoke with patient. Patient was offered an appointment for 07/10/2023, she declined. She states "I will just try monistat again before I set up a appointment". Patient states she will call back if she would like to schedule. She gave a verbalized understanding.  "
no

## 2023-09-25 NOTE — PATIENT PROFILE ADULT - CAREGIVER ADDRESS
Problem: Pain  Goal: #Acceptable pain level achieved/maintained at rest using NRS/Faces  Description: This goal is used for patients who can self-report.  Acceptable means the level is at or below the identified comfort/function goal.  Outcome: Outcome Not Met, Continue to Monitor  Goal: # Acceptable pain level achieved/maintained with activity using NRS/Faces  Description: This goal is used for patients who can self-report and are not achieving acceptable pain control during activity.  Outcome: Outcome Not Met, Continue to Monitor  Goal: Acceptable pain/comfort level is achieved/maintained at rest (based on Pain Behaviors Scale)  Description: This goal is used for patients who are not able to self-report pain and are assessed for pain using the Pain Behaviors Scale  Outcome: Outcome Not Met, Continue to Monitor  Goal: # Verbalizes understanding of pain management  Description: Documented in Patient Education Activity  Outcome: Outcome Not Met, Continue to Monitor     Problem: Nausea/Vomiting  Goal: Maintains oral intake with decreased/no reports of nausea/vomiting  Outcome: Outcome Not Met, Continue to Monitor  Goal: Current weight maintained or increased  Outcome: Outcome Not Met, Continue to Monitor  Goal: Verbalizes understanding of s/s and strategies to control nausea/vomiting  Description: Document education using the patient education activity.   Outcome: Outcome Not Met, Continue to Monitor  Goal: Decreased reports of nausea/vomiting (Hospice)  Outcome: Outcome Not Met, Continue to Monitor      [id number] [place] [address]

## 2023-11-07 ENCOUNTER — EMERGENCY (EMERGENCY)
Facility: HOSPITAL | Age: 68
LOS: 0 days | Discharge: HOME | End: 2023-11-07
Attending: STUDENT IN AN ORGANIZED HEALTH CARE EDUCATION/TRAINING PROGRAM
Payer: MEDICARE

## 2023-11-07 VITALS
OXYGEN SATURATION: 95 % | WEIGHT: 259.93 LBS | TEMPERATURE: 98 F | HEART RATE: 87 BPM | DIASTOLIC BLOOD PRESSURE: 103 MMHG | HEIGHT: 74 IN | SYSTOLIC BLOOD PRESSURE: 141 MMHG | RESPIRATION RATE: 20 BRPM

## 2023-11-07 VITALS
DIASTOLIC BLOOD PRESSURE: 90 MMHG | TEMPERATURE: 99 F | HEART RATE: 91 BPM | SYSTOLIC BLOOD PRESSURE: 128 MMHG | OXYGEN SATURATION: 100 % | RESPIRATION RATE: 20 BRPM

## 2023-11-07 DIAGNOSIS — Z79.02 LONG TERM (CURRENT) USE OF ANTITHROMBOTICS/ANTIPLATELETS: ICD-10-CM

## 2023-11-07 DIAGNOSIS — E11.9 TYPE 2 DIABETES MELLITUS WITHOUT COMPLICATIONS: ICD-10-CM

## 2023-11-07 DIAGNOSIS — E78.5 HYPERLIPIDEMIA, UNSPECIFIED: ICD-10-CM

## 2023-11-07 DIAGNOSIS — Z86.73 PERSONAL HISTORY OF TRANSIENT ISCHEMIC ATTACK (TIA), AND CEREBRAL INFARCTION WITHOUT RESIDUAL DEFICITS: ICD-10-CM

## 2023-11-07 DIAGNOSIS — I10 ESSENTIAL (PRIMARY) HYPERTENSION: ICD-10-CM

## 2023-11-07 DIAGNOSIS — M54.50 LOW BACK PAIN, UNSPECIFIED: ICD-10-CM

## 2023-11-07 DIAGNOSIS — Z79.82 LONG TERM (CURRENT) USE OF ASPIRIN: ICD-10-CM

## 2023-11-07 DIAGNOSIS — Z79.85 LONG-TERM (CURRENT) USE OF INJECTABLE NON-INSULIN ANTIDIABETIC DRUGS: ICD-10-CM

## 2023-11-07 DIAGNOSIS — Z98.890 OTHER SPECIFIED POSTPROCEDURAL STATES: Chronic | ICD-10-CM

## 2023-11-07 DIAGNOSIS — M50.20 OTHER CERVICAL DISC DISPLACEMENT, UNSPECIFIED CERVICAL REGION: Chronic | ICD-10-CM

## 2023-11-07 PROCEDURE — 99284 EMERGENCY DEPT VISIT MOD MDM: CPT

## 2023-11-07 PROCEDURE — 72110 X-RAY EXAM L-2 SPINE 4/>VWS: CPT | Mod: 26

## 2023-11-07 RX ORDER — CYCLOBENZAPRINE HYDROCHLORIDE 10 MG/1
1 TABLET, FILM COATED ORAL
Qty: 9 | Refills: 0
Start: 2023-11-07 | End: 2023-11-09

## 2023-11-07 RX ORDER — CYCLOBENZAPRINE HYDROCHLORIDE 10 MG/1
10 TABLET, FILM COATED ORAL ONCE
Refills: 0 | Status: COMPLETED | OUTPATIENT
Start: 2023-11-07 | End: 2023-11-07

## 2023-11-07 RX ORDER — LIDOCAINE 4 G/100G
1 CREAM TOPICAL ONCE
Refills: 0 | Status: COMPLETED | OUTPATIENT
Start: 2023-11-07 | End: 2023-11-07

## 2023-11-07 RX ORDER — OXYCODONE HYDROCHLORIDE 5 MG/1
1 TABLET ORAL
Qty: 12 | Refills: 0
Start: 2023-11-07 | End: 2023-11-09

## 2023-11-07 RX ADMIN — CYCLOBENZAPRINE HYDROCHLORIDE 10 MILLIGRAM(S): 10 TABLET, FILM COATED ORAL at 15:05

## 2023-11-07 RX ADMIN — LIDOCAINE 1 PATCH: 4 CREAM TOPICAL at 15:04

## 2023-11-07 NOTE — ED ADULT NURSE NOTE - OBJECTIVE STATEMENT
AQ&OX4.patient C/O 10/10 right lower intermitted back pain denies numbness/tingling or falls  patient ambulatory at this time no pmh no burning with urination no blood in stool

## 2023-11-07 NOTE — ED PROVIDER NOTE - PATIENT PORTAL LINK FT
You can access the FollowMyHealth Patient Portal offered by [facility] by registering at the following website: [email or web address]. By joining [employer]’s FollowMyHealth portal, you will also be able to view your health information using other applications (apps) compatible with our system.

## 2023-11-07 NOTE — ED PROVIDER NOTE - CLINICAL SUMMARY MEDICAL DECISION MAKING FREE TEXT BOX
69 y/o M with PMH HTN, HLD, DM, stroke presenting to the ED c/o back pain.  Vitals stable.  Patient is well appearing in NAD.  No midline spinal tenderness.  +R paraspinal tenderness  No significant red flag symptoms  Will obtain XR of L-spine  WIll treat pain    Pain improved.  Patient observed ambulating without assistance.  Will discharge w/ spine f/u

## 2023-11-07 NOTE — ED ADULT NURSE NOTE - NSFALLUNIVINTERV_ED_ALL_ED
Bed/Stretcher in lowest position, wheels locked, appropriate side rails in place/Call bell, personal items and telephone in reach/Instruct patient to call for assistance before getting out of bed/chair/stretcher/Non-slip footwear applied when patient is off stretcher/[place] to call system/Physically safe environment - no spills, clutter or unnecessary equipment/Purposeful proactive rounding/Room/bathroom lighting operational, light cord in reach

## 2023-11-07 NOTE — ED PROVIDER NOTE - CARE PROVIDER_API CALL
Mukesh Johnson  Orthopaedic Surgery  [address] [facility], [address] [address]  Phone: [phone]  Fax: [phone]  Follow Up Time: 1-3 Days

## 2023-11-07 NOTE — ED PROVIDER NOTE - OBJECTIVE STATEMENT
69 y/o M with PMH HTN, HLD, DM, stroke presenting to the ED c/o back pain.  Patient endorses lower back pain, worse on the right side, that travels down the right leg.  No numbness/tingling/weakness, saddle anesthesia, bowel or bladder incontinence.  Reports increased pain with movement.  No heavy lifting or trauma.  He has had similar pain in the past.

## 2023-11-07 NOTE — ED PROVIDER NOTE - PHYSICAL EXAMINATION
GENERAL: Awake, alert, NAD  HEENT: NC/AT, moist mucous membranes  LUNGS: CTAB, no wheezes or crackles   CARDIAC: RRR, no m/r/g  ABDOMEN: Soft, non tender, non distended, no rebound, no guarding  BACK: No midline spinal tenderness, no CVA tenderness, +R sided paraspinal tenderness in the L1-L4 distribution  EXT: No edema, no calf tenderness, no deformities.  NEURO: A&Ox3. Moving all extremities.  SKIN: Warm and dry. No rash.  PSYCH: Normal affect.

## 2023-11-29 NOTE — PATIENT PROFILE ADULT - DO YOU FEEL THREATENED BY OTHERS?
Increase pantoprazole   Zofran (ondansetron) as needed for nausea    If your pain on right upper abdomen does not improve or if your liver enzymes are elevated, we will order a CT SCAN of the abdomen.
no

## 2024-01-17 ENCOUNTER — EMERGENCY (EMERGENCY)
Facility: HOSPITAL | Age: 69
LOS: 0 days | Discharge: HOME | End: 2024-01-17
Attending: EMERGENCY MEDICINE
Payer: MEDICARE

## 2024-01-17 VITALS
TEMPERATURE: 98 F | RESPIRATION RATE: 17 BRPM | SYSTOLIC BLOOD PRESSURE: 166 MMHG | HEART RATE: 77 BPM | DIASTOLIC BLOOD PRESSURE: 99 MMHG | OXYGEN SATURATION: 98 %

## 2024-01-17 VITALS
SYSTOLIC BLOOD PRESSURE: 156 MMHG | RESPIRATION RATE: 19 BRPM | HEART RATE: 81 BPM | WEIGHT: 250 LBS | OXYGEN SATURATION: 98 % | DIASTOLIC BLOOD PRESSURE: 108 MMHG | TEMPERATURE: 99 F

## 2024-01-17 DIAGNOSIS — M25.562 PAIN IN LEFT KNEE: ICD-10-CM

## 2024-01-17 DIAGNOSIS — Z98.890 OTHER SPECIFIED POSTPROCEDURAL STATES: Chronic | ICD-10-CM

## 2024-01-17 DIAGNOSIS — Z79.4 LONG TERM (CURRENT) USE OF INSULIN: ICD-10-CM

## 2024-01-17 DIAGNOSIS — Z79.82 LONG TERM (CURRENT) USE OF ASPIRIN: ICD-10-CM

## 2024-01-17 DIAGNOSIS — Z79.02 LONG TERM (CURRENT) USE OF ANTITHROMBOTICS/ANTIPLATELETS: ICD-10-CM

## 2024-01-17 DIAGNOSIS — M50.20 OTHER CERVICAL DISC DISPLACEMENT, UNSPECIFIED CERVICAL REGION: Chronic | ICD-10-CM

## 2024-01-17 PROCEDURE — 73562 X-RAY EXAM OF KNEE 3: CPT | Mod: 26,LT

## 2024-01-17 PROCEDURE — 99285 EMERGENCY DEPT VISIT HI MDM: CPT

## 2024-01-17 PROCEDURE — 72100 X-RAY EXAM L-S SPINE 2/3 VWS: CPT | Mod: 26

## 2024-01-17 RX ORDER — ALLOPURINOL 300 MG
2 TABLET ORAL
Refills: 0 | DISCHARGE

## 2024-01-17 RX ORDER — INSULIN GLARGINE 100 [IU]/ML
32 INJECTION, SOLUTION SUBCUTANEOUS
Qty: 0 | Refills: 0 | DISCHARGE

## 2024-01-17 NOTE — ED ADULT TRIAGE NOTE - CHIEF COMPLAINT QUOTE
pt states he slipped and fell on the ice last night. he landed on his chest. c/o  back pain and left knee pain and swelling . no head injury or loc. history of dm and ht .

## 2024-01-17 NOTE — ED PROVIDER NOTE - PATIENT PORTAL LINK FT
You can access the FollowMyHealth Patient Portal offered by [facility] by registering at the following website: [email or web address]. By joining [employer]’s FollowMyHealth portal, you will also be able to view your health information using other applications (apps) compatible with our system.

## 2024-01-17 NOTE — SBIRT NOTE ADULT - NSSBIRTBRIEFINTDET_GEN_A_CORE
Provided SBIRT services: Full screen positive. Brief Intervention Performed. Screening results were reviewed with the patient and patient was provided information about healthy guidelines and potential negative consequences associated with level of risk. Motivation and readiness to reduce or stop use was discussed and goals and activities to make changes were suggested/offered. Goal is to stop drinking and improve overall health. Declined resources/support.

## 2024-01-17 NOTE — ED PROVIDER NOTE - OBJECTIVE STATEMENT
pt states he fell yesterday pt states he fell yesterday    pt reports getting gup today and was more sore    pt states he fell in from of a business and fell forward onto his knees    pt refused any pain control

## 2024-04-10 NOTE — CHART NOTE - NSTELEHEALTH PATIENT INTERVIEW PRIVATE SPACE
Patient interviewed in a private space. [FreeTextEntry2] : Pt will continue with weekly individual psychotherapy.  [Psychodynamic Therapy] : Psychodynamic Therapy  [de-identified] : Pt arrived on time to individual psychotherapy session. Pt reported on events over the last few weeks, including traveling over break with his family. Therapist highlighted pt's signs of progress in how he tolerated distress and managing stressors independently. Pt expressed feelings of pride and increased motivation, which were further positively reinforced by therapist. Pt left session in good emotional and behavioral control.  [Recommended Frequency of Visits: ____] : Recommended frequency of visits: [unfilled] [FreeTextEntry1] : Pt will continue twice weekly individual psychotherapy with writer, and attend next session in two weeks.

## 2024-04-14 ENCOUNTER — EMERGENCY (EMERGENCY)
Facility: HOSPITAL | Age: 69
LOS: 1 days | Discharge: HOME | End: 2024-04-14
Attending: EMERGENCY MEDICINE | Admitting: EMERGENCY MEDICINE
Payer: MEDICARE

## 2024-04-14 VITALS
DIASTOLIC BLOOD PRESSURE: 103 MMHG | RESPIRATION RATE: 18 BRPM | OXYGEN SATURATION: 95 % | SYSTOLIC BLOOD PRESSURE: 149 MMHG | HEART RATE: 73 BPM

## 2024-04-14 VITALS
TEMPERATURE: 98 F | DIASTOLIC BLOOD PRESSURE: 87 MMHG | RESPIRATION RATE: 16 BRPM | SYSTOLIC BLOOD PRESSURE: 142 MMHG | OXYGEN SATURATION: 95 % | HEART RATE: 75 BPM

## 2024-04-14 DIAGNOSIS — Z98.890 OTHER SPECIFIED POSTPROCEDURAL STATES: Chronic | ICD-10-CM

## 2024-04-14 DIAGNOSIS — M50.20 OTHER CERVICAL DISC DISPLACEMENT, UNSPECIFIED CERVICAL REGION: Chronic | ICD-10-CM

## 2024-04-14 LAB
ALBUMIN SERPL ELPH-MCNC: 4.3 G/DL — SIGNIFICANT CHANGE UP (ref 3.3–5)
ALP SERPL-CCNC: 87 U/L — SIGNIFICANT CHANGE UP (ref 40–120)
ALT FLD-CCNC: 19 U/L — SIGNIFICANT CHANGE UP (ref 4–41)
ANION GAP SERPL CALC-SCNC: 14 MMOL/L — SIGNIFICANT CHANGE UP (ref 7–14)
AST SERPL-CCNC: 25 U/L — SIGNIFICANT CHANGE UP (ref 4–40)
BASOPHILS # BLD AUTO: 0.02 K/UL — SIGNIFICANT CHANGE UP (ref 0–0.2)
BASOPHILS NFR BLD AUTO: 0.2 % — SIGNIFICANT CHANGE UP (ref 0–2)
BILIRUB SERPL-MCNC: 0.5 MG/DL — SIGNIFICANT CHANGE UP (ref 0.2–1.2)
BUN SERPL-MCNC: 21 MG/DL — SIGNIFICANT CHANGE UP (ref 7–23)
CALCIUM SERPL-MCNC: 8.8 MG/DL — SIGNIFICANT CHANGE UP (ref 8.4–10.5)
CHLORIDE SERPL-SCNC: 101 MMOL/L — SIGNIFICANT CHANGE UP (ref 98–107)
CO2 SERPL-SCNC: 24 MMOL/L — SIGNIFICANT CHANGE UP (ref 22–31)
CREAT SERPL-MCNC: 1.44 MG/DL — HIGH (ref 0.5–1.3)
EGFR: 54 ML/MIN/1.73M2 — LOW
EOSINOPHIL # BLD AUTO: 0.1 K/UL — SIGNIFICANT CHANGE UP (ref 0–0.5)
EOSINOPHIL NFR BLD AUTO: 1.2 % — SIGNIFICANT CHANGE UP (ref 0–6)
FLUAV AG NPH QL: SIGNIFICANT CHANGE UP
FLUBV AG NPH QL: SIGNIFICANT CHANGE UP
GLUCOSE SERPL-MCNC: 322 MG/DL — HIGH (ref 70–99)
HCT VFR BLD CALC: 37.1 % — LOW (ref 39–50)
HGB BLD-MCNC: 12.3 G/DL — LOW (ref 13–17)
IANC: 6.49 K/UL — SIGNIFICANT CHANGE UP (ref 1.8–7.4)
IMM GRANULOCYTES NFR BLD AUTO: 0.4 % — SIGNIFICANT CHANGE UP (ref 0–0.9)
LYMPHOCYTES # BLD AUTO: 1.17 K/UL — SIGNIFICANT CHANGE UP (ref 1–3.3)
LYMPHOCYTES # BLD AUTO: 14.1 % — SIGNIFICANT CHANGE UP (ref 13–44)
MCHC RBC-ENTMCNC: 29.5 PG — SIGNIFICANT CHANGE UP (ref 27–34)
MCHC RBC-ENTMCNC: 33.2 GM/DL — SIGNIFICANT CHANGE UP (ref 32–36)
MCV RBC AUTO: 89 FL — SIGNIFICANT CHANGE UP (ref 80–100)
MONOCYTES # BLD AUTO: 0.51 K/UL — SIGNIFICANT CHANGE UP (ref 0–0.9)
MONOCYTES NFR BLD AUTO: 6.1 % — SIGNIFICANT CHANGE UP (ref 2–14)
NEUTROPHILS # BLD AUTO: 6.49 K/UL — SIGNIFICANT CHANGE UP (ref 1.8–7.4)
NEUTROPHILS NFR BLD AUTO: 78 % — HIGH (ref 43–77)
NRBC # BLD: 0 /100 WBCS — SIGNIFICANT CHANGE UP (ref 0–0)
NRBC # FLD: 0 K/UL — SIGNIFICANT CHANGE UP (ref 0–0)
NT-PROBNP SERPL-SCNC: 338 PG/ML — HIGH
PLATELET # BLD AUTO: 256 K/UL — SIGNIFICANT CHANGE UP (ref 150–400)
POTASSIUM SERPL-MCNC: 4 MMOL/L — SIGNIFICANT CHANGE UP (ref 3.5–5.3)
POTASSIUM SERPL-SCNC: 4 MMOL/L — SIGNIFICANT CHANGE UP (ref 3.5–5.3)
PROT SERPL-MCNC: 7.2 G/DL — SIGNIFICANT CHANGE UP (ref 6–8.3)
RBC # BLD: 4.17 M/UL — LOW (ref 4.2–5.8)
RBC # FLD: 14 % — SIGNIFICANT CHANGE UP (ref 10.3–14.5)
RSV RNA NPH QL NAA+NON-PROBE: SIGNIFICANT CHANGE UP
SARS-COV-2 RNA SPEC QL NAA+PROBE: SIGNIFICANT CHANGE UP
SODIUM SERPL-SCNC: 139 MMOL/L — SIGNIFICANT CHANGE UP (ref 135–145)
TROPONIN T, HIGH SENSITIVITY RESULT: 39 NG/L — SIGNIFICANT CHANGE UP
TROPONIN T, HIGH SENSITIVITY RESULT: 43 NG/L — SIGNIFICANT CHANGE UP
WBC # BLD: 8.32 K/UL — SIGNIFICANT CHANGE UP (ref 3.8–10.5)
WBC # FLD AUTO: 8.32 K/UL — SIGNIFICANT CHANGE UP (ref 3.8–10.5)

## 2024-04-14 PROCEDURE — 93971 EXTREMITY STUDY: CPT | Mod: 26,LT

## 2024-04-14 PROCEDURE — 99284 EMERGENCY DEPT VISIT MOD MDM: CPT

## 2024-04-14 PROCEDURE — 71046 X-RAY EXAM CHEST 2 VIEWS: CPT | Mod: 26

## 2024-04-14 PROCEDURE — 93010 ELECTROCARDIOGRAM REPORT: CPT

## 2024-04-14 RX ORDER — SODIUM CHLORIDE 9 MG/ML
500 INJECTION INTRAMUSCULAR; INTRAVENOUS; SUBCUTANEOUS ONCE
Refills: 0 | Status: COMPLETED | OUTPATIENT
Start: 2024-04-14 | End: 2024-04-14

## 2024-04-14 RX ADMIN — SODIUM CHLORIDE 500 MILLILITER(S): 9 INJECTION INTRAMUSCULAR; INTRAVENOUS; SUBCUTANEOUS at 08:52

## 2024-04-14 NOTE — ED ADULT NURSE NOTE - NSFALLUNIVINTERV_ED_ALL_ED
Bed/Stretcher in lowest position, wheels locked, appropriate side rails in place/Call bell, personal items and telephone in reach/Instruct patient to call for assistance before getting out of bed/chair/stretcher/Non-slip footwear applied when patient is off stretcher/[place] to call system/Physically safe environment - no spills, clutter or unnecessary equipment/Purposeful proactive rounding/Room/bathroom lighting operational, light cord in reach

## 2024-04-14 NOTE — ED PROVIDER NOTE - NSFOLLOWUPINSTRUCTIONS_ED_ALL_ED_FT
You came to the Emergency Room because of concern for cough, shortness of breath, L leg pain.     Your lab work, X-ray, Ultrasound results are unremarkable.     Your symptom is likely due to upper respiratory viral infection for which the treatment is supportive therapy - please stay hydrated, rest well.     We do not think that you have any emergent medical condition that requires urgent intervention or hospital admission at this time.    You may take Tylenol 1000 mg every 8 hours (no more than 4000 mg per 24 hours) as needed for pain.   You may take Ibuprofen 400 mg every 6 hours as needed for pain.     Please follow up with your Primary Medical Doctor within the next 7 days to monitor your symptoms.     Please come back to the Emergency Room if your symptoms get worse.

## 2024-04-14 NOTE — ED ADULT TRIAGE NOTE - CHIEF COMPLAINT QUOTE
Pt c/o productive cough x 1 week, worse yesterday. Endorsing SOb and b/l leg pain. Respirations even and unlabored. Denies CP, fever, chills. pmhx: DM2, HTN, HLD

## 2024-04-14 NOTE — ED PROVIDER NOTE - PROGRESS NOTE DETAILS
Demetrius PGY3: patient reassessed. Reports that he is feeling better now. Workup unremarkable - Cr 1.44 at baseline. Will rocío.

## 2024-04-14 NOTE — ED PROVIDER NOTE - CLINICAL SUMMARY MEDICAL DECISION MAKING FREE TEXT BOX
68m, h/o htn, hld, dm, gout, presenting to ed with 1-2 w of cough, congestion, intermittent neck and lle pain (none at time of eval). Exam with +nasal congestion, without any other acute abnormal findings, unlabored breathing, clear lung, well perfused extremities. Plan for labs, cxr, ekg, covid/flu swab, US lle, eval for diff including but not limited to infection (more likely viral, eval for pna as well), elec dist, organ dysfunction, dvt. Patient and family at bedside aware and agreeable to plan. 68m, h/o htn, hld, dm, gout, presenting to ed with 1-2 w of cough, congestion, intermittent neck and lle pain (none at time of eval). Exam with +nasal congestion, without any other acute abnormal findings, unlabored breathing, clear lung, well perfused extremities. Plan for labs, cxr, ekg, covid/flu swab, US lle, eval for diff including but not limited to infection (more likely viral, eval for pna as well), elec dist, organ dysfunction, dvt. Patient and family at bedside aware and agreeable to plan.    68-year-old male, history of diabetes, hypertension, hyperlipidemia, presenting with 2 weeks onset of productive cough, shortness of breath, intermittent neck pain, left lower extremity pain.  Patient's wife is having similar symptoms at home.  Denies fever, chest pain, abdominal pain, vomiting.  Reports he does not have the neck and lower extremity pain at this time.  Denies fall or trauma.  Patient's vital signs are within normal limits on arrival.  Physical exam patient's well-appearing, not in acute distress, mildly tachypneic, clear lung exam bilaterally, abdomen is mildly distended, nontender on palpation, no leg swelling noted or skin color changes.  Differentials include but not limited to URI versus pneumonia versus less likely ACS.  Will get labs, cardiac markers, EKG, chest x-ray, duplex study to rule out DVT.

## 2024-04-14 NOTE — ED PROVIDER NOTE - OBJECTIVE STATEMENT
68m presents to the ed with cough, congestion, leg pain. Patient and daughter at bedside reports they both have been sick with similar symptoms. patient symptoms started between 1-2 weeks ago, productive cough, worse when hes trying to sleep, +nasal congestion, and pain to left leg region (calf area) over last 1-2 weeks as well. no fevers, cp, abd pain, vomiting, diarrhea, dysuria, edema, redness, numbness, unilateral weakness, hoang. Does report he also has generalized intermittent neck pain, none at this time, intermittent, no particular exacerbating or alleviating factors. Also reports he has no leg pain at this time but comes and goes intermittently as well. H/o htn, dm, hld, gout.

## 2024-04-14 NOTE — ED PROVIDER NOTE - PHYSICAL EXAMINATION
GEN - NAD; well appearing; A+O x3   HEAD - NC/AT   EYES- PERRL, EOMI  ENT: Airway patent, mmm, Oral cavity and pharynx normal. No inflammation, swelling, exudate, or lesions.  +nasal congestion.  NECK: Neck supple, from, no mildine or parasapinal ttp, small pimple to r. lateral neck region, no surrounding induration/erythema, no fluctuance.  PULMONARY - CTA b/l, symmetric breath sounds, unlabored  CARDIAC -s1s2, RRR, no M,G,R  ABDOMEN - +BS, ND, NT, soft, no guarding, no rebound, no masses   BACK - no CVA tenderness, Normal  spine   EXTREMITIES - FROM to all joints b/l ue and le, symmetric pulses, capillary refill < 2 seconds, no ttp to left leg in regions he reports he previously had pain, no femur/knee/calf/ankle ttp, no edema, no crepitus, compartments soft, silt  SKIN - no rash or bruising   NEUROLOGIC - alert, speech clear, no focal deficits  PSYCH -nl mood/affect, nl insight.

## 2024-04-14 NOTE — ED ADULT NURSE NOTE - OBJECTIVE STATEMENT
pt alert, oriented x3. to rm  4. reports increas ing cough - productive x past 2 wks with difficulty breathing. denies  fever ,ch pains. c/o intermittent l leg pain x weeks- denies at present. reports discomfort to back of neck- small pimple noted to area. no leg swelling noted. respirations sl tachypneic upon  activity noted. pt reports did not take any meds today-  evaluated by ed team. iv access ,labs sent. monitoring in effect. float rn

## 2024-04-14 NOTE — ED ADULT TRIAGE NOTE - PAIN: PRESENCE, MLM
Performing Laboratory: 0 Expected Date Of Service: 11/16/2023 Bill For Surgical Tray: no Billing Type: Third-Party Bill complains of pain/discomfort

## 2024-04-23 NOTE — PATIENT PROFILE ADULT - PATIENT REPRESENTATIVE: ( YOU CAN CHOOSE ANY PERSON THAT CAN ASSIST YOU WITH YOUR HEALTH CARE PREFERENCES, DOES NOT HAVE TO BE A SPOUSE, IMMEDIATE FAMILY OR SIGNIFICANT OTHER/PARTNER)
Scheduled date of EGD/colonoscopy (as of today):06.07.24  Physician performing EGD/colonoscopy:DR ANTON  Location of EGD/colonoscopy:[facility]  Desired bowel prep reviewed with patient:KRYSTYNA  Instructions reviewed with patient by:MARKIE  Clearances:  N/A  
yes

## 2024-09-12 ENCOUNTER — EMERGENCY (EMERGENCY)
Facility: HOSPITAL | Age: 69
LOS: 1 days | Discharge: HOME | End: 2024-09-12
Attending: EMERGENCY MEDICINE | Admitting: EMERGENCY MEDICINE
Payer: MEDICARE

## 2024-09-12 VITALS
TEMPERATURE: 98 F | DIASTOLIC BLOOD PRESSURE: 93 MMHG | SYSTOLIC BLOOD PRESSURE: 138 MMHG | RESPIRATION RATE: 20 BRPM | WEIGHT: 246.92 LBS | OXYGEN SATURATION: 95 % | HEART RATE: 103 BPM

## 2024-09-12 DIAGNOSIS — M50.20 OTHER CERVICAL DISC DISPLACEMENT, UNSPECIFIED CERVICAL REGION: Chronic | ICD-10-CM

## 2024-09-12 DIAGNOSIS — Z98.890 OTHER SPECIFIED POSTPROCEDURAL STATES: Chronic | ICD-10-CM

## 2024-09-12 LAB
ALBUMIN SERPL ELPH-MCNC: 4.2 G/DL — SIGNIFICANT CHANGE UP (ref 3.3–5)
ALP SERPL-CCNC: 79 U/L — SIGNIFICANT CHANGE UP (ref 40–120)
ALT FLD-CCNC: 22 U/L — SIGNIFICANT CHANGE UP (ref 4–41)
ANION GAP SERPL CALC-SCNC: 16 MMOL/L — HIGH (ref 7–14)
AST SERPL-CCNC: 19 U/L — SIGNIFICANT CHANGE UP (ref 4–40)
BASOPHILS # BLD AUTO: 0.05 K/UL — SIGNIFICANT CHANGE UP (ref 0–0.2)
BASOPHILS NFR BLD AUTO: 0.5 % — SIGNIFICANT CHANGE UP (ref 0–2)
BILIRUB SERPL-MCNC: 0.4 MG/DL — SIGNIFICANT CHANGE UP (ref 0.2–1.2)
BUN SERPL-MCNC: 22 MG/DL — SIGNIFICANT CHANGE UP (ref 7–23)
CALCIUM SERPL-MCNC: 9 MG/DL — SIGNIFICANT CHANGE UP (ref 8.4–10.5)
CHLORIDE SERPL-SCNC: 102 MMOL/L — SIGNIFICANT CHANGE UP (ref 98–107)
CO2 SERPL-SCNC: 24 MMOL/L — SIGNIFICANT CHANGE UP (ref 22–31)
CREAT SERPL-MCNC: 2 MG/DL — HIGH (ref 0.5–1.3)
EGFR: 36 ML/MIN/1.73M2 — LOW
EOSINOPHIL # BLD AUTO: 0.2 K/UL — SIGNIFICANT CHANGE UP (ref 0–0.5)
EOSINOPHIL NFR BLD AUTO: 2.2 % — SIGNIFICANT CHANGE UP (ref 0–6)
GLUCOSE SERPL-MCNC: 132 MG/DL — HIGH (ref 70–99)
HCT VFR BLD CALC: 39.8 % — SIGNIFICANT CHANGE UP (ref 39–50)
HGB BLD-MCNC: 12.9 G/DL — LOW (ref 13–17)
IANC: 5.92 K/UL — SIGNIFICANT CHANGE UP (ref 1.8–7.4)
IMM GRANULOCYTES NFR BLD AUTO: 0.5 % — SIGNIFICANT CHANGE UP (ref 0–0.9)
LYMPHOCYTES # BLD AUTO: 2.19 K/UL — SIGNIFICANT CHANGE UP (ref 1–3.3)
LYMPHOCYTES # BLD AUTO: 23.7 % — SIGNIFICANT CHANGE UP (ref 13–44)
MCHC RBC-ENTMCNC: 27.9 PG — SIGNIFICANT CHANGE UP (ref 27–34)
MCHC RBC-ENTMCNC: 32.4 GM/DL — SIGNIFICANT CHANGE UP (ref 32–36)
MCV RBC AUTO: 86 FL — SIGNIFICANT CHANGE UP (ref 80–100)
MONOCYTES # BLD AUTO: 0.83 K/UL — SIGNIFICANT CHANGE UP (ref 0–0.9)
MONOCYTES NFR BLD AUTO: 9 % — SIGNIFICANT CHANGE UP (ref 2–14)
NEUTROPHILS # BLD AUTO: 5.92 K/UL — SIGNIFICANT CHANGE UP (ref 1.8–7.4)
NEUTROPHILS NFR BLD AUTO: 64.1 % — SIGNIFICANT CHANGE UP (ref 43–77)
NRBC # BLD: 0 /100 WBCS — SIGNIFICANT CHANGE UP (ref 0–0)
NRBC # FLD: 0 K/UL — SIGNIFICANT CHANGE UP (ref 0–0)
NT-PROBNP SERPL-SCNC: 374 PG/ML — HIGH
PLATELET # BLD AUTO: 235 K/UL — SIGNIFICANT CHANGE UP (ref 150–400)
POTASSIUM SERPL-MCNC: 3.3 MMOL/L — LOW (ref 3.5–5.3)
POTASSIUM SERPL-SCNC: 3.3 MMOL/L — LOW (ref 3.5–5.3)
PROT SERPL-MCNC: 7.2 G/DL — SIGNIFICANT CHANGE UP (ref 6–8.3)
RBC # BLD: 4.63 M/UL — SIGNIFICANT CHANGE UP (ref 4.2–5.8)
RBC # FLD: 14.9 % — HIGH (ref 10.3–14.5)
SODIUM SERPL-SCNC: 142 MMOL/L — SIGNIFICANT CHANGE UP (ref 135–145)
TROPONIN T, HIGH SENSITIVITY RESULT: 51 NG/L — HIGH
WBC # BLD: 9.24 K/UL — SIGNIFICANT CHANGE UP (ref 3.8–10.5)
WBC # FLD AUTO: 9.24 K/UL — SIGNIFICANT CHANGE UP (ref 3.8–10.5)

## 2024-09-12 PROCEDURE — 99284 EMERGENCY DEPT VISIT MOD MDM: CPT

## 2024-09-12 PROCEDURE — 71045 X-RAY EXAM CHEST 1 VIEW: CPT | Mod: 26

## 2024-09-12 PROCEDURE — 93010 ELECTROCARDIOGRAM REPORT: CPT

## 2024-09-12 NOTE — ED ADULT TRIAGE NOTE - CHIEF COMPLAINT QUOTE
Patient presents from cardiology office for follow up and echo showed reduced ejection fraction. Patient was sent to ED for abnormal ekg, presents with LH#20G IV. Patient denies any CP or SOB, respirations equal and unlabored on room air. Fingerstick: 142 pmhx: HTN, DM2, CVA

## 2024-09-12 NOTE — ED PROVIDER NOTE - PATIENT PORTAL LINK FT
You can access the FollowMyHealth Patient Portal offered by [facility] by registering at the following website: [email or web address]. By joining [facility]’s FollowMyHealth portal, you will also be able to view your health information using other applications (apps) compatible with our system.

## 2024-09-12 NOTE — ED PROVIDER NOTE - PROGRESS NOTE DETAILS
Discussed with pt results of work up, strict return precautions, and need for follow up with cardiology and PCP.  Will repeat troponin and trend. If appropriate given result of repeat trop, pt will be DC home with close cardio f/up. Pt expressed understanding and agrees with plan.

## 2024-09-12 NOTE — ED PROVIDER NOTE - OBJECTIVE STATEMENT
68 y/o M with past medical history of hypertension, diabetes, gout BIBEMS from cardiologist with concerns for abnormal echo. Patient states that he was sent from cardiologist after being found to have low ejection fraction on echo, advised to come to ER immediately .  Patient states that he is not sure what the number was, does not have any record of EF. No known history of low ejection fraction. endorses recent headaches at night, no sx currently. No fevers, chills, SOB, cough, orthopnea, chest pain at rest or with exertion, abd pain, N/V/D, dysuria, increased swelling, weakness.

## 2024-09-12 NOTE — ED PROVIDER NOTE - PHYSICAL EXAMINATION
Gen: NAD  Head: normal appearing  HEENT: normal conjunctiva, oral mucosa moist  Lung: no respiratory distress, speaking in full sentences, CTA b/l     CV: regular rate and rhythm, no murmurs  Abd: soft, non distended, non tender, umbilical hernia noted, chronic and being followed per pt   MSK: no visible deformities  Neuro: No focal deficits, AAOx3  Skin: Warm, no edema  Psych: normal affect

## 2024-09-12 NOTE — ED PROVIDER NOTE - ATTENDING CONTRIBUTION TO CARE
I performed a face-to-face evaluation of the patient and performed a history and physical examination. I agree with the history and physical examination. If this was a PA visit, I personally saw the patient with the PA and performed a substantive portion of the visit including all aspects of the medical decision making.    Sent from Cards for low EF on echo. No Sx. Unclear why pt needed to come to the ED. Check trop, BNP, CXR. If unremarkable, then dc and f/u w/ Cards.

## 2024-09-12 NOTE — ED PROVIDER NOTE - NSFOLLOWUPINSTRUCTIONS_ED_ALL_ED_FT
Congestive Heart Failure (CHF)    Congestive heart failure is a chronic condition in which the heart has trouble pumping blood. In some cases of heart failure, fluid may back up into your lungs or you may have swelling (edema) in your lower legs. There are many causes of heart failure including high blood pressure, coronary artery disease, abnormal heart valves, heart muscle disease, lung disease, diabetes, etc. Symptoms include shortness of breath with activity or when lying flat, cough, swelling of the legs, fatigue, or increased urination during the night.     Treatment is aimed at managing the symptoms of heart failure and may include lifestyle changes, medications, or surgical procedures. Take medicines only as directed by your health care provider and do not stop unless instructed to do so. Eat heart-healthy foods with low or no trans/saturated fats, cholesterol and salt. Weigh yourself every day for early recognition of fluid accumulation. Please follow up with your cardiologist as soon as possible for further evaluation and to determine the best course of treatment.     SEEK IMMEDIATE MEDICAL CARE IF YOU HAVE ANY OF THE FOLLOWING SYMPTOMS: shortness of breath, change in mental status, chest pain, lightheadedness/dizziness/fainting, or worsening of symptoms including not being able to conduct normal physical activity.

## 2024-09-12 NOTE — ED ADULT NURSE NOTE - OBJECTIVE STATEMENT
Pt is A&O x 4, ambulatory w/o assistance, shows no signs of acute distress. Sent to the ED from his MD office for decreased ejection fraction and abnormal ECG. Pt states he initially visited the ED for intermittent headaches. Currently well-appearing. Denies gi/gu discomfort, fevers/chills, dizziness/vision changes, diaphoresis, SOB or chest discomfort. VSS upon arrival. Respirations even and unlabored. Placed on continuous telemetry, NSR w/ 1st degree AVF block. Arrives with a left hand 20 gauge IV line. Pending lab collection and CXR.

## 2024-09-12 NOTE — ED PROVIDER NOTE - CLINICAL SUMMARY MEDICAL DECISION MAKING FREE TEXT BOX
68 y/o M with past medical history of hypertension, diabetes, gout BIBEMS from cardiologist with concerns for reduced EF on echo. No known history of low ejection fraction. endorses recent headaches at night, no sx currently. Exam noncontributory, lungs clear, no pitting edema. The differential diagnosis includes but is not limited to HF vs ACS. Will order CBC, CMP, trops/EKG, proBNP, CXR. Likely CDU for echo vs admit. 68 y/o M with past medical history of hypertension, diabetes, gout BIBEMS from cardiologist with concerns for reduced EF on echo. No known history of low ejection fraction. endorses recent headaches at night, no sx currently. Exam noncontributory, lungs clear, no pitting edema. The differential diagnosis includes but is not limited to HF vs ACS. Will order CBC, CMP, trops/EKG, proBNP, CXR. Likely DC with close f/up vs CDU

## 2024-09-13 VITALS
TEMPERATURE: 98 F | SYSTOLIC BLOOD PRESSURE: 115 MMHG | RESPIRATION RATE: 19 BRPM | HEART RATE: 78 BPM | DIASTOLIC BLOOD PRESSURE: 85 MMHG | OXYGEN SATURATION: 96 %

## 2024-09-13 LAB — TROPONIN T, HIGH SENSITIVITY RESULT: 51 NG/L — HIGH

## 2024-10-30 ENCOUNTER — INPATIENT (INPATIENT)
Facility: HOSPITAL | Age: 69
LOS: 3 days | Discharge: HOME HEALTH | End: 2024-11-03
Attending: HOSPITALIST | Admitting: HOSPITALIST
Payer: MEDICARE

## 2024-10-30 VITALS
TEMPERATURE: 98 F | RESPIRATION RATE: 18 BRPM | HEIGHT: 76 IN | SYSTOLIC BLOOD PRESSURE: 124 MMHG | DIASTOLIC BLOOD PRESSURE: 87 MMHG | WEIGHT: 220.02 LBS | HEART RATE: 102 BPM | OXYGEN SATURATION: 99 %

## 2024-10-30 DIAGNOSIS — M50.20 OTHER CERVICAL DISC DISPLACEMENT, UNSPECIFIED CERVICAL REGION: Chronic | ICD-10-CM

## 2024-10-30 DIAGNOSIS — Z98.890 OTHER SPECIFIED POSTPROCEDURAL STATES: Chronic | ICD-10-CM

## 2024-10-30 LAB
ALBUMIN SERPL ELPH-MCNC: 4.3 G/DL — SIGNIFICANT CHANGE UP (ref 3.3–5)
ALP SERPL-CCNC: 85 U/L — SIGNIFICANT CHANGE UP (ref 40–120)
ALT FLD-CCNC: 25 U/L — SIGNIFICANT CHANGE UP (ref 4–41)
ANION GAP SERPL CALC-SCNC: 10 MMOL/L — SIGNIFICANT CHANGE UP (ref 7–14)
APPEARANCE UR: CLEAR — SIGNIFICANT CHANGE UP
AST SERPL-CCNC: 22 U/L — SIGNIFICANT CHANGE UP (ref 4–40)
BACTERIA # UR AUTO: NEGATIVE /HPF — SIGNIFICANT CHANGE UP
BASOPHILS # BLD AUTO: 0.04 K/UL — SIGNIFICANT CHANGE UP (ref 0–0.2)
BASOPHILS NFR BLD AUTO: 0.5 % — SIGNIFICANT CHANGE UP (ref 0–2)
BILIRUB SERPL-MCNC: 0.4 MG/DL — SIGNIFICANT CHANGE UP (ref 0.2–1.2)
BILIRUB UR-MCNC: NEGATIVE — SIGNIFICANT CHANGE UP
BLOOD GAS VENOUS COMPREHENSIVE RESULT: SIGNIFICANT CHANGE UP
BUN SERPL-MCNC: 24 MG/DL — HIGH (ref 7–23)
CALCIUM SERPL-MCNC: 9.4 MG/DL — SIGNIFICANT CHANGE UP (ref 8.4–10.5)
CAST: 1 /LPF — SIGNIFICANT CHANGE UP (ref 0–4)
CHLORIDE SERPL-SCNC: 107 MMOL/L — SIGNIFICANT CHANGE UP (ref 98–107)
CO2 SERPL-SCNC: 25 MMOL/L — SIGNIFICANT CHANGE UP (ref 22–31)
COLOR SPEC: YELLOW — SIGNIFICANT CHANGE UP
CREAT SERPL-MCNC: 1.69 MG/DL — HIGH (ref 0.5–1.3)
DIFF PNL FLD: NEGATIVE — SIGNIFICANT CHANGE UP
EGFR: 44 ML/MIN/1.73M2 — LOW
EOSINOPHIL # BLD AUTO: 0.24 K/UL — SIGNIFICANT CHANGE UP (ref 0–0.5)
EOSINOPHIL NFR BLD AUTO: 3 % — SIGNIFICANT CHANGE UP (ref 0–6)
GLUCOSE SERPL-MCNC: 114 MG/DL — HIGH (ref 70–99)
GLUCOSE UR QL: >=1000 MG/DL
HCT VFR BLD CALC: 44.8 % — SIGNIFICANT CHANGE UP (ref 39–50)
HGB BLD-MCNC: 14.6 G/DL — SIGNIFICANT CHANGE UP (ref 13–17)
IANC: 5.17 K/UL — SIGNIFICANT CHANGE UP (ref 1.8–7.4)
IMM GRANULOCYTES NFR BLD AUTO: 0.3 % — SIGNIFICANT CHANGE UP (ref 0–0.9)
KETONES UR-MCNC: NEGATIVE MG/DL — SIGNIFICANT CHANGE UP
LEUKOCYTE ESTERASE UR-ACNC: NEGATIVE — SIGNIFICANT CHANGE UP
LYMPHOCYTES # BLD AUTO: 1.77 K/UL — SIGNIFICANT CHANGE UP (ref 1–3.3)
LYMPHOCYTES # BLD AUTO: 22.3 % — SIGNIFICANT CHANGE UP (ref 13–44)
MCHC RBC-ENTMCNC: 29.1 PG — SIGNIFICANT CHANGE UP (ref 27–34)
MCHC RBC-ENTMCNC: 32.6 G/DL — SIGNIFICANT CHANGE UP (ref 32–36)
MCV RBC AUTO: 89.4 FL — SIGNIFICANT CHANGE UP (ref 80–100)
MONOCYTES # BLD AUTO: 0.68 K/UL — SIGNIFICANT CHANGE UP (ref 0–0.9)
MONOCYTES NFR BLD AUTO: 8.6 % — SIGNIFICANT CHANGE UP (ref 2–14)
NEUTROPHILS # BLD AUTO: 5.17 K/UL — SIGNIFICANT CHANGE UP (ref 1.8–7.4)
NEUTROPHILS NFR BLD AUTO: 65.3 % — SIGNIFICANT CHANGE UP (ref 43–77)
NITRITE UR-MCNC: NEGATIVE — SIGNIFICANT CHANGE UP
NRBC # BLD: 0 /100 WBCS — SIGNIFICANT CHANGE UP (ref 0–0)
NRBC # FLD: 0 K/UL — SIGNIFICANT CHANGE UP (ref 0–0)
PH UR: 5.5 — SIGNIFICANT CHANGE UP (ref 5–8)
PLATELET # BLD AUTO: 226 K/UL — SIGNIFICANT CHANGE UP (ref 150–400)
POTASSIUM SERPL-MCNC: 4.1 MMOL/L — SIGNIFICANT CHANGE UP (ref 3.5–5.3)
POTASSIUM SERPL-SCNC: 4.1 MMOL/L — SIGNIFICANT CHANGE UP (ref 3.5–5.3)
PROT SERPL-MCNC: 7.6 G/DL — SIGNIFICANT CHANGE UP (ref 6–8.3)
PROT UR-MCNC: 100 MG/DL
RBC # BLD: 5.01 M/UL — SIGNIFICANT CHANGE UP (ref 4.2–5.8)
RBC # FLD: 13.7 % — SIGNIFICANT CHANGE UP (ref 10.3–14.5)
RBC CASTS # UR COMP ASSIST: 0 /HPF — SIGNIFICANT CHANGE UP (ref 0–4)
SODIUM SERPL-SCNC: 142 MMOL/L — SIGNIFICANT CHANGE UP (ref 135–145)
SP GR SPEC: 1.04 — HIGH (ref 1–1.03)
SQUAMOUS # UR AUTO: 0 /HPF — SIGNIFICANT CHANGE UP (ref 0–5)
TROPONIN T, HIGH SENSITIVITY RESULT: 47 NG/L — SIGNIFICANT CHANGE UP
UROBILINOGEN FLD QL: 0.2 MG/DL — SIGNIFICANT CHANGE UP (ref 0.2–1)
WBC # BLD: 7.92 K/UL — SIGNIFICANT CHANGE UP (ref 3.8–10.5)
WBC # FLD AUTO: 7.92 K/UL — SIGNIFICANT CHANGE UP (ref 3.8–10.5)
WBC UR QL: 0 /HPF — SIGNIFICANT CHANGE UP (ref 0–5)

## 2024-10-30 PROCEDURE — 99285 EMERGENCY DEPT VISIT HI MDM: CPT

## 2024-10-30 PROCEDURE — 70450 CT HEAD/BRAIN W/O DYE: CPT | Mod: 26,MC

## 2024-10-30 PROCEDURE — 71046 X-RAY EXAM CHEST 2 VIEWS: CPT | Mod: 26

## 2024-10-30 NOTE — ED ADULT NURSE NOTE - NSICDXPASTSURGICALHX_GEN_ALL_CORE_FT
yes
PAST SURGICAL HISTORY:  Cervical disc herniation     History of arthroscopy of right shoulder     No significant past surgical history

## 2024-10-30 NOTE — ED ADULT NURSE NOTE - BEFAST SPEECH SLURRED
[facility] ([facility]) Hospitalist Daily Progress Note    Patient Name: Linda Schulte   Date: 11/24/22  Hospital Day: Hospital Day: 3    Subjective and Overnight Events:  No acute overnight events. Patient seen and examined.   Patient reports she is still feeling weak. Denies any SOB or chest pain. Diarrhea has resolved. No new complaints. Denies back pain today.    12 point ROS reviewed and negative except as stated above.     Past medical history, surgical history, family history, social history reviewed.     Inpatient Medications:  Current Facility-Administered Medications   Medication Dose Route Frequency Provider Last Rate Last Admin   • B complex-vitamin C-folic acid (NEPHRO-CARRINGTON) tablet 0.8 mg  1 tablet Oral Daily Cony Stout APNP   0.8 mg at 11/24/22 0920   • Magnesium Standard Replacement Protocol   Does not apply See Admin Instructions Billy Charles DO       • calcitRIOL (ROCALTROL) capsule 0.25 mcg  0.25 mcg Oral Daily Cony Stout APNP   0.25 mcg at 11/24/22 0920   • magnesium oxide (MAG-OX) tablet 400 mg  400 mg Oral Daily Billy Charles DO       • ALPRAZolam (XANAX) tablet 1 mg  1 mg Oral Nightly Carmen Leroy MD   1 mg at 11/23/22 2109   • ezetimibe (ZETIA) tablet 10 mg  10 mg Oral Daily Carmen Leroy MD   10 mg at 11/24/22 0920   • hydrALAZINE (APRESOLINE) tablet 50 mg  50 mg Oral 3 times per day Carmen Leroy MD   50 mg at 11/24/22 1340   • hydroxychloroquine (PLAQUENIL) tablet 200 mg  200 mg Oral Daily Carmen Leroy MD   200 mg at 11/24/22 0920   • levothyroxine (SYNTHROID, LEVOTHROID) tablet 175 mcg  175 mcg Oral QAM AC Carmen Leroy MD   175 mcg at 11/24/22 0933   • metoPROLOL succinate (TOPROL-XL) ER tablet 50 mg  50 mg Oral Daily Carmen Leroy MD   50 mg at 11/24/22 0920   • potassium CHLORIDE (KLOR-CON M) francisco ER tablet 20 mEq  20 mEq Oral Daily with breakfast Leanne Gaviria MD   20 mEq at 11/24/22 0920   • mycophenolate (MYFORTIC) DR tablet 360 mg  360 mg Oral BID Carmen  MD Rad   360 mg at 11/24/22 0920   • TACROlimus (PROGRAF) capsule 5 mg  5 mg Oral BID Carmen Leroy MD   5 mg at 11/24/22 0920   • famotidine (PEPCID) tablet 40 mg  40 mg Oral BID Carmen Leroy MD   40 mg at 11/24/22 0920      Current Facility-Administered Medications   Medication Dose Route Frequency Provider Last Rate Last Admin   • sodium chloride 0.9 % flush bag 25 mL  25 mL Intravenous PRN Billy Charles DO       • acetaminophen (TYLENOL) tablet 650 mg  650 mg Oral Q6H PRN Keri Salvador MD   650 mg at 11/24/22 0540   • ondansetron (ZOFRAN) injection 4 mg  4 mg Intravenous Q6H PRN Keri Salvador MD   4 mg at 11/24/22 1042   • dextrose 50 % injection 25 g  25 g Intravenous PRN Keri Salvador MD       • dextrose 50 % injection 12.5 g  12.5 g Intravenous PRN Keri Salvador MD       • glucagon (GLUCAGEN) injection 1 mg  1 mg Intramuscular PRN Keri Salvador MD       • dextrose (GLUTOSE) 40 % gel 15 g  15 g Oral PRN Keri Salvador MD       • dextrose (GLUTOSE) 40 % gel 30 g  30 g Oral PRN Keri Salvador MD       • albuterol inhaler 2 puff  2 puff Inhalation Q4H PRN Carmen Leroy MD   2 puff at 11/23/22 1458   • meclizine (ANTIVERT) tablet 25 mg  25 mg Oral TID PRN Carmen Leroy MD   25 mg at 11/24/22 0934   • HYDROcodone-acetaminophen (NORCO) 5-325 MG per tablet 1 tablet  1 tablet Oral Q8H PRN Leanne Gaviria MD   1 tablet at 11/24/22 0029            Physical Exam:  Vital Signs:    Vital Last Value 24 Hour Range   Temperature 99.3 °F (37.4 °C) (11/24/22 1227) Temp  Min: 98.2 °F (36.8 °C)  Max: 99.7 °F (37.6 °C)   Pulse 75 (11/24/22 1227) Pulse  Min: 75  Max: 82   Respiratory 18 (11/24/22 1227) Resp  Min: 12  Max: 18   Non-Invasive  Blood Pressure (!) 159/97 (11/24/22 1227) BP  Min: 151/94  Max: 171/92   Pulse Oximetry 92 % (11/24/22 1227) SpO2  Min: 91 %  Max: 94 %     Vital Today Admitted   Weight 128.5 kg (283 lb 4.7 oz) (11/24/22 1000) Weight: 129.3 kg (285 lb) (11/22/22 0033)    Height N/A Height: 6' 3\" (190.5 cm) (11/24/22 1000)   Body Mass Index N/A BMI (Calculated): 35.41 (11/24/22 1000)     Intake/Output:    No intake or output data in the 24 hours ending 11/24/22 1400     Physical Exam:  Constitutional:       General: She is not in acute distress.  HENT:      Right Ear: External ear normal.      Left Ear: External ear normal.      Nose: Nose normal.   Eyes:      General: No scleral icterus.     Pupils: Pupils are equal, round, and reactive to light.   Cardiovascular:      Rate and Rhythm: Normal rate and regular rhythm.      Heart sounds: No murmur heard.  Pulmonary:      Effort: Pulmonary effort is normal. No respiratory distress.      Breath sounds: Normal breath sounds.   Abdominal:      General: There is no distension.      Palpations: Abdomen is soft.      Tenderness: There is no abdominal tenderness. There is no guarding.   Musculoskeletal:         General: No swelling.      Cervical back: Neck supple. No rigidity.      Right lower leg: No edema.      Left lower leg: No edema.   Skin:     General: Skin is warm.      Coloration: Skin is not pale.      Findings: No erythema or rash.   Neurological:      General: No focal deficit present.      Mental Status: She is alert and oriented to person, place, and time.   Psychiatric:         Mood and Affect: Mood normal.         Behavior: Behavior normal.         Thought Content: Thought content normal.       Labs:  Recent Labs     11/22/22  0057 11/23/22  0710   WBC 2.8* 2.7*   RBC 6.51* 5.54*   HGB 14.3 12.3   HCT 50.3* 42.7    172   MCV 77.3* 77.1*   MCH 22.0* 22.2*   MCHC 28.4* 28.8*   NRBCRE 0 0         Recent Labs     11/22/22  0057 11/23/22  0710 11/23/22  0751 11/24/22  0638   SODIUM 142 141 141 140   POTASSIUM 3.4 3.5 3.4 3.6   MG  --  1.2*  --  1.9   PHOS  --   --  3.3 3.1   CO2 26 25 24 24   ANIONGAP 13 13 14 15   GLUCOSE 60* 128* 116* 153*   BUN 15 16 16 13   CREATININE 1.64* 1.79* 1.71* 1.58*   BCRAT 9 9 9 8   CALCIUM  8.8 8.1* 7.8* 8.5   BILIRUBIN 0.6 0.6  --   --    AST 42* 34  --   --    GPT 52 32  --   --    ALKPT 50 39*  --   --    GLOB 4.3* 3.7  --   --    AGR 0.7* 0.6*  --   --         No results found     No results for input(s): INR, PT, PTT in the last 72 hours.      Imaging:  CT ABDOMEN PELVIS WO CONTRAST  Narrative: PROCEDURE:  CT ABDOMEN PELVIS WO CONTRAST    CLINICAL INDICATION: LUQ abdominal pain.    TECHNIQUE: Multidetector noncontrast enhanced axial CT of the abdomen and  pelvis was obtained along with coronal and sagittal reformats.  Adjustment  of the mA and/or kV was done based on the patient's size.    CONTRAST: None.    COMPARISON:  None    FINDINGS:  The liver contains multiple hepatic cysts.  Both kidneys are markedly  enlarged containing numerous renal cysts.  Right lower quadrant transplant  kidney appears unremarkable.  The gallbladder is decompressed containing  several tiny gallstones.  The spleen pancreas, and adrenal glands are  unremarkable.    The small and large bowel demonstrates no abnormal wall thickening or  distention.  No pericolonic stranding.  No appendicitis.  No ascites or  free air.  No lymphadenopathy.    The bladder is unremarkable.  IUD is displaced inferiorly at the lower  uterine segment/cervix.  No free fluid or pelvic adenopathy.    No abdominal aortic aneurysm.    Large bridging disc osteophyte complexes, most pronounced at T11-T12,  T12-L1, and L4-L5.  This moderately narrows the spinal canal at T12-L1 and  severely narrows the spinal canal at L4-L5.    Trace right pleural effusion with mild atelectasis at the right lung base.   Few scattered patchy groundglass opacities at the lung bases.    Impression: 1. Inferiorly displaced IUD at the lower uterine segment/cervix.  2. Few scattered patchy groundglass opacities at the lung base raise  suspicion for infection.  3. Enlarged kidneys containing numerous cysts compatible with polycystic  kidney disease.  4. Right lower quadrant  renal transplant appears unremarkable.  5. Multiple hepatic cysts.  6. Several large bridging disc osteophytes, most pronounced at L4-L5  severely narrows the spinal canal.    The findings concur with the preliminary report provided by [facility].       Electronically Signed by: LILY SKINNER M.D.   Signed on: 11/22/2022 8:49 AM   XR CHEST PA OR AP 1 VIEW  Narrative: EXAM: XR CHEST PA OR AP 1 VIEW    CLINICAL INDICATION: Chest pain    TECHNIQUE: Single AP view of the chest    COMPARISON: Prior dated 01/03/2013    FINDINGS: The right hemidiaphragm is elevated, similar. No focal  consolidation is seen. There is no pneumothorax or pleural effusion.  Crowding of the hilar vasculature and prominence of interstitium is noted,  more so than on the prior. The cardiac silhouette is prominent in size.  Descending thoracic aorta is well delineated. The chest wall is intact.  Surgical clips are noted in the right neck base.  Impression:  Mild interstitial prominence which may be due to pulmonary edema or an  atypical infectious process. Stable elevated right hemidiaphragm.    Electronically Signed by: TIANA UMANZOR MD   Signed on: 11/22/2022 7:06 AM          Cultures:    Microbiology Results     None            Assessment and Plan:  * Acute renal injury (CMS/HCC)  Assessment & Plan  Likely prerenal from poor p.o. intake, diarrhea, vomiting, now improving  Nephrology consulted, appreciate recommendations  Hold home diuretics  IV fluids  Monitor creatinine     Weakness  Assessment & Plan  Likely secondary to COVID  Cleared by PT OT     Diarrhea  Assessment & Plan  Ongoing for 3 weeks, now likely 2/2 covid  CMV PCR negative. Unable to give sample for GIPP and C diff, diarrhea now resolved     Spine osteophytes with spinal narrowing  Assessment & Plan  No acute symptoms, no incontinence, no new leg weakness or numbness or tingling  Appears to have been more of a generalized weakness that she fell from  Outpatient follow-up  with neurosurgery     COVID-19 virus infection  Assessment & Plan  Symptomatic but not hypoxic  Hold off on steroids  Would hold off on remdesivir given ARLET     Anxiety disorder  Assessment & Plan  Continue Xanax as needed     Essential hypertension  Assessment & Plan  Continue home medications     History of kidney transplant  Assessment & Plan  Continue tacrolimus, Plaquenil and Myfortic for now  Patient does have COVID but is not hypoxic, does have an ARLET, will defer tacrolimus level to nephrology  Message out to nephrology regarding immunosuppressants     Mixed hyperlipidemia  Assessment & Plan  Continue Zetia     Postoperative hypothyroidism  Assessment & Plan  Continue home levothyroxine     Type 2 diabetes mellitus with complication, with long-term current use of insulin (CMS/[facility])  Assessment & Plan  Continue insulin pump    Chronic Disease:  Past Medical History:   Diagnosis Date   • Anxiety    • Diabetes mellitus (CMS/[facility])    • End stage renal disease (CMS/[facility])     Kidney Transplant   • Exogenous obesity    • Hyperlipidemia    • Hypertension    • EDMUND (obstructive sleep apnea)    • Polycystic kidney disease          FEN: Replete electrolytes as needed to maintain Mg 2, Phos 3, K 4 per protocol. Regular Diet  3 Times/day W Meals; Nepro With Carbsteady/renal Supplement Oral Nutrition Supplement   DVT Proph: SCD's, Encourage Ambulation with assistance,    Code Status:   Code Status: Full Resuscitation   Primary Care Provider: Leanne Gaviria MD  Dispo: Inpatient, discharge pending nephrology clearance, hopefully tomorrow      Greater than 35 minutes were spent in the independent review of imaging studies, laboratory results, interacting with patient, discussing patient with other providers and updating the patient and the other members of the care team.     Carmen Leroy MD           No

## 2024-10-30 NOTE — ED ADULT NURSE NOTE - NSFALLUNIVINTERV_ED_ALL_ED
Bed/Stretcher in lowest position, wheels locked, appropriate side rails in place/Call bell, personal items and telephone in reach/Instruct patient to call for assistance before getting out of bed/chair/stretcher/Non-slip footwear applied when patient is off stretcher/[place] to call system/Physically safe environment - no spills, clutter or unnecessary equipment/Purposeful proactive rounding/Room/bathroom lighting operational, light cord in reach

## 2024-10-30 NOTE — ED PROVIDER NOTE - PHYSICAL EXAMINATION
Vitals signed reviewed  General: Well appearing, NAD, AAOx3 (Name, Place, situation)   HEENT: NC/AT, PERRLA, EOM intact with no pain, MMM  Neck: full ROM, no trachea deviation  Heart: RRR, normal s1/s2  Lungs: CTAB, normal WOB, no wheezing, rales, or rhonchi  Abdomen: Soft, non-distended, non-tender, no CVA tenderness , rebounding or guarding  MSK:    normal gait, full ROM with no tenderness, no swelling, redness, instability  Neuro: CN II-XII intact.    mild right sided tongue deviation   No sensory/motor deficits   No nystagmus noted on exam

## 2024-10-30 NOTE — ED PROVIDER NOTE - PROGRESS NOTE DETAILS
Davin Gifford, ED attending: Labs, CT head unremarkable.  UA reveals hyperglycemia but no evidence of infection.  Chest x-ray negative.  Patient denies current headache, no recent fevers, no nuchal rigidity.  I have low suspicion for meningitis or encephalitis.  Regardless, I had an extensive conversation with the patient's family regarding possibility of lumbar puncture in order to evaluate for intracranial infection, which was last possible source of acute altered mental status that we had not evaluated, even though I believe the likelihood of meningitis or encephalitis are low.  Per shared decision making with the family, they would prefer to have neurology consult see the patient and weigh in prior to consenting to lumbar puncture, as they might be able to offer other differential diagnoses that may explain this gentlemen's acute change in mental status.  We will consult neurology at this time.  Will defer LP.  I will not order antivirals or antibiotics to treat meningitis or encephalitis given my low concern for these 2 pathologies at this time. Patient seen resting comfortably in no acute distress.  Patient seen ambulating around easily.  Neuroconsulted and advises VBG, full RVP, pending final recs.  Patient admitted to hospital for neuro final recs. Patient seen resting comfortably in no acute distress.  Patient seen ambulating around easily.  Neuro consulted and advises VBG, full RVP, pending final recs.  Patient admitted to hospital for neuro final recs. Davin Gifford, ED attending: Labs, CT head unremarkable.  UA reveals hyperglycemia but no evidence of infection.  Chest x-ray negative.  Patient denies current headache, no recent fevers, no nuchal rigidity on my exam.  I have low suspicion for meningitis or encephalitis.  Regardless, I had an extensive conversation with the patient's family regarding possibility of lumbar puncture in order to evaluate for intracranial infection, which was last possible source of acute altered mental status that we had not evaluated, even though I believe the likelihood of meningitis or encephalitis are low.  Per shared decision making with the family, they would prefer to have neurology consult see the patient and weigh in prior to consenting to lumbar puncture, as they might be able to offer other differential diagnoses that may explain this gentlemen's acute change in mental status.  We will consult neurology at this time.  Will defer LP at this time.  I will not order antivirals or antibiotics to treat meningitis or encephalitis given my low concern for these pathologies at this time.

## 2024-10-30 NOTE — ED ADULT TRIAGE NOTE - CHIEF COMPLAINT QUOTE
pt family reports increased confusion over past 3 days. pt c/o frequent urination. denies dysuria/fever/chills.

## 2024-10-30 NOTE — ED PROVIDER NOTE - OBJECTIVE STATEMENT
69-year-old male past medical history of hypertension, diabetes, presents to the ED complaining of increased confusion x 3 days.  Patient  presents with family at bedside who states patient has had increased confusion in which she is not aware of the date.  Patient endorses increased urination associated with symptoms but denies chest pain, shortness of breath, fevers, chills, nausea, vomiting, diarrhea, shortness of breath or any other concerning symptoms at this time.

## 2024-10-30 NOTE — ED ADULT NURSE NOTE - OBJECTIVE STATEMENT
pt present to ED for new onset confusion - per family at bedside pt has had increasing confusion and forgetfulness.   Currently pt is AOx4 - ambulatory.

## 2024-10-30 NOTE — ED PROVIDER NOTE - CLINICAL SUMMARY MEDICAL DECISION MAKING FREE TEXT BOX
69-year-old male past medical history of hypertension, diabetes, presents to the ED complaining of increased confusion x 3 days.  Patient  presents with family at bedside who states patient has had increased confusion in which she is not aware of the date.  Patient endorses increased urination associated with symptoms. Physical exam as above,    Impression: will r/o UTI vs head trauma    plan;    labs, UA/UC, CT head   dispo pending results

## 2024-10-30 NOTE — ED PROVIDER NOTE - ATTENDING APP SHARED VISIT CONTRIBUTION OF CARE
I have discussed the patient's case presentation with the PA. I have also personally performed a face-to-face evaluation of the patient. I agree with the PA's assessment and plan.    See my progress note

## 2024-10-31 DIAGNOSIS — M10.9 GOUT, UNSPECIFIED: ICD-10-CM

## 2024-10-31 DIAGNOSIS — Z29.9 ENCOUNTER FOR PROPHYLACTIC MEASURES, UNSPECIFIED: ICD-10-CM

## 2024-10-31 DIAGNOSIS — G93.40 ENCEPHALOPATHY, UNSPECIFIED: ICD-10-CM

## 2024-10-31 DIAGNOSIS — I50.22 CHRONIC SYSTOLIC (CONGESTIVE) HEART FAILURE: ICD-10-CM

## 2024-10-31 DIAGNOSIS — Z86.73 PERSONAL HISTORY OF TRANSIENT ISCHEMIC ATTACK (TIA), AND CEREBRAL INFARCTION WITHOUT RESIDUAL DEFICITS: ICD-10-CM

## 2024-10-31 DIAGNOSIS — I10 ESSENTIAL (PRIMARY) HYPERTENSION: ICD-10-CM

## 2024-10-31 DIAGNOSIS — R41.0 DISORIENTATION, UNSPECIFIED: ICD-10-CM

## 2024-10-31 DIAGNOSIS — N18.9 CHRONIC KIDNEY DISEASE, UNSPECIFIED: ICD-10-CM

## 2024-10-31 DIAGNOSIS — E11.9 TYPE 2 DIABETES MELLITUS WITHOUT COMPLICATIONS: ICD-10-CM

## 2024-10-31 LAB
A1C WITH ESTIMATED AVERAGE GLUCOSE RESULT: 9.7 % — HIGH (ref 4–5.6)
ESTIMATED AVERAGE GLUCOSE: 232 — SIGNIFICANT CHANGE UP
FLUAV AG NPH QL: SIGNIFICANT CHANGE UP
FLUBV AG NPH QL: SIGNIFICANT CHANGE UP
GLUCOSE BLDC GLUCOMTR-MCNC: 156 MG/DL — HIGH (ref 70–99)
RSV RNA NPH QL NAA+NON-PROBE: SIGNIFICANT CHANGE UP
SARS-COV-2 RNA SPEC QL NAA+PROBE: SIGNIFICANT CHANGE UP
T PALLIDUM AB TITR SER: NEGATIVE — SIGNIFICANT CHANGE UP
TROPONIN T, HIGH SENSITIVITY RESULT: 43 NG/L — SIGNIFICANT CHANGE UP
TSH SERPL-MCNC: 1.46 UIU/ML — SIGNIFICANT CHANGE UP (ref 0.27–4.2)
VIT B12 SERPL-MCNC: 289 PG/ML — SIGNIFICANT CHANGE UP (ref 200–900)

## 2024-10-31 PROCEDURE — 99222 1ST HOSP IP/OBS MODERATE 55: CPT

## 2024-10-31 PROCEDURE — 99223 1ST HOSP IP/OBS HIGH 75: CPT | Mod: GC

## 2024-10-31 PROCEDURE — 70551 MRI BRAIN STEM W/O DYE: CPT | Mod: 26

## 2024-10-31 RX ORDER — CARVEDILOL 25 MG/1
12.5 TABLET, FILM COATED ORAL EVERY 12 HOURS
Refills: 0 | Status: DISCONTINUED | OUTPATIENT
Start: 2024-10-31 | End: 2024-11-03

## 2024-10-31 RX ORDER — OMEPRAZOLE 10 MG
1 CAPSULE,DELAYED RELEASE (ENTERIC COATED) ORAL
Refills: 0 | DISCHARGE

## 2024-10-31 RX ORDER — THIAMINE HCL 100 MG
500 TABLET ORAL EVERY 8 HOURS
Refills: 0 | Status: DISCONTINUED | OUTPATIENT
Start: 2024-10-31 | End: 2024-11-03

## 2024-10-31 RX ORDER — ALLOPURINOL 100 MG
200 TABLET ORAL DAILY
Refills: 0 | Status: DISCONTINUED | OUTPATIENT
Start: 2024-10-31 | End: 2024-11-03

## 2024-10-31 RX ORDER — THIAMINE HCL 100 MG
300 TABLET ORAL ONCE
Refills: 0 | Status: DISCONTINUED | OUTPATIENT
Start: 2024-10-31 | End: 2024-10-31

## 2024-10-31 RX ORDER — INSULIN LISPRO 100/ML
VIAL (ML) SUBCUTANEOUS AT BEDTIME
Refills: 0 | Status: DISCONTINUED | OUTPATIENT
Start: 2024-10-31 | End: 2024-11-03

## 2024-10-31 RX ORDER — INSULIN GLARGINE,HUM.REC.ANLOG 100/ML
10 VIAL (ML) SUBCUTANEOUS EVERY MORNING
Refills: 0 | Status: DISCONTINUED | OUTPATIENT
Start: 2024-10-31 | End: 2024-10-31

## 2024-10-31 RX ORDER — HEPARIN SODIUM 10000 [USP'U]/ML
5000 INJECTION INTRAVENOUS; SUBCUTANEOUS EVERY 8 HOURS
Refills: 0 | Status: DISCONTINUED | OUTPATIENT
Start: 2024-10-31 | End: 2024-10-31

## 2024-10-31 RX ORDER — LOSARTAN POTASSIUM 25 MG/1
1 TABLET ORAL
Refills: 0 | DISCHARGE

## 2024-10-31 RX ORDER — THIAMINE HCL 100 MG
500 TABLET ORAL ONCE
Refills: 0 | Status: COMPLETED | OUTPATIENT
Start: 2024-10-31 | End: 2024-10-31

## 2024-10-31 RX ORDER — ASPIRIN/MAG CARB/ALUMINUM AMIN 325 MG
81 TABLET ORAL DAILY
Refills: 0 | Status: DISCONTINUED | OUTPATIENT
Start: 2024-10-31 | End: 2024-11-03

## 2024-10-31 RX ORDER — TAMSULOSIN HCL 0.4 MG
1 CAPSULE ORAL
Refills: 0 | DISCHARGE

## 2024-10-31 RX ORDER — APIXABAN 5 MG/1
5 TABLET, FILM COATED ORAL
Refills: 0 | Status: DISCONTINUED | OUTPATIENT
Start: 2024-10-31 | End: 2024-11-03

## 2024-10-31 RX ORDER — GLUCAGON INJECTION, SOLUTION 1 MG/.2ML
1 INJECTION, SOLUTION SUBCUTANEOUS ONCE
Refills: 0 | Status: DISCONTINUED | OUTPATIENT
Start: 2024-10-31 | End: 2024-11-03

## 2024-10-31 RX ORDER — GABAPENTIN 300 MG/1
300 CAPSULE ORAL
Refills: 0 | Status: DISCONTINUED | OUTPATIENT
Start: 2024-10-31 | End: 2024-11-03

## 2024-10-31 RX ORDER — INSULIN LISPRO 100/ML
VIAL (ML) SUBCUTANEOUS
Refills: 0 | Status: DISCONTINUED | OUTPATIENT
Start: 2024-10-31 | End: 2024-11-03

## 2024-10-31 RX ORDER — INFLUENZ VIR VAC TV P-SURF2003 15MCG/.5ML
0.5 SYRINGE (ML) INTRAMUSCULAR ONCE
Refills: 0 | Status: DISCONTINUED | OUTPATIENT
Start: 2024-10-31 | End: 2024-11-03

## 2024-10-31 RX ORDER — LOSARTAN POTASSIUM 25 MG/1
25 TABLET ORAL DAILY
Refills: 0 | Status: DISCONTINUED | OUTPATIENT
Start: 2024-10-31 | End: 2024-11-03

## 2024-10-31 RX ORDER — INSULIN GLARGINE,HUM.REC.ANLOG 100/ML
16 VIAL (ML) SUBCUTANEOUS EVERY MORNING
Refills: 0 | Status: DISCONTINUED | OUTPATIENT
Start: 2024-10-31 | End: 2024-11-03

## 2024-10-31 RX ORDER — SEMAGLUTIDE 1.34 MG/ML
0.5 INJECTION, SOLUTION SUBCUTANEOUS
Refills: 0 | DISCHARGE

## 2024-10-31 RX ORDER — ERGOCALCIFEROL (VITAMIN D2) 200 MCG/ML
1 DROPS ORAL
Refills: 0 | DISCHARGE

## 2024-10-31 RX ORDER — DULOXETINE HYDROCHLORIDE 30 MG/1
1 CAPSULE, DELAYED RELEASE ORAL
Refills: 0 | DISCHARGE

## 2024-10-31 RX ADMIN — LOSARTAN POTASSIUM 25 MILLIGRAM(S): 25 TABLET ORAL at 18:38

## 2024-10-31 RX ADMIN — APIXABAN 5 MILLIGRAM(S): 5 TABLET, FILM COATED ORAL at 14:48

## 2024-10-31 RX ADMIN — Medication 200 MILLIGRAM(S): at 12:32

## 2024-10-31 RX ADMIN — Medication 1: at 08:36

## 2024-10-31 RX ADMIN — Medication 105 MILLIGRAM(S): at 12:31

## 2024-10-31 RX ADMIN — CARVEDILOL 12.5 MILLIGRAM(S): 25 TABLET, FILM COATED ORAL at 18:38

## 2024-10-31 RX ADMIN — Medication 40 MILLIGRAM(S): at 22:08

## 2024-10-31 RX ADMIN — Medication 81 MILLIGRAM(S): at 18:35

## 2024-10-31 RX ADMIN — GABAPENTIN 300 MILLIGRAM(S): 300 CAPSULE ORAL at 18:28

## 2024-10-31 RX ADMIN — Medication 16 UNIT(S): at 08:35

## 2024-10-31 RX ADMIN — Medication 1: at 18:39

## 2024-10-31 RX ADMIN — Medication 105 MILLIGRAM(S): at 20:36

## 2024-10-31 RX ADMIN — Medication 1: at 12:33

## 2024-10-31 RX ADMIN — Medication 105 MILLIGRAM(S): at 04:19
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
## 2024-10-31 NOTE — PROGRESS NOTE ADULT - PROBLEM SELECTOR PLAN 6
Diet: Consistent carb  DVT ppx: Eliquis 5 BID (prescribed AC as outpatient; suspect d/t concern for cardioembolic stroke)  Dispo: inpatient Diet: Consistent carb  DVT ppx: Eliquis 5 BID (prescribed AC as outpatient; suspect d/t concern for cardioembolic stroke)  Dispo: pending workup and clinical improvement

## 2024-10-31 NOTE — PROGRESS NOTE ADULT - PROBLEM SELECTOR PLAN 5
Diet: Consistent carb  DVT ppx: heparin sq  Dispo: inpatient Diet: Consistent carb  DVT ppx: Eliquis 5 BID (prescribed AC as outpatient; suspect d/t concern for cardioembolic stroke)  Dispo: inpatient -continue allopurinol 100 mg tablet 2 tablets once a day
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
## 2024-10-31 NOTE — H&P ADULT - PROBLEM SELECTOR PLAN 1
-Broad differential: CVA vs. recrudescence of old stroke vs. meningitis/encephalitis vs. UTI vs. polypharmacy vs. transient global amnesia   -CTH negative for acute intracranial process, does show chronic right frontal infarct which extends to the ipsilateral basal ganglia  -UA, RVP, CXR negative  -No meningeal signs, no fever or leukocytosis, low suspicion for meningoencephalitis but in absence of other causes would require LP  [] f/u syphilis, HIV, B12, TSH  [] Neuro following, appreciate recs. Consider MRI Pt with acute change in mental status, manifesting with memory loss and confusion.  -Broad differential: Acute CVA vs. recrudescence of old stroke vs. meningitis/encephalitis (including autoimmune) vs. UTI vs. polypharmacy vs. transient global amnesia vs. paraneoplastic process  -CTH negative for acute intracranial process, does show chronic right frontal infarct which extends to the ipsilateral basal ganglia  -UA, RVP, CXR negative  -No meningeal signs, no fever or leukocytosis, low suspicion for meningoencephalitis, but in absence of other causes would obtain LP  [] f/u syphilis, HIV, vitamin B12, folate, ammonia, TSH  [] obtain MRI brain w/wo contrast  [] Neurology consulted, recs pending  [] monitor mental status  [] aspiration precautions, fall risk protocol

## 2024-10-31 NOTE — CONSULT NOTE ADULT - ATTENDING COMMENTS
Spoke with mom she a=is asking about follow up with Dr. Chino and CAMILO Ruffin. Mom also states that she would like to proceed with get a button. Please advise   Per wife - he had a sudden onset of symptoms on 10/28/24 with no change since onset.  Has ILR.  Started on apixaban recently - uncertain reason.     Exam:  R DIONNE.     I reviewed CT head - hypodensity in the medial left occipital lobe - possible subacute infarct.  D/W neuroradiologist and they agree.     A/P  Mr. Oakes is a 70 yo man with h/o stroke with probable new stroke on 10/28.  Continue apixaban - please give STAT dose - D/W primary team this AM during rounds.  If on apixaban, no need for antiplatelet agent for stroke prevention but this can be continued if there is another medical indication such as CAD.   High dose statin, antihypertensive medications.   MRI brain.   If no acute infarct on MRI brain then will do further work up.   HgA1c, lipid profile.   Cardiology to interrogate ILR.   TTE only if not done recently.   D/W patient, wife, primary team  Thank you Per wife - he had a sudden onset of symptoms on 10/28/24 with no change since onset.  Has ILR.  Started on apixaban recently - uncertain reason.     Exam:  JENI NIXON     I reviewed CT head - hypodensity in the medial left occipital lobe - possible subacute infarct.  D/W neuroradiologist and they agree.     A/P  Mr. Oakes is a 68 yo man with h/o stroke with probable new stroke on 10/28.  Continue apixaban - please give STAT dose - D/W primary team.  If on apixaban, no need for antiplatelet agent for stroke prevention but this can be continued if there is another medical indication such as CAD.   High dose statin, antihypertensive medications.   MRI brain.   If no acute infarct on MRI brain then will do further work up.   HgA1c, lipid profile.   Cardiology to interrogate ILR.   TTE only if not done recently.   D/W patient, wife, primary team  Thank you

## 2024-10-31 NOTE — H&P ADULT - PROBLEM SELECTOR PLAN 5
Diet: Consistent carb  DVT ppx: heparin sq  Dispo: inpatient -Per chart review, has been on allopurinol in the past  -No S/S of flare at this time  -Will continue allopurinol 100 mg daily, please clarify whether pt is still on medication in AM

## 2024-10-31 NOTE — H&P ADULT - PROBLEM SELECTOR PLAN 3
-UA with protein  -Cr 1.69  (unknown baseline)   [] obtain outpatient records -Unclear what BP meds pt is taking at home, wife will be bringing in meds during the day  -BPs currently in acceptable range  -Monitor BPs off BP meds for now, resume home BP meds once clarified if BP tolerates

## 2024-10-31 NOTE — H&P ADULT - PROBLEM SELECTOR PLAN 6
-Pt with history of CVA and ?loop recorder.  -Unclear is pt is taking antiplatelet or statin meds at home, wife will be bringing in meds during the day  -Aspiration precautions and fall risk protocol  -Possible EP consult to interrogate loop recorder for any events

## 2024-10-31 NOTE — H&P ADULT - NSHPREVIEWOFSYSTEMS_GEN_ALL_CORE
REVIEW OF SYSTEMS:    CONSTITUTIONAL:  No weakness, fevers or chills  EYES/ENT:  No visual changes;  No vertigo or throat pain   NECK:  No pain or stiffness  RESPIRATORY:  No cough, wheezing, hemoptysis; No shortness of breath  CARDIOVASCULAR:  No chest pain or palpitations  GASTROINTESTINAL:  No abdominal or epigastric pain. No nausea, vomiting, or hematemesis; No diarrhea or constipation. No melena or hematochezia.  GENITOURINARY:  +frequency  NEUROLOGICAL:  No numbness or weakness  SKIN:  No itching, rashes Constitutional: No generalized weakness, fever, chills, or weight loss  Eyes: No visual changes, double vision, or eye pain  Ears, Nose, Mouth, Throat: No runny nose, sinus pain, ear pain, tinnitus, sore throat, dysphagia, or odynophagia  Cardiovascular: No chest pain, palpitations, or LE edema  Respiratory: No cough, wheezing, hemoptysis, or shortness of breath  Gastrointestinal: No abdominal pain, dysphagia, anorexia, nausea/vomiting, diarrhea/constipation, hematemesis, melena, or BRBPR  Genitourinary: +Urinary frequency. No dysuria, urgency, or hematuria.  Musculoskeletal: No neck pain or back pain. No joint pain, swelling, or decreased ROM.  Skin: No pruritus, rashes, lesions, or wounds  Neurologic: No syncope, seizures, headache, paresthesias, numbness, or limb weakness  Psychiatric: No depression, anxiety, difficulty concentrating, anhedonia, or lack of energy  Endocrine: No heat/cold intolerance, mood swings, sweats, polydipsia, or polyuria  Hematologic/lymphatic: No purpura, petechia, or prolonged or excessive bleeding after dental extraction / injury  Allergic/Immunologic: No anaphylaxis or allergic response to materials, foods, animals    Positives and pertinent negatives noted and all other systems negative.

## 2024-10-31 NOTE — PROGRESS NOTE ADULT - PROBLEM SELECTOR PLAN 1
-Broad differential: CVA vs. recrudescence of old stroke vs. meningitis/encephalitis vs. UTI vs. polypharmacy vs. transient global amnesia   -CTH negative for acute intracranial process, does show chronic right frontal infarct which extends to the ipsilateral basal ganglia  -UA, RVP, CXR negative  -No meningeal signs, no fever or leukocytosis, low suspicion for meningoencephalitis but in absence of other causes would require LP  [] f/u syphilis, HIV, B12, TSH  [] Neuro following, appreciate recs. Consider MRI -most likely 2/2 ischemic stroke vs cardioembolic stroke. Differential includes CVA vs. recrudescence of old stroke. Lower suspicion for infectious etiology; however, still possible. Less likely meningoencephalitis/meningitis. No nuchal rigidity, Brudzinski -, Kernig -, no fever  -CTH negative for acute intracranial process, does show chronic right frontal infarct which extends to the ipsilateral basal ganglia  -UA, RVP, CXR negative, thyroid function testing normal  -Consider LP; ask neuro when to hold Eliquis for procedure   [] f/u syphilis, HIV, B1, B6, B12, vitamin D-25-OH, ammonia, ESR, CRP, etc   [] F/u MR brain   [] Neuro following, appreciate recs

## 2024-10-31 NOTE — H&P ADULT - NSHPPHYSICALEXAM_GEN_ALL_CORE
GENERAL: NAD, lying in bed comfortably  EYES: EOMI, PERRLA, conjunctiva and sclera clear  NECK: Supple, trachea midline, no JVD  HEART: Regular rate and rhythm, no murmurs, rubs, or gallops  LUNGS: Unlabored respirations.  Clear to auscultation bilaterally, no crackles, wheezing, or rhonchi  ABDOMEN: Soft, nontender, nondistended, +BS  EXTREMITIES: 2+ peripheral pulses bilaterally. No clubbing, cyanosis, or edema  NERVOUS SYSTEM:  A&O to person and place, not time. Confused and repeating the same questions every 2-3 minutes.  SKIN: No rashes or lesions Vital Signs Last 24 Hrs  T(C): 37.2 (31 Oct 2024 06:15), Max: 37.2 (31 Oct 2024 03:02)  T(F): 98.9 (31 Oct 2024 06:15), Max: 98.9 (31 Oct 2024 03:02)  HR: 68 (31 Oct 2024 06:15) (63 - 102)  BP: 158/95 (31 Oct 2024 06:15) (124/87 - 168/88)  BP(mean): --  RR: 17 (31 Oct 2024 06:15) (17 - 18)  SpO2: 98% (31 Oct 2024 06:15) (96% - 100%)    Parameters below as of 31 Oct 2024 06:15  Patient On (Oxygen Delivery Method): room air    GENERAL: NAD, lying in bed comfortably  EYES: EOMI, PERRL, conjunctiva and sclera clear  NECK: Supple, full ROM without any pain elicited, trachea midline, no JVD  HEART: Regular rate and rhythm, S1 and S2, no murmurs, rubs, or gallops. No LE edema b/l.  LUNGS: Unlabored respirations. Clear to auscultation bilaterally. No crackles, wheezing, or rhonchi.  ABDOMEN: Soft, nontender, nondistended, +BS. No palpable masses.  EXTREMITIES: 2+ peripheral pulses bilaterally. No clubbing, cyanosis, or edema.  NERVOUS SYSTEM: A&O to person and place, not to time. Confused and repeating the same questions every 2-3 minutes.  SKIN: No rashes or lesions  PSYCHIATRIC: Calm. Not answering questions appropriately.

## 2024-10-31 NOTE — H&P ADULT - NSHPSOCIALHISTORY_GEN_ALL_CORE
Lives with wife  Retired No alcohol, tobacco, or illicit drug use.  . Lives with wife.  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

## 2024-10-31 NOTE — PROGRESS NOTE ADULT - PROBLEM SELECTOR PLAN 3
-UA with protein  -Cr 1.69  (unknown baseline)   [] obtain outpatient records -UA with protein. Cr 1.69  (unknown baseline). Pt previously diagnosed with CKD Stage 3 per previous hospitalization record.   -Contact PCP Dr. Muller  to obtain further collateral

## 2024-10-31 NOTE — H&P ADULT - NSICDXPASTMEDICALHX_GEN_ALL_CORE_FT
PAST MEDICAL HISTORY:  CVA (cerebrovascular accident)     DM (diabetes mellitus)     Gout     HLD (hyperlipidemia)     HTN (hypertension)     HTN (hypertension)     Obese

## 2024-10-31 NOTE — CONSULT NOTE ADULT - ASSESSMENT
Assessment:  -69M HTN, HLD, DM prior right frontal stroke  -presents with 3 days of confusion, polyuria. Confusion mostly characterized memory/repeating himself, some behavioral changes (more excited)  -VSS BP 120s-160s/80s, HR 60s-100s, RR 18 satting well on RA afebrile. FSBG 128  -Exam w/ poor recollection, increased speech latency  -Labs w/ normal CBC, sCR 1.69 (improved form 2 in september) BUN 24, pH 7.34, pCO2 51, lactate 0.7, UA noninfectious, limited RVP negative, CXR no consolidations  -CTH w/ chronic right frontal stroke    Impression:  -Memory loss un unclear etiology. Atypical for TGA given duration. DDx Seizures, toxic metabolic encephalopathy vascular dementia, paraneoplastic    Recommendations:  -MRI Brain w/wo  -vEEG  -Check b1, b6, b12, folate, vitamin-D-25OH, TSH, Free T4, procal, ammonia, ESR, CRP, anti TPO, Anti thyroglobulin  -CT CAP w/ IV contrast r/o malignancy  -LP considered in ED is reasonable, family was hesitant, can await above workup first prior to further discussion    Plan NOT YET discussed with attending   Assessment:  -69M HTN, HLD, DM prior right frontal stroke  -presents with 3 days of confusion, polyuria. Confusion mostly characterized memory/repeating himself, some behavioral changes (more excited)  -VSS BP 120s-160s/80s, HR 60s-100s, RR 18 satting well on RA afebrile. FSBG 128  -Exam w/ poor recollection, increased speech latency. Not overly excited on my exam.   -Labs w/ normal CBC, sCR 1.69 (improved form 2 in september) BUN 24, pH 7.34, pCO2 51, lactate 0.7, UA noninfectious, limited RVP negative, CXR no consolidations  -CTH w/ chronic right frontal stroke    Impression:  -Memory loss ad behavioral change (excitedness) unclear etiology. Atypical for TGA given duration and behavioral change. DDx Seizure/postictal, toxic metabolic encephalopathy vascular dementia, paraneoplastic, AIE     Recommendations:  -MRI Brain w/wo  -vEEG  -Check b1, b6, b12, folate, vitamin-D-25OH, TSH, Free T4, procal, ammonia, ESR, CRP, anti TPO, Anti thyroglobulin, anti-NMDAr  -CT CAP w/ IV contrast r/o malignancy  -LP considered in ED is reasonable, family was hesitant, prefers noninvasive workup first   -Clarify and continue home secondary stroke prophylaxis.    Plan NOT YET discussed with attending   Assessment:  -69M HTN, HLD, DM prior right frontal stroke  -presents with 3 days of confusion, polyuria. Confusion mostly characterized memory/repeating himself, some behavioral changes (more excited)  -VSS BP 120s-160s/80s, HR 60s-100s, RR 18 satting well on RA afebrile. FSBG 128  -Exam w/ poor recollection, increased speech latency. Not overly excited on my exam.   -Labs w/ normal CBC, sCR 1.69 (improved form 2 in september) BUN 24, pH 7.34, pCO2 51, lactate 0.7, UA noninfectious, limited RVP negative, CXR no consolidations  -CTH w/ chronic right frontal stroke    Impression:  -Memory loss ad behavioral change (excitedness) unclear etiology. DDx Seizure/postictal, toxic metabolic encephalopathy, vascular dementia, encephalitis-possibly HSV, less likely paraneoplastic. Atypical for TGA given duration and behavioral change    Recommendations:  -MRI Brain w/wo  -vEEG  -Check b1, b6, b12, folate, vitamin-D-25OH, TSH, Free T4, procal, ammonia, ESR, CRP, anti TPO, Anti thyroglobulin, anti-NMDAr  -CT CAP w/ IV contrast r/o malignancy  -Clarify and continue home secondary stroke prophylaxis.  -LP considered in ED is reasonable, family was hesitant, prefers noninvasive workup first. Would recommend to do today as viral encephalitis on ddx  -would consider empiric treatment for HSV encephalitis, would consult ID     Plan discussed with attending

## 2024-10-31 NOTE — PROGRESS NOTE ADULT - PROBLEM SELECTOR PLAN 2
-On insulin at home (unknown dosage- wife will be bringing in meds)  -Will give lantus 10 in the AM pending med rec, and place on low dose insulin sliding scale, FS TID QHS Placed on Lantus 10 by night team  -Pt on 10-14 units pre-meal Admelog, Ozempic 0.25 mg q week, and Jardiance 10 mg tab qd  -Insulin sliding scale  -Consider consulting endocrine given pt's regimen Placed on Lantus 10 by night team  -Pt on 10-14 units pre-meal Admelog, Ozempic 0.25 mg q week, and Jardiance 10 mg tab qd  -Insulin sliding scale  -Consider consulting endocrine given pt's regimen and A1C 9.7

## 2024-10-31 NOTE — H&P ADULT - PROBLEM SELECTOR PLAN 2
-On insulin at home (unknown dosage- wife will be bringing in meds)  -Will give lantus 10 in the AM pending med rec, and place on low dose insulin sliding scale, FS TID QHS -On insulin at home (unknown dose, wife will be bringing in meds during the day)  -Will give Lantus 16 u in the AM (100 kg x0.2 x80%) pending med rec  -Start low-dose insulin sliding scale and FS checks QAC TID and QHS

## 2024-10-31 NOTE — H&P ADULT - NSHPLABSRESULTS_GEN_ALL_CORE
14.6   7.92  )-----------( 226      ( 30 Oct 2024 20:30 )             44.8     10    142  |  107  |  24[H]  ----------------------------<  114[H]  4.1   |  25  |  1.69[H]    Ca    9.4      30 Oct 2024 20:30    TPro  7.6  /  Alb  4.3  /  TBili  0.4  /  DBili  x   /  AST  22  /  ALT  25  /  AlkPhos  85  10    Urinalysis Basic - ( 30 Oct 2024 20:30 )    Color: Yellow / Appearance: Clear / S.038 / pH: x  Gluc: 114 mg/dL / Ketone: Negative mg/dL  / Bili: Negative / Urobili: 0.2 mg/dL   Blood: x / Protein: 100 mg/dL / Nitrite: Negative   Leuk Esterase: Negative / RBC: 0 /HPF / WBC 0 /HPF   Sq Epi: x / Non Sq Epi: 0 /HPF / Bacteria: Negative /HPF    < from: CT Head No Cont (10.30.24 @ 21:45) >    VENTRICLES AND SULCI: Mild parenchymal volume loss.  INTRA-AXIAL: No mass effect,acute hemorrhage, or midline shift.  There   are periventricular and subcortical white matter hypodensities,   consistent with microvascular type changes. Chronic right frontal infarct   which extends to the ipsilateral basal ganglia.    < end of copied text > Labs personally reviewed.               14.6   7.92  )-----------( 226      ( 30 Oct 2024 20:30 )             44.8     10-30    142  |  107  |  24[H]  ----------------------------<  114[H]  4.1   |  25  |  1.69[H]    Ca    9.4      30 Oct 2024 20:30    TPro  7.6  /  Alb  4.3  /  TBili  0.4  /  DBili  x   /  AST  22  /  ALT  25  /  AlkPhos  85  10-30    Urinalysis Basic - ( 30 Oct 2024 20:30 )  Color: Yellow / Appearance: Clear / S.038 / pH: x  Gluc: 114 mg/dL / Ketone: Negative mg/dL  / Bili: Negative / Urobili: 0.2 mg/dL   Blood: x / Protein: 100 mg/dL / Nitrite: Negative   Leuk Esterase: Negative / RBC: 0 /HPF / WBC 0 /HPF   Sq Epi: x / Non Sq Epi: 0 /HPF / Bacteria: Negative /HPF    Imaging personally reviewed.  < from: CT Head No Cont (10.30.24 @ 21:45) >  FINDINGS:  VENTRICLES AND SULCI: Mild parenchymal volume loss.  INTRA-AXIAL: No mass effect,acute hemorrhage, or midline shift.  There   are periventricular and subcortical white matter hypodensities,   consistent with microvascular type changes. Chronic right frontal infarct   which extends to the ipsilateral basal ganglia.  EXTRA-AXIAL: No mass or fluid collection. Basal cisterns are normal in   appearance.    VISUALIZED SINUSES:  Clear.  TYMPANOMASTOID CAVITIES:  Clear.  VISUALIZED ORBITS: Normal.  CALVARIUM: Intact.    MISCELLANEOUS: None.    IMPRESSION:  No acute intracranial hemorrhage, mass effect, or midline shift.    ACC: [id number] EXAM:  XR CHEST PA LAT 2V   ORDERED BY: ODALIS MANN   PROCEDURE DATE:  10/30/2024    INTERPRETATION:  CLINICAL INDICATION: Altered mental status.    EXAM: Two views of the chest.    COMPARISON: Chest radiograph from 2024.    FINDINGS:  Loop recorder  The lungs are clear.  No pleural effusion. No pneumothorax.  The heart is normal in size  The visualized osseous structures demonstrate no acute pathology.    IMPRESSION:  No focal consolidations.

## 2024-10-31 NOTE — H&P ADULT - ATTENDING COMMENTS
68 y/o M with past medical history of HTN, type 2 DM (on insulin), gout, CVA s/p loop recorder presenting with abrupt change in mental status x 3 days. Pt now with memory loss and confusion, including not being able to remember his down birth date. Unclear etiology. Neurology following, recs pending. Differential includes acute CVA vs. recrudescence of old stroke vs. meningitis/encephalitis (including autoimmune) vs. UTI vs. polypharmacy vs. transient global amnesia vs. paraneoplastic process. No evidence of infection at this time to explain AMS. Will likely need MRI brain for further eval, possible LP. Home medication list to be verified in AM once pt's wife brings in pt's meds from home.

## 2024-10-31 NOTE — PROGRESS NOTE ADULT - ASSESSMENT
70 y/o M with past medical history of hypertension, diabetes, gout, CVA presenting for acute change in mental status x 3 days.  70 y/o M with past medical history of hypertension, diabetes, gout, CVAx3, HFrEF, presenting for acute change in mental status x 3 days with right sided hemianopsia noted on physical exam, c/f recurrent stroke.  68 y/o M with past medical history of hypertension, diabetes, gout, CVAx3, CHF?, presenting for acute change in mental status x 3 days with right sided hemianopsia noted on physical exam, c/f recurrent stroke.

## 2024-10-31 NOTE — PATIENT PROFILE ADULT - NSPROMEDSADMININFO_GEN_A_NUR
Dirk Gong Is a 71 year old male presenting for a physical. Patient is doing well today with no questions or concerns.        Denies known Latex allergy or symptoms of Latex sensitivity.    Tobacco use verified  Medications verified     no concerns

## 2024-10-31 NOTE — H&P ADULT - ASSESSMENT
68 y/o M with past medical history of hypertension, diabetes, gout, CVA presenting for acute change in mental status x 3 days.  70 y/o M with past medical history of HTN, type 2 DM (on insulin), gout, CVA s/p loop recorder presenting for acute change in mental status x 3 days, admitted for further workup.

## 2024-10-31 NOTE — PROGRESS NOTE ADULT - PROBLEM SELECTOR PLAN 4
-Per chart review has been on allopurinol in the past  -Will continue, please clarify in AM -continue allopurinol 100 mg tablet 2 tablets once a day -Per outpatient records, patient dx with systolic heart failure.   -Previous echo on 6/12/2023 showing mild global left ventricular systolic dysfunction  -Pt euvolemic on exam    -Continue current dose of beta-blocker, losartan and SGLT-2  -Add MRA prior to discharge for further optimization of GDMT regimen  -Get in touch with PCP for further information and for additional records -Per outpatient records, patient dx with systolic heart failure.   -Previous echo on 6/12/2023 showing mild global left ventricular systolic dysfunction  -Pt euvolemic on exam    -Continue current dose of beta-blocker, losartan and SGLT-2  -consider Add MRA prior to discharge for further optimization of GDMT regimen  -Get in touch with PCP for further information and for additional records -> collateral from cards appreciated

## 2024-10-31 NOTE — PROGRESS NOTE ADULT - SUBJECTIVE AND OBJECTIVE BOX
PROGRESS NOTE:   Authored by Dr. Tasia Jeff    Patient is a 69y old  Male who presents with a chief complaint of amnesia (31 Oct 2024 06:25)      SUBJECTIVE / OVERNIGHT EVENTS:  No acute events overnight.     MEDICATIONS  (STANDING):  allopurinol 200 milliGRAM(s) Oral daily  dextrose 5%. 1000 milliLiter(s) (100 mL/Hr) IV Continuous <Continuous>  dextrose 5%. 1000 milliLiter(s) (50 mL/Hr) IV Continuous <Continuous>  dextrose 50% Injectable 12.5 Gram(s) IV Push once  dextrose 50% Injectable 25 Gram(s) IV Push once  dextrose 50% Injectable 25 Gram(s) IV Push once  glucagon  Injectable 1 milliGRAM(s) IntraMuscular once  heparin   Injectable 5000 Unit(s) SubCutaneous every 8 hours  insulin glargine Injectable (LANTUS) 16 Unit(s) SubCutaneous every morning  insulin lispro (ADMELOG) corrective regimen sliding scale   SubCutaneous three times a day before meals  insulin lispro (ADMELOG) corrective regimen sliding scale   SubCutaneous at bedtime    MEDICATIONS  (PRN):  dextrose Oral Gel 15 Gram(s) Oral once PRN Blood Glucose LESS THAN 70 milliGRAM(s)/deciliter      CAPILLARY BLOOD GLUCOSE      POCT Blood Glucose.: 128 mg/dL (30 Oct 2024 18:40)    I&O's Summary      PHYSICAL EXAM:  Vital Signs Last 24 Hrs  T(C): 37.2 (31 Oct 2024 06:15), Max: 37.2 (31 Oct 2024 03:02)  T(F): 98.9 (31 Oct 2024 06:15), Max: 98.9 (31 Oct 2024 03:02)  HR: 68 (31 Oct 2024 06:15) (63 - 102)  BP: 158/95 (31 Oct 2024 06:15) (124/87 - 168/88)  BP(mean): --  RR: 17 (31 Oct 2024 06:15) (17 - 18)  SpO2: 98% (31 Oct 2024 06:15) (96% - 100%)    Parameters below as of 31 Oct 2024 06:15  Patient On (Oxygen Delivery Method): room air        CONSTITUTIONAL: NAD  HEET: MMM, EOMI, PERRLA  NECK: supple  RESPIRATORY: Normal respiratory effort; lungs are clear to auscultation bilaterally  CARDIOVASCULAR: Regular rate and rhythm, normal S1 and S2, no murmur/rub/gallop; No lower extremity edema; Peripheral pulses are 2+ bilaterally  ABDOMEN: Nontender to palpation, normoactive bowel sounds, no rebound/guarding; No hepatosplenomegaly  MUSCULOSKELETAL: no clubbing or cyanosis of digits; no joint swelling or tenderness to palpation  PSYCH: A+O to person, place, and time; affect appropriate  SKIN: No rash    LABS:                        14.6   7.92  )-----------( 226      ( 30 Oct 2024 20:30 )             44.8     10    142  |  107  |  24[H]  ----------------------------<  114[H]  4.1   |  25  |  1.69[H]    Ca    9.4      30 Oct 2024 20:30    TPro  7.6  /  Alb  4.3  /  TBili  0.4  /  DBili  x   /  AST  22  /  ALT  25  /  AlkPhos  85  10-30          Urinalysis Basic - ( 30 Oct 2024 20:30 )    Color: Yellow / Appearance: Clear / S.038 / pH: x  Gluc: 114 mg/dL / Ketone: Negative mg/dL  / Bili: Negative / Urobili: 0.2 mg/dL   Blood: x / Protein: 100 mg/dL / Nitrite: Negative   Leuk Esterase: Negative / RBC: 0 /HPF / WBC 0 /HPF   Sq Epi: x / Non Sq Epi: 0 /HPF / Bacteria: Negative /HPF          Tele Reviewed:    RADIOLOGY & ADDITIONAL TESTS:  Results Reviewed:   Imaging Personally Reviewed:  Electrocardiogram Personally Reviewed:     PROGRESS NOTE:   Authored by Dr. Tasia Jeff    Patient is a 69y old  Male who presents with a chief complaint of amnesia (31 Oct 2024 06:25)      SUBJECTIVE / OVERNIGHT EVENTS:  No acute events overnight. Wife at the bedside this morning. Hx was obtained from chart review, from the patient's wife, and from the patient. Pt has not noticed a change in his mental status, but his wife notes that he was in his usual state of health when he suddenly had disorientation and confusion starting on Monday. She has not observed any personality changes or changes in mood. No reported changes in gait stability or coordination. Endorses changes in memory. She says that he was recently hospitalized for a cardiac arrythmia.  Paperwork from previous discharge shows a final diagnosis congestive heart failure. The pt's wife brought the patient's home medications to the hospital and a medication reconciliation was done. Patient noted to be on 3/4 heart failure medications, and newly started on Eliquis. The wife is unsure why the patient is on Eliquis. Neurology at the bedside this morning to evaluate patient w/ discovery of new neurological deficits concerning for stroke. Patient ordered for Eliquis and an MRI brain non-contrast was ordered. Pt's wife notes that he had a loop recorder placed by cardiology.     Pt denies any fever, chill, chest pain, nuchal rigidity, motor weakness, changes in sensation or other complaints.    Interval Hx: Patient's daughter on the phone during rounds with attending. She reports that the patient's heart is not functioning the way it should, with reduced squeeze estimated to be in the 40s. MRI form was completed at the bedside in anticipation of MR imaging.    MEDICATIONS  (STANDING):  allopurinol 200 milliGRAM(s) Oral daily  dextrose 5%. 1000 milliLiter(s) (100 mL/Hr) IV Continuous <Continuous>  dextrose 5%. 1000 milliLiter(s) (50 mL/Hr) IV Continuous <Continuous>  dextrose 50% Injectable 12.5 Gram(s) IV Push once  dextrose 50% Injectable 25 Gram(s) IV Push once  dextrose 50% Injectable 25 Gram(s) IV Push once  glucagon  Injectable 1 milliGRAM(s) IntraMuscular once  heparin   Injectable 5000 Unit(s) SubCutaneous every 8 hours  insulin glargine Injectable (LANTUS) 16 Unit(s) SubCutaneous every morning  insulin lispro (ADMELOG) corrective regimen sliding scale   SubCutaneous three times a day before meals  insulin lispro (ADMELOG) corrective regimen sliding scale   SubCutaneous at bedtime    MEDICATIONS  (PRN):  dextrose Oral Gel 15 Gram(s) Oral once PRN Blood Glucose LESS THAN 70 milliGRAM(s)/deciliter      CAPILLARY BLOOD GLUCOSE      POCT Blood Glucose.: 128 mg/dL (30 Oct 2024 18:40)    I&O's Summary      PHYSICAL EXAM:  Vital Signs Last 24 Hrs  T(C): 37.2 (31 Oct 2024 06:15), Max: 37.2 (31 Oct 2024 03:02)  T(F): 98.9 (31 Oct 2024 06:15), Max: 98.9 (31 Oct 2024 03:02)  HR: 68 (31 Oct 2024 06:15) (63 - 102)  BP: 158/95 (31 Oct 2024 06:15) (124/87 - 168/88)  BP(mean): --  RR: 17 (31 Oct 2024 06:15) (17 - 18)  SpO2: 98% (31 Oct 2024 06:15) (96% - 100%)    Parameters below as of 31 Oct 2024 06:15  Patient On (Oxygen Delivery Method): room air        GENERAL: NAD, lying in bed comfortably  EYES: EOMI, PERRL, conjunctiva and sclera clear.   NECK: Supple, full ROM   HEART: Regular rate and rhythm, S1 and S2, no murmurs, rubs, or gallops. No LE edema b/l.  LUNGS: Unlabored respirations. Clear to auscultation bilaterally. No crackles, wheezing, or rhonchi.  ABDOMEN: Soft, nontender, nondistended, +BS. Umbilical hernia. Non-reducible  EXTREMITIES: 2+ peripheral pulses bilaterally. No clubbing, cyanosis, or edema.  NERVOUS SYSTEM: A&O to person and place, not to time. Patient unable to spell the word W-O-R-L-D backwards. Correctly identifies objects. Immediately recollects 3 items from memory. Remembers 0/3 after 5 minutes. With hints remembers 0/3. - dysmetria, - dysdiadochokinesia, - pronator drift. Motor strength 5/5 B/L in upper and lower extremities. Sensation intact. CNII-XII intact. Visual field testing notable for right sided hemianopsia.   SKIN: No rashes or lesions  PSYCHIATRIC: Calm. Affect normal.      LABS:                        14.6   7.92  )-----------( 226      ( 30 Oct 2024 20:30 )             44.8     10    142  |  107  |  24[H]  ----------------------------<  114[H]  4.1   |  25  |  1.69[H]    Ca    9.4      30 Oct 2024 20:30    TPro  7.6  /  Alb  4.3  /  TBili  0.4  /  DBili  x   /  AST  22  /  ALT  25  /  AlkPhos  85  10-30          Urinalysis Basic - ( 30 Oct 2024 20:30 )    Color: Yellow / Appearance: Clear / S.038 / pH: x  Gluc: 114 mg/dL / Ketone: Negative mg/dL  / Bili: Negative / Urobili: 0.2 mg/dL   Blood: x / Protein: 100 mg/dL / Nitrite: Negative   Leuk Esterase: Negative / RBC: 0 /HPF / WBC 0 /HPF   Sq Epi: x / Non Sq Epi: 0 /HPF / Bacteria: Negative /HPF          Tele Reviewed:    RADIOLOGY & ADDITIONAL TESTS:  Results Reviewed:   Imaging Personally Reviewed:  Electrocardiogram Personally Reviewed:     PROGRESS NOTE:   Authored by Dr. Tasia Jeff    Patient is a 69y old  Male who presents with a chief complaint of amnesia (31 Oct 2024 06:25)      SUBJECTIVE / OVERNIGHT EVENTS:  No acute events overnight. Wife at the bedside this morning. Hx was obtained from chart review, from the patient's wife, and from the patient. Pt has not noticed a change in his mental status, but his wife notes that he was in his usual state of health when he suddenly had disorientation and confusion starting on Monday. She has not observed any personality changes or changes in mood. No reported changes in gait stability or coordination. Endorses changes in memory. She says that he was recently hospitalized for a cardiac arrythmia.  Paperwork from previous discharge shows a final diagnosis congestive heart failure. The pt's wife brought the patient's home medications to the hospital and a medication reconciliation was done. Patient noted to be on 3/4 heart failure medications, and newly started on Eliquis. The wife is unsure why the patient is on Eliquis. Neurology at the bedside this morning to evaluate patient w/ discovery of new neurological deficits concerning for stroke. Patient ordered for Eliquis and an MRI brain non-contrast was ordered. Pt's wife notes that he had a loop recorder placed by cardiology.     Pt denies any fever, chill, chest pain, nuchal rigidity, motor weakness, changes in sensation or other complaints.    Interval Hx: Patient's daughter on the phone during rounds with attending. She reports that the patient's heart is not functioning the way it should, with reduced squeeze estimated to be in the 40s. MRI form was completed at the bedside in anticipation of MR imaging.    MEDICATIONS  (STANDING):  allopurinol 200 milliGRAM(s) Oral daily  dextrose 5%. 1000 milliLiter(s) (100 mL/Hr) IV Continuous <Continuous>  dextrose 5%. 1000 milliLiter(s) (50 mL/Hr) IV Continuous <Continuous>  dextrose 50% Injectable 12.5 Gram(s) IV Push once  dextrose 50% Injectable 25 Gram(s) IV Push once  dextrose 50% Injectable 25 Gram(s) IV Push once  glucagon  Injectable 1 milliGRAM(s) IntraMuscular once  heparin   Injectable 5000 Unit(s) SubCutaneous every 8 hours  insulin glargine Injectable (LANTUS) 16 Unit(s) SubCutaneous every morning  insulin lispro (ADMELOG) corrective regimen sliding scale   SubCutaneous three times a day before meals  insulin lispro (ADMELOG) corrective regimen sliding scale   SubCutaneous at bedtime    MEDICATIONS  (PRN):  dextrose Oral Gel 15 Gram(s) Oral once PRN Blood Glucose LESS THAN 70 milliGRAM(s)/deciliter      CAPILLARY BLOOD GLUCOSE      POCT Blood Glucose.: 128 mg/dL (30 Oct 2024 18:40)    I&O's Summary      PHYSICAL EXAM:  Vital Signs Last 24 Hrs  T(C): 37.2 (31 Oct 2024 06:15), Max: 37.2 (31 Oct 2024 03:02)  T(F): 98.9 (31 Oct 2024 06:15), Max: 98.9 (31 Oct 2024 03:02)  HR: 68 (31 Oct 2024 06:15) (63 - 102)  BP: 158/95 (31 Oct 2024 06:15) (124/87 - 168/88)  BP(mean): --  RR: 17 (31 Oct 2024 06:15) (17 - 18)  SpO2: 98% (31 Oct 2024 06:15) (96% - 100%)    Parameters below as of 31 Oct 2024 06:15  Patient On (Oxygen Delivery Method): room air        GENERAL: NAD, lying in bed comfortably  EYES: EOMI, PERRL, conjunctiva and sclera clear.   NECK: Supple, full ROM   HEART: Regular rate and rhythm, S1 and S2, no murmurs, rubs, or gallops. No LE edema b/l.  LUNGS: Unlabored respirations. Clear to auscultation bilaterally. No crackles, wheezing, or rhonchi.  ABDOMEN: Soft, nontender, nondistended, +BS. Umbilical hernia. Non-reducible  EXTREMITIES: 2+ peripheral pulses bilaterally. No clubbing, cyanosis, or edema.  NERVOUS SYSTEM: A&O to person and place, not to time. Patient unable to spell the word W-O-R-L-D backwards. Correctly identifies objects. Immediately recollects 3 items from memory. Remembers 0/3 after 5 minutes. With hints remembers 0/3. - dysmetria, - dysdiadochokinesia, - pronator drift. Motor strength 5/5 B/L in upper and lower extremities. Sensation intact. CNII-XII intact. Visual field testing notable for right sided hemianopsia.   SKIN: No rashes or lesions  PSYCHIATRIC: Calm. Affect normal.      LABS:                                14.6   7.92  )-----------( 226      ( 30 Oct 2024 20:30 )             44.8       10    142  |  107  |  24[H]  ----------------------------<  114[H]  4.1   |  25  |  1.69[H]    Ca    9.4      30 Oct 2024 20:30    TPro  7.6  /  Alb  4.3  /  TBili  0.4  /  DBili  x   /  AST  22  /  ALT  25  /  AlkPhos  85  10          Urinalysis Basic - ( 30 Oct 2024 20:30 )    Color: Yellow / Appearance: Clear / S.038 / pH: x  Gluc: 114 mg/dL / Ketone: Negative mg/dL  / Bili: Negative / Urobili: 0.2 mg/dL   Blood: x / Protein: 100 mg/dL / Nitrite: Negative   Leuk Esterase: Negative / RBC: 0 /HPF / WBC 0 /HPF   Sq Epi: x / Non Sq Epi: 0 /HPF / Bacteria: Negative /HPF          Tele Reviewed:    < from: TTE with Doppler (w/Cont) (23 @ 07:48) >  Ejection Fraction (Modified Peterson Rule): 50 %  ------------------------------------------------------------------------  OBSERVATIONS:  Mitral Valve: Mitral annular calcification, otherwise  normal mitral valve. Mild-moderate mitral regurgitation.  Aortic Root: Normal size aortic root. (Ao:3.9 cm).  Aortic Valve: Calcified trileaflet aortic valve with normal  opening. Mild-moderate aortic regurgitation.  Left Atrium: Mildly dilated left atrium.  LA volume index =  37 cc/m2.  Left Ventricle: Mild global left ventricular systolic  dysfunction.  Endocardial visualization enhanced with  intravenous injection of echo contrast (Definity). Normal  left ventricular internal dimensions and wall thicknesses.  Right Heart: Normal right atrium. Normal right ventricular  size and function. Normal tricuspid valve.  Minimal  tricuspid regurgitation. Normal pulmonic valve.  Minimal  pulmonic regurgitation.  Pericardium/PleuraNormal pericardium with no pericardial  effusion.  Hemodynamic: Estimated right ventricular systolic pressure  equals 24 mm Hg, assuming right atrial pressure equals 10  mm Hg, consistent with normal pulmonary pressures. Agitated  saline injection demonstrates no evidence of a patent  foramen ovale.  --------------------------    < end of copied text >      RADIOLOGY & ADDITIONAL TESTS:  Results Reviewed:   Imaging Personally Reviewed:  < from: MR Head No Cont (10.31.24 @ 16:03) >  IMPRESSION:    Multiple small acute infarcts in the left occipital lobe and medial   temporal lobe. No acute intracranial hemorrhage identified.    Multiple chronic infarcts.    < end of copied text >      Electrocardiogram Personally Reviewed:    Consultant notes reviewed: cards, neuro

## 2024-10-31 NOTE — H&P ADULT - PROBLEM SELECTOR PLAN 4
-Per chart review has been on allopurinol in the past  -Will continue, please clarify in AM -UA with protein  -Cr 1.69  on admission (unknown baseline)   [] obtain outpatient records  [] monitor Cr and urine output  [] avoid NSAIDs and nephrotoxic agents  [] renally dose meds

## 2024-10-31 NOTE — CONSULT NOTE ADULT - SUBJECTIVE AND OBJECTIVE BOX
~~~~~~~~~~~~~~~~~~~~~~~~~~~~~~~~~~~~~~~~~~~~~~~~~~  Neurology Service     [facility] General Neurology Consult Service [id number], [phone]  [facility] Stroke Consult Service , [phone] (7a-7p, otherwise call [phone])  ~~~~~~~~~~~~~~~~~~~~~~~~~~~~~~~~~~~~~~~~~~~~~~~~~~  History:  History from Shonna (daughter respectively) patient does not know why he is here.    HPI:   ROBERT GEIGER is a 69year old  Man with PMHx HTN, HLD, DM, Gout,, HFrEF, right frontal stroke unclear medications who presents for confusion and polyuria x 3 days. Patient cannot provide further history, he is not sure  why he was here. Daughter describes had  3 strokes last year. Had slurred speech, facial droop. Remained communicative but speech remains slightly slurred. No language issues. No assistive devices. Retired, going about life without major issues. Monday 10/28 starting repeating questions.  Was very excited abnout things he otherwise would not have been / this wasout of character. He has been about the same for the 3 days without improvement or progression. Maybe had a fall based on a bump on his head but none witnessed or recollected     Review of Systems:     +light headed, Diffuse weakness (per daughter)  He denies all current symptoms but cannot recall if had any recent symptoms     PMHx:  DM (diabetes mellitus)  HTN (hypertension)  HLD (hyperlipidemia)  Obese  Mild HTN  HTN (hypertension)      Medications  Home Medications:  He does not know them    MEDICATIONS  (STANDING):    MEDICATIONS  (PRN):    Exam:  Vitals:  T(C): 37.2 (10-31-24 @ 03:02), Max: 37.2 (10-31-24 @ 03:02)  T(F): 98.9 (10-31-24 @ 03:02), Max: 98.9 (10-31-24 @ 03:02)  HR: 68 (10-31-24 @ 03:02) (63 - 102)  BP: 128/96 (10-31-24 @ 03:02) (124/87 - 168/88)  RR: 18 (10-31-24 @ 03:02) (18 - 18)  SpO2: 96% (10-31-24 @ 03:02) (96% - 100%)  I&O's Summary    Physical and Neurological Examination:   - General:    - Mental Status:   level of consciousness: Awake, alert  Orientation: Oriented to person, age, place, and nearly time (states 2024 when date is 10/31/2024)  Language: Speech latency, excessive filler words but overall Speech is fluent with intact naming, repetition, and ability to follow commands (including simple commands and complex cross commands)  Cortical Signs: No extinction to visual or tactile DSS, no hemineglect.   Memory & Concentration: Immediate recall is 3/3 words and delayed recall is 0/3 words at 3 minutes; remains 0/3 with hints. 2/3 with choices. Able to spell WORLD forwards backwards states "DORW" can list days of week fowards and backwards.   correctly identiifies thumb and nose   misidentifies an index finger as a thumb      - Cranial Nerves:   PERRL, VFF, EOMI, facial sensation is intact in the V1-V3 distribution bilaterally; face is symmetric with normal smile; hearing is intact to finger rub bilaterally and equally; uvula is midline and soft palate rises symmetrically; shoulder shrug intact; tongue protrudes in the midline with intact lateral movements    - Motor Testing:  Bulk: Normal   Tone: Normal  Strength:  There is no pronator drift b/l  There is no leg drift b/l                              Right           Left  Should-Abduct      5                   5  Elbow-Flex             5                   5  Elbow-Ext              5                   5                        5                   5    Hip-Flex                 5                   5  Knee-Flex              5                   5  Knee-Ext                5                   5  Dorsiflex                5                   5  Plantarflex             5                   5      - Reflexes:              Right           Left  Triceps                     2+                2+  Biceps                      2+                 2+  Brachioradialis         2+                2+  Patellar                    2+                 2+  Achilles                    2+                 2+  Plantar responses mute bilaterally.      - Sensory: Intact throughout to light touch.   - Coordination: Finger-nose-finger intact without dysmetria.    - Gait: Normal steps, base, arm swing, and turning.      Objective Studies  Labs:                          14.6   7.92  )-----------( 226      ( 30 Oct 2024 20:30 )             44.8       10-30    142  |  107  |  24[H]  ----------------------------<  114[H]  4.1   |  25  |  1.69[H]    Ca    9.4      30 Oct 2024 20:30    TPro  7.6  /  Alb  4.3  /  TBili  0.4  /  DBili  x   /  AST  22  /  ALT  25  /  AlkPhos  85  10-          Urinalysis Basic - ( 30 Oct 2024 20:30 )    Color: Yellow / Appearance: Clear / S.038 / pH: x  Gluc: 114 mg/dL / Ketone: Negative mg/dL  / Bili: Negative / Urobili: 0.2 mg/dL   Blood: x / Protein: 100 mg/dL / Nitrite: Negative   Leuk Esterase: Negative / RBC: 0 /HPF / WBC 0 /HPF   Sq Epi: x / Non Sq Epi: 0 /HPF / Bacteria: Negative /HPF            Lactate Trend            CAPILLARY BLOOD GLUCOSE      POCT Blood Glucose.: 128 mg/dL (30 Oct 2024 18:40)              CSF:                CNS Imaging:  CT Head No Cont:  (30 Oct 2024 21:45)      COMPARISON: None.    CONTRAST:  IV Contrast: None  Complications: None    TECHNIQUE:  Serial axial images were obtained from the skull base to the   vertex using multi-slice helical technique. Sagittal and coronal   reformats were obtained.    FINDINGS:    VENTRICLES AND SULCI: Mild parenchymal volume loss.  INTRA-AXIAL: No mass effect,acute hemorrhage, or midline shift.  There   are periventricular and subcortical white matter hypodensities,   consistent with microvascular type changes. Chronic right frontal infarct   which extends to the ipsilateral basal ganglia.  EXTRA-AXIAL: No mass or fluid collection. Basal cisterns are normal in   appearance.    VISUALIZED SINUSES:  Clear.  TYMPANOMASTOID CAVITIES:  Clear.  VISUALIZED ORBITS: Normal.  CALVARIUM: Intact.    MISCELLANEOUS: None.    IMPRESSION:  No acute intracranial hemorrhage, mass effect, or midline shift.       ~~~~~~~~~~~~~~~~~~~~~~~~~~~~~~~~~~~~~~~~~~~~~~~~~~  Neurology Service     [facility] General Neurology Consult Service [id number], [phone]  [facility] Stroke Consult Service , [phone] (7a-7p, otherwise call [phone])  ~~~~~~~~~~~~~~~~~~~~~~~~~~~~~~~~~~~~~~~~~~~~~~~~~~  History:  History from Shonna (daughter respectively) patient does not know why he is here.    HPI:   ROBERT GEIGER is a 69year old  Man with PMHx HTN, HLD, DM, Gout,, HFrEF, right frontal stroke unclear medications who presents for confusion and polyuria x 3 days. Patient cannot provide further history, he is not sure  why he was here. Daughter describes had  3 strokes last year. Had slurred speech, facial droop. Remained communicative but speech remains slightly slurred. No language issues. No assistive devices. Retired, going about life without major issues. Monday 10/28 starting repeating questions.  Was very excited abnout things he otherwise would not have been / this was out of character. He has been about the same for the 3 days without improvement or progression. Maybe had a fall based on a bump on his head but none witnessed or recollected     Review of Systems:     +light headed, Diffuse weakness (per daughter)  He denies all current symptoms but cannot recall if had any recent symptoms     PMHx:  DM (diabetes mellitus)  HTN (hypertension)  HLD (hyperlipidemia)  Obese  Mild HTN  HTN (hypertension)      Medications  Home Medications:  He does not know them    MEDICATIONS  (STANDING):    MEDICATIONS  (PRN):    Exam:  Vitals:  T(C): 37.2 (10-31-24 @ 03:02), Max: 37.2 (10-31-24 @ 03:02)  T(F): 98.9 (10-31-24 @ 03:02), Max: 98.9 (10-31-24 @ 03:02)  HR: 68 (10-31-24 @ 03:02) (63 - 102)  BP: 128/96 (10-31-24 @ 03:02) (124/87 - 168/88)  RR: 18 (10-31-24 @ 03:02) (18 - 18)  SpO2: 96% (10-31-24 @ 03:02) (96% - 100%)  I&O's Summary    Physical and Neurological Examination:   - General:    - Mental Status:   level of consciousness: Awake, alert  Orientation: Oriented to person, age, place, and nearly time (states 2024 when date is 10/31/2024)  Language: Speech latency, excessive filler words but overall Speech is fluent with intact naming, repetition, and ability to follow commands (including simple commands and complex cross commands)  Cortical Signs: No extinction to visual or tactile DSS, no hemineglect.   Memory & Concentration: Immediate recall is 3/3 words and delayed recall is 0/3 words at 3 minutes; remains 0/3 with hints. 2/3 with choices. Able to spell WORLD forwards backwards states "DORW" can list days of week fowards and backwards.   correctly identiifies thumb and nose   misidentifies an index finger as a thumb      - Cranial Nerves:   PERRL, VFF, EOMI, facial sensation is intact in the V1-V3 distribution bilaterally; face is symmetric with normal smile; hearing is intact to finger rub bilaterally and equally; uvula is midline and soft palate rises symmetrically; shoulder shrug intact; tongue protrudes in the midline with intact lateral movements    - Motor Testing:  Bulk: Normal   Tone: Normal  Strength:  There is no pronator drift b/l  There is no leg drift b/l                              Right           Left  Should-Abduct      5                   5  Elbow-Flex             5                   5  Elbow-Ext              5                   5                        5                   5    Hip-Flex                 5                   5  Knee-Flex              5                   5  Knee-Ext                5                   5  Dorsiflex                5                   5  Plantarflex             5                   5      - Reflexes:              Right           Left  Triceps                     2+                2+  Biceps                      2+                 2+  Brachioradialis         2+                2+  Patellar                    2+                 2+  Achilles                    2+                 2+  Plantar responses mute bilaterally.      - Sensory: Intact throughout to light touch.   - Coordination: Finger-nose-finger intact without dysmetria.    - Gait: Normal steps, base, arm swing, and turning.      Objective Studies  Labs:                          14.6   7.92  )-----------( 226      ( 30 Oct 2024 20:30 )             44.8       10-30    142  |  107  |  24[H]  ----------------------------<  114[H]  4.1   |  25  |  1.69[H]    Ca    9.4      30 Oct 2024 20:30    TPro  7.6  /  Alb  4.3  /  TBili  0.4  /  DBili  x   /  AST  22  /  ALT  25  /  AlkPhos  85  10-          Urinalysis Basic - ( 30 Oct 2024 20:30 )    Color: Yellow / Appearance: Clear / S.038 / pH: x  Gluc: 114 mg/dL / Ketone: Negative mg/dL  / Bili: Negative / Urobili: 0.2 mg/dL   Blood: x / Protein: 100 mg/dL / Nitrite: Negative   Leuk Esterase: Negative / RBC: 0 /HPF / WBC 0 /HPF   Sq Epi: x / Non Sq Epi: 0 /HPF / Bacteria: Negative /HPF            Lactate Trend            CAPILLARY BLOOD GLUCOSE      POCT Blood Glucose.: 128 mg/dL (30 Oct 2024 18:40)              CSF:                CNS Imaging:  CT Head No Cont:  (30 Oct 2024 21:45)      COMPARISON: None.    CONTRAST:  IV Contrast: None  Complications: None    TECHNIQUE:  Serial axial images were obtained from the skull base to the   vertex using multi-slice helical technique. Sagittal and coronal   reformats were obtained.    FINDINGS:    VENTRICLES AND SULCI: Mild parenchymal volume loss.  INTRA-AXIAL: No mass effect,acute hemorrhage, or midline shift.  There   are periventricular and subcortical white matter hypodensities,   consistent with microvascular type changes. Chronic right frontal infarct   which extends to the ipsilateral basal ganglia.  EXTRA-AXIAL: No mass or fluid collection. Basal cisterns are normal in   appearance.    VISUALIZED SINUSES:  Clear.  TYMPANOMASTOID CAVITIES:  Clear.  VISUALIZED ORBITS: Normal.  CALVARIUM: Intact.    MISCELLANEOUS: None.    IMPRESSION:  No acute intracranial hemorrhage, mass effect, or midline shift.

## 2024-11-01 DIAGNOSIS — I48.0 PAROXYSMAL ATRIAL FIBRILLATION: ICD-10-CM

## 2024-11-01 DIAGNOSIS — E78.5 HYPERLIPIDEMIA, UNSPECIFIED: ICD-10-CM

## 2024-11-01 LAB
ALBUMIN SERPL ELPH-MCNC: 3.8 G/DL — SIGNIFICANT CHANGE UP (ref 3.3–5)
ALP SERPL-CCNC: 73 U/L — SIGNIFICANT CHANGE UP (ref 40–120)
ALT FLD-CCNC: 20 U/L — SIGNIFICANT CHANGE UP (ref 4–41)
ANION GAP SERPL CALC-SCNC: 15 MMOL/L — HIGH (ref 7–14)
AST SERPL-CCNC: 18 U/L — SIGNIFICANT CHANGE UP (ref 4–40)
BASOPHILS # BLD AUTO: 0.03 K/UL — SIGNIFICANT CHANGE UP (ref 0–0.2)
BASOPHILS NFR BLD AUTO: 0.5 % — SIGNIFICANT CHANGE UP (ref 0–2)
BILIRUB SERPL-MCNC: 0.4 MG/DL — SIGNIFICANT CHANGE UP (ref 0.2–1.2)
BUN SERPL-MCNC: 22 MG/DL — SIGNIFICANT CHANGE UP (ref 7–23)
CALCIUM SERPL-MCNC: 8.9 MG/DL — SIGNIFICANT CHANGE UP (ref 8.4–10.5)
CHLORIDE SERPL-SCNC: 106 MMOL/L — SIGNIFICANT CHANGE UP (ref 98–107)
CO2 SERPL-SCNC: 20 MMOL/L — LOW (ref 22–31)
CREAT SERPL-MCNC: 1.47 MG/DL — HIGH (ref 0.5–1.3)
CRP SERPL-MCNC: <3 MG/L — SIGNIFICANT CHANGE UP
CULTURE RESULTS: SIGNIFICANT CHANGE UP
EGFR: 52 ML/MIN/1.73M2 — LOW
EOSINOPHIL # BLD AUTO: 0.2 K/UL — SIGNIFICANT CHANGE UP (ref 0–0.5)
EOSINOPHIL NFR BLD AUTO: 3.2 % — SIGNIFICANT CHANGE UP (ref 0–6)
ERYTHROCYTE [SEDIMENTATION RATE] IN BLOOD: 7 MM/HR — SIGNIFICANT CHANGE UP (ref 1–15)
GLUCOSE SERPL-MCNC: 145 MG/DL — HIGH (ref 70–99)
HCT VFR BLD CALC: 44 % — SIGNIFICANT CHANGE UP (ref 39–50)
HGB BLD-MCNC: 14.2 G/DL — SIGNIFICANT CHANGE UP (ref 13–17)
IANC: 3.83 K/UL — SIGNIFICANT CHANGE UP (ref 1.8–7.4)
IMM GRANULOCYTES NFR BLD AUTO: 0.2 % — SIGNIFICANT CHANGE UP (ref 0–0.9)
LYMPHOCYTES # BLD AUTO: 1.69 K/UL — SIGNIFICANT CHANGE UP (ref 1–3.3)
LYMPHOCYTES # BLD AUTO: 27 % — SIGNIFICANT CHANGE UP (ref 13–44)
MAGNESIUM SERPL-MCNC: 2.1 MG/DL — SIGNIFICANT CHANGE UP (ref 1.6–2.6)
MCHC RBC-ENTMCNC: 29 PG — SIGNIFICANT CHANGE UP (ref 27–34)
MCHC RBC-ENTMCNC: 32.3 G/DL — SIGNIFICANT CHANGE UP (ref 32–36)
MCV RBC AUTO: 89.8 FL — SIGNIFICANT CHANGE UP (ref 80–100)
MONOCYTES # BLD AUTO: 0.49 K/UL — SIGNIFICANT CHANGE UP (ref 0–0.9)
MONOCYTES NFR BLD AUTO: 7.8 % — SIGNIFICANT CHANGE UP (ref 2–14)
NEUTROPHILS # BLD AUTO: 3.83 K/UL — SIGNIFICANT CHANGE UP (ref 1.8–7.4)
NEUTROPHILS NFR BLD AUTO: 61.3 % — SIGNIFICANT CHANGE UP (ref 43–77)
NRBC # BLD: 0 /100 WBCS — SIGNIFICANT CHANGE UP (ref 0–0)
NRBC # FLD: 0 K/UL — SIGNIFICANT CHANGE UP (ref 0–0)
PHOSPHATE SERPL-MCNC: 3.3 MG/DL — SIGNIFICANT CHANGE UP (ref 2.5–4.5)
PLATELET # BLD AUTO: 204 K/UL — SIGNIFICANT CHANGE UP (ref 150–400)
POTASSIUM SERPL-MCNC: 3.7 MMOL/L — SIGNIFICANT CHANGE UP (ref 3.5–5.3)
POTASSIUM SERPL-SCNC: 3.7 MMOL/L — SIGNIFICANT CHANGE UP (ref 3.5–5.3)
PROT SERPL-MCNC: 6.7 G/DL — SIGNIFICANT CHANGE UP (ref 6–8.3)
RBC # BLD: 4.9 M/UL — SIGNIFICANT CHANGE UP (ref 4.2–5.8)
RBC # FLD: 13.6 % — SIGNIFICANT CHANGE UP (ref 10.3–14.5)
SODIUM SERPL-SCNC: 141 MMOL/L — SIGNIFICANT CHANGE UP (ref 135–145)
SPECIMEN SOURCE: SIGNIFICANT CHANGE UP
T4 FREE SERPL-MCNC: 1.2 NG/DL — SIGNIFICANT CHANGE UP (ref 0.9–1.8)
TSH SERPL-MCNC: 1.39 UIU/ML — SIGNIFICANT CHANGE UP (ref 0.27–4.2)
WBC # BLD: 6.25 K/UL — SIGNIFICANT CHANGE UP (ref 3.8–10.5)
WBC # FLD AUTO: 6.25 K/UL — SIGNIFICANT CHANGE UP (ref 3.8–10.5)

## 2024-11-01 PROCEDURE — 99222 1ST HOSP IP/OBS MODERATE 55: CPT

## 2024-11-01 PROCEDURE — 99232 SBSQ HOSP IP/OBS MODERATE 35: CPT

## 2024-11-01 PROCEDURE — 74177 CT ABD & PELVIS W/CONTRAST: CPT | Mod: 26

## 2024-11-01 PROCEDURE — 95718 EEG PHYS/QHP 2-12 HR W/VEEG: CPT

## 2024-11-01 PROCEDURE — 99233 SBSQ HOSP IP/OBS HIGH 50: CPT | Mod: GC

## 2024-11-01 PROCEDURE — 71260 CT THORAX DX C+: CPT | Mod: 26

## 2024-11-01 RX ADMIN — Medication 2: at 18:17

## 2024-11-01 RX ADMIN — Medication 105 MILLIGRAM(S): at 05:22

## 2024-11-01 RX ADMIN — Medication 16 UNIT(S): at 09:14

## 2024-11-01 RX ADMIN — APIXABAN 5 MILLIGRAM(S): 5 TABLET, FILM COATED ORAL at 18:09

## 2024-11-01 RX ADMIN — Medication 200 MILLIGRAM(S): at 15:07

## 2024-11-01 RX ADMIN — Medication 80 MILLIGRAM(S): at 22:01

## 2024-11-01 RX ADMIN — CARVEDILOL 12.5 MILLIGRAM(S): 25 TABLET, FILM COATED ORAL at 05:23

## 2024-11-01 RX ADMIN — GABAPENTIN 300 MILLIGRAM(S): 300 CAPSULE ORAL at 18:09

## 2024-11-01 RX ADMIN — LOSARTAN POTASSIUM 25 MILLIGRAM(S): 25 TABLET ORAL at 05:23

## 2024-11-01 RX ADMIN — GABAPENTIN 300 MILLIGRAM(S): 300 CAPSULE ORAL at 05:23

## 2024-11-01 RX ADMIN — APIXABAN 5 MILLIGRAM(S): 5 TABLET, FILM COATED ORAL at 05:23

## 2024-11-01 RX ADMIN — Medication 1: at 12:48

## 2024-11-01 RX ADMIN — Medication 105 MILLIGRAM(S): at 15:06

## 2024-11-01 RX ADMIN — Medication 1: at 09:11

## 2024-11-01 RX ADMIN — Medication 105 MILLIGRAM(S): at 22:01

## 2024-11-01 RX ADMIN — CARVEDILOL 12.5 MILLIGRAM(S): 25 TABLET, FILM COATED ORAL at 18:09

## 2024-11-01 RX ADMIN — Medication 81 MILLIGRAM(S): at 15:07

## 2024-11-01 NOTE — PROGRESS NOTE ADULT - PROBLEM SELECTOR PLAN 6
Diet: Consistent carb  DVT ppx: Eliquis 5 BID (prescribed AC as outpatient; suspect d/t concern for cardioembolic stroke)  Dispo: pending workup and clinical improvement -continue Eliquis 5 mg BID   -cardiology following; appreciate recs

## 2024-11-01 NOTE — DIETITIAN INITIAL EVALUATION ADULT - PROBLEM SELECTOR PLAN 2
-On insulin at home (unknown dose, wife will be bringing in meds during the day)  -Will give Lantus 16 u in the AM (100 kg x0.2 x80%) pending med rec  -Start low-dose insulin sliding scale and FS checks QAC TID and QHS

## 2024-11-01 NOTE — SWALLOW BEDSIDE ASSESSMENT ADULT - ASR SWALLOW RECOMMEND DIAG
bjective testing not warranted given no overt clinical s/s impaired airway protection at bedside / no chest imaging available for review

## 2024-11-01 NOTE — DIETITIAN INITIAL EVALUATION ADULT - ORAL INTAKE PTA/DIET HISTORY
Patient seen for assessment. Reports good appetite at home. Doesn't follow a diet or watch carbohydrate intake. Lives w/ wife. Noted to be on Victoza and Basaglar w/ A1c 9.7% per chart.

## 2024-11-01 NOTE — DIETITIAN INITIAL EVALUATION ADULT - PROBLEM SELECTOR PLAN 5
-Per chart review, has been on allopurinol in the past  -No S/S of flare at this time  -Will continue allopurinol 100 mg daily, please clarify whether pt is still on medication in AM

## 2024-11-01 NOTE — DIETITIAN INITIAL EVALUATION ADULT - PROBLEM SELECTOR PLAN 1
Pt with acute change in mental status, manifesting with memory loss and confusion.  -Broad differential: Acute CVA vs. recrudescence of old stroke vs. meningitis/encephalitis (including autoimmune) vs. UTI vs. polypharmacy vs. transient global amnesia vs. paraneoplastic process  -CTH negative for acute intracranial process, does show chronic right frontal infarct which extends to the ipsilateral basal ganglia  -UA, RVP, CXR negative  -No meningeal signs, no fever or leukocytosis, low suspicion for meningoencephalitis, but in absence of other causes would obtain LP  [] f/u syphilis, HIV, vitamin B12, folate, ammonia, TSH  [] obtain MRI brain w/wo contrast  [] Neurology consulted, recs pending  [] monitor mental status  [] aspiration precautions, fall risk protocol

## 2024-11-01 NOTE — PHYSICAL THERAPY INITIAL EVALUATION ADULT - GENERAL OBSERVATIONS, REHAB EVAL
patient found semi reclined in bed; VEEG attached but as per Tasia Jeff MD, via TEAMS and in orders, patient may participate with EEG leads on and patient may participate in PT patient found semi reclined in bed; VEEG attached but as per Tsaia Jeff MD, via TEAMS and in orders, patient may participate with EEG leads on and patient may participate in PT ; right eye with right sided field cut; patient with mild expressive aphasia. Patient found semi reclined in bed; VEEG attached but as per Tasia Jeff MD, via TEAMS and in orders, patient may participate with EEG leads on and patient may participate in PT ; patient offers no complaints; right eye with right sided field cut; right hand thumb finger opposition with slow margo.

## 2024-11-01 NOTE — PROGRESS NOTE ADULT - SUBJECTIVE AND OBJECTIVE BOX
DATE OF SERVICE: 11-01-24    Patient denies chest pain or shortness of breath.   Review of symptoms otherwise negative.    MEDICATIONS:  allopurinol 200 milliGRAM(s) Oral daily  apixaban 5 milliGRAM(s) Oral two times a day  aspirin enteric coated 81 milliGRAM(s) Oral daily  atorvastatin 40 milliGRAM(s) Oral at bedtime  carvedilol 12.5 milliGRAM(s) Oral every 12 hours  dextrose 5%. 1000 milliLiter(s) IV Continuous <Continuous>  dextrose 5%. 1000 milliLiter(s) IV Continuous <Continuous>  dextrose 50% Injectable 25 Gram(s) IV Push once  dextrose 50% Injectable 25 Gram(s) IV Push once  dextrose 50% Injectable 12.5 Gram(s) IV Push once  dextrose Oral Gel 15 Gram(s) Oral once PRN  gabapentin 300 milliGRAM(s) Oral two times a day  glucagon  Injectable 1 milliGRAM(s) IntraMuscular once  influenza  Vaccine (HIGH DOSE) 0.5 milliLiter(s) IntraMuscular once  insulin glargine Injectable (LANTUS) 16 Unit(s) SubCutaneous every morning  insulin lispro (ADMELOG) corrective regimen sliding scale   SubCutaneous at bedtime  insulin lispro (ADMELOG) corrective regimen sliding scale   SubCutaneous three times a day before meals  losartan 25 milliGRAM(s) Oral daily  thiamine IVPB 500 milliGRAM(s) IV Intermittent every 8 hours      LABS:                        14.2   6.25  )-----------( 204      ( 01 Nov 2024 07:22 )             44.0       Hemoglobin: 14.2 g/dL (11-01 @ 07:22)  Hemoglobin: 14.6 g/dL (10-30 @ 20:30)      11-01    141  |  106  |  22  ----------------------------<  145[H]  3.7   |  20[L]  |  1.47[H]    Ca    8.9      01 Nov 2024 07:22  Phos  3.3     11-01  Mg     2.10     11-01    TPro  6.7  /  Alb  3.8  /  TBili  0.4  /  DBili  x   /  AST  18  /  ALT  20  /  AlkPhos  73  11-01    Creatinine Trend: 1.47<--, 1.69<--    COAGS:           PHYSICAL EXAM:  T(C): 36.3 (11-01-24 @ 05:08), Max: 36.6 (10-31-24 @ 21:45)  HR: 63 (11-01-24 @ 05:08) (63 - 69)  BP: 158/94 (11-01-24 @ 05:08) (147/102 - 158/94)  RR: 17 (11-01-24 @ 05:08) (17 - 19)  SpO2: 98% (11-01-24 @ 05:08) (98% - 99%)  Wt(kg): --    I&O's Summary    Gen: Appears well in NAD  HEENT:  (-)icterus (-)pallor  CV: N S1 S2 1/6 BRENDA (+)2 Pulses B/l  Resp:  Clear to ausculatation B/L, normal effort  GI: (+) BS Soft, NT, ND  Lymph:  (-)Edema, (-)obvious lymphadenopathy  Skin: Warm to touch, Normal turgor  Psych: Appropriate mood and affect    DATA    < from: CT Head No Cont (10.30.24 @ 21:45) >    IMPRESSION:  No acute intracranial hemorrhage, mass effect, or midline shift.    < end of copied text >      ECHO 9/24/24  Mild segmental LV dysfunction.  Doppler evidence of grade I (impaired) diastolic  dysfunction.  Calculated EF 49%.  Left atrial cavity is mildly dilated.    NST 9/27/24  Conclusions: Normal study  Normal myocardial perfusion SPECT images No evidence of stress induced ischemia or infarction.  Normal left ventricular size and function.  Calculated EF is: 60%       < from: MR Head No Cont (10.31.24 @ 16:03) >  IMPRESSION:    Multiple small acute infarcts in the left occipital lobe and medial   temporal lobe. No acute intracranial hemorrhage identified.    Multiple chronic infarcts.    < end of copied text >    < from: CT Abdomen and Pelvis w/ IV Cont (11.01.24 @ 11:25) >  IMPRESSION:  No CT evidence of malignancy in the chest, abdomen or pelvis.    < end of copied text >        ASSESSMENT/PLAN: 	68 y/o M with past medical history of HTN, type 2 DM (on insulin), gout, CVA 6/2023 s/p loop recorder which found PAFib, on Eliquis since (recently lowered to 2.5 BID by Nephrologist Dr Rodriguez, recent creatinine 2.05 earlier this month)  presenting for acute change in mental status x 3 days. (PMD Dr Dutton, Cardiologist Dr Vernon Salinas)    -- MRI brain with multiple acute infarcts  -- CT CAP No CT evidence of malignancy in the chest, abdomen or pelvis.  -- recent TTE and NST noted above  -- cont Coreg and Losartan for HTN  -- Cont Eliquis for PAF  -- Cont ASA and Statin for hx CVA    Zeinab Marks MD  Pager: [phone]

## 2024-11-01 NOTE — DIETITIAN INITIAL EVALUATION ADULT - OTHER INFO
69 year old male with a PMH of hypertension, diabetes, gout, CVA, CHF presenting for acute change in mental status x 3 days with right sided hemianopsia noted on physical exam, c/f recurrent stroke per chart.    Patient seen during breakfast and consumed 75% of meal. No GI distress reported. Currently pending swallow evaluation. Has no food allergies. UBW is around 240 lbs. and states no weight loss prior to admission. Per HIE, noted w/ weights 112 kg (9/12) and 117.9 kg (11/7/23). ABW is 103.2 kg (10/31) per chart. Bed scale stating weight is 110 kg (11/1), which is more consistent w/ patients UBW. Rec obtain new weight for verification. No edema or pressure injuries noted per RN flow sheet.    Provided pt with handout Carbohydrate Counting for People with Diabetes. Discussed carbohydrate sources, carbohydrate portions, protein sources, mixed meals, and nutrition label reading. Stressed importance of balanced meals with adequate protein and fiber. Pt was informed of current A1c, goal A1c.

## 2024-11-01 NOTE — SWALLOW BEDSIDE ASSESSMENT ADULT - SWALLOW EVAL: DIAGNOSIS
Functional oral stage for regular solids and thin liquids characterized by adequate oral retrieval, functional mastication of solid, adequate bolus collection, anterior posterior transfer, and oral clearance. Functional pharyngeal stage suspected for the above noted consistencies characterized by swallow initiation & hyolaryngeal excursion upon digital palpation and no overt clinical s/s airway penetration/aspiration.

## 2024-11-01 NOTE — SWALLOW BEDSIDE ASSESSMENT ADULT - COMMENTS
Adult Internal Medicine 11/1: 70 y/o M with past medical history of hypertension, diabetes, gout, CVAx3, CHF?, presenting for acute change in mental status x 3 days with right sided hemianopsia noted on physical exam, c/f recurrent stroke.    MRI Brain 10/31: IMPRESSION: Multiple small acute infarcts in the left occipital lobe and medial temporal lobe. No acute intracranial hemorrhage identified. Multiple chronic infarcts.    No chest imaging this admission.     Patient received awake/alert, positioned upright in bed, wife at bedside. Patient denies difficulty chewing/swallowing, and states he has been eating/drinking regular consistency trays that he is receiving.

## 2024-11-01 NOTE — PROGRESS NOTE ADULT - PROBLEM SELECTOR PLAN 1
-most likely 2/2 ischemic stroke vs cardioembolic stroke. Differential includes CVA vs. recrudescence of old stroke. Lower suspicion for infectious etiology; however, still possible. Less likely meningoencephalitis/meningitis. No nuchal rigidity, Brudzinski -, Kernig -, no fever  -CTH negative for acute intracranial process, does show chronic right frontal infarct which extends to the ipsilateral basal ganglia  -UA, RVP, CXR negative, thyroid function testing normal  -Consider LP; ask neuro when to hold Eliquis for procedure   [] f/u syphilis, HIV, B1, B6, B12, vitamin D-25-OH, ammonia, ESR, CRP, etc   [] F/u MR brain   [] Neuro following, appreciate recs -most likely d/t cardioembolic stroke given findings on MRI. Less likely infectious, vitamin deficiency and/or paraneoplastic. Also unlikely metabolic and inflammatory.  -MRI 10/31-Multiple small acute infarcts in the left occipital lobe and medial temporal lobe. No acute intracranial hemorrhage identified.  -CT Abdomen/Pelvis 11/1 w/ no CT evidence of malignancy in the chest, abdomen or pelvis.  -syphilis negative, vitamin B12 WNL, CRP and ESR normal  -recent TTE showing ECHO 9/24/24 Mild segmental LV dysfunction. Doppler evidence of grade I (impaired) diastolic  dysfunction. Calculated EF 49%. NST 9/27/24-Conclusions: Normal study. Normal myocardial perfusion SPECT images No evidence of stress induced ischemia or infarction.    [] F/u neuro recs   [ ] q4 neuro checks  [ ] Lipid panel in the AM  [ ] PT/OT eval placed  [ ] CTA head and neck   [ ] Lipitor 80 mg; titrate ldl <70 -most likely d/t cardioembolic stroke given findings on MRI. Less likely infectious, vitamin deficiency and/or paraneoplastic. Also unlikely metabolic and inflammatory.  -MRI 10/31-Multiple small acute infarcts in the left occipital lobe and medial temporal lobe. No acute intracranial hemorrhage identified.  -CT Abdomen/Pelvis 11/1 w/ no CT evidence of malignancy in the chest, abdomen or pelvis.  -syphilis negative, vitamin B12 WNL, CRP and ESR normal  -recent TTE showing ECHO 9/24/24 Mild segmental LV dysfunction. Doppler evidence of grade I (impaired) diastolic  dysfunction. Calculated EF 49%. NST 9/27/24-Conclusions: Normal study. Normal myocardial perfusion SPECT images No evidence of stress induced ischemia or infarction.    [] F/u neuro recs   [ ] q4 neuro checks  [ ] Lipid panel in the AM  [ ] PT/OT eval placed -> rec cognitive rehab  [ ] CTA head and neck   [ ] Lipitor 80 mg; titrate ldl <70

## 2024-11-01 NOTE — PROGRESS NOTE ADULT - SUBJECTIVE AND OBJECTIVE BOX
Neurology - Progress Note    -  Spectra: [phone] ([facility]), [phone] ([facility])  -    Interval History:       Review of Systems:    All other review of systems is negative unless indicated above.    Allergies:  No Known Allergies      PMHx/PSHx/Family Hx: As above, otherwise see below   DM (diabetes mellitus)    HTN (hypertension)    HLD (hyperlipidemia)    Obese    Mild HTN    HTN (hypertension)    Gout    CVA (cerebrovascular accident)        Social Hx:  No current use of tobacco, alcohol, or illicit drugs      Medications:  MEDICATIONS  (STANDING):  allopurinol 200 milliGRAM(s) Oral daily  apixaban 5 milliGRAM(s) Oral two times a day  aspirin enteric coated 81 milliGRAM(s) Oral daily  atorvastatin 40 milliGRAM(s) Oral at bedtime  carvedilol 12.5 milliGRAM(s) Oral every 12 hours  dextrose 5%. 1000 milliLiter(s) (50 mL/Hr) IV Continuous <Continuous>  dextrose 5%. 1000 milliLiter(s) (100 mL/Hr) IV Continuous <Continuous>  dextrose 50% Injectable 25 Gram(s) IV Push once  dextrose 50% Injectable 25 Gram(s) IV Push once  dextrose 50% Injectable 12.5 Gram(s) IV Push once  gabapentin 300 milliGRAM(s) Oral two times a day  glucagon  Injectable 1 milliGRAM(s) IntraMuscular once  influenza  Vaccine (HIGH DOSE) 0.5 milliLiter(s) IntraMuscular once  insulin glargine Injectable (LANTUS) 16 Unit(s) SubCutaneous every morning  insulin lispro (ADMELOG) corrective regimen sliding scale   SubCutaneous three times a day before meals  insulin lispro (ADMELOG) corrective regimen sliding scale   SubCutaneous at bedtime  losartan 25 milliGRAM(s) Oral daily  thiamine IVPB 500 milliGRAM(s) IV Intermittent every 8 hours    MEDICATIONS  (PRN):  dextrose Oral Gel 15 Gram(s) Oral once PRN Blood Glucose LESS THAN 70 milliGRAM(s)/deciliter      Vitals:  T(C): 36.3 (11-01-24 @ 05:08), Max: 36.6 (10-31-24 @ 21:45)  HR: 63 (11-01-24 @ 05:08) (63 - 69)  BP: 158/94 (11-01-24 @ 05:08) (147/102 - 158/94)  RR: 17 (11-01-24 @ 05:08) (17 - 19)  SpO2: 98% (11-01-24 @ 05:08) (98% - 99%)    Physical Examination: INCOMPLETE  General - NAD  Cardiovascular - Peripheral pulses palpable, no edema  Eyes - Fundoscopy with flat, sharp optic discs and no hemorrhage or exudates; Fundoscopy not well visualized; Fundoscopy not performed due to safety precautions in the setting of the COVID-19 pandemic    Neurologic Exam:  Mental status - Awake, Alert, Oriented to person, place, and time. Speech fluent, repetition and naming intact. Follows simple and complex commands. Attention/concentration, recent and remote memory (including registration and recall), and fund of knowledge intact    Cranial nerves - PERRL, VFF, EOMI, face sensation (V1-V3) intact b/l, facial strength intact without asymmetry b/l, hearing intact b/l, palate with symmetric elevation, trapezius OR sternocleidomastiod 5/5 strength b/l, tongue midline on protrusion with full lateral movement    Motor - Normal bulk and tone throughout. No pronator drift.  Strength testing            Deltoid      Biceps      Triceps     Wrist Extension    Wrist Flexion     Interossei         R            5                 5               5                     5                              5                        5                 5  L             5                 5               5                     5                              5                        5                 5              Hip Flexion    Hip Extension    Knee Flexion    Knee Extension    Dorsiflexion    Plantar Flexion  R              5                           5                       5                           5                            5                          5  L              5                           5                        5                           5                            5                          5    Sensation - Light touch/temperature OR pain/vibration/ proprioception intact throughout    DTR's -             Biceps      Triceps     Brachioradialis      Patellar    Ankle    Toes/plantar response  R             2+             2+                  2+                       2+            2+                 Down  L              2+             2+                 2+                        2+           2+                 Down    Coordination - Finger to Nose and Heel Galvez intact b/l. No tremors appreciated    Gait and station - Normal casual gait. Romberg (-)    Labs:                        14.2   6.25  )-----------( 204      ( 01 Nov 2024 07:22 )             44.0     11-01    141  |  106  |  22  ----------------------------<  145[H]  3.7   |  20[L]  |  1.47[H]    Ca    8.9      01 Nov 2024 07:22  Phos  3.3     11-01  Mg     2.10     11-01    TPro  6.7  /  Alb  3.8  /  TBili  0.4  /  DBili  x   /  AST  18  /  ALT  20  /  AlkPhos  73  11-01    CAPILLARY BLOOD GLUCOSE      POCT Blood Glucose.: 196 mg/dL (01 Nov 2024 12:05)    LIVER FUNCTIONS - ( 01 Nov 2024 07:22 )  Alb: 3.8 g/dL / Pro: 6.7 g/dL / ALK PHOS: 73 U/L / ALT: 20 U/L / AST: 18 U/L / GGT: x             Culture - Urine (collected 31 Oct 2024 02:10)  Source: Clean Catch Clean Catch (Midstream)  Final Report (01 Nov 2024 07:35):    <10,000 CFU/mL Normal Urogenital Farzaneh        CSF:                  Radiology:  MR Head No Cont:  (31 Oct 2024 16:03)  CT Head No Cont:  (30 Oct 2024 21:45)     Neurology - Progress Note    -  Spectra: [phone] ([facility]), [phone] ([facility])  -    Interval History:       Review of Systems:    All other review of systems is negative unless indicated above.    Allergies:  No Known Allergies      PMHx/PSHx/Family Hx: As above, otherwise see below   DM (diabetes mellitus)    HTN (hypertension)    HLD (hyperlipidemia)    Obese    Mild HTN    HTN (hypertension)    Gout    CVA (cerebrovascular accident)        Social Hx:  No current use of tobacco, alcohol, or illicit drugs      Medications:  MEDICATIONS  (STANDING):  allopurinol 200 milliGRAM(s) Oral daily  apixaban 5 milliGRAM(s) Oral two times a day  aspirin enteric coated 81 milliGRAM(s) Oral daily  atorvastatin 40 milliGRAM(s) Oral at bedtime  carvedilol 12.5 milliGRAM(s) Oral every 12 hours  dextrose 5%. 1000 milliLiter(s) (50 mL/Hr) IV Continuous <Continuous>  dextrose 5%. 1000 milliLiter(s) (100 mL/Hr) IV Continuous <Continuous>  dextrose 50% Injectable 25 Gram(s) IV Push once  dextrose 50% Injectable 25 Gram(s) IV Push once  dextrose 50% Injectable 12.5 Gram(s) IV Push once  gabapentin 300 milliGRAM(s) Oral two times a day  glucagon  Injectable 1 milliGRAM(s) IntraMuscular once  influenza  Vaccine (HIGH DOSE) 0.5 milliLiter(s) IntraMuscular once  insulin glargine Injectable (LANTUS) 16 Unit(s) SubCutaneous every morning  insulin lispro (ADMELOG) corrective regimen sliding scale   SubCutaneous three times a day before meals  insulin lispro (ADMELOG) corrective regimen sliding scale   SubCutaneous at bedtime  losartan 25 milliGRAM(s) Oral daily  thiamine IVPB 500 milliGRAM(s) IV Intermittent every 8 hours    MEDICATIONS  (PRN):  dextrose Oral Gel 15 Gram(s) Oral once PRN Blood Glucose LESS THAN 70 milliGRAM(s)/deciliter      Vitals:  T(C): 36.3 (11-01-24 @ 05:08), Max: 36.6 (10-31-24 @ 21:45)  HR: 63 (11-01-24 @ 05:08) (63 - 69)  BP: 158/94 (11-01-24 @ 05:08) (147/102 - 158/94)  RR: 17 (11-01-24 @ 05:08) (17 - 19)  SpO2: 98% (11-01-24 @ 05:08) (98% - 99%)    Physical Examination:  General - NAD      Neurologic Exam:  Mental status - Awake, Alert, speech fluent and clear, following commands    Cranial nerves -  RHH, EOMI, facial strength intact without asymmetry b/l    Motor -   Strength testing    Moving all extremities antigravity and symmetrically     Coordination -  No tremors appreciated    Gait and station - Normal casual gait    Labs:                        14.2   6.25  )-----------( 204      ( 01 Nov 2024 07:22 )             44.0     11-01    141  |  106  |  22  ----------------------------<  145[H]  3.7   |  20[L]  |  1.47[H]    Ca    8.9      01 Nov 2024 07:22  Phos  3.3     11-01  Mg     2.10     11-01    TPro  6.7  /  Alb  3.8  /  TBili  0.4  /  DBili  x   /  AST  18  /  ALT  20  /  AlkPhos  73  11-01    CAPILLARY BLOOD GLUCOSE      POCT Blood Glucose.: 196 mg/dL (01 Nov 2024 12:05)    LIVER FUNCTIONS - ( 01 Nov 2024 07:22 )  Alb: 3.8 g/dL / Pro: 6.7 g/dL / ALK PHOS: 73 U/L / ALT: 20 U/L / AST: 18 U/L / GGT: x             Culture - Urine (collected 31 Oct 2024 02:10)  Source: Clean Catch Clean Catch (Midstream)  Final Report (01 Nov 2024 07:35):    <10,000 CFU/mL Normal Urogenital Farzaneh            Radiology:  MR Head No Cont:  (31 Oct 2024 16:03)  < from: MR Head No Cont (10.31.24 @ 16:03) >  Multiple small acute infarcts in the left occipital lobe and medial   temporal lobe. No acute intracranial hemorrhage identified.

## 2024-11-01 NOTE — CONSULT NOTE ADULT - PROBLEM/RECOMMENDATION-3
Outreach attempt was made to schedule a Medicare Wellness Visit. This was the first attempt. Contact was made, [facility] appointment refused.     Patient will like to schedule closer to due date.  
DISPLAY PLAN FREE TEXT
DISPLAY PLAN FREE TEXT

## 2024-11-01 NOTE — DIETITIAN INITIAL EVALUATION ADULT - PERTINENT MEDS FT
MEDICATIONS  (STANDING):  allopurinol 200 milliGRAM(s) Oral daily  apixaban 5 milliGRAM(s) Oral two times a day  aspirin enteric coated 81 milliGRAM(s) Oral daily  atorvastatin 40 milliGRAM(s) Oral at bedtime  carvedilol 12.5 milliGRAM(s) Oral every 12 hours  dextrose 5%. 1000 milliLiter(s) (50 mL/Hr) IV Continuous <Continuous>  dextrose 5%. 1000 milliLiter(s) (100 mL/Hr) IV Continuous <Continuous>  dextrose 50% Injectable 25 Gram(s) IV Push once  dextrose 50% Injectable 12.5 Gram(s) IV Push once  dextrose 50% Injectable 25 Gram(s) IV Push once  gabapentin 300 milliGRAM(s) Oral two times a day  glucagon  Injectable 1 milliGRAM(s) IntraMuscular once  influenza  Vaccine (HIGH DOSE) 0.5 milliLiter(s) IntraMuscular once  insulin glargine Injectable (LANTUS) 16 Unit(s) SubCutaneous every morning  insulin lispro (ADMELOG) corrective regimen sliding scale   SubCutaneous three times a day before meals  insulin lispro (ADMELOG) corrective regimen sliding scale   SubCutaneous at bedtime  losartan 25 milliGRAM(s) Oral daily  thiamine IVPB 500 milliGRAM(s) IV Intermittent every 8 hours    MEDICATIONS  (PRN):  dextrose Oral Gel 15 Gram(s) Oral once PRN Blood Glucose LESS THAN 70 milliGRAM(s)/deciliter

## 2024-11-01 NOTE — PROGRESS NOTE ADULT - PROBLEM SELECTOR PLAN 8
Diet: Consistent carb  DVT ppx: Eliquis 5 BID (prescribed AC as outpatient for paroxysmal afib  Dispo: pending workup and clinical improvement Diet: Consistent carb  DVT ppx: Eliquis 5 BID (prescribed AC as outpatient for paroxysmal afib)  Dispo: pending workup and clinical improvement, potentially home over weekend

## 2024-11-01 NOTE — EEG REPORT - NS EEG TEXT BOX
[facility]ERT GEIG[facility]-[id number]     Study Date: 11-01-24 14:40 to 11-01-24 16:07  Duration: 1 hr 15 min    --------------------------------------------------------------------------------------------------  History:  CC/ HPI Patient is a 69y old  Male who presents with a chief complaint of Amnesia (01 Nov 2024 12:31)    MEDICATIONS  (STANDING):  allopurinol 200 milliGRAM(s) Oral daily  apixaban 5 milliGRAM(s) Oral two times a day  aspirin enteric coated 81 milliGRAM(s) Oral daily  atorvastatin 80 milliGRAM(s) Oral at bedtime  carvedilol 12.5 milliGRAM(s) Oral every 12 hours  dextrose 5%. 1000 milliLiter(s) (100 mL/Hr) IV Continuous <Continuous>  dextrose 5%. 1000 milliLiter(s) (50 mL/Hr) IV Continuous <Continuous>  dextrose 50% Injectable 25 Gram(s) IV Push once  dextrose 50% Injectable 12.5 Gram(s) IV Push once  dextrose 50% Injectable 25 Gram(s) IV Push once  gabapentin 300 milliGRAM(s) Oral two times a day  glucagon  Injectable 1 milliGRAM(s) IntraMuscular once  influenza  Vaccine (HIGH DOSE) 0.5 milliLiter(s) IntraMuscular once  insulin glargine Injectable (LANTUS) 16 Unit(s) SubCutaneous every morning  insulin lispro (ADMELOG) corrective regimen sliding scale   SubCutaneous at bedtime  insulin lispro (ADMELOG) corrective regimen sliding scale   SubCutaneous three times a day before meals  losartan 25 milliGRAM(s) Oral daily  thiamine IVPB 500 milliGRAM(s) IV Intermittent every 8 hours    --------------------------------------------------------------------------------------------------  Study Interpretation:    [[[Abbreviation Key:  PDR=alpha rhythm/posterior dominant rhythm. A-P=anterior posterior gradient.  Amplitude: ‘very low’:<20; ‘low’:20-50; ‘medium’:; ‘high’:>200uV.  Persistence for periodic/rhythmic patterns (% of epoch) ‘rare’:<1%; ‘occasional’:1-10%; ‘frequent’:10-50%; ‘abundant’:50-90%; ‘continuous’:>90%.  Persistence for sporadic discharges: ‘rare’:<1/hr; ‘occasional’:1/min-1/hr; ‘frequent’:>1/min; ‘abundant’:>1/10 sec.  GRDA=generalized rhythmic delta activity; FIRDA=frontal intermittent GRDA; LRDA=lateralized rhythmic delta activity; TIRDA=temporal intermittent rhythmic delta activity;  LPD=PLED=lateralized periodic discharges; GPD=generalized periodic discharges; BiPDs=BiPLEDs=bilateral independent periodic epileptiform discharges; SIRPID=stimulus induced rhythmic, periodic, or ictal appearing discharges; BIRDs=brief potentially ictal rhythmic discharges >4 Hz, lasting .5-10s; PFA=paroxysmal bursts of beta/gamma; LVFA=low voltage fast activity.  Modifiers: +F=with fast component; +S=with spike component; +R=with rhythmic component.  S-B=burst suppression pattern.  Max=maximal. N1-drowsy; N2-stage II sleep; N3-slow wave sleep. SSS/BETS=small sharp spikes/benign epileptiform transients of sleep. HV=hyperventilation; PS=photic stimulation]]]    FINDINGS:      Background:  Continuity: Continuous  Symmetry: Symmetric  PDR: 9 Hz activity, with amplitude to 40 uV, that attenuated to eye opening.    Reactivity: Present  Voltage: Normal (between 20-150uV)  Anterior Posterior Gradient: Present  Other background findings: None  Breach: Absent    Background Slowing:  Generalized slowing: None was present.  Focal slowing: None was present.    State Changes:   -Drowsiness noted with increased slowing of the background, attenuation of fast activity, vertex transients.  -N2 sleep transients were not recorded.    Sporadic Epileptiform Discharges:    None    Rhythmic and Periodic Patterns (RPPs):  None     Electrographic and Electroclinical seizures:  None    Other Clinical Events:  None    Activation Procedures:   -Hyperventilation was not performed.    -Photic stimulation was not performed.     Artifacts:  Intermittent myogenic and movement artifacts were noted.    ECG:  The heart rate on single channel ECG was predominantly between 60 - 70 BPM.    EEG Classification / Summary:  Normal EEG study in the awake / drowsy states    -----------------------------------------------------------------------------------------------------    Clinical Impression:  Normal EEG study  There were no seizures or epileptiform abnormalities recorded.      This is a preliminary impression still pending attendings attestation.     -------------------------------------------------------------------------------------------------------  EEG reading room: [phone]    After-hours epilepsy service: [phone]      Kaleb Solano DO  Epilepsy Fellow   ROBERT GEIGER [facility]-[id number]     Study Date: 11-01-24 14:40 to 11-01-24 16:07  Duration: 1 hr 15 min    --------------------------------------------------------------------------------------------------  History:  CC/ HPI Patient is a 69y old  Male who presents with a chief complaint of Amnesia (01 Nov 2024 12:31)    MEDICATIONS  (STANDING):  allopurinol 200 milliGRAM(s) Oral daily  apixaban 5 milliGRAM(s) Oral two times a day  aspirin enteric coated 81 milliGRAM(s) Oral daily  atorvastatin 80 milliGRAM(s) Oral at bedtime  carvedilol 12.5 milliGRAM(s) Oral every 12 hours  dextrose 5%. 1000 milliLiter(s) (100 mL/Hr) IV Continuous <Continuous>  dextrose 5%. 1000 milliLiter(s) (50 mL/Hr) IV Continuous <Continuous>  dextrose 50% Injectable 25 Gram(s) IV Push once  dextrose 50% Injectable 12.5 Gram(s) IV Push once  dextrose 50% Injectable 25 Gram(s) IV Push once  gabapentin 300 milliGRAM(s) Oral two times a day  glucagon  Injectable 1 milliGRAM(s) IntraMuscular once  influenza  Vaccine (HIGH DOSE) 0.5 milliLiter(s) IntraMuscular once  insulin glargine Injectable (LANTUS) 16 Unit(s) SubCutaneous every morning  insulin lispro (ADMELOG) corrective regimen sliding scale   SubCutaneous at bedtime  insulin lispro (ADMELOG) corrective regimen sliding scale   SubCutaneous three times a day before meals  losartan 25 milliGRAM(s) Oral daily  thiamine IVPB 500 milliGRAM(s) IV Intermittent every 8 hours    --------------------------------------------------------------------------------------------------  Study Interpretation:    [[[Abbreviation Key:  PDR=alpha rhythm/posterior dominant rhythm. A-P=anterior posterior gradient.  Amplitude: ‘very low’:<20; ‘low’:20-50; ‘medium’:; ‘high’:>200uV.  Persistence for periodic/rhythmic patterns (% of epoch) ‘rare’:<1%; ‘occasional’:1-10%; ‘frequent’:10-50%; ‘abundant’:50-90%; ‘continuous’:>90%.  Persistence for sporadic discharges: ‘rare’:<1/hr; ‘occasional’:1/min-1/hr; ‘frequent’:>1/min; ‘abundant’:>1/10 sec.  GRDA=generalized rhythmic delta activity; FIRDA=frontal intermittent GRDA; LRDA=lateralized rhythmic delta activity; TIRDA=temporal intermittent rhythmic delta activity;  LPD=PLED=lateralized periodic discharges; GPD=generalized periodic discharges; BiPDs=BiPLEDs=bilateral independent periodic epileptiform discharges; SIRPID=stimulus induced rhythmic, periodic, or ictal appearing discharges; BIRDs=brief potentially ictal rhythmic discharges >4 Hz, lasting .5-10s; PFA=paroxysmal bursts of beta/gamma; LVFA=low voltage fast activity.  Modifiers: +F=with fast component; +S=with spike component; +R=with rhythmic component.  S-B=burst suppression pattern.  Max=maximal. N1-drowsy; N2-stage II sleep; N3-slow wave sleep. SSS/BETS=small sharp spikes/benign epileptiform transients of sleep. HV=hyperventilation; PS=photic stimulation]]]    FINDINGS:      Background:  Continuity: Continuous  Symmetry: Symmetric  PDR: 9 Hz activity, with amplitude to 40 uV, that attenuated to eye opening.    Reactivity: Present  Voltage: Normal (between 20-150uV)  Anterior Posterior Gradient: Present  Other background findings: None  Breach: Absent    Background Slowing:  Generalized slowing: None was present.  Focal slowing: None was present.    State Changes:   -Drowsiness noted with increased slowing of the background, attenuation of fast activity, vertex transients.  -N2 sleep transients were not recorded.    Sporadic Epileptiform Discharges:    None    Rhythmic and Periodic Patterns (RPPs):  None     Electrographic and Electroclinical seizures:  None    Other Clinical Events:  None    Activation Procedures:   -Hyperventilation was not performed.    -Photic stimulation was not performed.     Artifacts:  Intermittent myogenic and movement artifacts were noted.    ECG:  The heart rate on single channel ECG was predominantly between 60 - 70 BPM.    EEG Classification / Summary:   Normal EEG study in the awake / drowsy states    -----------------------------------------------------------------------------------------------------    Clinical Impression:  Normal EEG study  There were no seizures or epileptiform abnormalities recorded.      This is a preliminary impression still pending attendings attestation.     -------------------------------------------------------------------------------------------------------  EEG reading room: [phone]    After-hours epilepsy service: [phone]      Kaleb Solano DO  Epilepsy Fellow

## 2024-11-01 NOTE — CONSULT NOTE ADULT - ASSESSMENT
68 y/o M with past medical history of HTN, type 2 DM (on insulin), gout, CVA 6/2023 s/p loop recorder which found PAFib, on Eliquis (recently lowered to 2.5 BID by Nephrologist Dr Rodriguez, recent creatinine 2.05 earlier this month)  presenting for acute change in mental status x 3 days. Wife reports frequent urination recently, otherwise no fever, chills, pain, SOB, palps, syncope. Currently being worked up for potential CVA.    #Poorly controlled T2DM  -HbA1c 9.7%  -Does have endocrinologist -last seen 3 weeks ago   -Home regimen: Basal insulin ? dose, ozempic 0.25mg weekly, [id number] daily and admelog 10-14units TID pre meals     PLAN  -Aim -180mg/dL  -C/W lantus 16 units pre bed   -C/W admelog correction scale TID pre meals and pre bed for now -as BGS mainly in target. If BGs start to rise >180, then can start 5 units TID of admelog pre meals  -Carb consistent diet   -Hypoglycemia protocol     DC plan  -Increase jardiance to 25mg daily, increase ozempic to 0.5mg weekly, + basal bolus (doses to be determined based on insulin requirement)  -Pt will follow up with private endo on discharge     #HTN  Manage as per primary team  Urinary ACR as out pt    #HLD  -Check lipids if not done recently   -Aim LDL <70  70 y/o M with past medical history of HTN, type 2 DM (on insulin), gout, CVA 6/2023 s/p loop recorder which found PAFib, on Eliquis (recently lowered to 2.5 BID by Nephrologist Dr Rodriguez, recent creatinine 2.05 earlier this month)  presenting for acute change in mental status x 3 days. Wife reports frequent urination recently, otherwise no fever, chills, pain, SOB, palps, syncope. Currently being worked up for potential CVA.    D/W provider    #Poorly controlled T2DM  -HbA1c 9.7%  -Does have endocrinologist -last seen 3 weeks ago   -Home regimen: Basal insulin ? dose, ozempic 0.25mg weekly, [id number] daily and admelog 10-14units TID pre meals     PLAN  -Aim -180mg/dL  -C/W lantus 16 units pre bed   -C/W admelog correction scale TID pre meals and pre bed for now -as BGS mainly in target. If BGs start to rise >180, then can start 5 units TID of admelog pre meals  -Carb consistent diet   -Hypoglycemia protocol     DC plan  -Increase jardiance to 25mg daily, increase ozempic to 0.5mg weekly, + basal bolus (doses to be determined based on insulin requirement)  -Pt will follow up with private endo on discharge     #HTN  Manage as per primary team  Urinary ACR as out pt    #HLD  -Check lipids if not done recently   -Aim LDL <70

## 2024-11-01 NOTE — PHYSICAL THERAPY INITIAL EVALUATION ADULT - NSPTDISCHREC_GEN_A_CORE
Cognitive rehab; Patient to be seen during this hospital stay to attain all PT goals./Outpatient PT Cognitive rehab for memory impairments; Patient to be seen during this hospital stay to attain all PT goals./Outpatient PT

## 2024-11-01 NOTE — DIETITIAN INITIAL EVALUATION ADULT - PROBLEM SELECTOR PLAN 4
-UA with protein  -Cr 1.69  on admission (unknown baseline)   [] obtain outpatient records  [] monitor Cr and urine output  [] avoid NSAIDs and nephrotoxic agents  [] renally dose meds

## 2024-11-01 NOTE — PROGRESS NOTE ADULT - SUBJECTIVE AND OBJECTIVE BOX
PROGRESS NOTE:   Authored by Dr. Tasia Jeff    Patient is a 69y old  Male who presents with a chief complaint of Amnesia (31 Oct 2024 15:15)      SUBJECTIVE / OVERNIGHT EVENTS:  No acute events overnight.     MEDICATIONS  (STANDING):  allopurinol 200 milliGRAM(s) Oral daily  apixaban 5 milliGRAM(s) Oral two times a day  aspirin enteric coated 81 milliGRAM(s) Oral daily  atorvastatin 40 milliGRAM(s) Oral at bedtime  carvedilol 12.5 milliGRAM(s) Oral every 12 hours  dextrose 5%. 1000 milliLiter(s) (100 mL/Hr) IV Continuous <Continuous>  dextrose 5%. 1000 milliLiter(s) (50 mL/Hr) IV Continuous <Continuous>  dextrose 50% Injectable 25 Gram(s) IV Push once  dextrose 50% Injectable 25 Gram(s) IV Push once  dextrose 50% Injectable 12.5 Gram(s) IV Push once  gabapentin 300 milliGRAM(s) Oral two times a day  glucagon  Injectable 1 milliGRAM(s) IntraMuscular once  influenza  Vaccine (HIGH DOSE) 0.5 milliLiter(s) IntraMuscular once  insulin glargine Injectable (LANTUS) 16 Unit(s) SubCutaneous every morning  insulin lispro (ADMELOG) corrective regimen sliding scale   SubCutaneous at bedtime  insulin lispro (ADMELOG) corrective regimen sliding scale   SubCutaneous three times a day before meals  losartan 25 milliGRAM(s) Oral daily  thiamine IVPB 500 milliGRAM(s) IV Intermittent every 8 hours    MEDICATIONS  (PRN):  dextrose Oral Gel 15 Gram(s) Oral once PRN Blood Glucose LESS THAN 70 milliGRAM(s)/deciliter      CAPILLARY BLOOD GLUCOSE      POCT Blood Glucose.: 171 mg/dL (31 Oct 2024 21:48)  POCT Blood Glucose.: 158 mg/dL (31 Oct 2024 18:21)  POCT Blood Glucose.: 235 mg/dL (31 Oct 2024 15:22)  POCT Blood Glucose.: 193 mg/dL (31 Oct 2024 12:07)  POCT Blood Glucose.: 156 mg/dL (31 Oct 2024 08:25)    I&O's Summary      PHYSICAL EXAM:  Vital Signs Last 24 Hrs  T(C): 36.3 (2024 05:08), Max: 36.6 (31 Oct 2024 21:45)  T(F): 97.3 (2024 05:08), Max: 97.9 (31 Oct 2024 21:45)  HR: 63 (2024 05:08) (63 - 69)  BP: 158/94 (2024 05:08) (147/102 - 158/94)  BP(mean): --  RR: 17 (2024 05:08) (17 - 19)  SpO2: 98% (2024 05:08) (98% - 99%)    Parameters below as of 2024 05:08  Patient On (Oxygen Delivery Method): room air        CONSTITUTIONAL: NAD  HEET: MMM, EOMI, PERRLA  NECK: supple  RESPIRATORY: Normal respiratory effort; lungs are clear to auscultation bilaterally  CARDIOVASCULAR: Regular rate and rhythm, normal S1 and S2, no murmur/rub/gallop; No lower extremity edema; Peripheral pulses are 2+ bilaterally  ABDOMEN: Nontender to palpation, normoactive bowel sounds, no rebound/guarding; No hepatosplenomegaly  MUSCULOSKELETAL: no clubbing or cyanosis of digits; no joint swelling or tenderness to palpation  PSYCH: A+O to person, place, and time; affect appropriate  SKIN: No rash    LABS:                        14.6   7.92  )-----------( 226      ( 30 Oct 2024 20:30 )             44.8     10-30    142  |  107  |  24[H]  ----------------------------<  114[H]  4.1   |  25  |  1.69[H]    Ca    9.4      30 Oct 2024 20:30    TPro  7.6  /  Alb  4.3  /  TBili  0.4  /  DBili  x   /  AST  22  /  ALT  25  /  AlkPhos  85  10-30          Urinalysis Basic - ( 30 Oct 2024 20:30 )    Color: Yellow / Appearance: Clear / S.038 / pH: x  Gluc: 114 mg/dL / Ketone: Negative mg/dL  / Bili: Negative / Urobili: 0.2 mg/dL   Blood: x / Protein: 100 mg/dL / Nitrite: Negative   Leuk Esterase: Negative / RBC: 0 /HPF / WBC 0 /HPF   Sq Epi: x / Non Sq Epi: 0 /HPF / Bacteria: Negative /HPF          Tele Reviewed:    RADIOLOGY & ADDITIONAL TESTS:  Results Reviewed:   Imaging Personally Reviewed:  Electrocardiogram Personally Reviewed:     PROGRESS NOTE:   Authored by Dr. Tasia Jeff    Patient is a 69y old  Male who presents with a chief complaint of Amnesia (31 Oct 2024 15:15)      SUBJECTIVE / OVERNIGHT EVENTS:  No acute events overnight. Pt needs for things to be repeated multiple times. Was informed of CT head and MRI findings but soon forgot and repeatedly asked for the results. A couple of hours after initial interview, wife was at the bedside. Pt consented for CT to r/o possible paraneoplastic syndrome and malignancy.      MEDICATIONS  (STANDING):  allopurinol 200 milliGRAM(s) Oral daily  apixaban 5 milliGRAM(s) Oral two times a day  aspirin enteric coated 81 milliGRAM(s) Oral daily  atorvastatin 40 milliGRAM(s) Oral at bedtime  carvedilol 12.5 milliGRAM(s) Oral every 12 hours  dextrose 5%. 1000 milliLiter(s) (100 mL/Hr) IV Continuous <Continuous>  dextrose 5%. 1000 milliLiter(s) (50 mL/Hr) IV Continuous <Continuous>  dextrose 50% Injectable 25 Gram(s) IV Push once  dextrose 50% Injectable 25 Gram(s) IV Push once  dextrose 50% Injectable 12.5 Gram(s) IV Push once  gabapentin 300 milliGRAM(s) Oral two times a day  glucagon  Injectable 1 milliGRAM(s) IntraMuscular once  influenza  Vaccine (HIGH DOSE) 0.5 milliLiter(s) IntraMuscular once  insulin glargine Injectable (LANTUS) 16 Unit(s) SubCutaneous every morning  insulin lispro (ADMELOG) corrective regimen sliding scale   SubCutaneous at bedtime  insulin lispro (ADMELOG) corrective regimen sliding scale   SubCutaneous three times a day before meals  losartan 25 milliGRAM(s) Oral daily  thiamine IVPB 500 milliGRAM(s) IV Intermittent every 8 hours    MEDICATIONS  (PRN):  dextrose Oral Gel 15 Gram(s) Oral once PRN Blood Glucose LESS THAN 70 milliGRAM(s)/deciliter      CAPILLARY BLOOD GLUCOSE      POCT Blood Glucose.: 171 mg/dL (31 Oct 2024 21:48)  POCT Blood Glucose.: 158 mg/dL (31 Oct 2024 18:21)  POCT Blood Glucose.: 235 mg/dL (31 Oct 2024 15:22)  POCT Blood Glucose.: 193 mg/dL (31 Oct 2024 12:07)  POCT Blood Glucose.: 156 mg/dL (31 Oct 2024 08:25)    I&O's Summary      PHYSICAL EXAM:  Vital Signs Last 24 Hrs  T(C): 36.3 (2024 05:08), Max: 36.6 (31 Oct 2024 21:45)  T(F): 97.3 (2024 05:08), Max: 97.9 (31 Oct 2024 21:45)  HR: 63 (2024 05:08) (63 - 69)  BP: 158/94 (2024 05:08) (147/102 - 158/94)  BP(mean): --  RR: 17 (2024 05:08) (17 - 19)  SpO2: 98% (2024 05:08) (98% - 99%)    Parameters below as of 2024 05:08  Patient On (Oxygen Delivery Method): room air        GENERAL: NAD, lying in bed comfortably  EYES: EOMI, PERRL, conjunctiva and sclera clear.   NECK: Supple, full ROM   HEART: Regular rate and rhythm, S1 and S2, no murmurs, rubs, or gallops. No LE edema b/l.  LUNGS: Unlabored respirations. Clear to auscultation bilaterally. No crackles, wheezing, or rhonchi.  ABDOMEN: Soft, nontender, nondistended, +BS. Umbilical hernia. Non-reducible  EXTREMITIES: 2+ peripheral pulses bilaterally. No clubbing, cyanosis, or edema.  NERVOUS SYSTEM: A&O to person and place, not to time. Hemianopsia appears to be improved in left eye. Same as previous exam in right eye.  PSYCHIATRIC: Calm. Affect normal.      LABS:                        14.6   7.92  )-----------( 226      ( 30 Oct 2024 20:30 )             44.8     10-30    142  |  107  |  24[H]  ----------------------------<  114[H]  4.1   |  25  |  1.69[H]    Ca    9.4      30 Oct 2024 20:30    TPro  7.6  /  Alb  4.3  /  TBili  0.4  /  DBili  x   /  AST  22  /  ALT  25  /  AlkPhos  85  10-30          Urinalysis Basic - ( 30 Oct 2024 20:30 )    Color: Yellow / Appearance: Clear / S.038 / pH: x  Gluc: 114 mg/dL / Ketone: Negative mg/dL  / Bili: Negative / Urobili: 0.2 mg/dL   Blood: x / Protein: 100 mg/dL / Nitrite: Negative   Leuk Esterase: Negative / RBC: 0 /HPF / WBC 0 /HPF   Sq Epi: x / Non Sq Epi: 0 /HPF / Bacteria: Negative /HPF          Tele Reviewed:    RADIOLOGY & ADDITIONAL TESTS:  Results Reviewed:   Imaging Personally Reviewed:  Electrocardiogram Personally Reviewed:     PROGRESS NOTE:   Authored by Dr. Tasia Jeff    Patient is a 69y old  Male who presents with a chief complaint of Amnesia (31 Oct 2024 15:15)      SUBJECTIVE / OVERNIGHT EVENTS:  No acute events overnight. Pt needs for things to be repeated multiple times. Was informed of CT head and MRI findings but soon forgot and repeatedly asked for the results. A couple of hours after initial interview, wife was at the bedside. Pt consented for CT to r/o possible paraneoplastic syndrome and malignancy.      MEDICATIONS  (STANDING):  allopurinol 200 milliGRAM(s) Oral daily  apixaban 5 milliGRAM(s) Oral two times a day  aspirin enteric coated 81 milliGRAM(s) Oral daily  atorvastatin 40 milliGRAM(s) Oral at bedtime  carvedilol 12.5 milliGRAM(s) Oral every 12 hours  dextrose 5%. 1000 milliLiter(s) (100 mL/Hr) IV Continuous <Continuous>  dextrose 5%. 1000 milliLiter(s) (50 mL/Hr) IV Continuous <Continuous>  dextrose 50% Injectable 25 Gram(s) IV Push once  dextrose 50% Injectable 25 Gram(s) IV Push once  dextrose 50% Injectable 12.5 Gram(s) IV Push once  gabapentin 300 milliGRAM(s) Oral two times a day  glucagon  Injectable 1 milliGRAM(s) IntraMuscular once  influenza  Vaccine (HIGH DOSE) 0.5 milliLiter(s) IntraMuscular once  insulin glargine Injectable (LANTUS) 16 Unit(s) SubCutaneous every morning  insulin lispro (ADMELOG) corrective regimen sliding scale   SubCutaneous at bedtime  insulin lispro (ADMELOG) corrective regimen sliding scale   SubCutaneous three times a day before meals  losartan 25 milliGRAM(s) Oral daily  thiamine IVPB 500 milliGRAM(s) IV Intermittent every 8 hours    MEDICATIONS  (PRN):  dextrose Oral Gel 15 Gram(s) Oral once PRN Blood Glucose LESS THAN 70 milliGRAM(s)/deciliter      CAPILLARY BLOOD GLUCOSE      POCT Blood Glucose.: 171 mg/dL (31 Oct 2024 21:48)  POCT Blood Glucose.: 158 mg/dL (31 Oct 2024 18:21)  POCT Blood Glucose.: 235 mg/dL (31 Oct 2024 15:22)  POCT Blood Glucose.: 193 mg/dL (31 Oct 2024 12:07)  POCT Blood Glucose.: 156 mg/dL (31 Oct 2024 08:25)    I&O's Summary      PHYSICAL EXAM:  Vital Signs Last 24 Hrs  T(C): 36.3 (2024 05:08), Max: 36.6 (31 Oct 2024 21:45)  T(F): 97.3 (2024 05:08), Max: 97.9 (31 Oct 2024 21:45)  HR: 63 (2024 05:08) (63 - 69)  BP: 158/94 (2024 05:08) (147/102 - 158/94)  BP(mean): --  RR: 17 (2024 05:08) (17 - 19)  SpO2: 98% (2024 05:08) (98% - 99%)    Parameters below as of 2024 05:08  Patient On (Oxygen Delivery Method): room air        GENERAL: NAD, lying in bed comfortably  EYES: EOMI, PERRL, conjunctiva and sclera clear.   NECK: Supple, full ROM   HEART: Regular rate and rhythm, S1 and S2, no murmurs, rubs, or gallops. No LE edema b/l.  LUNGS: Unlabored respirations. Clear to auscultation bilaterally. No crackles, wheezing, or rhonchi.  ABDOMEN: Soft, nontender, nondistended, +BS. Umbilical hernia. Non-reducible  EXTREMITIES: 2+ peripheral pulses bilaterally. No clubbing, cyanosis, or edema.  NERVOUS SYSTEM: A&O to person and place, not to time. Hemianopsia appears to be improved in left eye. Same as previous exam in right eye.  PSYCHIATRIC: Calm. Affect normal.      LABS:                                   14.2   6.25  )-----------( 204      ( 2024 07:22 )             44.0                  14.6   7.92  )-----------( 226      ( 30 Oct 2024 20:30 )             44.8   11-    141  |  106  |  22  ----------------------------<  145[H]  3.7   |  20[L]  |  1.47[H]    Ca    8.9      2024 07:22  Phos  3.3     11-  Mg     2.10     11-01    TPro  6.7  /  Alb  3.8  /  TBili  0.4  /  DBili  x   /  AST  18  /  ALT  20  /  AlkPhos  73  11-01      10-30    142  |  107  |  24[H]  ----------------------------<  114[H]  4.1   |  25  |  1.69[H]    Ca    9.4      30 Oct 2024 20:30    TPro  7.6  /  Alb  4.3  /  TBili  0.4  /  DBili  x   /  AST  22  /  ALT  25  /  AlkPhos  85  10-30          Urinalysis Basic - ( 30 Oct 2024 20:30 )    Color: Yellow / Appearance: Clear / S.038 / pH: x  Gluc: 114 mg/dL / Ketone: Negative mg/dL  / Bili: Negative / Urobili: 0.2 mg/dL   Blood: x / Protein: 100 mg/dL / Nitrite: Negative   Leuk Esterase: Negative / RBC: 0 /HPF / WBC 0 /HPF   Sq Epi: x / Non Sq Epi: 0 /HPF / Bacteria: Negative /HPF          Tele Reviewed:    RADIOLOGY & ADDITIONAL TESTS:  Results Reviewed:   Imaging Personally Reviewed:  < from: CT Chest w/ IV Cont (24 @ 11:25) >    IMPRESSION:  No CT evidence of malignancy in the chest, abdomen or pelvis.    < end of copied text >      Electrocardiogram Personally Reviewed:    Consultant notes reviewed: Neuro, Endocrine, Cards

## 2024-11-01 NOTE — SWALLOW BEDSIDE ASSESSMENT ADULT - ADDITIONAL RECOMMENDATIONS
Medical team advised to reconsult service if patient exhibits change in medical condition impacting oral diet tolerance. This service to follow up for diet tolerance as schedule permits.

## 2024-11-01 NOTE — CONSULT NOTE ADULT - SUBJECTIVE AND OBJECTIVE BOX
HPI:  68 y/o M with past medical history of HTN, type 2 DM (on insulin), gout, CVA 6/2023 s/p loop recorder which found PAFib, on Eliquis (recently lowered to 2.5 BID by Nephrologist Dr Rodriguez, recent creatinine 2.05 earlier this month)  presenting for acute change in mental status x 3 days. Wife reports frequent urination recently, otherwise no fever, chills, pain, SOB, palps, syncope.    Collateral obtained from daughter and wife. He started becoming forgetful on Monday, repeatedly asking the same questions, forgetting that he had water boiling for his morning coffee. Prior to this, he did not have any diagnosis of dementia or any problems with memory. Currently being worked up for potential stroke.    Endocrine history (collateral from wife and   T2DM  -Diagnosed 10 years ago   -Has prior endocrinologist - cannot remember the name 3 weeks ago)  -HbA1c 9.4%  -Medications: Jardiance 10mg daily, ozempic 0.25mg weekly and pre meal insulin 10-14 units. ? wife also reports taking pre bed insulin as well unclear of dose or type   -Complications: CKD, CVA 6/2023, no IHD, denies any other microvascular cx   -eGFR 52 (follows with nephrologist   -No pancreatitis/UTI or thyroid cancer  -Family history - unclear   -Non smoker  -ETOH occasionally         PAST MEDICAL & SURGICAL HISTORY:  DM (diabetes mellitus)      HTN (hypertension)      HLD (hyperlipidemia)      Obese      HTN (hypertension)      Gout      CVA (cerebrovascular accident)      Cervical disc herniation      History of arthroscopy of right shoulder      No significant past surgical history          FAMILY HISTORY:  No pertinent family history in first degree relatives        Social History:    Outpatient Medications:    MEDICATIONS  (STANDING):  allopurinol 200 milliGRAM(s) Oral daily  apixaban 5 milliGRAM(s) Oral two times a day  aspirin enteric coated 81 milliGRAM(s) Oral daily  atorvastatin 40 milliGRAM(s) Oral at bedtime  carvedilol 12.5 milliGRAM(s) Oral every 12 hours  dextrose 5%. 1000 milliLiter(s) (50 mL/Hr) IV Continuous <Continuous>  dextrose 5%. 1000 milliLiter(s) (100 mL/Hr) IV Continuous <Continuous>  dextrose 50% Injectable 25 Gram(s) IV Push once  dextrose 50% Injectable 12.5 Gram(s) IV Push once  dextrose 50% Injectable 25 Gram(s) IV Push once  gabapentin 300 milliGRAM(s) Oral two times a day  glucagon  Injectable 1 milliGRAM(s) IntraMuscular once  influenza  Vaccine (HIGH DOSE) 0.5 milliLiter(s) IntraMuscular once  insulin glargine Injectable (LANTUS) 16 Unit(s) SubCutaneous every morning  insulin lispro (ADMELOG) corrective regimen sliding scale   SubCutaneous at bedtime  insulin lispro (ADMELOG) corrective regimen sliding scale   SubCutaneous three times a day before meals  losartan 25 milliGRAM(s) Oral daily  thiamine IVPB 500 milliGRAM(s) IV Intermittent every 8 hours    MEDICATIONS  (PRN):  dextrose Oral Gel 15 Gram(s) Oral once PRN Blood Glucose LESS THAN 70 milliGRAM(s)/deciliter      Allergies    No Known Allergies    Intolerances      Review of Systems:  Constitutional: No fever  Eyes: No blurry vision  Neuro: No tremors  HEENT: No pain  Cardiovascular: No chest pain, palpitations  Respiratory: No SOB, no cough  GI: No nausea, vomiting, abdominal pain  : No dysuria  Skin: no rash  Psych: no depression  Endocrine: no polyuria, polydipsia  Hem/lymph: no swelling  Osteoporosis: no fractures    ALL OTHER SYSTEMS REVIEWED AND NEGATIVE    UNABLE TO OBTAIN    PHYSICAL EXAM:  VITALS: T(C): 36.3 (11-01-24 @ 05:08)  T(F): 97.3 (11-01-24 @ 05:08), Max: 97.9 (10-31-24 @ 21:45)  HR: 63 (11-01-24 @ 05:08) (63 - 69)  BP: 158/94 (11-01-24 @ 05:08) (147/102 - 158/94)  RR:  (17 - 19)  SpO2:  (98% - 99%)  Wt(kg): --  GENERAL: NAD, well-groomed, well-developed  EYES: No proptosis, no lid lag, anicteric  HEENT:  Atraumatic, Normocephalic, moist mucous membranes  THYROID: Normal size, no palpable nodules  RESPIRATORY: Clear to auscultation bilaterally; No rales, rhonchi, wheezing, or rubs  CARDIOVASCULAR: Regular rate and rhythm; No murmurs; no peripheral edema  GI: Soft, nontender, non distended, normal bowel sounds  SKIN: Dry, intact, No rashes or lesions  MUSCULOSKELETAL: Full range of motion, normal strength  NEURO: sensation intact, extraocular movements intact, no tremor, normal reflexes  PSYCH: Alert and oriented x 3, normal affect, normal mood  CUSHING'S SIGNS: no striae    POCT Blood Glucose.: 151 mg/dL (11-01-24 @ 08:41)  POCT Blood Glucose.: 171 mg/dL (10-31-24 @ 21:48)  POCT Blood Glucose.: 158 mg/dL (10-31-24 @ 18:21)  POCT Blood Glucose.: 235 mg/dL (10-31-24 @ 15:22)  POCT Blood Glucose.: 193 mg/dL (10-31-24 @ 12:07)  POCT Blood Glucose.: 156 mg/dL (10-31-24 @ 08:25)  POCT Blood Glucose.: 128 mg/dL (10-30-24 @ 18:40)                            14.2   6.25  )-----------( 204      ( 01 Nov 2024 07:22 )             44.0       11-01    141  |  106  |  22  ----------------------------<  145[H]  3.7   |  20[L]  |  1.47[H]    eGFR: 52[L]    Ca    8.9      11-01  Mg     2.10     11-01  Phos  3.3     11-01    TPro  6.7  /  Alb  3.8  /  TBili  0.4  /  DBili  x   /  AST  18  /  ALT  20  /  AlkPhos  73  11-01      Thyroid Function Tests:  11-01 @ 07:22 TSH 1.39 FreeT4 -- T3 -- Anti TPO -- Anti Thyroglobulin Ab -- TSI --  10-31 @ 08:12 TSH 1.46 FreeT4 -- T3 -- Anti TPO -- Anti Thyroglobulin Ab -- TSI --              Radiology:              HPI:  68 y/o M with past medical history of HTN, type 2 DM (on insulin), gout, CVA 6/2023 s/p loop recorder which found PAFib, on Eliquis (recently lowered to 2.5 BID by Nephrologist Dr Rodriguez, recent creatinine 2.05 earlier this month)  presenting for acute change in mental status x 3 days. Wife reports frequent urination recently, otherwise no fever, chills, pain, SOB, palps, syncope.    Collateral obtained from daughter and wife. He started becoming forgetful on Monday, repeatedly asking the same questions, forgetting that he had water boiling for his morning coffee. Prior to this, he did not have any diagnosis of dementia or any problems with memory. Currently being worked up for potential stroke.    Endocrine history (collateral from wife and daughter)  T2DM  -Diagnosed 10 years ago   -Has prior endocrinologist - cannot remember the name 3 weeks ago)  -HbA1c 9.4%  -Medications: Jardiance 10mg daily, ozempic 0.25mg weekly and pre meal insulin 10-14 units. ? wife also reports taking pre bed insulin as well unclear of dose or type   -Complications: CKD, CVA 6/2023, no IHD, denies any other microvascular cx   -eGFR 52 (follows with nephrologist   -No pancreatitis/UTI or thyroid cancer  -Family history - unclear   -Non smoker  -ETOH occasionally         PAST MEDICAL & SURGICAL HISTORY:  DM (diabetes mellitus)      HTN (hypertension)      HLD (hyperlipidemia)      Obese      HTN (hypertension)      Gout      CVA (cerebrovascular accident)      Cervical disc herniation      History of arthroscopy of right shoulder      No significant past surgical history          FAMILY HISTORY:  No pertinent family history in first degree relatives        Social History:    Outpatient Medications:    MEDICATIONS  (STANDING):  allopurinol 200 milliGRAM(s) Oral daily  apixaban 5 milliGRAM(s) Oral two times a day  aspirin enteric coated 81 milliGRAM(s) Oral daily  atorvastatin 40 milliGRAM(s) Oral at bedtime  carvedilol 12.5 milliGRAM(s) Oral every 12 hours  dextrose 5%. 1000 milliLiter(s) (50 mL/Hr) IV Continuous <Continuous>  dextrose 5%. 1000 milliLiter(s) (100 mL/Hr) IV Continuous <Continuous>  dextrose 50% Injectable 25 Gram(s) IV Push once  dextrose 50% Injectable 12.5 Gram(s) IV Push once  dextrose 50% Injectable 25 Gram(s) IV Push once  gabapentin 300 milliGRAM(s) Oral two times a day  glucagon  Injectable 1 milliGRAM(s) IntraMuscular once  influenza  Vaccine (HIGH DOSE) 0.5 milliLiter(s) IntraMuscular once  insulin glargine Injectable (LANTUS) 16 Unit(s) SubCutaneous every morning  insulin lispro (ADMELOG) corrective regimen sliding scale   SubCutaneous at bedtime  insulin lispro (ADMELOG) corrective regimen sliding scale   SubCutaneous three times a day before meals  losartan 25 milliGRAM(s) Oral daily  thiamine IVPB 500 milliGRAM(s) IV Intermittent every 8 hours    MEDICATIONS  (PRN):  dextrose Oral Gel 15 Gram(s) Oral once PRN Blood Glucose LESS THAN 70 milliGRAM(s)/deciliter      Allergies    No Known Allergies    Intolerances      Review of Systems:  Constitutional: No fever  Eyes: No blurry vision  Neuro: No tremors  HEENT: No pain  Cardiovascular: No chest pain, palpitations  Respiratory: No SOB, no cough  GI: No nausea, vomiting, abdominal pain  : No dysuria    PHYSICAL EXAM:  VITALS: T(C): 36.3 (11-01-24 @ 05:08)  T(F): 97.3 (11-01-24 @ 05:08), Max: 97.9 (10-31-24 @ 21:45)  HR: 63 (11-01-24 @ 05:08) (63 - 69)  BP: 158/94 (11-01-24 @ 05:08) (147/102 - 158/94)  RR:  (17 - 19)  SpO2:  (98% - 99%)  Wt(kg): --  GENERAL: NAD, well-groomed, well-developed  HEENT:  Atraumatic, Normocephalic, moist mucous membranes  RESPIRATORY: Clear to auscultation bilaterally; No rales, rhonchi, wheezing, or rubs  CARDIOVASCULAR: Regular rate and rhythm; No murmurs; no peripheral edema  GI: Soft, nontender, non distended, normal bowel sounds    POCT Blood Glucose.: 151 mg/dL (11-01-24 @ 08:41)  POCT Blood Glucose.: 171 mg/dL (10-31-24 @ 21:48)  POCT Blood Glucose.: 158 mg/dL (10-31-24 @ 18:21)  POCT Blood Glucose.: 235 mg/dL (10-31-24 @ 15:22)  POCT Blood Glucose.: 193 mg/dL (10-31-24 @ 12:07)  POCT Blood Glucose.: 156 mg/dL (10-31-24 @ 08:25)  POCT Blood Glucose.: 128 mg/dL (10-30-24 @ 18:40)                            14.2   6.25  )-----------( 204      ( 01 Nov 2024 07:22 )             44.0       11-01    141  |  106  |  22  ----------------------------<  145[H]  3.7   |  20[L]  |  1.47[H]    eGFR: 52[L]    Ca    8.9      11-01  Mg     2.10     11-01  Phos  3.3     11-01    TPro  6.7  /  Alb  3.8  /  TBili  0.4  /  DBili  x   /  AST  18  /  ALT  20  /  AlkPhos  73  11-01      Thyroid Function Tests:  11-01 @ 07:22 TSH 1.39 FreeT4 -- T3 -- Anti TPO -- Anti Thyroglobulin Ab -- TSI --  10-31 @ 08:12 TSH 1.46 FreeT4 -- T3 -- Anti TPO -- Anti Thyroglobulin Ab -- TSI --              Radiology:

## 2024-11-01 NOTE — DIETITIAN INITIAL EVALUATION ADULT - PERTINENT LABORATORY DATA
11-01    141  |  106  |  22  ----------------------------<  145[H]  3.7   |  20[L]  |  1.47[H]    Ca    8.9      01 Nov 2024 07:22  Phos  3.3     11-01  Mg     2.10     11-01    TPro  6.7  /  Alb  3.8  /  TBili  0.4  /  DBili  x   /  AST  18  /  ALT  20  /  AlkPhos  73  11-01  POCT Blood Glucose.: 151 mg/dL (11-01-24 @ 08:41)  A1C with Estimated Average Glucose Result: 9.7 % (10-31-24 @ 06:58)

## 2024-11-01 NOTE — PROGRESS NOTE ADULT - PROBLEM SELECTOR PLAN 3
-UA with protein. Cr 1.69  (unknown baseline). Pt previously diagnosed with CKD Stage 3 per previous hospitalization record.   -Contact PCP Dr. Muller  to obtain further collateral -UA with protein. Cr 1.69  (unknown baseline). Pt previously diagnosed with CKD Stage 3 per previous hospitalization record.   -VM left with Dr. Rodriguez's office for further information     -Trend lytes and renal function  -Avoid nephrotoxins  -Continue losartan for renoprotective effects -UA with protein. Cr 1.69  (unknown baseline). Pt previously diagnosed with CKD Stage 3 per previous hospitalization record.   -VM left with Dr. Rodriguez's office for further information     -Trend lytes and renal function (cr 1.47 today)  -Avoid nephrotoxins  -Continue losartan for renoprotective effects

## 2024-11-01 NOTE — DIETITIAN INITIAL EVALUATION ADULT - ADD RECOMMEND
Monitor PO intake, tolerance to diet/supplement, nutrition related lab values, weight trends, BMs/GI distress, hydration status, skin integrity

## 2024-11-01 NOTE — OCCUPATIONAL THERAPY INITIAL EVALUATION ADULT - NSOTDISCHREC_GEN_A_CORE
Outpatient OT for cognitive based rehab due to memory impairments. OT to continue to follow./Outpatient OT

## 2024-11-01 NOTE — DIETITIAN INITIAL EVALUATION ADULT - NSICDXPASTMEDICALHX_GEN_ALL_CORE_FT
Detail Level: Zone
Render In Strict Bullet Format?: No
Initiate Treatment: Doxycycline as directed. Pt has at home.
PAST MEDICAL HISTORY:  CVA (cerebrovascular accident)     DM (diabetes mellitus)     Gout     HLD (hyperlipidemia)     HTN (hypertension)     HTN (hypertension)     Obese

## 2024-11-01 NOTE — PROGRESS NOTE ADULT - PROBLEM SELECTOR PLAN 4
-Per outpatient records, patient dx with systolic heart failure.   -Previous echo on 6/12/2023 showing mild global left ventricular systolic dysfunction  -Pt euvolemic on exam    -Continue current dose of beta-blocker, losartan and SGLT-2  -consider Add MRA prior to discharge for further optimization of GDMT regimen  -Get in touch with PCP for further information and for additional records -> collateral from cards appreciated -Per outpatient records, patient dx with systolic heart failure.   -Previous echo on 6/12/2023 showing mild global left ventricular systolic dysfunction  -Pt euvolemic on exam    -Continue current dose of beta-blocker, losartan and SGLT-2  -consider Add MRA prior to discharge for further optimization of GDMT regimen  -Get in touch with PCP for further information and for additional records -> collateral from cards appreciated  -- cont Coreg and Losartan for HTN  -- Cont Eliquis for PAF  -- Cont ASA and Statin for hx CVA

## 2024-11-01 NOTE — PROGRESS NOTE ADULT - PROBLEM SELECTOR PLAN 2
Placed on Lantus 10 by night team  -Pt on 10-14 units pre-meal Admelog, Ozempic 0.25 mg q week, and Jardiance 10 mg tab qd  -Insulin sliding scale  -Consider consulting endocrine given pt's regimen and A1C 9.7 -Currently on 16 of Lantus and sliding scale   -Pt on 10-14 units pre-meal Admelog, Ozempic 0.25 mg q week, and Jardiance 10 mg tab qd    -endocrine consulted; appreciate recs  -per endo, if BGs start to rise post-prandially add 5U pre-meal insulin TID. Will likely increase ozempic, jardiance, and possibly premeal insulin as outpatient depending on glucose levels

## 2024-11-01 NOTE — PROGRESS NOTE ADULT - ASSESSMENT
Impression:     Plan:  [] Obtain CTA H/N  [] TTE  [] ANTITHROMBOTIC THERAPY:  [] MRI  [] Vessel imaging  [] TTE                   [] A1C, LDL  [] Atorvastatin 80 [] not on high intensity statin due to  []  Physical therapy/OT/Speech Language    SBP goal:  Other:    Case & Plan discussed with patient and family. All questions answered. Plan discussed with primary team       CORE MEASURES:         Admission NIHSS:     ICH score:     Starks and Petit Score:       Tenecteplase: [] YES [] NO     Statin Therapy: [] YES [] NO     Smoking [] YES [] NO     Afib [] YES [] NO     Stroke Education [] YES [] NO    Dysphagia screen : [] Passed [] Failed- S&S pending [] Pending  (Ensure medications are ordered via appropriate route)    DVT ppx: Venodynes [] Heparin s.c [] LMWH [] Not on chemical DVT ppx due to: 69year old  Man with PAF on eliquis HTN, HLD, DM, Gout,, HFrEF,  L posterior limb of IC stroke (residual dysarthria) stroke unclear medications who presents for confusion and polyuria x 3 days. Patient cannot provide further history, he is not sure  why he was here. Daughter describes had  3 strokes last year. Had slurred speech, facial droop. On exam, noted to have RHH. MRI shows small acute infarcts in the left occipital lobe and medial temporal lobe.       Impression: Right homonomous hemianopsia and mixed aphasia due to small acute infarcts in the left occipital lobe and medial temporal lobe. Mechanism cardioembolic from A fib    Plan:  [] Obtain CTA H/N  [] Obtain TTE  [] Obtain Lipid panel  [] ANTITHROMBOTIC THERAPY: On Eliquis   [x] MRI B           [x] A1C 9.7  [] Atorvastatin 80 mg HS to titrate LDL < 70  []  Physical therapy/OT/Speech Language  [] SCDs   [] Patient has private neurologist can follow up with them. Can also follow with Stroke NP Laurel Mobley [address] [place] [address]. Please instruct the patient to call [phone] to schedule this appointment.   SBP goal: Gradual Normotension     Other:    Case & Plan discussed with patient and wife. All questions answered. Plan discussed with primary team       CORE MEASURES:         Admission NIHSS: 3     Tenecteplase: [] YES [x] NO     Statin Therapy: [x] YES [] NO     Smoking [] YES [x] NO     Afib [x] YES [] NO     Stroke Education [x] YES [] NO    Dysphagia screen : [x] Passed    DVT ppx: On eliquis. Place SCDs as well.     Seen with Dr. Sharpe.  69year old  Man with PAF on eliquis HTN, HLD, DM, Gout,, HFrEF,  L posterior limb of IC stroke (residual dysarthria) stroke unclear medications who presents for confusion and polyuria x 3 days. Patient cannot provide further history, he is not sure  why he was here. Daughter describes had  3 strokes last year. Had slurred speech, facial droop. On exam, noted to have RHH. MRI shows small acute infarcts in the left occipital lobe and medial temporal lobe.       Impression: Right homonomous hemianopsia and mixed aphasia due to small acute infarcts in the left occipital lobe and medial temporal lobe. Mechanism cardioembolic from A fib    Plan:  [] Obtain CTA H/N  [] Obtain TTE  [] Obtain Lipid panel  [] ANTITHROMBOTIC THERAPY: On Eliquis   [x] MRI B           [x] A1C 9.7  [] Atorvastatin 80 mg HS to titrate LDL < 70  []  Physical therapy/OT/Speech Language  [] SCDs   [] Patient has private neurologist can follow up with them. Can also follow with Stroke NP Laurel Mobley [address] [place] [address]. Please instruct the patient to call [phone] to schedule this appointment.  [] Q 4 stroke neurochecks      SBP goal: Gradual Normotension     Other:    Case & Plan discussed with patient and wife. All questions answered. Plan discussed with primary team       CORE MEASURES:         Admission NIHSS: 3     Tenecteplase: [] YES [x] NO     Statin Therapy: [x] YES [] NO     Smoking [] YES [x] NO     Afib [x] YES [] NO     Stroke Education [x] YES [] NO    Dysphagia screen : [x] Passed    DVT ppx: On eliquis. Place SCDs as well.     Seen with Dr. Sharpe.

## 2024-11-01 NOTE — OCCUPATIONAL THERAPY INITIAL EVALUATION ADULT - PERTINENT HX OF CURRENT PROBLEM, REHAB EVAL
69 year old male with past medical history of hypertension, diabetes, gout, CVAx3, CHF?, presenting for acute change in mental status x 3 days. MRI showing multiple small acute infarcts in the left occipital lobe and medial temporal lobe c/f cardioembolic stroke.

## 2024-11-01 NOTE — DIETITIAN INITIAL EVALUATION ADULT - PROBLEM SELECTOR PLAN 3
-Unclear what BP meds pt is taking at home, wife will be bringing in meds during the day  -BPs currently in acceptable range  -Monitor BPs off BP meds for now, resume home BP meds once clarified if BP tolerates

## 2024-11-02 ENCOUNTER — TRANSCRIPTION ENCOUNTER (OUTPATIENT)
Age: 69
End: 2024-11-02

## 2024-11-02 DIAGNOSIS — I67.1 CEREBRAL ANEURYSM, NONRUPTURED: ICD-10-CM

## 2024-11-02 LAB
ALBUMIN SERPL ELPH-MCNC: 3.9 G/DL — SIGNIFICANT CHANGE UP (ref 3.3–5)
ALP SERPL-CCNC: 83 U/L — SIGNIFICANT CHANGE UP (ref 40–120)
ALT FLD-CCNC: 17 U/L — SIGNIFICANT CHANGE UP (ref 4–41)
ANION GAP SERPL CALC-SCNC: 15 MMOL/L — HIGH (ref 7–14)
AST SERPL-CCNC: 15 U/L — SIGNIFICANT CHANGE UP (ref 4–40)
BASOPHILS # BLD AUTO: 0.03 K/UL — SIGNIFICANT CHANGE UP (ref 0–0.2)
BASOPHILS NFR BLD AUTO: 0.4 % — SIGNIFICANT CHANGE UP (ref 0–2)
BILIRUB SERPL-MCNC: 0.4 MG/DL — SIGNIFICANT CHANGE UP (ref 0.2–1.2)
BUN SERPL-MCNC: 25 MG/DL — HIGH (ref 7–23)
CALCIUM SERPL-MCNC: 9 MG/DL — SIGNIFICANT CHANGE UP (ref 8.4–10.5)
CHLORIDE SERPL-SCNC: 104 MMOL/L — SIGNIFICANT CHANGE UP (ref 98–107)
CHOLEST SERPL-MCNC: 164 MG/DL — SIGNIFICANT CHANGE UP
CO2 SERPL-SCNC: 20 MMOL/L — LOW (ref 22–31)
CREAT SERPL-MCNC: 1.6 MG/DL — HIGH (ref 0.5–1.3)
EGFR: 47 ML/MIN/1.73M2 — LOW
EOSINOPHIL # BLD AUTO: 0.17 K/UL — SIGNIFICANT CHANGE UP (ref 0–0.5)
EOSINOPHIL NFR BLD AUTO: 2.5 % — SIGNIFICANT CHANGE UP (ref 0–6)
GLUCOSE SERPL-MCNC: 253 MG/DL — HIGH (ref 70–99)
HCT VFR BLD CALC: 43.4 % — SIGNIFICANT CHANGE UP (ref 39–50)
HDLC SERPL-MCNC: 49 MG/DL — SIGNIFICANT CHANGE UP
HGB BLD-MCNC: 14 G/DL — SIGNIFICANT CHANGE UP (ref 13–17)
IANC: 4.41 K/UL — SIGNIFICANT CHANGE UP (ref 1.8–7.4)
IMM GRANULOCYTES NFR BLD AUTO: 0.3 % — SIGNIFICANT CHANGE UP (ref 0–0.9)
LIPID PNL WITH DIRECT LDL SERPL: 67 MG/DL — SIGNIFICANT CHANGE UP
LYMPHOCYTES # BLD AUTO: 1.65 K/UL — SIGNIFICANT CHANGE UP (ref 1–3.3)
LYMPHOCYTES # BLD AUTO: 24.2 % — SIGNIFICANT CHANGE UP (ref 13–44)
MAGNESIUM SERPL-MCNC: 2 MG/DL — SIGNIFICANT CHANGE UP (ref 1.6–2.6)
MCHC RBC-ENTMCNC: 28.9 PG — SIGNIFICANT CHANGE UP (ref 27–34)
MCHC RBC-ENTMCNC: 32.3 G/DL — SIGNIFICANT CHANGE UP (ref 32–36)
MCV RBC AUTO: 89.5 FL — SIGNIFICANT CHANGE UP (ref 80–100)
MONOCYTES # BLD AUTO: 0.54 K/UL — SIGNIFICANT CHANGE UP (ref 0–0.9)
MONOCYTES NFR BLD AUTO: 7.9 % — SIGNIFICANT CHANGE UP (ref 2–14)
NEUTROPHILS # BLD AUTO: 4.41 K/UL — SIGNIFICANT CHANGE UP (ref 1.8–7.4)
NEUTROPHILS NFR BLD AUTO: 64.7 % — SIGNIFICANT CHANGE UP (ref 43–77)
NON HDL CHOLESTEROL: 115 MG/DL — SIGNIFICANT CHANGE UP
NRBC # BLD: 0 /100 WBCS — SIGNIFICANT CHANGE UP (ref 0–0)
NRBC # FLD: 0 K/UL — SIGNIFICANT CHANGE UP (ref 0–0)
PHOSPHATE SERPL-MCNC: 3.6 MG/DL — SIGNIFICANT CHANGE UP (ref 2.5–4.5)
PLATELET # BLD AUTO: 222 K/UL — SIGNIFICANT CHANGE UP (ref 150–400)
POTASSIUM SERPL-MCNC: 4 MMOL/L — SIGNIFICANT CHANGE UP (ref 3.5–5.3)
POTASSIUM SERPL-SCNC: 4 MMOL/L — SIGNIFICANT CHANGE UP (ref 3.5–5.3)
PROT SERPL-MCNC: 6.8 G/DL — SIGNIFICANT CHANGE UP (ref 6–8.3)
RBC # BLD: 4.85 M/UL — SIGNIFICANT CHANGE UP (ref 4.2–5.8)
RBC # FLD: 13.5 % — SIGNIFICANT CHANGE UP (ref 10.3–14.5)
SODIUM SERPL-SCNC: 139 MMOL/L — SIGNIFICANT CHANGE UP (ref 135–145)
TRIGL SERPL-MCNC: 239 MG/DL — HIGH
WBC # BLD: 6.82 K/UL — SIGNIFICANT CHANGE UP (ref 3.8–10.5)
WBC # FLD AUTO: 6.82 K/UL — SIGNIFICANT CHANGE UP (ref 3.8–10.5)

## 2024-11-02 PROCEDURE — 70498 CT ANGIOGRAPHY NECK: CPT | Mod: 26

## 2024-11-02 PROCEDURE — 99233 SBSQ HOSP IP/OBS HIGH 50: CPT | Mod: GC

## 2024-11-02 PROCEDURE — 70496 CT ANGIOGRAPHY HEAD: CPT | Mod: 26

## 2024-11-02 PROCEDURE — 99232 SBSQ HOSP IP/OBS MODERATE 35: CPT

## 2024-11-02 PROCEDURE — 99221 1ST HOSP IP/OBS SF/LOW 40: CPT

## 2024-11-02 RX ORDER — INSULIN LISPRO 100/ML
5 VIAL (ML) SUBCUTANEOUS
Refills: 0 | Status: DISCONTINUED | OUTPATIENT
Start: 2024-11-02 | End: 2024-11-03

## 2024-11-02 RX ORDER — EMPAGLIFLOZIN 25 MG/1
1 TABLET, FILM COATED ORAL
Qty: 30 | Refills: 1
Start: 2024-11-02

## 2024-11-02 RX ORDER — APIXABAN 5 MG/1
1 TABLET, FILM COATED ORAL
Qty: 0 | Refills: 0 | DISCHARGE
Start: 2024-11-02

## 2024-11-02 RX ORDER — INSULIN GLARGINE,HUM.REC.ANLOG 100/ML
16 VIAL (ML) SUBCUTANEOUS
Qty: 0 | Refills: 0 | DISCHARGE
Start: 2024-11-02

## 2024-11-02 RX ORDER — APIXABAN 5 MG/1
1 TABLET, FILM COATED ORAL
Refills: 0 | DISCHARGE

## 2024-11-02 RX ADMIN — GABAPENTIN 300 MILLIGRAM(S): 300 CAPSULE ORAL at 17:41

## 2024-11-02 RX ADMIN — Medication 16 UNIT(S): at 08:35

## 2024-11-02 RX ADMIN — Medication 105 MILLIGRAM(S): at 14:10

## 2024-11-02 RX ADMIN — GABAPENTIN 300 MILLIGRAM(S): 300 CAPSULE ORAL at 05:42

## 2024-11-02 RX ADMIN — Medication 2: at 08:33

## 2024-11-02 RX ADMIN — APIXABAN 5 MILLIGRAM(S): 5 TABLET, FILM COATED ORAL at 17:41

## 2024-11-02 RX ADMIN — Medication 105 MILLIGRAM(S): at 23:30

## 2024-11-02 RX ADMIN — LOSARTAN POTASSIUM 25 MILLIGRAM(S): 25 TABLET ORAL at 05:43

## 2024-11-02 RX ADMIN — Medication 200 MILLIGRAM(S): at 12:17

## 2024-11-02 RX ADMIN — Medication 5 UNIT(S): at 17:40

## 2024-11-02 RX ADMIN — Medication 2: at 12:16

## 2024-11-02 RX ADMIN — CARVEDILOL 12.5 MILLIGRAM(S): 25 TABLET, FILM COATED ORAL at 05:43

## 2024-11-02 RX ADMIN — APIXABAN 5 MILLIGRAM(S): 5 TABLET, FILM COATED ORAL at 05:43

## 2024-11-02 RX ADMIN — CARVEDILOL 12.5 MILLIGRAM(S): 25 TABLET, FILM COATED ORAL at 17:41

## 2024-11-02 RX ADMIN — Medication 81 MILLIGRAM(S): at 12:17

## 2024-11-02 RX ADMIN — Medication 3: at 17:40

## 2024-11-02 RX ADMIN — Medication 80 MILLIGRAM(S): at 22:34

## 2024-11-02 RX ADMIN — Medication 105 MILLIGRAM(S): at 05:42

## 2024-11-02 NOTE — PROGRESS NOTE ADULT - PROBLEM SELECTOR PLAN 2
-Currently on 16 of Lantus and sliding scale   -Pt on 10-14 units pre-meal Admelog, Ozempic 0.25 mg q week, and Jardiance 10 mg tab qd    -endocrine consulted; appreciate recs  -per endo, if BGs start to rise post-prandially add 5U pre-meal insulin TID. Will likely increase ozempic, jardiance, and possibly premeal insulin as outpatient depending on glucose levels -Currently on 16 of Lantus and sliding scale   -Pt on 10-14 units pre-meal Admelog, Ozempic 0.25 mg q week, and Jardiance 10 mg tab qd    -endocrine consulted; appreciate recs  -per endo, if BGs start to rise post-prandially add 5U pre-meal insulin TID. Will likely increase ozempic, jardiance, and possibly premeal insulin as outpatient depending on glucose levels  - can see if thiamine be switched from dextrose to other solution

## 2024-11-02 NOTE — PROGRESS NOTE ADULT - PROBLEM SELECTOR PLAN 3
-UA with protein. Cr 1.69  (unknown baseline). Pt previously diagnosed with CKD Stage 3 per previous hospitalization record.   -VM left with Dr. Rodriguez's office for further information     -Trend lytes and renal function (cr 1.47 today)  -Avoid nephrotoxins  -Continue losartan for renoprotective effects -UA with protein. Cr 1.69  (unknown baseline). Pt previously diagnosed with CKD Stage 3 per previous hospitalization record.   -VM left with Dr. Rodriguez's office for further information     -Trend lytes and renal function (cr 1.6 today)  -Avoid nephrotoxins  -Continue losartan for renoprotective effects

## 2024-11-02 NOTE — CONSULT NOTE ADULT - PROBLEM SELECTOR RECOMMENDATION 9
- CTA imaging and MRI imaging reviewed with attending Dr. Shane Parra  - No further neurosurgical inpatient workup needed  - Stroke management per Neurology  - Outpatient follow up with Dr. Parra for aneurysm- will email office to set up appointment        d/w attending w/ review of case and imaging

## 2024-11-02 NOTE — PROGRESS NOTE ADULT - PROBLEM SELECTOR PLAN 8
Diet: Consistent carb  DVT ppx: Eliquis 5 BID (prescribed AC as outpatient for paroxysmal afib)  Dispo: pending workup and clinical improvement, potentially home over weekend Diet: Consistent carb  DVT ppx: Eliquis 5 BID (prescribed AC as outpatient for paroxysmal afib)  Dispo: pending workup and clinical improvement, potentially home over weekend, home w/ cognitive rehab

## 2024-11-02 NOTE — CONSULT NOTE ADULT - ASSESSMENT
69year old  Man with PAF on eliquis HTN, HLD, DM, Gout,, HFrEF,  L posterior limb of IC stroke (residual dysarthria) stroke Recently eliquis changed to 2.5mg BID who presents for confusion and polyuria x 3 days. Patient cannot provide further history, he is not sure  why he was here. Daughter describes had  3 strokes last year. Had slurred speech, facial droop. On exam, noted to have RHH. MRI shows small acute infarcts in the left occipital lobe and medial temporal lobe.       MRI brain multiple small acute infarcts in left occipital lobe and medial temporal lobe    CTA  H&N 11/2 Abrupts occlusion of P2 segment of left PCA, multiple other areas of intracranial stenosis, 3.8mm right PCOMM aneurysm

## 2024-11-02 NOTE — DISCHARGE NOTE PROVIDER - CARE PROVIDERS DIRECT ADDRESSES
,[email or web address],[email or web address],DirectAddress_Unknown

## 2024-11-02 NOTE — PROVIDER CONTACT NOTE (OTHER) - BACKGROUND
70 yo M PMH HTN, DM2, who presented with confusion
Disorientation
DM Disorientation
69 year old male admitted for disorientation, History of hypertension, diabetes, and gout.

## 2024-11-02 NOTE — DISCHARGE NOTE PROVIDER - PROVIDER TOKENS
PROVIDER:[TOKEN:[[id number]],FOLLOWUP:[1 week]],PROVIDER:[TOKEN:[[id number]],FOLLOWUP:[1 week]] PROVIDER:[TOKEN:[[id number]],FOLLOWUP:[1 week]],PROVIDER:[TOKEN:[[id number]],FOLLOWUP:[1 week]],PROVIDER:[TOKEN:[[id number]],FOLLOWUP:[2 weeks],ESTABLISHEDPATIENT:[T]],PROVIDER:[TOKEN:[[id number]],FOLLOWUP:[1 week],ESTABLISHEDPATIENT:[T]]

## 2024-11-02 NOTE — PROGRESS NOTE ADULT - PROBLEM SELECTOR PLAN 4
-Per outpatient records, patient dx with systolic heart failure.   -Previous echo on 6/12/2023 showing mild global left ventricular systolic dysfunction  -Pt euvolemic on exam    -Continue current dose of beta-blocker, losartan and SGLT-2  -consider Add MRA prior to discharge for further optimization of GDMT regimen  -Get in touch with PCP for further information and for additional records -> collateral from cards appreciated  -- cont Coreg and Losartan for HTN  -- Cont Eliquis for PAF  -- Cont ASA and Statin for hx CVA -Per outpatient records, patient dx with systolic heart failure.   -Previous echo on 6/12/2023 showing mild global left ventricular systolic dysfunction  -Pt euvolemic on exam    -Continue current dose of beta-blocker, losartan and SGLT-2  -consider Add MRA prior to discharge for further optimization of GDMT regimen  -Get in touch with PCP for further information and for additional records -> collateral from cards appreciated  -- cont Coreg and Losartan for HTN  -- Cont Eliquis for PAF  -- Cont ASA and Statin for hx CVA  --Cardiology following, appreciate recs -Per outpatient records, patient dx with systolic heart failure.   -Previous echo on 6/12/2023 showing mild global left ventricular systolic dysfunction  -Pt euvolemic on exam    -Continue current dose of beta-blocker, losartan and SGLT-2  -consider Add MRA prior to discharge for further optimization of GDMT regimen  -Get in touch with PCP for further information and for additional records -> collateral from cards appreciated  -- cont Coreg and Losartan for HTN  -- Cont Eliquis for PAF  -- Cont ASA and Statin for hx CVA (imaging also noting intracranial stenosis)  --Cardiology following, appreciate recs

## 2024-11-02 NOTE — DISCHARGE NOTE PROVIDER - HOSPITAL COURSE
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 fevers/chills, chest pain, shortness of breath. 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    (31 Oct 2024 06:25)    Hospital Course: Given the patients acute change in mental status, a CT head non-contrast 10/30 was promptly ordered which revealed no acute intracranial hemorrhage, mass effect, or midline shift. During his hospital stay, he was evaluated by the neurology team, who noted right sided visual field defects (in addition to memory defects) concerning for acute stroke. MRI on 10/31/2024 showed multiple small acute infarcts in the left occipital lobe and medial temporal lobe c/f stroke of cardioembolic origin. Neurology further recommended vEEG, TTE, and CTA Head and neck to evaluate full stroke bundle. vEEG was normal. The last transthoracic echo was done on 9/24 showing doppler evidence of grade I diastolic dysfunction with an estimated EF of 49% and a mildly dilated left atrium. A previous echo with bubble study done in 2023 did not disclose a patent foramen ovale or bubbles in the left side of the heart, mural thrombosis or hypokinesis. CTA head and neck acquired on 11/2 disclosed an abrupt occlusion of the P2 segment of the left PCA with diminished enhancement of the distal left PCA branch vasculature, in addition to a 3.8 mm right-sided P-comm origin aneurysm and multiple areas of intracranial stenosis. Per neurology patient outside the window for TPA administration or alternative intervention. Neurosurgery was also consulted for incidental finding of P-comm aneurysm. They recommended outpatient follow up. Patient was started on high intensity statin, continued ASA and apixaban, and had glycemic control optimized with endocrinology assistance. Endocrine recommended increasing Jardiance to 25mg daily, increase Ozempic to 0.5 mg weekly (increase to 1mg on 5th week if tolerating this should be done by outside provider) + basal insulin based on insurance coverage 16 units sq every am and Admelog solostar pen 5 units TID AC (please hold if npo/not eating).      On day of discharge, patient is clinically stable with no new exam findings or acute symptoms compared to prior. The patient was seen by the attending physician on the date of discharge and deemed stable and acceptable for discharge. The patient's chronic medical conditions were treated accordingly per the patient's home medication regimen. The patient's medication reconciliation, follow up appointments, discharge instructions, and significant lab and diagnostic studies are as noted.        Important Medication Changes and Reason:    - increase Eliquis to 5 mg BID to prevent recurrence of stroke  - Increase jardiance to 25mg daily  - start lantus 16u every morning  - increase atorvastatin to 80 mg qHS       Active or Pending Issues Requiring Follow-up:  1. Will require follow up with Stroke NP Laurel Mobley [address] [place] [address]. Please instruct the patient to call [phone] to schedule this appointment. Patient to follow up with private neurologist.   2. Follow up with private endocrinologist on discharge for uncontrolled diabetes mellitus  3. Pt will follow up with private endo on discharge   4. Follow up with neurosurgery Dr. Parra      Advanced Directives:   [X ] Full code  [ ] DNR  [ ] Hospice    Discharge Diagnoses:   Encephalopathy secondary to acute CVA  Acute CVA suspect secondary to cardioembolic etiology, with occlusion of P2 segment of L PCA noted on CTA, multiple small acute infarcts in the left occipital and medial temporal lobe noted on MRI  Posterior communicating artery aneurysm  Paroxysmal atrial fibrillation with secondary hypercoagulable state due to elevated [id number] score   Chronic kidney disease suspect CKD stage 3   Uncontrolled Diabetes Mellitus type 2 complicated by hyperglycemia, on long term insulin therapy  Essential Hypertension

## 2024-11-02 NOTE — DISCHARGE NOTE PROVIDER - CARE PROVIDER_API CALL
Shane Parra W  Neurosurgery  [address], [address]  [place] [address]  Phone: [phone]  Fax: [phone]  Follow Up Time: 1 week    Laurel Mobley NP in Family Health  [address], [address]  [place] [address]  Phone: [phone]  Fax: [phone]  Follow Up Time: 1 week   Shane Parra  Neurosurgery  [address] [facility], [address]  [place] [address]  Phone: [phone]  Fax: [phone]  Follow Up Time: 1 week    Laurel Mobley  NP in [facility]  [address], [address]  [place] [address]  Phone: [phone]  Fax: [phone]  Follow Up Time: 1 week    Javi Salinas  Cardiology  [address] [facility] [facility] [facility] [address]  [place] [address]  Phone: [phone]  Fax: [phone]  Established Patient  Follow Up Time: 2 weeks    Madonna Nevarez  Internal Medicine  [address] [facility] [facility], [address]  [place] [address]  Phone: [phone]  Fax: [phone]  Established Patient  Follow Up Time: 1 week

## 2024-11-02 NOTE — EEG REPORT - NS EEG TEXT BOX
ROBERT GEIGER [facility]-[id number]     Study Date: 		11-01-24 (1440)   -  11-02-24  (0800)   Duration: 17hr 20, however, study unreadable after midnight   --------------------------------------------------------------------------------------------------  History:  CC/ HPI Patient is a 69y old  Male who presents with a chief complaint of Amnesia (02 Nov 2024 10:20)    MEDICATIONS  (STANDING):  allopurinol 200 milliGRAM(s) Oral daily  apixaban 5 milliGRAM(s) Oral two times a day  aspirin enteric coated 81 milliGRAM(s) Oral daily  atorvastatin 80 milliGRAM(s) Oral at bedtime  carvedilol 12.5 milliGRAM(s) Oral every 12 hours  dextrose 5%. 1000 milliLiter(s) (50 mL/Hr) IV Continuous <Continuous>  dextrose 5%. 1000 milliLiter(s) (100 mL/Hr) IV Continuous <Continuous>  dextrose 50% Injectable 25 Gram(s) IV Push once  dextrose 50% Injectable 12.5 Gram(s) IV Push once  dextrose 50% Injectable 25 Gram(s) IV Push once  gabapentin 300 milliGRAM(s) Oral two times a day  glucagon  Injectable 1 milliGRAM(s) IntraMuscular once  influenza  Vaccine (HIGH DOSE) 0.5 milliLiter(s) IntraMuscular once  insulin glargine Injectable (LANTUS) 16 Unit(s) SubCutaneous every morning  insulin lispro (ADMELOG) corrective regimen sliding scale   SubCutaneous three times a day before meals  insulin lispro (ADMELOG) corrective regimen sliding scale   SubCutaneous at bedtime  losartan 25 milliGRAM(s) Oral daily  thiamine IVPB 500 milliGRAM(s) IV Intermittent every 8 hours    --------------------------------------------------------------------------------------------------  Study Interpretation:    [[[Abbreviation Key:  PDR=alpha rhythm/posterior dominant rhythm. A-P=anterior posterior gradient.  Amplitude: ‘very low’:<20; ‘low’:20-50; ‘medium’:; ‘high’:>200uV.  Persistence for periodic/rhythmic patterns (% of epoch) ‘rare’:<1%; ‘occasional’:1-10%; ‘frequent’:10-50%; ‘abundant’:50-90%; ‘continuous’:>90%.  Persistence for sporadic discharges: ‘rare’:<1/hr; ‘occasional’:1/min-1/hr; ‘frequent’:>1/min; ‘abundant’:>1/10 sec.  GRDA=generalized rhythmic delta activity; FIRDA=frontal intermittent GRDA; LRDA=lateralized rhythmic delta activity; TIRDA=temporal intermittent rhythmic delta activity;  LPD=PLED=lateralized periodic discharges; GPD=generalized periodic discharges; BiPDs=BiPLEDs=bilateral independent periodic epileptiform discharges; SIRPID=stimulus induced rhythmic, periodic, or ictal appearing discharges; BIRDs=brief potentially ictal rhythmic discharges >4 Hz, lasting .5-10s; PFA=paroxysmal bursts of beta/gamma; LVFA=low voltage fast activity.  Modifiers: +F=with fast component; +S=with spike component; +R=with rhythmic component.  S-B=burst suppression pattern.  Max=maximal. N1-drowsy; N2-stage II sleep; N3-slow wave sleep. SSS/BETS=small sharp spikes/benign epileptiform transients of sleep. HV=hyperventilation; PS=photic stimulation]]]    Daily EEG Visual Analysis    FINDINGS:      Background:  Continuity: continuous  Symmetry: symmetric  PDR: 9 Hz activity, with amplitude to 40 uV, that attenuated to eye opening.  Low amplitude frontal beta noted in wakefulness.  Reactivity: present  Voltage: normal, mostly 20-150uV  Anterior Posterior Gradient: present  Other background findings: none  Breach: absent    Background Slowing:  Generalized slowing: none was present.  Focal slowing: none was present.    State Changes:   -Drowsiness noted with increased slowing, attenuation of fast activity, vertex transients.  -N2 sleep transients were not recorded.      Sporadic Epileptiform Discharges:    None    Rhythmic and Periodic Patterns (RPPs):  None     Electrographic and Electroclinical seizures:  None    Other Clinical Events:  None    Activation Procedures:   -Hyperventilation was not performed.    -Photic stimulation was not performed.    Artifacts:  Intermittent myogenic and movement artifacts were noted.    ECG:  The heart rate on single channel ECG was predominantly between 60-80 BPM.    EEG Classification / Summary:  Normal EEG in the awake / drowsy / asleep state(s).    Clinical Impression:  There were no epileptiform abnormalities or seizures  recorded.        *THIS IS A PRELIM READ, ATTENDING ATTESTATION PENDING*     -------------------------------------------------------------------------------------------------------  [facility] EEG Reading Room Ph#: [phone]  Epilepsy Answering Service after 5PM and before 8:30AM: Ph#: [phone]    Byron Ridley DO   Epilepsy Fellow    ROBERT GEIGER [facility]-[id number]     Study Date: 		11-01-24 (1440)   -  11-02-24  (0800)   Duration: 17hr 20, however, study unreadable after midnight   --------------------------------------------------------------------------------------------------  History:  CC/ HPI Patient is a 69y old  Male who presents with a chief complaint of Amnesia (02 Nov 2024 10:20)    MEDICATIONS  (STANDING):  allopurinol 200 milliGRAM(s) Oral daily  apixaban 5 milliGRAM(s) Oral two times a day  aspirin enteric coated 81 milliGRAM(s) Oral daily  atorvastatin 80 milliGRAM(s) Oral at bedtime  carvedilol 12.5 milliGRAM(s) Oral every 12 hours  dextrose 5%. 1000 milliLiter(s) (50 mL/Hr) IV Continuous <Continuous>  dextrose 5%. 1000 milliLiter(s) (100 mL/Hr) IV Continuous <Continuous>  dextrose 50% Injectable 25 Gram(s) IV Push once  dextrose 50% Injectable 12.5 Gram(s) IV Push once  dextrose 50% Injectable 25 Gram(s) IV Push once  gabapentin 300 milliGRAM(s) Oral two times a day  glucagon  Injectable 1 milliGRAM(s) IntraMuscular once  influenza  Vaccine (HIGH DOSE) 0.5 milliLiter(s) IntraMuscular once  insulin glargine Injectable (LANTUS) 16 Unit(s) SubCutaneous every morning  insulin lispro (ADMELOG) corrective regimen sliding scale   SubCutaneous three times a day before meals  insulin lispro (ADMELOG) corrective regimen sliding scale   SubCutaneous at bedtime  losartan 25 milliGRAM(s) Oral daily  thiamine IVPB 500 milliGRAM(s) IV Intermittent every 8 hours    --------------------------------------------------------------------------------------------------  Study Interpretation:    [[[Abbreviation Key:  PDR=alpha rhythm/posterior dominant rhythm. A-P=anterior posterior gradient.  Amplitude: ‘very low’:<20; ‘low’:20-50; ‘medium’:; ‘high’:>200uV.  Persistence for periodic/rhythmic patterns (% of epoch) ‘rare’:<1%; ‘occasional’:1-10%; ‘frequent’:10-50%; ‘abundant’:50-90%; ‘continuous’:>90%.  Persistence for sporadic discharges: ‘rare’:<1/hr; ‘occasional’:1/min-1/hr; ‘frequent’:>1/min; ‘abundant’:>1/10 sec.  GRDA=generalized rhythmic delta activity; FIRDA=frontal intermittent GRDA; LRDA=lateralized rhythmic delta activity; TIRDA=temporal intermittent rhythmic delta activity;  LPD=PLED=lateralized periodic discharges; GPD=generalized periodic discharges; BiPDs=BiPLEDs=bilateral independent periodic epileptiform discharges; SIRPID=stimulus induced rhythmic, periodic, or ictal appearing discharges; BIRDs=brief potentially ictal rhythmic discharges >4 Hz, lasting .5-10s; PFA=paroxysmal bursts of beta/gamma; LVFA=low voltage fast activity.  Modifiers: +F=with fast component; +S=with spike component; +R=with rhythmic component.  S-B=burst suppression pattern.  Max=maximal. N1-drowsy; N2-stage II sleep; N3-slow wave sleep. SSS/BETS=small sharp spikes/benign epileptiform transients of sleep. HV=hyperventilation; PS=photic stimulation]]]    Daily EEG Visual Analysis    FINDINGS:      Background:  Continuity: continuous  Symmetry: symmetric  PDR: 9 Hz activity, with amplitude to 40 uV, that attenuated to eye opening.  Low amplitude frontal beta noted in wakefulness.  Reactivity: present  Voltage: normal, mostly 20-150uV  Anterior Posterior Gradient: present  Other background findings: none  Breach: absent    Background Slowing:  Generalized slowing: none was present.  Focal slowing: none was present.    State Changes:   -Drowsiness noted with increased slowing, attenuation of fast activity, vertex transients.  -N2 sleep transients were not recorded.      Sporadic Epileptiform Discharges:    None    Rhythmic and Periodic Patterns (RPPs):  None     Electrographic and Electroclinical seizures:  None    Other Clinical Events:  None    Activation Procedures:   -Hyperventilation was not performed.    -Photic stimulation was not performed.    Artifacts:  Intermittent myogenic and movement artifacts were noted.    ECG:  The heart rate on single channel ECG was predominantly between 60-80 BPM.    EEG Classification / Summary:  Normal EEG in the awake / drowsy / asleep state(s).    Clinical Impression:  There were no epileptiform abnormalities or seizures  recorded.      -------------------------------------------------------------------------------------------------------  [facility] EEG Reading Room Ph#: [phone]  Epilepsy Answering Service after 5PM and before 8:30AM: Ph#: [phone]    Byron Ridley DO   Epilepsy Fellow     Angel Zimmerman MD  Neurology Attending Physician

## 2024-11-02 NOTE — DISCHARGE NOTE PROVIDER - NSDCFUADDAPPT_GEN_ALL_CORE_FT
APPTS ARE READY TO BE MADE: [ ] YES    Best Family or Patient Contact (if needed):    Additional Information about above appointments (if needed):    1:  Can also follow with Stroke NP Laurel Mobley [address] [place] [address] [place]. Please instruct the patient to call [phone] to schedule this appointment.  2: Please have the patient follow up with his private neurologist.   3:     Other comments or requests:    APPTS ARE READY TO BE MADE: [ ] YES    Best Family or Patient Contact (if needed):    Additional Information about above appointments (if needed):    1:  Can also follow with Stroke NP Laurel Mobley [address] [place] [address] [place]. Please instruct the patient to call [phone] to schedule this appointment.  2: Please instruct patient to follow up with his private neurologist.   3: Please     Other comments or requests:    APPTS ARE READY TO BE MADE: [ ] YES    Best Family or Patient Contact (if needed):    Additional Information about above appointments (if needed):    1:  Can also follow with Stroke NP Laurel Mobley [address] [place] [address]. Please instruct the patient to call [phone] to schedule this appointment.  2: Please instruct patient to follow up with his private neurologist.   3: Please follow up with your outpatient endocrinologist  4. Please follow up with your outpatient cardiologist to monitor your cardiac function   5. Please follow up with your primary care physician.     Other comments or requests:    APPTS ARE READY TO BE MADE: [X] YES    Best Family or Patient Contact (if needed):    Additional Information about above appointments (if needed):    1:  Can also follow with Stroke NP Laurel Mobley [address] [place] [address]. Please instruct the patient to call [phone] to schedule this appointment.  2: Please instruct patient to follow up with his private neurologist.   3: Please follow up with your outpatient endocrinologist  4. Please follow up with your outpatient cardiologist to monitor your cardiac function   5. Please follow up with your primary care physician.  6. follow up with Dr. Parra, neurosurgery, for intracranial aneurysm monitoring    Other comments or requests:    APPTS ARE READY TO BE MADE: [X] YES    Best Family or Patient Contact (if needed):    Additional Information about above appointments (if needed):    1:  Can also follow with Stroke NP Laurel Mobley [address] [place] [address]. Please instruct the patient to call [phone] to schedule this appointment.  2: Please instruct patient to follow up with his private neurologist.   3: Please follow up with your outpatient endocrinologist  4. Please follow up with your outpatient cardiologist to monitor your cardiac function   5. Please follow up with your primary care physician.  6. follow up with Dr. Parra, neurosurgery, for intracranial aneurysm monitoring  [phone]    Other comments or requests:    APPTS ARE READY TO BE MADE: [X] YES    Best Family or Patient Contact (if needed):    Additional Information about above appointments (if needed):    1:  Can also follow with Stroke NP Laurel Mobley [address] [place] [address]. Please instruct the patient to call [phone] to schedule this appointment.  2: Please instruct patient to follow up with his private neurologist.   3: Please follow up with your outpatient endocrinologist  4. Please follow up with your outpatient cardiologist to monitor your cardiac function   5. Please follow up with your primary care physician.  6. follow up with Dr. Parra, neurosurgery, for intracranial aneurysm monitoring  [phone]    Other comments or requests:   Prior to outreaching the patient, it was visible that the patient has secured a follow up appointment which was not scheduled by our team. Patient is scheduled on 11/13 ar 9:20A at [address] [place] [address] with NP Laurel Mobley     Prior to outreaching the patient, it was visible that the patient has secured a follow up appointment which was not scheduled by our team. Patient is scheduled on 11/12 at 10:30A at [address] [facility] with [address]. Shane Parra     Patient informed us they already have secured a follow up appointment which is not visible on [facility] and declined to provide appointment details. Patient's family member advised patient saw PCP last week but did not specify provider or date.    APPTS ARE READY TO BE MADE: [X] YES    Best Family or Patient Contact (if needed):    Additional Information about above appointments (if needed):    1:  Can also follow with Stroke NP Laurel Mobley [address] [place] [address]. Please instruct the patient to call [phone] to schedule this appointment.  2: Please instruct patient to follow up with his private neurologist.   3: Please follow up with your outpatient endocrinologist  4. Please follow up with your outpatient cardiologist to monitor your cardiac function   5. Please follow up with your primary care physician.  6. follow up with Dr. Parra, neurosurgery, for intracranial aneurysm monitoring  [phone]    Other comments or requests:   Prior to outreaching the patient, it was visible that the patient has secured a follow up appointment which was not scheduled by our team. Patient is scheduled on 11/13 ar 9:20A at [address] [place] [address] with NP Laurel Mobley     Prior to outreaching the patient, it was visible that the patient has secured a follow up appointment which was not scheduled by our team. Patient is scheduled on 11/12 at 10:30A at [address] with [address]. Shane Parra     Patient informed us they already have secured a follow up appointment which is not visible on [facility] and declined to provide appointment details. Patient's family member advised patient saw PCP last week but did not specify provider or date.       Provider's office was contacted to secure an appointment, however the office will follow up with the patient/caregiver directly. Message was left for Dr. Salinas as office kept hanging up call. Left message for patients family member Dalia to offer assistance with a new cardiologist (possibly Dr. Cavazos in [facility])

## 2024-11-02 NOTE — PROGRESS NOTE ADULT - SUBJECTIVE AND OBJECTIVE BOX
PROGRESS NOTE:   Authored by Dr. Tasia Jeff    Patient is a 69y old  Male who presents with a chief complaint of Amnesia (01 Nov 2024 12:31)      SUBJECTIVE / OVERNIGHT EVENTS:  No acute events overnight.     MEDICATIONS  (STANDING):  allopurinol 200 milliGRAM(s) Oral daily  apixaban 5 milliGRAM(s) Oral two times a day  aspirin enteric coated 81 milliGRAM(s) Oral daily  atorvastatin 80 milliGRAM(s) Oral at bedtime  carvedilol 12.5 milliGRAM(s) Oral every 12 hours  dextrose 5%. 1000 milliLiter(s) (50 mL/Hr) IV Continuous <Continuous>  dextrose 5%. 1000 milliLiter(s) (100 mL/Hr) IV Continuous <Continuous>  dextrose 50% Injectable 25 Gram(s) IV Push once  dextrose 50% Injectable 25 Gram(s) IV Push once  dextrose 50% Injectable 12.5 Gram(s) IV Push once  gabapentin 300 milliGRAM(s) Oral two times a day  glucagon  Injectable 1 milliGRAM(s) IntraMuscular once  influenza  Vaccine (HIGH DOSE) 0.5 milliLiter(s) IntraMuscular once  insulin glargine Injectable (LANTUS) 16 Unit(s) SubCutaneous every morning  insulin lispro (ADMELOG) corrective regimen sliding scale   SubCutaneous three times a day before meals  insulin lispro (ADMELOG) corrective regimen sliding scale   SubCutaneous at bedtime  losartan 25 milliGRAM(s) Oral daily  thiamine IVPB 500 milliGRAM(s) IV Intermittent every 8 hours    MEDICATIONS  (PRN):  dextrose Oral Gel 15 Gram(s) Oral once PRN Blood Glucose LESS THAN 70 milliGRAM(s)/deciliter      CAPILLARY BLOOD GLUCOSE      POCT Blood Glucose.: 190 mg/dL (01 Nov 2024 21:49)  POCT Blood Glucose.: 244 mg/dL (01 Nov 2024 18:02)  POCT Blood Glucose.: 196 mg/dL (01 Nov 2024 12:05)  POCT Blood Glucose.: 151 mg/dL (01 Nov 2024 08:41)    I&O's Summary      PHYSICAL EXAM:  Vital Signs Last 24 Hrs  T(C): 36.4 (02 Nov 2024 05:20), Max: 36.6 (01 Nov 2024 12:04)  T(F): 97.5 (02 Nov 2024 05:20), Max: 97.8 (01 Nov 2024 12:04)  HR: 69 (02 Nov 2024 05:20) (62 - 75)  BP: 158/91 (02 Nov 2024 05:20) (120/86 - 160/96)  BP(mean): --  RR: 18 (02 Nov 2024 05:20) (18 - 18)  SpO2: 98% (02 Nov 2024 05:20) (97% - 98%)    Parameters below as of 02 Nov 2024 05:20  Patient On (Oxygen Delivery Method): room air        CONSTITUTIONAL: NAD  HEET: MMM, EOMI, PERRLA  NECK: supple  RESPIRATORY: Normal respiratory effort; lungs are clear to auscultation bilaterally  CARDIOVASCULAR: Regular rate and rhythm, normal S1 and S2, no murmur/rub/gallop; No lower extremity edema; Peripheral pulses are 2+ bilaterally  ABDOMEN: Nontender to palpation, normoactive bowel sounds, no rebound/guarding; No hepatosplenomegaly  MUSCULOSKELETAL: no clubbing or cyanosis of digits; no joint swelling or tenderness to palpation  PSYCH: A+O to person, place, and time; affect appropriate  SKIN: No rash    LABS:                        14.2   6.25  )-----------( 204      ( 01 Nov 2024 07:22 )             44.0     11-01    141  |  106  |  22  ----------------------------<  145[H]  3.7   |  20[L]  |  1.47[H]    Ca    8.9      01 Nov 2024 07:22  Phos  3.3     11-01  Mg     2.10     11-01    TPro  6.7  /  Alb  3.8  /  TBili  0.4  /  DBili  x   /  AST  18  /  ALT  20  /  AlkPhos  73  11-01          Urinalysis Basic - ( 01 Nov 2024 07:22 )    Color: x / Appearance: x / SG: x / pH: x  Gluc: 145 mg/dL / Ketone: x  / Bili: x / Urobili: x   Blood: x / Protein: x / Nitrite: x   Leuk Esterase: x / RBC: x / WBC x   Sq Epi: x / Non Sq Epi: x / Bacteria: x        Culture - Urine (collected 31 Oct 2024 02:10)  Source: Clean Catch Clean Catch (Midstream)  Final Report (01 Nov 2024 07:35):    <10,000 CFU/mL Normal Urogenital Farzaneh        Tele Reviewed:    RADIOLOGY & ADDITIONAL TESTS:  Results Reviewed:   Imaging Personally Reviewed:  Electrocardiogram Personally Reviewed:     PROGRESS NOTE:   Authored by Dr. Tasia Jeff    Patient is a 69y old  Male who presents with a chief complaint of Amnesia (01 Nov 2024 12:31)      SUBJECTIVE / OVERNIGHT EVENTS:  No acute events overnight. Pt is still disoriented but appears slightly less confused than yesterday. Reports he is feeling well and would like to go home.     MEDICATIONS  (STANDING):  allopurinol 200 milliGRAM(s) Oral daily  apixaban 5 milliGRAM(s) Oral two times a day  aspirin enteric coated 81 milliGRAM(s) Oral daily  atorvastatin 80 milliGRAM(s) Oral at bedtime  carvedilol 12.5 milliGRAM(s) Oral every 12 hours  dextrose 5%. 1000 milliLiter(s) (50 mL/Hr) IV Continuous <Continuous>  dextrose 5%. 1000 milliLiter(s) (100 mL/Hr) IV Continuous <Continuous>  dextrose 50% Injectable 25 Gram(s) IV Push once  dextrose 50% Injectable 25 Gram(s) IV Push once  dextrose 50% Injectable 12.5 Gram(s) IV Push once  gabapentin 300 milliGRAM(s) Oral two times a day  glucagon  Injectable 1 milliGRAM(s) IntraMuscular once  influenza  Vaccine (HIGH DOSE) 0.5 milliLiter(s) IntraMuscular once  insulin glargine Injectable (LANTUS) 16 Unit(s) SubCutaneous every morning  insulin lispro (ADMELOG) corrective regimen sliding scale   SubCutaneous three times a day before meals  insulin lispro (ADMELOG) corrective regimen sliding scale   SubCutaneous at bedtime  losartan 25 milliGRAM(s) Oral daily  thiamine IVPB 500 milliGRAM(s) IV Intermittent every 8 hours    MEDICATIONS  (PRN):  dextrose Oral Gel 15 Gram(s) Oral once PRN Blood Glucose LESS THAN 70 milliGRAM(s)/deciliter      CAPILLARY BLOOD GLUCOSE      POCT Blood Glucose.: 190 mg/dL (01 Nov 2024 21:49)  POCT Blood Glucose.: 244 mg/dL (01 Nov 2024 18:02)  POCT Blood Glucose.: 196 mg/dL (01 Nov 2024 12:05)  POCT Blood Glucose.: 151 mg/dL (01 Nov 2024 08:41)    I&O's Summary      PHYSICAL EXAM:  Vital Signs Last 24 Hrs  T(C): 36.4 (02 Nov 2024 05:20), Max: 36.6 (01 Nov 2024 12:04)  T(F): 97.5 (02 Nov 2024 05:20), Max: 97.8 (01 Nov 2024 12:04)  HR: 69 (02 Nov 2024 05:20) (62 - 75)  BP: 158/91 (02 Nov 2024 05:20) (120/86 - 160/96)  BP(mean): --  RR: 18 (02 Nov 2024 05:20) (18 - 18)  SpO2: 98% (02 Nov 2024 05:20) (97% - 98%)    Parameters below as of 02 Nov 2024 05:20  Patient On (Oxygen Delivery Method): room air          GENERAL: NAD, lying in bed comfortably  EYES: EOMI, PERRL, conjunctiva and sclera clear.   NECK: Supple, full ROM   HEART: Regular rate and rhythm, S1 and S2, no murmurs, rubs, or gallops. No LE edema b/l.  LUNGS: Unlabored respirations. Clear to auscultation bilaterally. No crackles, wheezing, or rhonchi.  ABDOMEN: Soft, nontender, nondistended, +BS. Umbilical hernia. Non-reducible  EXTREMITIES: 2+ peripheral pulses bilaterally. No clubbing, cyanosis, or edema.  NERVOUS SYSTEM: A&O to person and place, not to time. Hemianopsia appears to be improved in left eye. Same as previous exam in right eye.  PSYCHIATRIC: Calm. Affect normal.    LABS:                        14.2   6.25  )-----------( 204      ( 01 Nov 2024 07:22 )             44.0     11-01    141  |  106  |  22  ----------------------------<  145[H]  3.7   |  20[L]  |  1.47[H]    Ca    8.9      01 Nov 2024 07:22  Phos  3.3     11-01  Mg     2.10     11-01    TPro  6.7  /  Alb  3.8  /  TBili  0.4  /  DBili  x   /  AST  18  /  ALT  20  /  AlkPhos  73  11-01        Culture - Urine (collected 31 Oct 2024 02:10)  Source: Clean Catch Clean Catch (Midstream)  Final Report (01 Nov 2024 07:35):    <10,000 CFU/mL Normal Urogenital Farzaneh        Tele Reviewed:    RADIOLOGY & ADDITIONAL TESTS:  Results Reviewed:   Imaging Personally Reviewed:  Electrocardiogram Personally Reviewed:     PROGRESS NOTE:   Authored by Dr. Tasia Jeff    Patient is a 69y old  Male who presents with a chief complaint of Amnesia (01 Nov 2024 12:31)      SUBJECTIVE / OVERNIGHT EVENTS:  No acute events overnight. Pt is still disoriented but appears slightly less confused than yesterday. Reports he is feeling well and would like to go home.     MEDICATIONS  (STANDING):  allopurinol 200 milliGRAM(s) Oral daily  apixaban 5 milliGRAM(s) Oral two times a day  aspirin enteric coated 81 milliGRAM(s) Oral daily  atorvastatin 80 milliGRAM(s) Oral at bedtime  carvedilol 12.5 milliGRAM(s) Oral every 12 hours  dextrose 5%. 1000 milliLiter(s) (50 mL/Hr) IV Continuous <Continuous>  dextrose 5%. 1000 milliLiter(s) (100 mL/Hr) IV Continuous <Continuous>  dextrose 50% Injectable 25 Gram(s) IV Push once  dextrose 50% Injectable 25 Gram(s) IV Push once  dextrose 50% Injectable 12.5 Gram(s) IV Push once  gabapentin 300 milliGRAM(s) Oral two times a day  glucagon  Injectable 1 milliGRAM(s) IntraMuscular once  influenza  Vaccine (HIGH DOSE) 0.5 milliLiter(s) IntraMuscular once  insulin glargine Injectable (LANTUS) 16 Unit(s) SubCutaneous every morning  insulin lispro (ADMELOG) corrective regimen sliding scale   SubCutaneous three times a day before meals  insulin lispro (ADMELOG) corrective regimen sliding scale   SubCutaneous at bedtime  losartan 25 milliGRAM(s) Oral daily  thiamine IVPB 500 milliGRAM(s) IV Intermittent every 8 hours    MEDICATIONS  (PRN):  dextrose Oral Gel 15 Gram(s) Oral once PRN Blood Glucose LESS THAN 70 milliGRAM(s)/deciliter      CAPILLARY BLOOD GLUCOSE      POCT Blood Glucose.: 190 mg/dL (01 Nov 2024 21:49)  POCT Blood Glucose.: 244 mg/dL (01 Nov 2024 18:02)  POCT Blood Glucose.: 196 mg/dL (01 Nov 2024 12:05)  POCT Blood Glucose.: 151 mg/dL (01 Nov 2024 08:41)    I&O's Summary      PHYSICAL EXAM:  Vital Signs Last 24 Hrs  T(C): 36.4 (02 Nov 2024 05:20), Max: 36.6 (01 Nov 2024 12:04)  T(F): 97.5 (02 Nov 2024 05:20), Max: 97.8 (01 Nov 2024 12:04)  HR: 69 (02 Nov 2024 05:20) (62 - 75)  BP: 158/91 (02 Nov 2024 05:20) (120/86 - 160/96)  RR: 18 (02 Nov 2024 05:20) (18 - 18)  SpO2: 98% (02 Nov 2024 05:20) (97% - 98%)    Parameters below as of 02 Nov 2024 05:20  Patient On (Oxygen Delivery Method): room air          GENERAL: NAD, lying in bed comfortably  EYES: EOMI, PERRL, conjunctiva and sclera clear.   NECK: Supple, full ROM   HEART: Regular rate and rhythm, S1 and S2, no murmurs, rubs, or gallops. No LE edema b/l.  LUNGS: Unlabored respirations. Clear to auscultation bilaterally. No crackles, wheezing, or rhonchi.  ABDOMEN: Soft, nontender, nondistended, +BS. Umbilical hernia. Non-reducible  EXTREMITIES: 2+ peripheral pulses bilaterally. No clubbing, cyanosis, or edema.  NERVOUS SYSTEM: A&O to person and place, not to time. Hemianopsia appears to be improved in left eye. Same as previous exam in right eye.  PSYCHIATRIC: Calm. Affect normal.    LABS:                                   14.0   6.82  )-----------( 222      ( 02 Nov 2024 06:50 )             43.4                  14.2   6.25  )-----------( 204      ( 01 Nov 2024 07:22 )             44.0     11-02    139  |  104  |  25[H]  ----------------------------<  253[H]  4.0   |  20[L]  |  1.60[H]    Ca    9.0      02 Nov 2024 06:50  Phos  3.6     11-02  Mg     2.00     11-02    TPro  6.8  /  Alb  3.9  /  TBili  0.4  /  DBili  x   /  AST  15  /  ALT  17  /  AlkPhos  83  11-02 11-01    141  |  106  |  22  ----------------------------<  145[H]  3.7   |  20[L]  |  1.47[H]    Ca    8.9      01 Nov 2024 07:22  Phos  3.3     11-01  Mg     2.10     11-01    TPro  6.7  /  Alb  3.8  /  TBili  0.4  /  DBili  x   /  AST  18  /  ALT  20  /  AlkPhos  73  11-01        Culture - Urine (collected 31 Oct 2024 02:10)  Source: Clean Catch Clean Catch (Midstream)  Final Report (01 Nov 2024 07:35):    <10,000 CFU/mL Normal Urogenital Farzaneh        Tele Reviewed:    RADIOLOGY & ADDITIONAL TESTS:  Results Reviewed:   Imaging Personally Reviewed:  < from: CT Angio Head w/ IV Cont (11.02.24 @ 09:22) >  IMPRESSION:    CT HEAD:  1. Evolving acute/subacute left-sided PCA distribution infarct with   associated cytotoxic edema and without hemorrhagic transformation.  2. Unchanged encephalomalacia and gliosis within the right lateral   frontal lobe.    CTA NECK:  1. No large vessel occlusion or major stenosis.    CTA HEAD:  1. Abrupt occlusion of the P2 segment of the leftPCA with diminished   enhancement of the distal left PCA branch vasculature.  2. Multiple other areas of intracranial stenoses, as discussed.  3. 3.8 mm right-sided P-comm origin aneurysm.    < end of copied text >      Electrocardiogram Personally Reviewed:    Providers d/w: team d/w neuro re: CTA results, rec Nsx input  Consultant notes reviewed: Nsx, Endocrine, Cardiology

## 2024-11-02 NOTE — PROGRESS NOTE ADULT - PROBLEM SELECTOR PLAN 1
-most likely d/t cardioembolic stroke given findings on MRI. Less likely infectious, vitamin deficiency and/or paraneoplastic. Also unlikely metabolic and inflammatory.  -MRI 10/31-Multiple small acute infarcts in the left occipital lobe and medial temporal lobe. No acute intracranial hemorrhage identified.  -CT Abdomen/Pelvis 11/1 w/ no CT evidence of malignancy in the chest, abdomen or pelvis.  -syphilis negative, vitamin B12 WNL, CRP and ESR normal  -recent TTE showing ECHO 9/24/24 Mild segmental LV dysfunction. Doppler evidence of grade I (impaired) diastolic  dysfunction. Calculated EF 49%. NST 9/27/24-Conclusions: Normal study. Normal myocardial perfusion SPECT images No evidence of stress induced ischemia or infarction.    [] F/u neuro recs   [ ] q4 neuro checks  [ ] Lipid panel in the AM  [ ] PT/OT eval placed -> rec cognitive rehab  [ ] CTA head and neck   [ ] Lipitor 80 mg; titrate ldl <70 -CTA head and neck with/ evolving acute/subacute left-sided PCA distribution infarct with associated cytotoxic edema and without hemorrhagic transformation. Abrupt occlusion of the P2 segment of the leftPCA with diminished enhancement of the distal left PCA branch vasculature. Multiple other areas of intracranial stenoses, as discussed.  3.8 mm right-sided P-comm origin aneurysm.  -MRI 10/31-Multiple small acute infarcts in the left occipital lobe and medial temporal lobe. No acute intracranial hemorrhage identified.    [ ] q4 neuro checks  [ ] Lipid panel shows hypertriglyceridemia   [ ] PT/OT eval placed -> rec cognitive rehab  [ ] CTA head and neck ->P2 segment occlusion, P-comm aneurysm, evolving left sided PCA dist. infarct  [ ] F/u neurology; per phone call with neuro resident, patient outside window for TPA or acute intervention  [ ] Neurosurgery consult to see if there is any need for intervention -CTA head and neck with/ evolving acute/subacute left-sided PCA distribution infarct with associated cytotoxic edema and without hemorrhagic transformation. Abrupt occlusion of the P2 segment of the leftPCA with diminished enhancement of the distal left PCA branch vasculature. Multiple other areas of intracranial stenoses, as discussed.  3.8 mm right-sided P-comm origin aneurysm.  -MRI 10/31-Multiple small acute infarcts in the left occipital lobe and medial temporal lobe. No acute intracranial hemorrhage identified.    [ ] q4 neuro checks  [ ] Lipid panel shows hypertriglyceridemia   [ ] PT/OT eval placed -> rec cognitive rehab  [ ] CTA head and neck ->P2 segment occlusion, P-comm aneurysm, evolving left sided PCA dist. infarct  [ ] F/u neurology; per phone call with neuro resident, patient outside window for TPA or acute intervention  [ ] Neurosurgery consult to see if there is any need for intervention re: posterior cerebral aneurysm -> no acute surgical intervention, otpt f/u Dr. Parra

## 2024-11-02 NOTE — PROVIDER CONTACT NOTE (OTHER) - SITUATION
patient had a bp reading of 147/102
Vit B6 ordered 18:42 drawn and sent to lab cancelled as reported by Cyndie  Mau
/105
Patient took EEG off

## 2024-11-02 NOTE — PROGRESS NOTE ADULT - ASSESSMENT
68 y/o M with past medical history of hypertension, diabetes, gout, CVAx3, CHF?, presenting for acute change in mental status x 3 days with right sided hemianopsia noted on physical exam and MRI showing multiple small acute infarcts in the left occipital lobe and medial   temporal lobe c/f cardioembolic stroke.  70 y/o M with past medical history of hypertension, diabetes, gout, CVAx3, CHF?, presenting for acute change in mental status x 3 days with right sided hemianopsia noted on physical exam and MRI showing multiple small acute infarcts in the left occipital lobe and medial temporal lobe c/f cardioembolic stroke, with occlusion of P2 segment of the left PCA, and incidental finding of 3.8 mm right sided P-comm aneurysm.

## 2024-11-02 NOTE — DISCHARGE NOTE PROVIDER - NSDCCPTREATMENT_GEN_ALL_CORE_FT
PRINCIPAL PROCEDURE  Procedure: MR head wo con  Findings and Treatment: FINDINGS:  Multiple acute small infarcts are seen involving the left occipital lobe   and extending into the left medial temporal lobe, including with   involvement of the hippocampus. No acute intracranial hemorrhage   identified.  Chronic infarcts of the bilateral basal ganglia and right lateral frontal   lobe are again seen. Scattered FLAIR hyperintense white matter foci are   noted, most compatible with chronic small vessel disease.  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  No hydrocephalus. No extra-axial fluid collections. The skull base flow   voids are present.  The visualized intraorbital contents are unremarkable. The imaged   portions of the paranasal sinuses are clear. The mastoid air cells are   clear. The visualized osseous structures, soft tissues and partially   visualized parotid glands appear normal.  IMPRESSION:   Multiple small acute infarcts in the left occipital lobe and medial   temporal lobe. No acute intracranial hemorrhage identified.  Multiple chronic infarcts.        SECONDARY PROCEDURE  Procedure: CT chest w contrast  Findings and Treatment: FINDINGS:  CHEST:  LUNGS AND LARGE AIRWAYS: Patent central airways. No pulmonary nodules.  PLEURA: No pleural effusion.  VESSELS: Within normal limits.  HEART: Cardiomegaly. No pericardial effusion.  MEDIASTINUM AND JULIA:No lymphadenopathy.  CHEST WALL AND LOWER NECK: Left chest wall loop recorder.  ABDOMEN AND PELVIS:  LIVER: Within normal limits.  BILE DUCTS: Normal caliber.  GALLBLADDER: Cholelithiasis.  SPLEEN: Within normal limits.  PANCREAS: Within normal limits.  ADRENALS: Within normal limits.  KIDNEYS/URETERS: No hydronephrosis. Bilateral cyst and subcentimeter   hypodense foci are too small to characterize.  BLADDER: Minimally distended.  REPRODUCTIVE ORGANS: Prostate within normal limits.  BOWEL:No bowel obstruction. Colonic diverticulosis. Appendix is normal.  PERITONEUM/RETROPERITONEUM: Within normal limits.  VESSELS: Atherosclerotic changes. Retroaortic left renal vein.  LYMPH NODES: No lymphadenopathy.  ABDOMINAL WALL: Moderate fat-containing umbilical hernia is unchanged.  BONES: Degenerative changes.  IMPRESSION:  No CT evidence of malignancy in the chest, abdomen or pelvis.      Procedure: CT abdomen and pelvis w contrast  Findings and Treatment: FINDINGS:  CHEST:  LUNGS AND LARGE AIRWAYS: Patent central airways. No pulmonary nodules.  PLEURA: No pleural effusion.  VESSELS: Within normal limits.  HEART: Cardiomegaly. No pericardial effusion.  MEDIASTINUM AND JULIA:No lymphadenopathy.  CHEST WALL AND LOWER NECK: Left chest wall loop recorder.  ABDOMEN AND PELVIS:  LIVER: Within normal limits.  BILE DUCTS: Normal caliber.  GALLBLADDER: Cholelithiasis.  SPLEEN: Within normal limits.  PANCREAS: Within normal limits.  ADRENALS: Within normal limits.  KIDNEYS/URETERS: No hydronephrosis. Bilateral cyst and subcentimeter   hypodense foci are too small to characterize.  BLADDER: Minimally distended.  REPRODUCTIVE ORGANS: Prostate within normal limits.  BOWEL:No bowel obstruction. Colonic diverticulosis. Appendix is normal.  PERITONEUM/RETROPERITONEUM: Within normal limits.  VESSELS: Atherosclerotic changes. Retroaortic left renal vein.  LYMPH NODES: No lymphadenopathy.  ABDOMINAL WALL: Moderate fat-containing umbilical hernia is unchanged.  BONES: Degenerative changes.  IMPRESSION:  No CT evidence of malignancy in the chest, abdomen or pelvis.  --- End of Report ---      Procedure: CT angiogram neck w contrast  Findings and Treatment: FINDINGS:  Head CT: An evolving acute/subacute infarct within the left posterior   temporal and occipital lobes was better seen on the prior MRI study but   is faintly seen on the current CT exam. There is no associated   hemorrhagic transformation.  Encephalomalacia and gliosis within the right lateral frontal lobe   appears unchanged.  Chronic lacunar infarcts are seen within the bilateral basal ganglia.  There is no acute intracranial hemorrhage, shift of midline structures,   herniation, or hydrocephalus.  There is diffuse cerebral volume loss with prominence of the sulci,   fissures, and cisternal spaces which is normal for the patient's age.   There is mild periventricular white matter hypoattenuation statistically   compatible with microvascular type changes given calcific atherosclerotic   disease of the intracranial arteries.  The paranasal sinuses and tympanomastoid cavities are clear. The   calvarium appears intact. The orbits appear unremarkable.  CTA NECK: There is a standard anatomic configuration to the aortic arch.  The origins of the great vessels appear unremarkable. The bilateral   common carotid arteries and carotid bifurcations appear unremarkable.  The bilateral cervical internal carotid arteries are within normal limits.  The origins of the bilateral vertebral arteries are normal. The bilateral   cervical vertebral arteries are normal in course and caliber.  CTA HEAD: There is abrupt occlusion of the P2 segment of the left PCA   (series 4, images [id number]). Diminished enhancementof the left PCA   branches are seen.  The bilateral V4 segments and basilar artery are widely patent. There is   mild focal stenosis involving the right proximal P2 segment (series 4,   image 446). The distal right PCA branches enhance with contrast.  The anterior and bilateral posterior communicating arteries are   visualized. There is a 3.8 mm saccular outpouching at the origin of the   right P-comm (series 4, image 435). This appears unchanged.  Atherosclerosis involves the bilateral

## 2024-11-02 NOTE — PROGRESS NOTE ADULT - TIME BILLING
Reviewing labs/vitals/imaging/consultant recs, discussing with endocrine, interviewing/examining patient, discussing results/plans, answering questions, coordinating care, discussing on weekend IDRs, documentation
Reviewing labs/vitals/imaging/consultant recs, d/w cards, interviewing/examining patient, discussing findings and plan, updating wife, answering questions, coordinating care, discussing on IDRs, documentation

## 2024-11-02 NOTE — PROGRESS NOTE ADULT - SUBJECTIVE AND OBJECTIVE BOX
DATE OF SERVICE: 11-02-24      No events overnight        allopurinol 200 milliGRAM(s) Oral daily  apixaban 5 milliGRAM(s) Oral two times a day  aspirin enteric coated 81 milliGRAM(s) Oral daily  atorvastatin 80 milliGRAM(s) Oral at bedtime  carvedilol 12.5 milliGRAM(s) Oral every 12 hours  dextrose 5%. 1000 milliLiter(s) IV Continuous <Continuous>  dextrose 5%. 1000 milliLiter(s) IV Continuous <Continuous>  dextrose 50% Injectable 25 Gram(s) IV Push once  dextrose 50% Injectable 12.5 Gram(s) IV Push once  dextrose 50% Injectable 25 Gram(s) IV Push once  dextrose Oral Gel 15 Gram(s) Oral once PRN  gabapentin 300 milliGRAM(s) Oral two times a day  glucagon  Injectable 1 milliGRAM(s) IntraMuscular once  influenza  Vaccine (HIGH DOSE) 0.5 milliLiter(s) IntraMuscular once  insulin glargine Injectable (LANTUS) 16 Unit(s) SubCutaneous every morning  insulin lispro (ADMELOG) corrective regimen sliding scale   SubCutaneous at bedtime  insulin lispro (ADMELOG) corrective regimen sliding scale   SubCutaneous three times a day before meals  losartan 25 milliGRAM(s) Oral daily  thiamine IVPB 500 milliGRAM(s) IV Intermittent every 8 hours                            14.0   6.82  )-----------( 222      ( 02 Nov 2024 06:50 )             43.4       Hemoglobin: 14.0 g/dL (11-02 @ 06:50)  Hemoglobin: 14.2 g/dL (11-01 @ 07:22)  Hemoglobin: 14.6 g/dL (10-30 @ 20:30)      11-02    139  |  104  |  25[H]  ----------------------------<  253[H]  4.0   |  20[L]  |  1.60[H]    Ca    9.0      02 Nov 2024 06:50  Phos  3.6     11-02  Mg     2.00     11-02    TPro  6.8  /  Alb  3.9  /  TBili  0.4  /  DBili  x   /  AST  15  /  ALT  17  /  AlkPhos  83  11-02    Creatinine Trend: 1.60<--, 1.47<--, 1.69<--    COAGS:           T(C): 36.4 (11-02-24 @ 05:20), Max: 36.6 (11-01-24 @ 12:04)  HR: 69 (11-02-24 @ 05:20) (62 - 75)  BP: 158/91 (11-02-24 @ 05:20) (120/86 - 160/96)  RR: 18 (11-02-24 @ 05:20) (18 - 18)  SpO2: 98% (11-02-24 @ 05:20) (97% - 98%)  Wt(kg): --    I&O's Summary    Gen: Appears well in NAD  HEENT:  (-)icterus (-)pallor  CV: N S1 S2 1/6 BRENDA (+)2 Pulses B/l  Resp:  Clear to ausculatation B/L, normal effort  GI: (+) BS Soft, NT, ND  Lymph:  (-)Edema, (-)obvious lymphadenopathy  Skin: Warm to touch, Normal turgor  Psych: Appropriate mood and affect    DATA    < from: CT Head No Cont (10.30.24 @ 21:45) >    IMPRESSION:  No acute intracranial hemorrhage, mass effect, or midline shift.    < end of copied text >      ECHO 9/24/24  Mild segmental LV dysfunction.  Doppler evidence of grade I (impaired) diastolic  dysfunction.  Calculated EF 49%.  Left atrial cavity is mildly dilated.    NST 9/27/24  Conclusions: Normal study  Normal myocardial perfusion SPECT images No evidence of stress induced ischemia or infarction.  Normal left ventricular size and function.  Calculated EF is: 60%       < from: MR Head No Cont (10.31.24 @ 16:03) >  IMPRESSION:    Multiple small acute infarcts in the left occipital lobe and medial   temporal lobe. No acute intracranial hemorrhage identified.    Multiple chronic infarcts.    < end of copied text >    < from: CT Abdomen and Pelvis w/ IV Cont (11.01.24 @ 11:25) >  IMPRESSION:  No CT evidence of malignancy in the chest, abdomen or pelvis.    < end of copied text >        ASSESSMENT/PLAN: 	68 y/o M with past medical history of HTN, type 2 DM (on insulin), gout, CVA 6/2023 s/p loop recorder which found PAFib, on Eliquis since (recently lowered to 2.5 BID by Nephrologist Dr Rodriguez, recent creatinine 2.05 earlier this month)  presenting for acute change in mental status x 3 days. (PMD Dr Dutton, Cardiologist Dr Vernon Salinas)    -- MRI brain with multiple acute infarcts  -- CT CAP No CT evidence of malignancy in the chest, abdomen or pelvis.  -- recent TTE and NST noted above  -- cont Coreg and Losartan for HTN  -- Cont Eliquis for PAF  -- Cont ASA and Statin for hx CVA

## 2024-11-02 NOTE — DISCHARGE NOTE PROVIDER - NSDCMRMEDTOKEN_GEN_ALL_CORE_FT
Admelog 100 units/mL injectable solution: 10 unit(s) injectable 3 times a day (before meals) 10-14 units  allopurinol 100 mg oral tablet: 2 tab(s) orally once a day  aspirin 81 mg oral delayed release tablet: 1 tab(s) orally once a day  atorvastatin 40 mg oral tablet: 1 tab(s) orally once a day (at bedtime)  Coreg 12.5 mg oral tablet: 1 tab(s) orally 2 times a day  DULoxetine: 1 cap(s) once a day  Eliquis 5 mg oral tablet: 1 tab(s) orally every 12 hours  ergocalciferol 1.25 mg (50,000 intl units) oral capsule: 1 cap(s) orally every other week  gabapentin 300 mg oral capsule: 1 cap(s) orally 2 times a day  losartan 25 mg oral tablet: 1 tab(s) orally once a day  Multiple Vitamins oral tablet: 1 tab(s) orally once a day  omeprazole 40 mg oral delayed release capsule: 1 cap(s) orally once a day  Ozempic 2 mg/1.5 mL (0.25 mg or 0.5 mg dose) subcutaneous solution: 0.5 milligram(s) subcutaneously once a week  Pen Needles: Use daily as directed  tamsulosin 0.4 mg oral capsule: 1 cap(s) orally once a day for 14 days   Admelog 100 units/mL injectable solution: 10 unit(s) injectable 3 times a day (before meals) 10-14 units  allopurinol 100 mg oral tablet: 2 tab(s) orally once a day  apixaban 5 mg oral tablet: 1 tab(s) orally 2 times a day  aspirin 81 mg oral delayed release tablet: 1 tab(s) orally once a day  atorvastatin 40 mg oral tablet: 1 tab(s) orally once a day (at bedtime)  Coreg 12.5 mg oral tablet: 1 tab(s) orally 2 times a day  DULoxetine: 1 cap(s) once a day  ergocalciferol 1.25 mg (50,000 intl units) oral capsule: 1 cap(s) orally every other week  gabapentin 300 mg oral capsule: 1 cap(s) orally 2 times a day  Jardiance 25 mg oral tablet: 1 tab(s) orally once a day  losartan 25 mg oral tablet: 1 tab(s) orally once a day  Multiple Vitamins oral tablet: 1 tab(s) orally once a day  omeprazole 40 mg oral delayed release capsule: 1 cap(s) orally once a day  Ozempic 2 mg/1.5 mL (0.25 mg or 0.5 mg dose) subcutaneous solution: 0.5 milligram(s) subcutaneously once a week  Pen Needles: Use daily as directed  tamsulosin 0.4 mg oral capsule: 1 cap(s) orally once a day for 14 days   Admelog 100 units/mL injectable solution: 5 unit(s) injectable 3 times a day (before meals)  allopurinol 100 mg oral tablet: 2 tab(s) orally once a day  apixaban 5 mg oral tablet: 1 tab(s) orally 2 times a day  aspirin 81 mg oral delayed release tablet: 1 tab(s) orally once a day  atorvastatin 40 mg oral tablet: 1 tab(s) orally once a day (at bedtime)  Coreg 12.5 mg oral tablet: 1 tab(s) orally 2 times a day  DULoxetine: 1 cap(s) once a day  ergocalciferol 1.25 mg (50,000 intl units) oral capsule: 1 cap(s) orally every other week  gabapentin 300 mg oral capsule: 1 cap(s) orally 2 times a day  Jardiance 25 mg oral tablet: 1 tab(s) orally once a day  Lantus 100 units/mL subcutaneous solution: 16 unit(s) subcutaneous once a day 16 units administered in the morning  losartan 25 mg oral tablet: 1 tab(s) orally once a day  Multiple Vitamins oral tablet: 1 tab(s) orally once a day  omeprazole 40 mg oral delayed release capsule: 1 cap(s) orally once a day  Ozempic 2 mg/1.5 mL (0.25 mg or 0.5 mg dose) subcutaneous solution: 0.5 milligram(s) subcutaneously once a week  Pen Needles: Use daily as directed  tamsulosin 0.4 mg oral capsule: 1 cap(s) orally once a day for 14 days   Admelog 100 units/mL injectable solution: 5 unit(s) injectable 3 times a day (before meals)  allopurinol 100 mg oral tablet: 2 tab(s) orally once a day  apixaban 5 mg oral tablet: 1 tab(s) orally 2 times a day MDD: 2  aspirin 81 mg oral delayed release tablet: 1 tab(s) orally once a day  atorvastatin 80 mg oral tablet: 1 tab(s) orally once a day (at bedtime) MDD: 1  Coreg 12.5 mg oral tablet: 1 tab(s) orally 2 times a day  DULoxetine: 1 cap(s) once a day  ergocalciferol 1.25 mg (50,000 intl units) oral capsule: 1 cap(s) orally every other week  gabapentin 300 mg oral capsule: 1 cap(s) orally 2 times a day  Jardiance 25 mg oral tablet: 1 tab(s) orally once a day MDD: 1  Lantus Solostar Pen 100 units/mL subcutaneous solution: 16 unit(s) subcutaneous once a day MDD: 16  losartan 25 mg oral tablet: 1 tab(s) orally once a day  Multiple Vitamins oral tablet: 1 tab(s) orally once a day  omeprazole 40 mg oral delayed release capsule: 1 cap(s) orally once a day  Ozempic 2 mg/1.5 mL (0.25 mg or 0.5 mg dose) subcutaneous solution: 0.5 milligram(s) subcutaneously once a week  Pen Needles: Use daily as directed  tamsulosin 0.4 mg oral capsule: 1 cap(s) orally once a day for 14 days   Admelog 100 units/mL injectable solution: 5 unit(s) injectable 3 times a day (before meals)  allopurinol 100 mg oral tablet: 2 tab(s) orally once a day  apixaban 5 mg oral tablet: 1 tab(s) orally 2 times a day MDD: 2  aspirin 81 mg oral delayed release tablet: 1 tab(s) orally once a day  atorvastatin 80 mg oral tablet: 1 tab(s) orally once a day (at bedtime) MDD: 1  Basaglar KwikPen 100 units/mL subcutaneous solution: 16 unit(s) subcutaneous once a day MDD: 16  Coreg 12.5 mg oral tablet: 1 tab(s) orally 2 times a day  DULoxetine: 1 cap(s) once a day  ergocalciferol 1.25 mg (50,000 intl units) oral capsule: 1 cap(s) orally every other week  gabapentin 300 mg oral capsule: 1 cap(s) orally 2 times a day  Jardiance 25 mg oral tablet: 1 tab(s) orally once a day MDD: 1  losartan 25 mg oral tablet: 1 tab(s) orally once a day  Multiple Vitamins oral tablet: 1 tab(s) orally once a day  omeprazole 40 mg oral delayed release capsule: 1 cap(s) orally once a day  Ozempic 2 mg/1.5 mL (0.25 mg or 0.5 mg dose) subcutaneous solution: 0.5 milligram(s) subcutaneously once a week  Pen Needles: Use daily as directed  tamsulosin 0.4 mg oral capsule: 1 cap(s) orally once a day for 14 days   Admelog 100 units/mL injectable solution: 5 unit(s) injectable 3 times a day (before meals) MDD: 15  alcohol swabs: Apply topically to affected area 4 times a day  allopurinol 100 mg oral tablet: 2 tab(s) orally once a day  apixaban 5 mg oral tablet: 1 tab(s) orally 2 times a day MDD: 2  aspirin 81 mg oral delayed release tablet: 1 tab(s) orally once a day  atorvastatin 80 mg oral tablet: 1 tab(s) orally once a day (at bedtime) MDD: 1  Basaglar KwikPen 100 units/mL subcutaneous solution: 16 unit(s) subcutaneous once a day MDD: 16  Coreg 12.5 mg oral tablet: 1 tab(s) orally 2 times a day  DULoxetine: 1 cap(s) once a day  ergocalciferol 1.25 mg (50,000 intl units) oral capsule: 1 cap(s) orally every other week  gabapentin 300 mg oral capsule: 1 cap(s) orally 2 times a day  glucometer (per patient&#x27;s insurance): Test blood sugars four times a day. Dispense #1 glucometer.  glucose tablets: Follow instructions on bottle when sugar is low.  Insulin Pen Needles, 4mm: 1 application subcutaneously 4 times a day. ** Use with insulin pen **  Jardiance 25 mg oral tablet: 1 tab(s) orally once a day MDD: 1  lancets: 1 application subcutaneously 4 times a day  losartan 25 mg oral tablet: 1 tab(s) orally once a day  Multiple Vitamins oral tablet: 1 tab(s) orally once a day  omeprazole 40 mg oral delayed release capsule: 1 cap(s) orally once a day  Ozempic 2 mg/1.5 mL (0.25 mg or 0.5 mg dose) subcutaneous solution: 0.5 milligram(s) subcutaneously once a week  Pen Needles: Use daily as directed  tamsulosin 0.4 mg oral capsule: 1 cap(s) orally once a day for 14 days  test strips (per patient&#x27;s insurance): 1 application subcutaneously 4 times a day. ** Compatible with patient&#x27;s glucometer **

## 2024-11-02 NOTE — PROVIDER CONTACT NOTE (OTHER) - RECOMMENDATIONS
continue to monitor and make frequent rounding
Notify MD Tasia Jeff.
MD made aware
continue to monitor

## 2024-11-02 NOTE — DISCHARGE NOTE PROVIDER - NSDCCPCAREPLAN_GEN_ALL_CORE_FT
PRINCIPAL DISCHARGE DIAGNOSIS  Diagnosis: Stroke  Assessment and Plan of Treatment: You presented to [facility] for confusion and altered mental status and were found to have clinical exam and imaging findings concerning for ischemic stroke. This type of stroke occurs when the blood supply to a part of your brain is blocked or reduced, preventing the brain tissue from getting oxygen and nutrients. Imaging showed a blocked artery in the back part of your brain (occipital lobe), which can cause the visual symtoms you've been experiencing. Based on imaging, there was also involvement of the temporal lobe, which houses the hippocampus, the part of the brain responsible for memory, which could explain your memory deficits. You were maintained on your current dose of Eliquis to prevent further clotting and stroke from occuring. Our experts determined that no surgical or procedural intervention was warranted during your hospitalization based on your findings. Please follow up with your outpatient neurologist on dischage.      SECONDARY DISCHARGE DIAGNOSES  Diagnosis: Uncontrolled type 2 diabetes mellitus with hyperglycemia  Assessment and Plan of Treatment: Diabetes is a condition that occurs when your blood sugars are too high and develops when your pancreas does not produce enough insulin or when the cells become resistant to the effects of insulin. Our endocrinologists evaluated you and titrated your diabetes regimen to better control your high blood sugar. Please follow up with your outpatient endocrinologist for continued monitoring and insulin adjustments     PRINCIPAL DISCHARGE DIAGNOSIS  Diagnosis: Stroke  Assessment and Plan of Treatment: You presented to [facility] for confusion and altered mental status and were found to have clinical exam and imaging findings concerning for ischemic stroke. This type of stroke occurs when the blood supply to a part of your brain is blocked or reduced, preventing the brain tissue from getting oxygen and nutrients. Imaging showed a blocked artery in the back part of your brain (occipital lobe), which can cause the visual symtoms you've been experiencing. Based on imaging, there was also involvement of the temporal lobe, which houses the hippocampus, the part of the brain responsible for memory, which could explain your memory deficits. You were maintained on your current dose of Eliquis to prevent further clotting and stroke from occuring. 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 The neurosurgeons reviewed your imaging and recommended that you see them outside of the hospital to monitor this over time. Our experts determined that no surgical or procedural intervention was warranted during your hospitalization based on your findings. Please follow up with your outpatient neurologist on dischage.      SECONDARY DISCHARGE DIAGNOSES  Diagnosis: Uncontrolled type 2 diabetes mellitus with hyperglycemia  Assessment and Plan of Treatment: Diabetes is a condition that occurs when your blood sugars are too high and develops when your pancreas does not produce enough insulin or when the cells become resistant to the effects of insulin. Our endocrinologists evaluated you and titrated your diabetes regimen to better control your high blood sugar. Please follow up with your outpatient endocrinologist for continued monitoring and insulin adjustments.   Your new medication regimen includes:   Jardiance 25mg daily  Ozempic 0.5 mg weekly  16 units of basal insulin (lantus) every morning  5 units of admelog three times a day with meals (don't take if you are not eating)  -Please call your doctor if you fs is 70 or below and or 250 and above   -Please follow up with your pcp, podiatry, endocrinology, and opthalmology as an outpt     PRINCIPAL DISCHARGE DIAGNOSIS  Diagnosis: Stroke  Assessment and Plan of Treatment: You presented to [facility] for confusion and altered mental status and were found to have clinical exam and imaging findings concerning for ischemic stroke. This type of stroke occurs when the blood supply to a part of your brain is blocked or reduced, preventing the brain tissue from getting oxygen and nutrients. Imaging showed a blocked artery in the back part of your brain (occipital lobe), which can cause the visual symtoms you've been experiencing. Based on imaging, there was also involvement of the temporal lobe, which houses the hippocampus, the part of the brain responsible for memory, which could explain your memory deficits. You should be taking Eliquis 5 mg twice a day to prevent further clotting and stroke from occuring. You should also take the increased dose of your cholesterol medication (atorvastatin 80 mg). 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 The neurosurgeons reviewed your imaging and recommended that you see them outside of the hospital to monitor this over time. Our experts determined that no surgical or procedural intervention was warranted during your hospitalization based on your findings. Please follow up with your outpatient neurologist and the neurosurgeon on dischage.      SECONDARY DISCHARGE DIAGNOSES  Diagnosis: Uncontrolled type 2 diabetes mellitus with hyperglycemia  Assessment and Plan of Treatment: Diabetes is a condition that occurs when your blood sugars are too high and develops when your pancreas does not produce enough insulin or when the cells become resistant to the effects of insulin. Our endocrinologists evaluated you and titrated your diabetes regimen to better control your high blood sugar. Please follow up with your outpatient endocrinologist for continued monitoring and insulin adjustments.   Your new medication regimen includes:   Jardiance 25mg daily  Ozempic 0.5 mg weekly  16 units of basal insulin (lantus) every morning  5 units of admelog three times a day with meals (don't take if you are not eating)  -Please call your doctor if you fs is 70 or below and or 250 and above   -Please follow up with your pcp, podiatry, endocrinology, and opthalmology as an outpatient

## 2024-11-02 NOTE — CONSULT NOTE ADULT - NS ATTEND AMEND GEN_ALL_CORE FT
Patient seen and examined. Agree with plan as detailed in PA/NP Note.     C/w Eliquis   F/u MRI   His Eliquis was recently reduced as outpatient but back on 5 mg which is appropriate dose    Zeinab Marks MD  Pager: [phone]
Acute left temporo-occipital territory infarcts with possible occlusion of distal left PCA, and multiple other areas of intracranial stenoses. No large vessel occlusion or significant stenosis that can be addressed endovascularly. Recommend optimization medically per neurology. The possible small R PComm aneurysm, which could just be an infundibulum, can be followed over time for change but presents very low risk currently.

## 2024-11-02 NOTE — PROGRESS NOTE ADULT - SUBJECTIVE AND OBJECTIVE BOX
Chief Complaint: Type 2 DM     History: Pt seen at bedside. Pt tolerating oral diet. Pt denies nausea and vomiting/any signs of hypoglycemia. Pt reports an adequate appetite.     MEDICATIONS  (STANDING):  allopurinol 200 milliGRAM(s) Oral daily  apixaban 5 milliGRAM(s) Oral two times a day  aspirin enteric coated 81 milliGRAM(s) Oral daily  atorvastatin 80 milliGRAM(s) Oral at bedtime  carvedilol 12.5 milliGRAM(s) Oral every 12 hours  dextrose 5%. 1000 milliLiter(s) (50 mL/Hr) IV Continuous <Continuous>  dextrose 5%. 1000 milliLiter(s) (100 mL/Hr) IV Continuous <Continuous>  dextrose 50% Injectable 25 Gram(s) IV Push once  dextrose 50% Injectable 25 Gram(s) IV Push once  dextrose 50% Injectable 12.5 Gram(s) IV Push once  gabapentin 300 milliGRAM(s) Oral two times a day  glucagon  Injectable 1 milliGRAM(s) IntraMuscular once  influenza  Vaccine (HIGH DOSE) 0.5 milliLiter(s) IntraMuscular once  insulin glargine Injectable (LANTUS) 16 Unit(s) SubCutaneous every morning  insulin lispro (ADMELOG) corrective regimen sliding scale   SubCutaneous three times a day before meals  insulin lispro (ADMELOG) corrective regimen sliding scale   SubCutaneous at bedtime  losartan 25 milliGRAM(s) Oral daily  thiamine IVPB 500 milliGRAM(s) IV Intermittent every 8 hours    MEDICATIONS  (PRN):  dextrose Oral Gel 15 Gram(s) Oral once PRN Blood Glucose LESS THAN 70 milliGRAM(s)/deciliter      Allergies: No Known Allergies    Review of Systems:  Respiratory: No SOB, no cough  GI: No nausea, vomiting, abdominal pain  Endocrine: no polyuria, polydipsia    PHYSICAL EXAM:  VITALS: T(C): 36.3 (11-02-24 @ 12:49)  T(F): 97.4 (11-02-24 @ 12:49), Max: 97.5 (11-02-24 @ 05:20)  HR: 66 (11-02-24 @ 12:49) (66 - 75)  BP: 136/93 (11-02-24 @ 12:49) (120/86 - 160/96)  RR:  (17 - 18)  SpO2:  (96% - 98%)  Wt(kg): --  GENERAL: NAD, well-groomed, well-developed  RESPIRATORY: No labored breathing   GI: Soft, nontender, non distended  PSYCH: Alert and oriented x 3, normal affect, normal mood      CAPILLARY BLOOD GLUCOSE  POCT Blood Glucose.: 227 mg/dL (02 Nov 2024 12:11)  POCT Blood Glucose.: 225 mg/dL (02 Nov 2024 08:26)  POCT Blood Glucose.: 190 mg/dL (01 Nov 2024 21:49)  POCT Blood Glucose.: 244 mg/dL (01 Nov 2024 18:02)    A1C with Estimated Average Glucose (10.31.24 @ 06:58)    A1C with Estimated Average Glucose Result: 9.7   Estimated Average Glucose: 232      11-02    139  |  104  |  25[H]  ----------------------------<  253[H]  4.0   |  20[L]  |  1.60[H]    eGFR: 47[L]    Ca    9.0      11-02  Mg     2.00     11-02  Phos  3.6     11-02    TPro  6.8  /  Alb  3.9  /  TBili  0.4  /  DBili  x   /  AST  15  /  ALT  17  /  AlkPhos  83  11-02    Thyroid Function Tests:  11-01 @ 07:22 TSH 1.39 FreeT4 1.2 T3 -- Anti TPO -- Anti Thyroglobulin Ab -- TSI --  10-31 @ 08:12 TSH 1.46 FreeT4 -- T3 -- Anti TPO -- Anti Thyroglobulin Ab -- TSI --      Diet, Consistent Carbohydrate/No Snacks (10-31-24 @ 06:46) [Active]
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
## 2024-11-02 NOTE — DISCHARGE NOTE PROVIDER - NSDCFUSCHEDAPPT_GEN_ALL_CORE_FT
Shane Parra  [facility] Physician [facility]  NEUROSURG [address]  Scheduled Appointment: 11/12/2024     [facility]  NEUROLOGY [address] [facility]  Scheduled Appointment: 01/13/2025

## 2024-11-02 NOTE — CONSULT NOTE ADULT - CONSULT REASON
Hyperglycemia
Well known to the office  PAF on Eliquis  hx CVA
AMS
PCA stenosis with occipital infarct  Pcomm aneurysm

## 2024-11-02 NOTE — PROVIDER CONTACT NOTE (OTHER) - REASON
/105
Patient took EEG off
bp 147/102
Vit B6 ordered 18:42 drawn and sent to lab cancelled as reported by Cyndie  Mau

## 2024-11-02 NOTE — PROGRESS NOTE ADULT - ASSESSMENT
68 y/o M with past medical history of HTN, type 2 DM (on insulin), gout, CVA 6/2023 s/p loop recorder which found PAFib, on Eliquis (recently lowered to 2.5 BID by Nephrologist Dr Rodriguez, recent creatinine 2.05 earlier this month)  presenting for acute change in mental status x 3 days. Wife reports frequent urination recently, otherwise no fever, chills, pain, SOB, palps, syncope. Currently being worked up for potential CVA.    D/W provider    #Poorly controlled T2DM  -HbA1c 9.7%  -Does have endocrinologist -last seen 3 weeks ago   -Home regimen: Basal insulin ? dose, ozempic 0.25mg weekly, [id number] daily and admelog 10-14units TID pre meals     PLAN  -Aim -180mg/dL: above goal. Although pt receive thiamine IVPB q 8 hours with dextrose solution advised team to please consider changing to sodium chloride if there is no contraindications   - Continue with Lantus 16 units every am   - Add Admelog 5 units TID AC (please hold if npo/not eating)( first dose for dinner)  - Continue with Admelog correction scale TID pre meals and pre bed   - FS before meals and at bedtime   - RD consult   -Carb consistent diet   -Hypoglycemia protocol     DC plan  -Increase Jardiance to 25mg daily, Increase Ozempic to 0.5 mg weekly (increase to 1mg on 5th week if tolerating this should be done by outside provider) + basal insulin based on insurance coverage 16 units sq every am ( wife aware of this change from pm to am dosing of long acting insulin)+ Admelog solostar pen 5 units TID AC (please hold if npo/not eating)  -Note as Ozempic doses are increased insulin doses may need to be adjusted   -Ensure patient has working glucometer, test strips, lancets, alcohol pads, and BD basia pen needles  -For severe hypoglycemia: Please prescribe Glucose tablets 4G (take 4 tablets) or 15G tablets for blood sugar less than 70 mG/dL repeat fingerstick in 15 minutes.   -Please call your doctor if you fs is 70 or below and or 250 and above   -Reviewed importance of medication adherence,  glucose monitoring, and following a consistent carb diet   -Hypoglycemia and intervention reviewed   -Please follow up with your pcp, podiatry, endocrinology, and opthalmology as an outpt   -Pt will follow up with private endo on discharge   -Wife takes full responsibility for pts medication administration including insulin and glucose monitoring   -D/w Dalia (Wife)-[phone]    #HTN  Goal SBP <130/80  Urinary ACR as out pt  Manage as per primary team    #HLD  -Check lipids if not done recently   -Aim LDL <70   -Continue Lipitor 80mg p.o. qhs if no contraindications     D/w Dr. Geraldine Betancur  Nurse Practitioner  Division of Endocrinology & Diabetes  In house pager #[phone]    If before 9AM or after 6PM, or on weekends/holidays, please call endocrine answering service for assistance ([phone]).For nonurgent matters email [email or web address] for assistance.   -Defer to primary team  68 y/o M with past medical history of HTN, type 2 DM (on insulin), gout, CVA 6/2023 s/p loop recorder which found PAFib, on Eliquis (recently lowered to 2.5 BID by Nephrologist Dr Rodriguez, recent creatinine 2.05 earlier this month)  presenting for acute change in mental status x 3 days. Wife reports frequent urination recently, otherwise no fever, chills, pain, SOB, palps, syncope. Currently being worked up for potential CVA.    D/W provider    #Poorly controlled T2DM  -HbA1c 9.7%  -Does have endocrinologist -last seen 3 weeks ago   -Home regimen: Basal insulin ? dose, ozempic 0.25mg weekly, [id number] daily and admelog 10-14units TID pre meals     PLAN  -Aim -180mg/dL: above goal. Although pt receive thiamine IVPB q 8 hours with dextrose solution advised team to please consider changing to sodium chloride if there is no contraindications   - Continue with Lantus 16 units every am   - Add Admelog 5 units TID AC (please hold if npo/not eating)( first dose for dinner)  - Continue with Admelog correction scale TID pre meals and pre bed   - FS before meals and at bedtime   - RD consult   -Carb consistent diet   -Hypoglycemia protocol     DC plan  -Please clarify dc recs with endocrine on day of discharge  - If discharged today: Increase Jardiance to 25mg daily, Increase Ozempic to 0.5 mg weekly (increase to 1mg on 5th week if tolerating this should be done by outside provider) + basal insulin based on insurance coverage 16 units sq every am ( wife aware of this change from pm to am dosing of long acting insulin)+ Admelog solostar pen 5 units TID AC (please hold if npo/not eating)  -Note as Ozempic doses are increased insulin doses may need to be adjusted   -Ensure patient has working glucometer, test strips, lancets, alcohol pads, and BD basia pen needles  -For severe hypoglycemia: Please prescribe Glucose tablets 4G (take 4 tablets) or 15G tablets for blood sugar less than 70 mG/dL repeat fingerstick in 15 minutes.   -Please call your doctor if you fs is 70 or below and or 250 and above   -Reviewed importance of medication adherence,  glucose monitoring, and following a consistent carb diet   -Hypoglycemia and intervention reviewed   -Please follow up with your pcp, podiatry, endocrinology, and opthalmology as an outpt   -Pt will follow up with private endo on discharge   -Wife takes full responsibility for pts medication administration including insulin and glucose monitoring   -D/w Dalia (Wife)-[phone]    #HTN  Goal SBP <130/80  Urinary ACR as out pt  Manage as per primary team    #HLD  -Check lipids if not done recently   -Aim LDL <70   -Continue Lipitor 80mg p.o. qhs if no contraindications     D/w Dr. Geraldine Betancur  Nurse Practitioner  Division of Endocrinology & Diabetes  In house pager #[phone]    If before 9AM or after 6PM, or on weekends/holidays, please call endocrine answering service for assistance ([phone]).For nonurgent matters email [email or web address] for assistance.   -Defer to primary team

## 2024-11-03 VITALS
SYSTOLIC BLOOD PRESSURE: 140 MMHG | HEART RATE: 65 BPM | OXYGEN SATURATION: 96 % | DIASTOLIC BLOOD PRESSURE: 99 MMHG | TEMPERATURE: 98 F | RESPIRATION RATE: 18 BRPM

## 2024-11-03 LAB
ALBUMIN SERPL ELPH-MCNC: 3.9 G/DL — SIGNIFICANT CHANGE UP (ref 3.3–5)
ALP SERPL-CCNC: 81 U/L — SIGNIFICANT CHANGE UP (ref 40–120)
ALT FLD-CCNC: 18 U/L — SIGNIFICANT CHANGE UP (ref 4–41)
ANION GAP SERPL CALC-SCNC: 13 MMOL/L — SIGNIFICANT CHANGE UP (ref 7–14)
AST SERPL-CCNC: 17 U/L — SIGNIFICANT CHANGE UP (ref 4–40)
BASOPHILS # BLD AUTO: 0.04 K/UL — SIGNIFICANT CHANGE UP (ref 0–0.2)
BASOPHILS NFR BLD AUTO: 0.6 % — SIGNIFICANT CHANGE UP (ref 0–2)
BILIRUB SERPL-MCNC: 0.5 MG/DL — SIGNIFICANT CHANGE UP (ref 0.2–1.2)
BUN SERPL-MCNC: 22 MG/DL — SIGNIFICANT CHANGE UP (ref 7–23)
CALCIUM SERPL-MCNC: 9 MG/DL — SIGNIFICANT CHANGE UP (ref 8.4–10.5)
CHLORIDE SERPL-SCNC: 105 MMOL/L — SIGNIFICANT CHANGE UP (ref 98–107)
CO2 SERPL-SCNC: 21 MMOL/L — LOW (ref 22–31)
CREAT SERPL-MCNC: 1.42 MG/DL — HIGH (ref 0.5–1.3)
EGFR: 54 ML/MIN/1.73M2 — LOW
EOSINOPHIL # BLD AUTO: 0.25 K/UL — SIGNIFICANT CHANGE UP (ref 0–0.5)
EOSINOPHIL NFR BLD AUTO: 3.9 % — SIGNIFICANT CHANGE UP (ref 0–6)
GLUCOSE SERPL-MCNC: 184 MG/DL — HIGH (ref 70–99)
HCT VFR BLD CALC: 43 % — SIGNIFICANT CHANGE UP (ref 39–50)
HGB BLD-MCNC: 13.9 G/DL — SIGNIFICANT CHANGE UP (ref 13–17)
IANC: 3.98 K/UL — SIGNIFICANT CHANGE UP (ref 1.8–7.4)
IMM GRANULOCYTES NFR BLD AUTO: 0.3 % — SIGNIFICANT CHANGE UP (ref 0–0.9)
LYMPHOCYTES # BLD AUTO: 1.53 K/UL — SIGNIFICANT CHANGE UP (ref 1–3.3)
LYMPHOCYTES # BLD AUTO: 24.1 % — SIGNIFICANT CHANGE UP (ref 13–44)
MAGNESIUM SERPL-MCNC: 2 MG/DL — SIGNIFICANT CHANGE UP (ref 1.6–2.6)
MCHC RBC-ENTMCNC: 28.8 PG — SIGNIFICANT CHANGE UP (ref 27–34)
MCHC RBC-ENTMCNC: 32.3 G/DL — SIGNIFICANT CHANGE UP (ref 32–36)
MCV RBC AUTO: 89 FL — SIGNIFICANT CHANGE UP (ref 80–100)
MONOCYTES # BLD AUTO: 0.54 K/UL — SIGNIFICANT CHANGE UP (ref 0–0.9)
MONOCYTES NFR BLD AUTO: 8.5 % — SIGNIFICANT CHANGE UP (ref 2–14)
NEUTROPHILS # BLD AUTO: 3.98 K/UL — SIGNIFICANT CHANGE UP (ref 1.8–7.4)
NEUTROPHILS NFR BLD AUTO: 62.6 % — SIGNIFICANT CHANGE UP (ref 43–77)
NRBC # BLD: 0 /100 WBCS — SIGNIFICANT CHANGE UP (ref 0–0)
NRBC # FLD: 0 K/UL — SIGNIFICANT CHANGE UP (ref 0–0)
PHOSPHATE SERPL-MCNC: 3.3 MG/DL — SIGNIFICANT CHANGE UP (ref 2.5–4.5)
PLATELET # BLD AUTO: 212 K/UL — SIGNIFICANT CHANGE UP (ref 150–400)
POTASSIUM SERPL-MCNC: 3.8 MMOL/L — SIGNIFICANT CHANGE UP (ref 3.5–5.3)
POTASSIUM SERPL-SCNC: 3.8 MMOL/L — SIGNIFICANT CHANGE UP (ref 3.5–5.3)
PROT SERPL-MCNC: 7.2 G/DL — SIGNIFICANT CHANGE UP (ref 6–8.3)
RBC # BLD: 4.83 M/UL — SIGNIFICANT CHANGE UP (ref 4.2–5.8)
RBC # FLD: 13.7 % — SIGNIFICANT CHANGE UP (ref 10.3–14.5)
SODIUM SERPL-SCNC: 139 MMOL/L — SIGNIFICANT CHANGE UP (ref 135–145)
WBC # BLD: 6.36 K/UL — SIGNIFICANT CHANGE UP (ref 3.8–10.5)
WBC # FLD AUTO: 6.36 K/UL — SIGNIFICANT CHANGE UP (ref 3.8–10.5)

## 2024-11-03 PROCEDURE — 99239 HOSP IP/OBS DSCHRG MGMT >30: CPT | Mod: GC

## 2024-11-03 RX ORDER — INSULIN LISPRO 100/ML
5 VIAL (ML) SUBCUTANEOUS
Qty: 1 | Refills: 0
Start: 2024-11-03 | End: 2024-12-02

## 2024-11-03 RX ORDER — INSULIN GLARGINE,HUM.REC.ANLOG 100/ML
16 VIAL (ML) SUBCUTANEOUS
Qty: 1 | Refills: 0
Start: 2024-11-03 | End: 2024-12-02

## 2024-11-03 RX ORDER — EMPAGLIFLOZIN 25 MG/1
1 TABLET, FILM COATED ORAL
Qty: 30 | Refills: 1
Start: 2024-11-03 | End: 2025-01-01

## 2024-11-03 RX ORDER — BENZOCAINE .06; .7 ML/1; ML/1
0 SWAB TOPICAL
Qty: 100 | Refills: 1
Start: 2024-11-03

## 2024-11-03 RX ORDER — APIXABAN 5 MG/1
1 TABLET, FILM COATED ORAL
Qty: 60 | Refills: 0
Start: 2024-11-03 | End: 2024-12-02

## 2024-11-03 RX ORDER — INSULIN LISPRO 100/ML
5 VIAL (ML) SUBCUTANEOUS
Qty: 0 | Refills: 0 | DISCHARGE

## 2024-11-03 RX ADMIN — Medication 105 MILLIGRAM(S): at 05:24

## 2024-11-03 RX ADMIN — Medication 1: at 09:24

## 2024-11-03 RX ADMIN — Medication 5 UNIT(S): at 12:40

## 2024-11-03 RX ADMIN — APIXABAN 5 MILLIGRAM(S): 5 TABLET, FILM COATED ORAL at 05:24

## 2024-11-03 RX ADMIN — Medication 200 MILLIGRAM(S): at 13:00

## 2024-11-03 RX ADMIN — CARVEDILOL 12.5 MILLIGRAM(S): 25 TABLET, FILM COATED ORAL at 05:24

## 2024-11-03 RX ADMIN — GABAPENTIN 300 MILLIGRAM(S): 300 CAPSULE ORAL at 05:24

## 2024-11-03 RX ADMIN — LOSARTAN POTASSIUM 25 MILLIGRAM(S): 25 TABLET ORAL at 05:24

## 2024-11-03 RX ADMIN — Medication 5 UNIT(S): at 09:49

## 2024-11-03 RX ADMIN — Medication 105 MILLIGRAM(S): at 14:03

## 2024-11-03 RX ADMIN — Medication 81 MILLIGRAM(S): at 13:00

## 2024-11-03 RX ADMIN — Medication 16 UNIT(S): at 09:44

## 2024-11-03 NOTE — PROGRESS NOTE ADULT - PROBLEM SELECTOR PROBLEM 3
Hyperlipidemia
Chronic kidney disease, unspecified CKD stage

## 2024-11-03 NOTE — PROGRESS NOTE ADULT - NS ATTEND AMEND GEN_ALL_CORE FT
Patient seen and examined. Agree with plan as detailed in PA/NP Note.     C/w Eliquis 5 mg BID    Zeinab Mraks MD  Pager: [phone]
Patient seen and examined. Agree with plan as detailed in PA/NP Note.     Zeinab Marks MD  Pager: [phone]

## 2024-11-03 NOTE — PROGRESS NOTE ADULT - ATTENDING COMMENTS
Patient seen and examined, d/w Dr. Jeff, agree w/ above with following additions:     70 yo M w/ HTN, DM2 (A1C 9.7), likely CKD3 (based on review of Cr), gout, pafib (on A/C, per collateral obtained from family), multiple prior CVAs, admitted with abrupt changes in mental status starting 3 days ago (including memory loss), undergoing further workup.     Patient orientated to self and hospital, but unable to tell the year. Able to identify wife at bedside, and knew their anniversary date. Had 0/3 recall after 5 minutes (apple/table/luisa), unable to remember with categorical prompt either. Performed serial 7s incorrectly, unable to spell world backwards. Able to explain idiom like raining cats and dogs.     Discussed with patient, wife (at bedside) and daughter (on speakerphone) re; plan for workup of mental status changes including MRI and EEG. They would prefer to defer invasive testing unless absolutely needed, to obtain other studies first. Neuro and cards recs appreciated.     Given timeline of symptoms (abrupt onset) and prior history, symptoms may be related to a new stroke which occurred few days ago. MRI obtained which noted multiple small acute infarcts in the left occipital and medial temporal lobe. Will continue anticoagulation with eliquis (given afib with secondary hypercoagulable state), f/u cards re: need for antiplatelet agent, c/w other stroke risk factor modifications including statin, blood pressure and glycemic control. A1C 9.7, above goal, may benefit from endocrine followup for further optimization and outpatient followup.
Patient seen and examined, d/w Dr. Flower, agree w/ above.     70 yo M w/ HTN, DM2 (A1C 9.7), likely CKD3 (based on review of Cr), gout, pafib (on A/C, per collateral obtained from family), multiple prior CVAs, admitted with abrupt changes in mental status starting 3 days ago (including memory loss), found to have multiple small acute infarcts in the left occipital and medial temporal lobe (suspect cardioembolic etiology, likely cause of the patient's symptoms.     Patient without acute complaints, eager to go home. Reviewed specialist recommendations with him including 1) need for continued stroke risk factor modifications (taking meds for BP, cholesterol, glucose control), and eliquis (given afib and cardioembolic cva) to prevent future strokes 2) endocrine recs re: discharge medications including basal/bolus insulin, ozempic and jardiance 3)No acute neurosurgical intervention required re PCOMM aneurysm, rec otpt f/u w/ Dr. Parra 4) and outpatient followup. Anticipatory guidance provided re: signs/symptoms that would prompt seeking urgent medical attention/return to the hospital (such as signs of stroke - vision changes/slurred speech/facial droop/weakness/numbness etc). Patient verbalized understanding, in agreement with discharge plan. Case discussed on weekend IDRs re: d/c planning for today.  start of care for services 11/4.     Discharge time 38 minutes    # encephalopathy due to acute CVA, suspect cardioembolic etiology to stroke, w/ occlusion of P2 segment of L PCA noted on imaging  # pafib with secondary hypercoagulable state due to elevated [id number] score  # DM2 c/b hyperglycemia, on insulin therapy  # Essential HTN  # CKD3
Patient seen and examined, d/w Dr. Jeff, agree w/ above.     68 yo M w/ HTN, DM2 (A1C 9.7), likely CKD3 (based on review of Cr), gout, pafib (on A/C, per collateral obtained from family), multiple prior CVAs, admitted with abrupt changes in mental status starting 3 days ago (including memory loss), found to have multiple small acute infarcts in the left occipital and medial temporal lobe (suspect cardioembolic etiology, likely cause of the patient's symptoms.     Patient without acute complaints. Wife at bedside. We reviewed the findings on MRI and concern for acute stroke likely related to cardioembolic source (afib). Discussed results of other negative workup and that patient's symptoms are likely due to stroke. Discussed plan to continue anticoagulation (on eliquis), continue stroke risk factor modifications (including control of BP, lipids and sugars, reviewed plan for endocrine followup) and to obtain PT/OT evaluation to determine need for services/therapy.   Patient and wife in agreement with plan, questions answered, no other concerns at this time.     CT scans negative for malignancy. EEG (prelim) without evidence of epileptiform activity. Neuro input appreciated, will obtain CTA H/N to complete workup. Endocrine input appreciated re: optimization of glycemic control, anticipate plan to increase jardiance and ozempic on discharge and to continue insulin therapy. Case d/w Dr. Marks (cards) re: imaging findings concerning for acute CVA secondary, to cardioembolic etiology, continue eliquis for now (is correct dose), would be hesitant to say its eliquis failure as patient may have been underdosed as otpt. In regards to antiplatelet therapy, he would recommend to continue aspirin therapy in light of prior cardiac workup. PT recs home w/ otpt PT, cognitive rehab.
Patient seen and examined, d/w Dr. Jeff, agree w/ above.     68 yo M w/ HTN, DM2 (A1C 9.7), likely CKD3 (based on review of Cr), gout, pafib (on A/C, per collateral obtained from family), multiple prior CVAs, admitted with abrupt changes in mental status starting 3 days ago (including memory loss), found to have multiple small acute infarcts in the left occipital and medial temporal lobe (suspect cardioembolic etiology, likely cause of the patient's symptoms.     Patient without acute complaints. Underwent CTA earlier today showing evolving PCA infarct and occlusion of P2 segment of L PCA, multiple other areas of intracranial stenosis, and 3.8 mm R PCOMM aneurysm. Reviewed results with patient, using analogies to assist with patient understanding. and reinforced importance of medications and stroke risk factor modifications. Discussed plan for NSx input (as team d/w neuro) regarding CT findings (PCOMM aneurysm) and continued optimization of medical conditions prior to discharge (hopeful for home over this weekend). Patient in agreement with plan.     Case d/w endocrine Jena re: FS above goal, adding on premeal admelog for better glycemic control, will f/u further recs re: d/c regimen (anticipate plan to increase jardiance and ozempic on discharge and to continue insulin therapy). Cards input appreciated, c/w asa/statin/eliquis/coreg/losartan. Nsx input appreciated, no acute intervention, otpt f/u Dr. Parra re: PCOMM aneurysm. EEG final without epileptiform abnormalities or seizures. PT recs home w/ otpt PT, cognitive rehab.

## 2024-11-03 NOTE — PROGRESS NOTE ADULT - PROBLEM SELECTOR PROBLEM 1
Diabetes mellitus, type 2
Acute encephalopathy

## 2024-11-03 NOTE — PROGRESS NOTE ADULT - PROBLEM SELECTOR PROBLEM 2
Hypertension
Diabetes mellitus, type 2

## 2024-11-03 NOTE — PROGRESS NOTE ADULT - PROBLEM SELECTOR PLAN 1
CTA head and neck with/ evolving acute/subacute left-sided PCA distribution infarct with associated cytotoxic edema and without hemorrhagic transformation. Abrupt occlusion of the P2 segment of the leftPCA with diminished enhancement of the distal left PCA branch vasculature. Multiple other areas of intracranial stenoses, as discussed.  3.8 mm right-sided P-comm origin aneurysm.  -MRI 10/31-Multiple small acute infarcts in the left occipital lobe and medial temporal lobe. No acute intracranial hemorrhage identified.    [ ] q4 neuro checks  [ ] Lipid panel shows hypertriglyceridemia   [ ] PT/OT eval placed -> rec cognitive rehab  [ ] CTA head and neck ->P2 segment occlusion, P-comm aneurysm, evolving left sided PCA dist. infarct  [ ] F/u neurology; per phone call with neuro resident, patient outside window for TPA or acute intervention  [ ] Neurosurgery consult to see if there is any need for intervention re: posterior cerebral aneurysm -> no acute surgical intervention, otpt f/u Dr. Parra -CTA head and neck with/ evolving acute/subacute left-sided PCA distribution infarct with associated cytotoxic edema and without hemorrhagic transformation. Abrupt occlusion of the P2 segment of the leftPCA with diminished enhancement of the distal left PCA branch vasculature. Multiple other areas of intracranial stenoses, as discussed.  3.8 mm right-sided P-comm origin aneurysm.  -MRI 10/31-Multiple small acute infarcts in the left occipital lobe and medial temporal lobe. No acute intracranial hemorrhage identified.    [ ] q4 neuro checks  [ ] Lipid panel shows hypertriglyceridemia   [ ] PT/OT eval placed -> rec cognitive rehab  [ ] CTA head and neck ->P2 segment occlusion, P-comm aneurysm, evolving left sided PCA dist. infarct  [ ] neurology following ; per phone call with neuro resident, patient outside window for TPA or acute intervention  [ ] Neurosurgery consult to see if there is any need for intervention re: posterior cerebral aneurysm -> no acute surgical intervention, otpt f/u Dr. Parra

## 2024-11-03 NOTE — PROGRESS NOTE ADULT - SUBJECTIVE AND OBJECTIVE BOX
DATE OF SERVICE: 11-03-24      no chest pain or sob        allopurinol 200 milliGRAM(s) Oral daily  apixaban 5 milliGRAM(s) Oral two times a day  aspirin enteric coated 81 milliGRAM(s) Oral daily  atorvastatin 80 milliGRAM(s) Oral at bedtime  carvedilol 12.5 milliGRAM(s) Oral every 12 hours  dextrose 5%. 1000 milliLiter(s) IV Continuous <Continuous>  dextrose 5%. 1000 milliLiter(s) IV Continuous <Continuous>  dextrose 50% Injectable 25 Gram(s) IV Push once  dextrose 50% Injectable 25 Gram(s) IV Push once  dextrose 50% Injectable 12.5 Gram(s) IV Push once  dextrose Oral Gel 15 Gram(s) Oral once PRN  gabapentin 300 milliGRAM(s) Oral two times a day  glucagon  Injectable 1 milliGRAM(s) IntraMuscular once  influenza  Vaccine (HIGH DOSE) 0.5 milliLiter(s) IntraMuscular once  insulin glargine Injectable (LANTUS) 16 Unit(s) SubCutaneous every morning  insulin lispro (ADMELOG) corrective regimen sliding scale   SubCutaneous three times a day before meals  insulin lispro (ADMELOG) corrective regimen sliding scale   SubCutaneous at bedtime  insulin lispro Injectable (ADMELOG) 5 Unit(s) SubCutaneous three times a day before meals  losartan 25 milliGRAM(s) Oral daily  thiamine IVPB 500 milliGRAM(s) IV Intermittent every 8 hours                            13.9   6.36  )-----------( 212      ( 03 Nov 2024 06:30 )             43.0       Hemoglobin: 13.9 g/dL (11-03 @ 06:30)  Hemoglobin: 14.0 g/dL (11-02 @ 06:50)  Hemoglobin: 14.2 g/dL (11-01 @ 07:22)  Hemoglobin: 14.6 g/dL (10-30 @ 20:30)      11-03    139  |  105  |  22  ----------------------------<  184[H]  3.8   |  21[L]  |  1.42[H]    Ca    9.0      03 Nov 2024 06:30  Phos  3.3     11-03  Mg     2.00     11-03    TPro  7.2  /  Alb  3.9  /  TBili  0.5  /  DBili  x   /  AST  17  /  ALT  18  /  AlkPhos  81  11-03    Creatinine Trend: 1.42<--, 1.60<--, 1.47<--, 1.69<--    COAGS:           T(C): 36.5 (11-03-24 @ 05:21), Max: 36.5 (11-03-24 @ 05:21)  HR: 66 (11-03-24 @ 05:21) (61 - 67)  BP: 160/98 (11-03-24 @ 05:21) (135/85 - 160/98)  RR: 17 (11-03-24 @ 05:21) (17 - 18)  SpO2: 99% (11-03-24 @ 05:21) (96% - 99%)  Wt(kg): --    I&O's Summary    Gen: Appears well in NAD  HEENT:  (-)icterus (-)pallor  CV: N S1 S2 1/6 BRENDA (+)2 Pulses B/l  Resp:  Clear to ausculatation B/L, normal effort  GI: (+) BS Soft, NT, ND  Lymph:  (-)Edema, (-)obvious lymphadenopathy  Skin: Warm to touch, Normal turgor  Psych: Appropriate mood and affect    DATA    < from: CT Head No Cont (10.30.24 @ 21:45) >    IMPRESSION:  No acute intracranial hemorrhage, mass effect, or midline shift.    < end of copied text >      ECHO 9/24/24  Mild segmental LV dysfunction.  Doppler evidence of grade I (impaired) diastolic  dysfunction.  Calculated EF 49%.  Left atrial cavity is mildly dilated.    NST 9/27/24  Conclusions: Normal study  Normal myocardial perfusion SPECT images No evidence of stress induced ischemia or infarction.  Normal left ventricular size and function.  Calculated EF is: 60%       < from: MR Head No Cont (10.31.24 @ 16:03) >  IMPRESSION:    Multiple small acute infarcts in the left occipital lobe and medial   temporal lobe. No acute intracranial hemorrhage identified.    Multiple chronic infarcts.    < end of copied text >    < from: CT Abdomen and Pelvis w/ IV Cont (11.01.24 @ 11:25) >  IMPRESSION:  No CT evidence of malignancy in the chest, abdomen or pelvis.    < end of copied text >        ASSESSMENT/PLAN: 	70 y/o M with past medical history of HTN, type 2 DM (on insulin), gout, CVA 6/2023 s/p loop recorder which found PAFib, on Eliquis since (recently lowered to 2.5 BID by Nephrologist Dr Rodriguez, recent creatinine 2.05 earlier this month)  presenting for acute change in mental status x 3 days. (PMD Dr Dutton, Cardiologist Dr Vernon Salinas)    -- MRI brain with multiple acute infarcts  -- CT CAP No CT evidence of malignancy in the chest, abdomen or pelvis.  -- recent TTE and NST noted above  -- cont Coreg and Losartan for HTN  -- Cont Eliquis for PAF  -- Cont ASA and Statin for hx CVA

## 2024-11-03 NOTE — PROGRESS NOTE ADULT - PROBLEM SELECTOR PLAN 4
-Per outpatient records, patient dx with systolic heart failure.   -Previous echo on 6/12/2023 showing mild global left ventricular systolic dysfunction  -Pt euvolemic on exam    -Continue current dose of beta-blocker, losartan and SGLT-2  -consider Add MRA prior to discharge for further optimization of GDMT regimen  -Get in touch with PCP for further information and for additional records -> collateral from cards appreciated  -- cont Coreg and Losartan for HTN  -- Cont Eliquis for PAF  -- Cont ASA and Statin for hx CVA (imaging also noting intracranial stenosis)  --Cardiology following, appreciate recs

## 2024-11-03 NOTE — PROGRESS NOTE ADULT - PROBLEM SELECTOR PLAN 8
Diet: Consistent carb  DVT ppx: Eliquis 5 BID (prescribed AC as outpatient for paroxysmal afib)  Dispo: pending workup and clinical improvement, potentially home over weekend, home w/ cognitive rehab Diet: Consistent carb  DVT ppx: Eliquis 5 BID (prescribed AC as outpatient for paroxysmal afib)  Dispo:  home today w/ cognitive rehab

## 2024-11-03 NOTE — DISCHARGE NOTE NURSING/CASE MANAGEMENT/SOCIAL WORK - NSDCFUADDAPPT_GEN_ALL_CORE_FT
APPTS ARE READY TO BE MADE: [X] YES    Best Family or Patient Contact (if needed):    Additional Information about above appointments (if needed):    1:  Can also follow with Stroke NP Laurel Mobley [address] [place] [address]. Please instruct the patient to call [phone] to schedule this appointment.  2: Please instruct patient to follow up with his private neurologist.   3: Please follow up with your outpatient endocrinologist  4. Please follow up with your outpatient cardiologist to monitor your cardiac function   5. Please follow up with your primary care physician.  6. follow up with Dr. Parra, neurosurgery, for intracranial aneurysm monitoring  [phone]    Other comments or requests:

## 2024-11-03 NOTE — PROGRESS NOTE ADULT - PROBLEM SELECTOR PLAN 2
-Currently on 16 of Lantus and sliding scale   -Pt on 10-14 units pre-meal Admelog, Ozempic 0.25 mg q week, and Jardiance 10 mg tab qd    -endocrine consulted; appreciate recs  -per endo, if BGs start to rise post-prandially add 5U pre-meal insulin TID. Will likely increase ozempic, jardiance, and possibly premeal insulin as outpatient depending on glucose levels  - can see if thiamine be switched from dextrose to other solution -Currently on 16 of Lantus and sliding scale   -Pt on 10-14 units pre-meal Admelog, Ozempic 0.25 mg q week, and Jardiance 10 mg tab qd    -endocrine consulted; appreciate recs  -per endo, added 5U pre-meal insulin TID. Will likely increase ozempic, jardiance, and possibly premeal insulin as outpatient depending on glucose levels  - can see if thiamine be switched from dextrose to other solution  - appreciate endo recs re: discharge regimen (basal+bolus insulin, jardiance + ozempic)

## 2024-11-03 NOTE — PROGRESS NOTE ADULT - ASSESSMENT
70 y/o M with past medical history of hypertension, diabetes, gout, CVAx3, CHF?, presenting for acute change in mental status x 3 days with right sided hemianopsia noted on physical exam and MRI showing multiple small acute infarcts in the left occipital lobe and medial temporal lobe c/f cardioembolic stroke, with occlusion of P2 segment of the left PCA, and incidental finding of 3.8 mm right sided P-comm aneurysm.   68 y/o M with past medical history of hypertension, diabetes, gout, CVAx3, CHF?, presenting for acute change in mental status x 3 days with right sided hemianopsia noted on physical exam and MRI showing multiple small acute infarcts in the left occipital lobe and medial temporal lobe c/f cardioembolic stroke, with occlusion of P2 segment of the left PCA, and incidental finding of 3.8 mm right sided P-comm aneurysm. Plan for discharge home with otpt followup.

## 2024-11-03 NOTE — PROGRESS NOTE ADULT - SUBJECTIVE AND OBJECTIVE BOX
PROGRESS NOTE:   Authored by Dr. Tasia Jeff    Patient is a 69y old  Male who presents with a chief complaint of Amnesia (01 Nov 2024 12:31)      SUBJECTIVE / OVERNIGHT EVENTS:  No acute events overnight. Pt is still disoriented but appears slightly less confused than yesterday. Reports he is feeling well and would like to go home.     MEDICATIONS  (STANDING):  allopurinol 200 milliGRAM(s) Oral daily  apixaban 5 milliGRAM(s) Oral two times a day  aspirin enteric coated 81 milliGRAM(s) Oral daily  atorvastatin 80 milliGRAM(s) Oral at bedtime  carvedilol 12.5 milliGRAM(s) Oral every 12 hours  dextrose 5%. 1000 milliLiter(s) (50 mL/Hr) IV Continuous <Continuous>  dextrose 5%. 1000 milliLiter(s) (100 mL/Hr) IV Continuous <Continuous>  dextrose 50% Injectable 25 Gram(s) IV Push once  dextrose 50% Injectable 25 Gram(s) IV Push once  dextrose 50% Injectable 12.5 Gram(s) IV Push once  gabapentin 300 milliGRAM(s) Oral two times a day  glucagon  Injectable 1 milliGRAM(s) IntraMuscular once  influenza  Vaccine (HIGH DOSE) 0.5 milliLiter(s) IntraMuscular once  insulin glargine Injectable (LANTUS) 16 Unit(s) SubCutaneous every morning  insulin lispro (ADMELOG) corrective regimen sliding scale   SubCutaneous three times a day before meals  insulin lispro (ADMELOG) corrective regimen sliding scale   SubCutaneous at bedtime  losartan 25 milliGRAM(s) Oral daily  thiamine IVPB 500 milliGRAM(s) IV Intermittent every 8 hours    MEDICATIONS  (PRN):  dextrose Oral Gel 15 Gram(s) Oral once PRN Blood Glucose LESS THAN 70 milliGRAM(s)/deciliter      CAPILLARY BLOOD GLUCOSE      POCT Blood Glucose.: 190 mg/dL (01 Nov 2024 21:49)  POCT Blood Glucose.: 244 mg/dL (01 Nov 2024 18:02)  POCT Blood Glucose.: 196 mg/dL (01 Nov 2024 12:05)  POCT Blood Glucose.: 151 mg/dL (01 Nov 2024 08:41)    I&O's Summary      PHYSICAL EXAM:  Vital Signs Last 24 Hrs  T(C): 36.4 (02 Nov 2024 05:20), Max: 36.6 (01 Nov 2024 12:04)  T(F): 97.5 (02 Nov 2024 05:20), Max: 97.8 (01 Nov 2024 12:04)  HR: 69 (02 Nov 2024 05:20) (62 - 75)  BP: 158/91 (02 Nov 2024 05:20) (120/86 - 160/96)  RR: 18 (02 Nov 2024 05:20) (18 - 18)  SpO2: 98% (02 Nov 2024 05:20) (97% - 98%)    Parameters below as of 02 Nov 2024 05:20  Patient On (Oxygen Delivery Method): room air          GENERAL: NAD, lying in bed comfortably  EYES: EOMI, PERRL, conjunctiva and sclera clear.   NECK: Supple, full ROM   HEART: Regular rate and rhythm, S1 and S2, no murmurs, rubs, or gallops. No LE edema b/l.  LUNGS: Unlabored respirations. Clear to auscultation bilaterally. No crackles, wheezing, or rhonchi.  ABDOMEN: Soft, nontender, nondistended, +BS. Umbilical hernia. Non-reducible  EXTREMITIES: 2+ peripheral pulses bilaterally. No clubbing, cyanosis, or edema.  NERVOUS SYSTEM: A&O to person and place, not to time. Hemianopsia appears to be improved in left eye. Same as previous exam in right eye.  PSYCHIATRIC: Calm. Affect normal.    LABS:                                   14.0   6.82  )-----------( 222      ( 02 Nov 2024 06:50 )             43.4                  14.2   6.25  )-----------( 204      ( 01 Nov 2024 07:22 )             44.0     11-02    139  |  104  |  25[H]  ----------------------------<  253[H]  4.0   |  20[L]  |  1.60[H]    Ca    9.0      02 Nov 2024 06:50  Phos  3.6     11-02  Mg     2.00     11-02    TPro  6.8  /  Alb  3.9  /  TBili  0.4  /  DBili  x   /  AST  15  /  ALT  17  /  AlkPhos  83  11-02 11-01    141  |  106  |  22  ----------------------------<  145[H]  3.7   |  20[L]  |  1.47[H]    Ca    8.9      01 Nov 2024 07:22  Phos  3.3     11-01  Mg     2.10     11-01    TPro  6.7  /  Alb  3.8  /  TBili  0.4  /  DBili  x   /  AST  18  /  ALT  20  /  AlkPhos  73  11-01        Culture - Urine (collected 31 Oct 2024 02:10)  Source: Clean Catch Clean Catch (Midstream)  Final Report (01 Nov 2024 07:35):    <10,000 CFU/mL Normal Urogenital Farzaneh        Tele Reviewed:    RADIOLOGY & ADDITIONAL TESTS:  Results Reviewed:   Imaging Personally Reviewed:  < from: CT Angio Head w/ IV Cont (11.02.24 @ 09:22) >  IMPRESSION:    CT HEAD:  1. Evolving acute/subacute left-sided PCA distribution infarct with   associated cytotoxic edema and without hemorrhagic transformation.  2. Unchanged encephalomalacia and gliosis within the right lateral   frontal lobe.    CTA NECK:  1. No large vessel occlusion or major stenosis.    CTA HEAD:  1. Abrupt occlusion of the P2 segment of the leftPCA with diminished   enhancement of the distal left PCA branch vasculature.  2. Multiple other areas of intracranial stenoses, as discussed.  3. 3.8 mm right-sided P-comm origin aneurysm.    < end of copied text >      Electrocardiogram Personally Reviewed:    Providers d/w: team d/w neuro re: CTA results, rec Nsx input  Consultant notes reviewed: Nsx, Endocrine, Cardiology PROGRESS NOTE:     Patient is a 69y old  Male who presents with a chief complaint of Amnesia (01 Nov 2024 12:31)      SUBJECTIVE / OVERNIGHT EVENTS:  No acute events overnight. Pt knows name and that he is in the hospital for stroke. He does not know the year. Reports he is feeling well and would like to go home.     MEDICATIONS  (STANDING):  allopurinol 200 milliGRAM(s) Oral daily  apixaban 5 milliGRAM(s) Oral two times a day  aspirin enteric coated 81 milliGRAM(s) Oral daily  atorvastatin 80 milliGRAM(s) Oral at bedtime  carvedilol 12.5 milliGRAM(s) Oral every 12 hours  dextrose 5%. 1000 milliLiter(s) (50 mL/Hr) IV Continuous <Continuous>  dextrose 5%. 1000 milliLiter(s) (100 mL/Hr) IV Continuous <Continuous>  dextrose 50% Injectable 25 Gram(s) IV Push once  dextrose 50% Injectable 25 Gram(s) IV Push once  dextrose 50% Injectable 12.5 Gram(s) IV Push once  gabapentin 300 milliGRAM(s) Oral two times a day  glucagon  Injectable 1 milliGRAM(s) IntraMuscular once  influenza  Vaccine (HIGH DOSE) 0.5 milliLiter(s) IntraMuscular once  insulin glargine Injectable (LANTUS) 16 Unit(s) SubCutaneous every morning  insulin lispro (ADMELOG) corrective regimen sliding scale   SubCutaneous three times a day before meals  insulin lispro (ADMELOG) corrective regimen sliding scale   SubCutaneous at bedtime  losartan 25 milliGRAM(s) Oral daily  thiamine IVPB 500 milliGRAM(s) IV Intermittent every 8 hours    MEDICATIONS  (PRN):  dextrose Oral Gel 15 Gram(s) Oral once PRN Blood Glucose LESS THAN 70 milliGRAM(s)/deciliter      CAPILLARY BLOOD GLUCOSE      POCT Blood Glucose.: 190 mg/dL (01 Nov 2024 21:49)  POCT Blood Glucose.: 244 mg/dL (01 Nov 2024 18:02)  POCT Blood Glucose.: 196 mg/dL (01 Nov 2024 12:05)  POCT Blood Glucose.: 151 mg/dL (01 Nov 2024 08:41)    I&O's Summary      PHYSICAL EXAM:  Vital Signs Last 24 Hrs  T(C): 36.5 (03 Nov 2024 05:21), Max: 36.5 (03 Nov 2024 05:21)  T(F): 97.7 (03 Nov 2024 05:21), Max: 97.7 (03 Nov 2024 05:21)  HR: 66 (03 Nov 2024 05:21) (61 - 67)  BP: 160/98 (03 Nov 2024 05:21) (135/85 - 160/98)  BP(mean): --  RR: 17 (03 Nov 2024 05:21) (17 - 18)  SpO2: 99% (03 Nov 2024 05:21) (96% - 99%)    Parameters below as of 03 Nov 2024 05:21  Patient On (Oxygen Delivery Method): room air      GENERAL: NAD, lying in bed comfortably  EYES: EOMI, PERRL, conjunctiva and sclera clear.   NECK: Supple, full ROM   HEART: Regular rate and rhythm, S1 and S2, no murmurs, rubs, or gallops. No LE edema b/l.  LUNGS: Unlabored respirations. Clear to auscultation bilaterally. No crackles, wheezing, or rhonchi.  ABDOMEN: Soft, nontender, nondistended, +BS. Umbilical hernia. Non-reducible  EXTREMITIES: 2+ peripheral pulses bilaterally. No clubbing, cyanosis, or edema.  NERVOUS SYSTEM: A&O to person and place, not to time. Hemianopsia appears to be improved in left eye. Same as previous exam in right eye.  PSYCHIATRIC: Calm. Affect normal.    LABS:             CBC Full  -  ( 03 Nov 2024 06:30 )  WBC Count : 6.36 K/uL  RBC Count : 4.83 M/uL  Hemoglobin : 13.9 g/dL  Hematocrit : 43.0 %  Platelet Count - Automated : 212 K/uL  Mean Cell Volume : 89.0 fL  Mean Cell Hemoglobin : 28.8 pg  Mean Cell Hemoglobin Concentration : 32.3 g/dL  Auto Neutrophil # : 3.98 K/uL  Auto Lymphocyte # : 1.53 K/uL  Auto Monocyte # : 0.54 K/uL  Auto Eosinophil # : 0.25 K/uL  Auto Basophil # : 0.04 K/uL  Auto Neutrophil % : 62.6 %  Auto Lymphocyte % : 24.1 %  Auto Monocyte % : 8.5 %  Auto Eosinophil % : 3.9 %  Auto Basophil % : 0.6 %    11-03    139  |  105  |  22  ----------------------------<  184[H]  3.8   |  21[L]  |  1.42[H]    Ca    9.0      03 Nov 2024 06:30  Phos  3.3     11-03  Mg     2.00     11-03    TPro  7.2  /  Alb  3.9  /  TBili  0.5  /  DBili  x   /  AST  17  /  ALT  18  /  AlkPhos  81  11-03    Culture - Urine (collected 31 Oct 2024 02:10)  Source: Clean Catch Clean Catch (Midstream)  Final Report (01 Nov 2024 07:35):    <10,000 CFU/mL Normal Urogenital Farzaneh        Tele Reviewed:    RADIOLOGY & ADDITIONAL TESTS:  Results Reviewed:   Imaging Personally Reviewed:  < from: CT Angio Head w/ IV Cont (11.02.24 @ 09:22) >  IMPRESSION:    CT HEAD:  1. Evolving acute/subacute left-sided PCA distribution infarct with   associated cytotoxic edema and without hemorrhagic transformation.  2. Unchanged encephalomalacia and gliosis within the right lateral   frontal lobe.    CTA NECK:  1. No large vessel occlusion or major stenosis.    CTA HEAD:  1. Abrupt occlusion of the P2 segment of the leftPCA with diminished   enhancement of the distal left PCA branch vasculature.  2. Multiple other areas of intracranial stenoses, as discussed.  3. 3.8 mm right-sided P-comm origin aneurysm.    < end of copied text >      Electrocardiogram Personally Reviewed:    Providers d/w: team d/w neuro re: CTA results, rec Nsx input  Consultant notes reviewed: Nsx, Endocrine, Cardiology PROGRESS NOTE:     Patient is a 69y old  Male who presents with a chief complaint of Amnesia (01 Nov 2024 12:31)      SUBJECTIVE / OVERNIGHT EVENTS:  No acute events overnight. Pt knows name and that he is in the hospital for stroke. He does not know the year. Reports he is feeling well and would like to go home.     MEDICATIONS  (STANDING):  allopurinol 200 milliGRAM(s) Oral daily  apixaban 5 milliGRAM(s) Oral two times a day  aspirin enteric coated 81 milliGRAM(s) Oral daily  atorvastatin 80 milliGRAM(s) Oral at bedtime  carvedilol 12.5 milliGRAM(s) Oral every 12 hours  dextrose 5%. 1000 milliLiter(s) (50 mL/Hr) IV Continuous <Continuous>  dextrose 5%. 1000 milliLiter(s) (100 mL/Hr) IV Continuous <Continuous>  dextrose 50% Injectable 25 Gram(s) IV Push once  dextrose 50% Injectable 25 Gram(s) IV Push once  dextrose 50% Injectable 12.5 Gram(s) IV Push once  gabapentin 300 milliGRAM(s) Oral two times a day  glucagon  Injectable 1 milliGRAM(s) IntraMuscular once  influenza  Vaccine (HIGH DOSE) 0.5 milliLiter(s) IntraMuscular once  insulin glargine Injectable (LANTUS) 16 Unit(s) SubCutaneous every morning  insulin lispro (ADMELOG) corrective regimen sliding scale   SubCutaneous three times a day before meals  insulin lispro (ADMELOG) corrective regimen sliding scale   SubCutaneous at bedtime  losartan 25 milliGRAM(s) Oral daily  thiamine IVPB 500 milliGRAM(s) IV Intermittent every 8 hours    MEDICATIONS  (PRN):  dextrose Oral Gel 15 Gram(s) Oral once PRN Blood Glucose LESS THAN 70 milliGRAM(s)/deciliter      CAPILLARY BLOOD GLUCOSE      POCT Blood Glucose.: 190 mg/dL (01 Nov 2024 21:49)  POCT Blood Glucose.: 244 mg/dL (01 Nov 2024 18:02)  POCT Blood Glucose.: 196 mg/dL (01 Nov 2024 12:05)  POCT Blood Glucose.: 151 mg/dL (01 Nov 2024 08:41)    I&O's Summary      PHYSICAL EXAM:  Vital Signs Last 24 Hrs  T(C): 36.5 (03 Nov 2024 05:21), Max: 36.5 (03 Nov 2024 05:21)  T(F): 97.7 (03 Nov 2024 05:21), Max: 97.7 (03 Nov 2024 05:21)  HR: 66 (03 Nov 2024 05:21) (61 - 67)  BP: 160/98 (03 Nov 2024 05:21) (135/85 - 160/98)  BP(mean): --  RR: 17 (03 Nov 2024 05:21) (17 - 18)  SpO2: 99% (03 Nov 2024 05:21) (96% - 99%)    Parameters below as of 03 Nov 2024 05:21  Patient On (Oxygen Delivery Method): room air      GENERAL: NAD, lying in bed comfortably  EYES: EOMI, PERRL, conjunctiva and sclera clear.   NECK: Supple, full ROM   HEART: Regular rate and rhythm, S1 and S2, no murmurs, rubs, or gallops. No LE edema b/l.  LUNGS: Unlabored respirations. Clear to auscultation bilaterally. No crackles, wheezing, or rhonchi.  ABDOMEN: Soft, nontender, nondistended, +BS. Umbilical hernia. Non-reducible  EXTREMITIES: 2+ peripheral pulses bilaterally. No clubbing, cyanosis, or edema.  NERVOUS SYSTEM: A&O to person and place, not to time. Hemianopsia appears to be improved in left eye. Same as previous exam in right eye.  PSYCHIATRIC: Calm. Affect normal.    LABS:             CBC Full  -  ( 03 Nov 2024 06:30 )  WBC Count : 6.36 K/uL  RBC Count : 4.83 M/uL  Hemoglobin : 13.9 g/dL  Hematocrit : 43.0 %  Platelet Count - Automated : 212 K/uL  Mean Cell Volume : 89.0 fL  Mean Cell Hemoglobin : 28.8 pg  Mean Cell Hemoglobin Concentration : 32.3 g/dL  Auto Neutrophil # : 3.98 K/uL  Auto Lymphocyte # : 1.53 K/uL  Auto Monocyte # : 0.54 K/uL  Auto Eosinophil # : 0.25 K/uL  Auto Basophil # : 0.04 K/uL  Auto Neutrophil % : 62.6 %  Auto Lymphocyte % : 24.1 %  Auto Monocyte % : 8.5 %  Auto Eosinophil % : 3.9 %  Auto Basophil % : 0.6 %    11-03    139  |  105  |  22  ----------------------------<  184[H]  3.8   |  21[L]  |  1.42[H]    Ca    9.0      03 Nov 2024 06:30  Phos  3.3     11-03  Mg     2.00     11-03    TPro  7.2  /  Alb  3.9  /  TBili  0.5  /  DBili  x   /  AST  17  /  ALT  18  /  AlkPhos  81  11-03    Culture - Urine (collected 31 Oct 2024 02:10)  Source: Clean Catch Clean Catch (Midstream)  Final Report (01 Nov 2024 07:35):    <10,000 CFU/mL Normal Urogenital Farzaneh        Tele Reviewed:    RADIOLOGY & ADDITIONAL TESTS:  Results Reviewed:   Imaging Personally Reviewed:  < from: CT Angio Head w/ IV Cont (11.02.24 @ 09:22) >  IMPRESSION:    CT HEAD:  1. Evolving acute/subacute left-sided PCA distribution infarct with   associated cytotoxic edema and without hemorrhagic transformation.  2. Unchanged encephalomalacia and gliosis within the right lateral   frontal lobe.    CTA NECK:  1. No large vessel occlusion or major stenosis.    CTA HEAD:  1. Abrupt occlusion of the P2 segment of the leftPCA with diminished   enhancement of the distal left PCA branch vasculature.  2. Multiple other areas of intracranial stenoses, as discussed.  3. 3.8 mm right-sided P-comm origin aneurysm.    < end of copied text >      Electrocardiogram Personally Reviewed:    Providers d/w:   Consultant notes reviewed: Nsx, Endocrine, Cardiology

## 2024-11-03 NOTE — PROGRESS NOTE ADULT - PROBLEM SELECTOR PLAN 3
-UA with protein. Cr 1.69  (unknown baseline). Pt previously diagnosed with CKD Stage 3 per previous hospitalization record.   -VM left with Dr. Rodriguez's office for further information     -Trend lytes and renal function (cr 1.6 today)  -Avoid nephrotoxins  -Continue losartan for renoprotective effects -UA with protein. Cr 1.69  (unknown baseline). Pt previously diagnosed with CKD Stage 3 per previous hospitalization record.   -VM left with Dr. Rodriguez's office for further information     -Trend lytes and renal function (cr 1.42 today)  -Avoid nephrotoxins  -Continue losartan for renoprotective effects

## 2024-11-03 NOTE — DISCHARGE NOTE NURSING/CASE MANAGEMENT/SOCIAL WORK - NSDCPNINST_GEN_ALL_CORE
Pt dc home, instructions given pt verbalized understating allowed for questions, diabetic bookless given, no distress noted.

## 2024-11-03 NOTE — DISCHARGE NOTE NURSING/CASE MANAGEMENT/SOCIAL WORK - PATIENT PORTAL LINK FT
You can access the FollowMyHealth Patient Portal offered by [facility] by registering at the following website: [email or web address]. By joining [facility]’s FollowMyHealth portal, you will also be able to view your health information using other applications (apps) compatible with our system.

## 2024-11-03 NOTE — DISCHARGE NOTE NURSING/CASE MANAGEMENT/SOCIAL WORK - FINANCIAL ASSISTANCE
[facility] provides services at a reduced cost to those who are determined to be eligible through [facility]’s financial assistance program. Information regarding [facility]’s financial assistance program can be found by going to [email or web address] or by calling [phone].

## 2024-11-03 NOTE — PROGRESS NOTE ADULT - PROVIDER SPECIALTY LIST ADULT
Cardiology
Internal Medicine
Cardiology
Cardiology
Neurology
Internal Medicine
Internal Medicine
Endocrinology
Internal Medicine

## 2024-11-03 NOTE — DISCHARGE NOTE NURSING/CASE MANAGEMENT/SOCIAL WORK - NSDCVIVACCINE_GEN_ALL_CORE_FT
Tdap; 19-Sep-2018 19:44; Magdalena Peralta (ROBERTO); [employer]; [id number] (Exp. Date: 13-Feb-2021); IntraMuscular; Deltoid Right.; 0.5 milliLiter(s); VIS (VIS Published: 09-May-2013, VIS Presented: 19-Sep-2018);

## 2024-11-04 PROBLEM — M10.9 GOUT, UNSPECIFIED: Chronic | Status: ACTIVE | Noted: 2024-10-31

## 2024-11-04 PROBLEM — I63.9 CEREBRAL INFARCTION, UNSPECIFIED: Chronic | Status: ACTIVE | Noted: 2024-10-31

## 2024-11-05 ENCOUNTER — NON-APPOINTMENT (OUTPATIENT)
Age: 69
End: 2024-11-05

## 2024-11-05 LAB — PYRIDOXAL PHOS SERPL-MCNC: 4.4 UG/L — SIGNIFICANT CHANGE UP (ref 3.4–65.2)

## 2024-11-12 ENCOUNTER — NON-APPOINTMENT (OUTPATIENT)
Age: 69
End: 2024-11-12

## 2024-11-12 ENCOUNTER — APPOINTMENT (OUTPATIENT)
Dept: NEUROSURGERY | Facility: CLINIC | Age: 69
End: 2024-11-12
Payer: MEDICARE

## 2024-11-12 VITALS
SYSTOLIC BLOOD PRESSURE: 120 MMHG | HEIGHT: 76 IN | BODY MASS INDEX: 25.57 KG/M2 | HEART RATE: 73 BPM | OXYGEN SATURATION: 98 % | DIASTOLIC BLOOD PRESSURE: 83 MMHG | WEIGHT: 210 LBS

## 2024-11-12 DIAGNOSIS — Z86.39 PERSONAL HISTORY OF OTHER ENDOCRINE, NUTRITIONAL AND METABOLIC DISEASE: ICD-10-CM

## 2024-11-12 DIAGNOSIS — Z78.9 OTHER SPECIFIED HEALTH STATUS: ICD-10-CM

## 2024-11-12 DIAGNOSIS — Z86.79 PERSONAL HISTORY OF OTHER DISEASES OF THE CIRCULATORY SYSTEM: ICD-10-CM

## 2024-11-12 DIAGNOSIS — I67.1 CEREBRAL ANEURYSM, NONRUPTURED: ICD-10-CM

## 2024-11-12 PROCEDURE — 99215 OFFICE O/P EST HI 40 MIN: CPT

## 2024-11-12 RX ORDER — INSULIN LISPRO 100 U/ML
100 INJECTION, SOLUTION INTRAVENOUS; SUBCUTANEOUS
Refills: 0 | Status: ACTIVE | COMMUNITY

## 2024-11-12 RX ORDER — GABAPENTIN 300 MG
300 TABLET ORAL
Refills: 0 | Status: ACTIVE | COMMUNITY

## 2024-11-12 RX ORDER — INSULIN GLARGINE 100 [IU]/ML
100 INJECTION, SOLUTION SUBCUTANEOUS
Refills: 0 | Status: ACTIVE | COMMUNITY

## 2024-11-12 RX ORDER — SEMAGLUTIDE 2.68 MG/ML
INJECTION, SOLUTION SUBCUTANEOUS
Refills: 0 | Status: ACTIVE | COMMUNITY

## 2024-11-12 RX ORDER — OMEPRAZOLE 40 MG/1
40 CAPSULE, DELAYED RELEASE ORAL
Refills: 0 | Status: ACTIVE | COMMUNITY

## 2024-11-12 RX ORDER — ALLOPURINOL 100 MG/1
100 TABLET ORAL
Refills: 0 | Status: ACTIVE | COMMUNITY

## 2024-11-12 RX ORDER — DULOXETINE HYDROCHLORIDE 40 MG/1
CAPSULE, DELAYED RELEASE PELLETS ORAL
Refills: 0 | Status: ACTIVE | COMMUNITY

## 2024-11-12 RX ORDER — TAMSULOSIN HYDROCHLORIDE 0.4 MG/1
0.4 CAPSULE ORAL
Refills: 0 | Status: ACTIVE | COMMUNITY

## 2024-11-12 RX ORDER — APIXABAN 5 MG/1
5 TABLET, FILM COATED ORAL
Refills: 0 | Status: ACTIVE | COMMUNITY

## 2024-11-12 RX ORDER — EMPAGLIFLOZIN 25 MG/1
25 TABLET, FILM COATED ORAL
Refills: 0 | Status: ACTIVE | COMMUNITY

## 2024-11-12 RX ORDER — CARVEDILOL 12.5 MG/1
12.5 TABLET, FILM COATED ORAL
Refills: 0 | Status: ACTIVE | COMMUNITY

## 2024-11-13 ENCOUNTER — NON-APPOINTMENT (OUTPATIENT)
Age: 69
End: 2024-11-13

## 2024-11-13 ENCOUNTER — APPOINTMENT (OUTPATIENT)
Dept: NEUROLOGY | Facility: CLINIC | Age: 69
End: 2024-11-13
Payer: MEDICARE

## 2024-11-13 VITALS
SYSTOLIC BLOOD PRESSURE: 131 MMHG | BODY MASS INDEX: 29.47 KG/M2 | HEART RATE: 109 BPM | HEIGHT: 76 IN | WEIGHT: 242 LBS | DIASTOLIC BLOOD PRESSURE: 91 MMHG

## 2024-11-13 DIAGNOSIS — H53.461 HOMONYMOUS BILATERAL FIELD DEFECTS, RIGHT SIDE: ICD-10-CM

## 2024-11-13 DIAGNOSIS — I63.9 CEREBRAL INFARCTION, UNSPECIFIED: ICD-10-CM

## 2024-11-13 PROCEDURE — 99215 OFFICE O/P EST HI 40 MIN: CPT

## 2024-11-13 PROCEDURE — 99205 OFFICE O/P NEW HI 60 MIN: CPT

## 2024-11-13 PROCEDURE — G2211 COMPLEX E/M VISIT ADD ON: CPT

## 2024-12-17 ENCOUNTER — TRANSCRIPTION ENCOUNTER (OUTPATIENT)
Age: 69
End: 2024-12-17

## 2024-12-18 ENCOUNTER — TRANSCRIPTION ENCOUNTER (OUTPATIENT)
Age: 69
End: 2024-12-18

## 2025-01-14 ENCOUNTER — APPOINTMENT (OUTPATIENT)
Dept: NEUROLOGY | Facility: CLINIC | Age: 70
End: 2025-01-14
Payer: MEDICARE

## 2025-01-14 VITALS
DIASTOLIC BLOOD PRESSURE: 86 MMHG | BODY MASS INDEX: 27.27 KG/M2 | HEART RATE: 80 BPM | SYSTOLIC BLOOD PRESSURE: 130 MMHG | WEIGHT: 224 LBS

## 2025-01-14 DIAGNOSIS — I63.9 CEREBRAL INFARCTION, UNSPECIFIED: ICD-10-CM

## 2025-01-14 DIAGNOSIS — H53.461 HOMONYMOUS BILATERAL FIELD DEFECTS, RIGHT SIDE: ICD-10-CM

## 2025-01-14 PROCEDURE — G2211 COMPLEX E/M VISIT ADD ON: CPT

## 2025-01-14 PROCEDURE — 99215 OFFICE O/P EST HI 40 MIN: CPT

## 2025-01-15 NOTE — DISCHARGE NOTE NURSING/CASE MANAGEMENT/SOCIAL WORK - NSDCPETBCESMAN_GEN_ALL_CORE
If you are a smoker, it is important for your health to stop smoking. Please be aware that second hand smoke is also harmful.
Yes - the patient is able to be screened

## 2025-01-17 ENCOUNTER — APPOINTMENT (OUTPATIENT)
Dept: CT IMAGING | Facility: IMAGING CENTER | Age: 70
End: 2025-01-17

## 2025-01-27 NOTE — ED ADULT TRIAGE NOTE - AS HEIGHT TYPE
Called patient's wife and after verifying name and date of birth scheduled patient to see Dr. Licona on Wednesday at 11:20. Wife states that Dr. Meadows is booked and that they would like to see another MD and not a NP. Educated wife that he is indeed a MD. Wife voiced understanding.    Mindy Koenig LPN    ----- Message from Shey sent at 1/27/2025  2:57 PM CST -----  Contact: pt's wife/Susanen  Type:  Sooner Apoointment Request    Caller is requesting a sooner appointment.  Caller declined first available appointment listed below.  Caller will not accept being placed on the waitlist and is requesting a message be sent to doctor.  Name of Caller: pt's wife/Susanne  When is the first available appointment? 1/30 (with pcp) but wants to see another dr and not a pa  Symptoms: back is giving him problems  Would the patient rather a call back or a response via [facility]?  phone  Best Call Back Number: [phone] (please call Susanne on this number)  Additional Information:   caller was advise that pt need to see pcp but she insist on pt seeing another dr  
stated

## 2025-02-12 ENCOUNTER — APPOINTMENT (OUTPATIENT)
Dept: CT IMAGING | Facility: CLINIC | Age: 70
End: 2025-02-12

## 2025-02-12 ENCOUNTER — OUTPATIENT (OUTPATIENT)
Dept: OUTPATIENT SERVICES | Facility: HOSPITAL | Age: 70
LOS: 1 days | End: 2025-02-12
Payer: COMMERCIAL

## 2025-02-12 DIAGNOSIS — Z98.890 OTHER SPECIFIED POSTPROCEDURAL STATES: Chronic | ICD-10-CM

## 2025-02-12 DIAGNOSIS — Z00.8 ENCOUNTER FOR OTHER GENERAL EXAMINATION: ICD-10-CM

## 2025-02-12 PROCEDURE — 72131 CT LUMBAR SPINE W/O DYE: CPT | Mod: 26

## 2025-02-12 PROCEDURE — 73700 CT LOWER EXTREMITY W/O DYE: CPT | Mod: 26,RT,76

## 2025-02-12 PROCEDURE — 72131 CT LUMBAR SPINE W/O DYE: CPT

## 2025-02-12 PROCEDURE — 72125 CT NECK SPINE W/O DYE: CPT

## 2025-02-12 PROCEDURE — 73700 CT LOWER EXTREMITY W/O DYE: CPT

## 2025-02-12 PROCEDURE — 72125 CT NECK SPINE W/O DYE: CPT | Mod: 26

## 2025-02-15 ENCOUNTER — TRANSCRIPTION ENCOUNTER (OUTPATIENT)
Age: 70
End: 2025-02-15

## 2025-03-03 ENCOUNTER — APPOINTMENT (OUTPATIENT)
Dept: NEUROLOGY | Facility: CLINIC | Age: 70
End: 2025-03-03

## 2025-03-04 NOTE — PHYSICAL THERAPY INITIAL EVALUATION ADULT - MD ORDER
[de-identified] :  RIGHT KNEE Inspection:  minimal effusion Palpation: medial facet of the patella tenderness  Knee Range of Motion:  0-135 Strength: 5/5 Quadriceps strength, 5/5 Hamstring strength, 4/5 Hip Abductor strength Neurological: light touch is intact throughout Ligament Stability and Special Tests:  McMurrays: neg Lachman: neg Pivot Shift: neg Posterior Drawer: neg Valgus: neg Varus: neg Patella Apprehension: neg Patella Maltracking: neg   LEFT KNEE Inspection:  minimal effusion Palpation: medial facet of the patella tenderness  Knee Range of Motion:  0-135 Strength: 5/5 Quadriceps strength, 5/5 Hamstring strength, 4/5 Hip Abductor strength Neurological: light touch is intact throughout Ligament Stability and Special Tests:  McMurrays: neg Lachman: neg Pivot Shift: neg Posterior Drawer: neg Valgus: neg Varus: neg Patella Apprehension: neg Patella Maltracking: neg  s/p Right Ulnar ORIF

## 2025-03-25 ENCOUNTER — INPATIENT (INPATIENT)
Facility: HOSPITAL | Age: 70
LOS: 1 days | Discharge: HOME | DRG: 65 | End: 2025-03-27
Attending: STUDENT IN AN ORGANIZED HEALTH CARE EDUCATION/TRAINING PROGRAM | Admitting: STUDENT IN AN ORGANIZED HEALTH CARE EDUCATION/TRAINING PROGRAM
Payer: MEDICARE

## 2025-03-25 VITALS
TEMPERATURE: 98 F | DIASTOLIC BLOOD PRESSURE: 100 MMHG | RESPIRATION RATE: 16 BRPM | SYSTOLIC BLOOD PRESSURE: 158 MMHG | HEART RATE: 71 BPM | OXYGEN SATURATION: 99 %

## 2025-03-25 DIAGNOSIS — I63.9 CEREBRAL INFARCTION, UNSPECIFIED: ICD-10-CM

## 2025-03-25 DIAGNOSIS — Z98.890 OTHER SPECIFIED POSTPROCEDURAL STATES: Chronic | ICD-10-CM

## 2025-03-25 DIAGNOSIS — E11.9 TYPE 2 DIABETES MELLITUS WITHOUT COMPLICATIONS: ICD-10-CM

## 2025-03-25 DIAGNOSIS — M50.20 OTHER CERVICAL DISC DISPLACEMENT, UNSPECIFIED CERVICAL REGION: Chronic | ICD-10-CM

## 2025-03-25 DIAGNOSIS — I48.0 PAROXYSMAL ATRIAL FIBRILLATION: ICD-10-CM

## 2025-03-25 LAB
A1C WITH ESTIMATED AVERAGE GLUCOSE RESULT: 12.9 % — HIGH (ref 4–5.6)
ALBUMIN SERPL ELPH-MCNC: 3.2 G/DL — LOW (ref 3.3–5)
ALP SERPL-CCNC: 256 U/L — HIGH (ref 40–120)
ALT FLD-CCNC: 97 U/L — HIGH (ref 10–45)
ANION GAP SERPL CALC-SCNC: 10 MMOL/L — SIGNIFICANT CHANGE UP (ref 5–17)
APTT BLD: 33.8 SEC — SIGNIFICANT CHANGE UP (ref 24.5–35.6)
AST SERPL-CCNC: 115 U/L — HIGH (ref 10–40)
BASOPHILS # BLD AUTO: 0.04 K/UL — SIGNIFICANT CHANGE UP (ref 0–0.2)
BASOPHILS NFR BLD AUTO: 0.6 % — SIGNIFICANT CHANGE UP (ref 0–2)
BILIRUB SERPL-MCNC: 0.7 MG/DL — SIGNIFICANT CHANGE UP (ref 0.2–1.2)
BUN SERPL-MCNC: 17 MG/DL — SIGNIFICANT CHANGE UP (ref 7–23)
CALCIUM SERPL-MCNC: 8.2 MG/DL — LOW (ref 8.4–10.5)
CHLORIDE SERPL-SCNC: 104 MMOL/L — SIGNIFICANT CHANGE UP (ref 96–108)
CHOLEST SERPL-MCNC: 140 MG/DL — SIGNIFICANT CHANGE UP
CO2 SERPL-SCNC: 23 MMOL/L — SIGNIFICANT CHANGE UP (ref 22–31)
CREAT SERPL-MCNC: 1.19 MG/DL — SIGNIFICANT CHANGE UP (ref 0.5–1.3)
EGFR: 67 ML/MIN/1.73M2 — SIGNIFICANT CHANGE UP
EGFR: 67 ML/MIN/1.73M2 — SIGNIFICANT CHANGE UP
EOSINOPHIL # BLD AUTO: 0.36 K/UL — SIGNIFICANT CHANGE UP (ref 0–0.5)
EOSINOPHIL NFR BLD AUTO: 5.1 % — SIGNIFICANT CHANGE UP (ref 0–6)
ESTIMATED AVERAGE GLUCOSE: 324 MG/DL — HIGH (ref 68–114)
FLUAV AG NPH QL: SIGNIFICANT CHANGE UP
FLUBV AG NPH QL: SIGNIFICANT CHANGE UP
GLUCOSE BLDC GLUCOMTR-MCNC: 117 MG/DL — HIGH (ref 70–99)
GLUCOSE BLDC GLUCOMTR-MCNC: 127 MG/DL — HIGH (ref 70–99)
GLUCOSE BLDC GLUCOMTR-MCNC: 139 MG/DL — HIGH (ref 70–99)
GLUCOSE BLDC GLUCOMTR-MCNC: 178 MG/DL — HIGH (ref 70–99)
GLUCOSE SERPL-MCNC: 129 MG/DL — HIGH (ref 70–99)
HCT VFR BLD CALC: 33.2 % — LOW (ref 39–50)
HDLC SERPL-MCNC: 55 MG/DL — SIGNIFICANT CHANGE UP
HGB BLD-MCNC: 10.8 G/DL — LOW (ref 13–17)
IMM GRANULOCYTES NFR BLD AUTO: 0.3 % — SIGNIFICANT CHANGE UP (ref 0–0.9)
INR BLD: 1.19 RATIO — HIGH (ref 0.85–1.16)
LIPID PNL WITH DIRECT LDL SERPL: 73 MG/DL — SIGNIFICANT CHANGE UP
LYMPHOCYTES # BLD AUTO: 1.06 K/UL — SIGNIFICANT CHANGE UP (ref 1–3.3)
LYMPHOCYTES # BLD AUTO: 14.9 % — SIGNIFICANT CHANGE UP (ref 13–44)
MCHC RBC-ENTMCNC: 29.1 PG — SIGNIFICANT CHANGE UP (ref 27–34)
MCHC RBC-ENTMCNC: 32.5 G/DL — SIGNIFICANT CHANGE UP (ref 32–36)
MCV RBC AUTO: 89.5 FL — SIGNIFICANT CHANGE UP (ref 80–100)
MONOCYTES # BLD AUTO: 0.63 K/UL — SIGNIFICANT CHANGE UP (ref 0–0.9)
MONOCYTES NFR BLD AUTO: 8.9 % — SIGNIFICANT CHANGE UP (ref 2–14)
NEUTROPHILS # BLD AUTO: 5 K/UL — SIGNIFICANT CHANGE UP (ref 1.8–7.4)
NEUTROPHILS NFR BLD AUTO: 70.2 % — SIGNIFICANT CHANGE UP (ref 43–77)
NON HDL CHOLESTEROL: 85 MG/DL — SIGNIFICANT CHANGE UP
NRBC BLD AUTO-RTO: 0 /100 WBCS — SIGNIFICANT CHANGE UP (ref 0–0)
PLATELET # BLD AUTO: 165 K/UL — SIGNIFICANT CHANGE UP (ref 150–400)
PLATELET RESPONSE ASPIRIN RESULT: 643 ARU — SIGNIFICANT CHANGE UP
POTASSIUM SERPL-MCNC: 4.5 MMOL/L — SIGNIFICANT CHANGE UP (ref 3.5–5.3)
POTASSIUM SERPL-SCNC: 4.5 MMOL/L — SIGNIFICANT CHANGE UP (ref 3.5–5.3)
PROT SERPL-MCNC: 5.7 G/DL — LOW (ref 6–8.3)
PROTHROM AB SERPL-ACNC: 13.5 SEC — HIGH (ref 9.9–13.4)
RBC # BLD: 3.71 M/UL — LOW (ref 4.2–5.8)
RBC # FLD: 14.1 % — SIGNIFICANT CHANGE UP (ref 10.3–14.5)
RSV RNA NPH QL NAA+NON-PROBE: SIGNIFICANT CHANGE UP
SARS-COV-2 RNA SPEC QL NAA+PROBE: SIGNIFICANT CHANGE UP
SODIUM SERPL-SCNC: 137 MMOL/L — SIGNIFICANT CHANGE UP (ref 135–145)
SOURCE RESPIRATORY: SIGNIFICANT CHANGE UP
TRIGL SERPL-MCNC: 56 MG/DL — SIGNIFICANT CHANGE UP
TROPONIN T, HIGH SENSITIVITY RESULT: 30 NG/L — SIGNIFICANT CHANGE UP (ref 0–51)
WBC # BLD: 7.11 K/UL — SIGNIFICANT CHANGE UP (ref 3.8–10.5)
WBC # FLD AUTO: 7.11 K/UL — SIGNIFICANT CHANGE UP (ref 3.8–10.5)

## 2025-03-25 PROCEDURE — 70551 MRI BRAIN STEM W/O DYE: CPT | Mod: 26

## 2025-03-25 PROCEDURE — 70547 MR ANGIOGRAPHY NECK W/O DYE: CPT | Mod: 26

## 2025-03-25 PROCEDURE — 70450 CT HEAD/BRAIN W/O DYE: CPT | Mod: 26,XU

## 2025-03-25 PROCEDURE — 70544 MR ANGIOGRAPHY HEAD W/O DYE: CPT | Mod: 26,XU

## 2025-03-25 PROCEDURE — 70498 CT ANGIOGRAPHY NECK: CPT | Mod: 26

## 2025-03-25 PROCEDURE — 99291 CRITICAL CARE FIRST HOUR: CPT | Mod: 25

## 2025-03-25 PROCEDURE — 99222 1ST HOSP IP/OBS MODERATE 55: CPT

## 2025-03-25 PROCEDURE — 70496 CT ANGIOGRAPHY HEAD: CPT | Mod: 26

## 2025-03-25 PROCEDURE — 0042T: CPT

## 2025-03-25 RX ORDER — ASPIRIN 325 MG
81 TABLET ORAL DAILY
Refills: 0 | Status: DISCONTINUED | OUTPATIENT
Start: 2025-03-25 | End: 2025-03-26

## 2025-03-25 RX ORDER — DEXTROSE 50 % IN WATER 50 %
12.5 SYRINGE (ML) INTRAVENOUS ONCE
Refills: 0 | Status: DISCONTINUED | OUTPATIENT
Start: 2025-03-25 | End: 2025-03-27

## 2025-03-25 RX ORDER — NICARDIPINE HCL 30 MG
5 CAPSULE ORAL
Qty: 40 | Refills: 0 | Status: DISCONTINUED | OUTPATIENT
Start: 2025-03-25 | End: 2025-03-25

## 2025-03-25 RX ORDER — DEXTROSE 50 % IN WATER 50 %
25 SYRINGE (ML) INTRAVENOUS ONCE
Refills: 0 | Status: DISCONTINUED | OUTPATIENT
Start: 2025-03-25 | End: 2025-03-27

## 2025-03-25 RX ORDER — GLUCAGON 3 MG/1
1 POWDER NASAL ONCE
Refills: 0 | Status: DISCONTINUED | OUTPATIENT
Start: 2025-03-25 | End: 2025-03-27

## 2025-03-25 RX ORDER — INSULIN LISPRO 100 U/ML
INJECTION, SOLUTION INTRAVENOUS; SUBCUTANEOUS AT BEDTIME
Refills: 0 | Status: DISCONTINUED | OUTPATIENT
Start: 2025-03-25 | End: 2025-03-27

## 2025-03-25 RX ORDER — ATORVASTATIN CALCIUM 80 MG/1
80 TABLET, FILM COATED ORAL AT BEDTIME
Refills: 0 | Status: DISCONTINUED | OUTPATIENT
Start: 2025-03-25 | End: 2025-03-27

## 2025-03-25 RX ORDER — DEXTROSE 50 % IN WATER 50 %
15 SYRINGE (ML) INTRAVENOUS ONCE
Refills: 0 | Status: DISCONTINUED | OUTPATIENT
Start: 2025-03-25 | End: 2025-03-27

## 2025-03-25 RX ORDER — INSULIN LISPRO 100 U/ML
INJECTION, SOLUTION INTRAVENOUS; SUBCUTANEOUS
Refills: 0 | Status: DISCONTINUED | OUTPATIENT
Start: 2025-03-25 | End: 2025-03-27

## 2025-03-25 RX ORDER — DULOXETINE 20 MG/1
1 CAPSULE, DELAYED RELEASE ORAL
Refills: 0 | DISCHARGE

## 2025-03-25 RX ORDER — GABAPENTIN 400 MG/1
300 CAPSULE ORAL
Refills: 0 | Status: DISCONTINUED | OUTPATIENT
Start: 2025-03-25 | End: 2025-03-27

## 2025-03-25 RX ORDER — SODIUM CHLORIDE 9 G/1000ML
1000 INJECTION, SOLUTION INTRAVENOUS
Refills: 0 | Status: DISCONTINUED | OUTPATIENT
Start: 2025-03-25 | End: 2025-03-27

## 2025-03-25 RX ORDER — INFLUENZA A VIRUS A/IDAHO/07/2018 (H1N1) ANTIGEN (MDCK CELL DERIVED, PROPIOLACTONE INACTIVATED, INFLUENZA A VIRUS A/INDIANA/08/2018 (H3N2) ANTIGEN (MDCK CELL DERIVED, PROPIOLACTONE INACTIVATED), INFLUENZA B VIRUS B/SINGAPORE/INFTT-16-0610/2016 ANTIGEN (MDCK CELL DERIVED, PROPIOLACTONE INACTIVATED), INFLUENZA B VIRUS B/IOWA/06/2017 ANTIGEN (MDCK CELL DERIVED, PROPIOLACTONE INACTIVATED) 15; 15; 15; 15 UG/.5ML; UG/.5ML; UG/.5ML; UG/.5ML
0.5 INJECTION, SUSPENSION INTRAMUSCULAR ONCE
Refills: 0 | Status: DISCONTINUED | OUTPATIENT
Start: 2025-03-25 | End: 2025-03-27

## 2025-03-25 RX ORDER — TAMSULOSIN HYDROCHLORIDE 0.4 MG/1
0.4 CAPSULE ORAL AT BEDTIME
Refills: 0 | Status: DISCONTINUED | OUTPATIENT
Start: 2025-03-25 | End: 2025-03-27

## 2025-03-25 RX ORDER — ENOXAPARIN SODIUM 100 MG/ML
40 INJECTION SUBCUTANEOUS EVERY 24 HOURS
Refills: 0 | Status: DISCONTINUED | OUTPATIENT
Start: 2025-03-25 | End: 2025-03-26

## 2025-03-25 RX ORDER — DULOXETINE 20 MG/1
30 CAPSULE, DELAYED RELEASE ORAL DAILY
Refills: 0 | Status: DISCONTINUED | OUTPATIENT
Start: 2025-03-25 | End: 2025-03-27

## 2025-03-25 RX ADMIN — Medication 40 MILLIGRAM(S): at 14:57

## 2025-03-25 RX ADMIN — ATORVASTATIN CALCIUM 80 MILLIGRAM(S): 80 TABLET, FILM COATED ORAL at 21:44

## 2025-03-25 RX ADMIN — TAMSULOSIN HYDROCHLORIDE 0.4 MILLIGRAM(S): 0.4 CAPSULE ORAL at 21:44

## 2025-03-25 RX ADMIN — GABAPENTIN 300 MILLIGRAM(S): 400 CAPSULE ORAL at 18:31

## 2025-03-25 RX ADMIN — Medication 25 MG/HR: at 03:56

## 2025-03-25 RX ADMIN — DULOXETINE 30 MILLIGRAM(S): 20 CAPSULE, DELAYED RELEASE ORAL at 14:58

## 2025-03-25 RX ADMIN — Medication 81 MILLIGRAM(S): at 14:57

## 2025-03-25 RX ADMIN — INSULIN LISPRO 1: 100 INJECTION, SOLUTION INTRAVENOUS; SUBCUTANEOUS at 17:11

## 2025-03-25 RX ADMIN — Medication 200 MILLIGRAM(S): at 14:57

## 2025-03-25 RX ADMIN — ENOXAPARIN SODIUM 40 MILLIGRAM(S): 100 INJECTION SUBCUTANEOUS at 18:31

## 2025-03-25 RX ADMIN — Medication 25 MG/HR: at 04:04

## 2025-03-25 NOTE — SPEECH LANGUAGE PATHOLOGY EVALUATION - H & P REVIEW
69RHM with PMHx of HTN, HLD, T2DM, Afib, prior strokes (right frontal and left temporal/occipital regions with residual R homonymous hemianopia, R sided weakness and mixed aphasia) presenting as transfer from [facility] due to L M2 occlusion and for possible thrombectomy. LKW 16:00 on 3/24/2025. Family reported that patient had sudden onset slurred speech and difficulty producing words. Per family at bedside, patient was having difficulty producing words after his initial stroke and that has persisted since but family noticed worsening of those symptoms on 3/24/2025 as well as new onset slurred speech. CTA H/N on 3/24/2025 at [facility] revealed near total occlusion of the origin of L M2 branch. Perfusion imaging was unavailable at [facility]. Reports adherence to medications. Ambulates independently at baseline, handles ADLs independently, and has never handled iADLs independently./yes

## 2025-03-25 NOTE — H&P ADULT - ASSESSMENT
LKW: 16:00 on 3/24/2025  NIHSS: 8  Baseline MRS: 1  Not a tenecteplase candidate due to presentation outside of window.   Not a thrombectomy candidate due to chronic occlusion that is too distal.     Impression:  Wernicke aphasia and residual right homonymous hemianopia due to recrudescence of prior infarcts in left occipital lobe and medial temporal lobe vs acute ischemic stroke. Mechanism cardioembolic from Afib.       Recommendations:    Medications:  [] Continue home Aspirin 81mg daily   [] Hold home Eliquis 5 mg BID for now  [] Atorvastatin 80 mg daily titrated per LDL < 70  [] Continue home omeprazole, gabapentin 300 mg BID, duloxetine 30 mg qd, allopurinol 200 mg qd, tamsulosin, and losartan  [] ISS, ADA diet when cleared for diet    Investigations:  [] HgbA1C, fasting lipid panel, CBC, CMP, coag panel, troponin  [] RVP+COVID, UA, Utox  [] MRI brain w/o con  [] MRA head w/o con, MRA neck w contrast NOVA  [] TTE w/o bubble study  [] Telemetry to check for arrhythmia, EKG  [x] Patient already has loop recorder in place - may need to interrogate  [] Tight glucose control (long-term goal HgbA1c < 6%)  [] Stroke education and counseling  [] Neuro-checks and VS q2h  [] Permissive HTN up to 220/120 for 24-48h from symptom onset  [] Dysphagia screen. If fails, speech/swallow eval  [] Aspiration, fall precautions  [] STAT CT head non-contrast for change in neuro exam.   [] PT/ OT eval  [] DVT ppx: SCDs and lovenox 40 mg s.c.    Discussed with stroke fellow Dr. Solorio under supervision of attending Dr. Eduardo regarding decision against candidacy for tenecteplase/ thrombectomy. Will be formally staffed on morning rounds with attending. Recommendations will be complete once signed by attending.   LKW: 16:00 on 3/24/2025  NIHSS: 8  Baseline MRS: 1  Not a tenecteplase candidate due to presentation outside of window.   Not a thrombectomy candidate due to chronic occlusion that is too distal.     Impression:  Wernicke aphasia and residual right homonymous hemianopia due to recrudescence of prior infarcts in left occipital lobe and medial temporal lobe vs acute ischemic stroke. Mechanism cardioembolic from Afib.       Recommendations:    [] Continue home Aspirin 81mg daily   [] Hold home Eliquis 5 mg BID for now  [] Atorvastatin 80 mg daily titrated per LDL < 70  [] Continue home omeprazole, gabapentin 300 mg BID, duloxetine 30 mg qd, allopurinol 200 mg qd, tamsulosin, and losartan  [] ISS, ADA diet when cleared for diet  [] HgbA1C, fasting lipid panel, CBC, CMP, coag panel, troponin  [] RVP+COVID, UA, Utox  [] MRI brain w/o con  [] MRA head w/o con, MRA neck w contrast NOVA  [] TTE w/o bubble study  [] Telemetry to check for arrhythmia, EKG  [x] Patient already has loop recorder in place - may need to interrogate  [] Tight glucose control (long-term goal HgbA1c < 6%)  [] Stroke education and counseling  [] Neuro-checks and VS q2h  [] Permissive HTN up to 220/120 for 24-48h from symptom onset  [] Dysphagia screen. If fails, speech/swallow eval  [] Aspiration, fall precautions  [] STAT CT head non-contrast for change in neuro exam.   [] PT/ OT eval  [] DVT ppx: SCDs and lovenox 40 mg s.c.    Discussed with stroke fellow Dr. Solorio under supervision of attending Dr. Eduardo regarding decision against candidacy for tenecteplase/ thrombectomy. Will be formally staffed on morning rounds with attending. Recommendations will be complete once signed by attending.   LKW: 16:00 on 3/24/2025  NIHSS: 8  Baseline MRS: 1  Not a tenecteplase candidate due to presentation outside of window.   Not a thrombectomy candidate due to chronic occlusion that is too distal.     Impression:  Wernicke aphasia and residual right homonymous hemianopia due to recrudescence of prior infarcts in left occipital lobe and medial temporal lobe vs acute ischemic stroke. Mechanism cardioembolic from Afib.       Recommendations:    [] Continue home Aspirin 81mg daily   [] Hold home Eliquis 5 mg BID for now  [] Hold home losartan for now to allow for permissive HTN  [] Atorvastatin 80 mg daily titrated per LDL < 70  [] Continue home omeprazole, gabapentin, duloxetine, allopurinol, and tamsulosin  [] ISS, ADA diet   [] HgbA1C, fasting lipid panel, CBC, CMP, coag panel, troponin  [] RVP+COVID, UA, Utox  [] MRI brain w/o con  [] MRA head w/o con, MRA neck w contrast NOVA  [] TTE w/o bubble study  [] Telemetry to check for arrhythmia, EKG  [x] Patient already has loop recorder in place - may need to interrogate  [] Tight glucose control (long-term goal HgbA1c < 6%)  [] Stroke education and counseling  [] Neuro-checks and VS q2h  [] Permissive HTN up to 220/120 for 24-48h from symptom onset  [] Dysphagia screen. If fails, speech/swallow eval  [] Aspiration, fall precautions  [] STAT CT head non-contrast for change in neuro exam.   [] PT/ OT eval  [] DVT ppx: SCDs and lovenox 40 mg s.c.    Discussed with stroke fellow Dr. Solorio under supervision of attending Dr. Eduardo regarding decision against candidacy for tenecteplase/ thrombectomy. Will be formally staffed on morning rounds with attending. Recommendations will be complete once signed by attending.

## 2025-03-25 NOTE — H&P ADULT - NSHPPHYSICALEXAM_GEN_ALL_CORE
Physical Examination:   General - NAD  Cardiovascular - no edema  Neurologic Exam:  Mental status - Awake, Alert, Not answering any orientation questions. Minimal spontaneous verbal output. Speech fluent, repetition and naming not intact (glove, phone, elbow). Not following simple commands.     Cranial nerves:  CN II: Left visual fields intact to threat. R visual fields not intact to threat. PERRL.    CN III, IV, VI: EOMI  CN V: ARTEMIO due to patient participation  CN VII: Face is symmetric with normal eye closure and smile.  CN VII: Hearing is normal to rubbing fingers  CN IX, X: Palate elevates symmetrically. Phonation is normal.  CN XI: Head turning intact  CN XII: Tongue is midline with normal movements and no atrophy.    Motor - Normal bulk throughout. No pronator drift of out-stretched arms.  Strength testing  RUE, LUE: maintains antigravity for at least 10 seconds with no drift  RLE, LLE: maintains antigravity for at least 5 seconds with no drift    Sensation - Grimaces in response to painful stimuli in all 4 extremities    Coordination - ARTEMIO due to patient participation    Gait and station - ARTEMIO due to patient participation

## 2025-03-25 NOTE — PHYSICAL THERAPY INITIAL EVALUATION ADULT - PLANNED THERAPY INTERVENTIONS, PT EVAL
GOAL: Pt will ascend/descend (2) steps (I) with U HR and step over step pattern in 4 weeks./balance training/bed mobility training/gait training/transfer training

## 2025-03-25 NOTE — CONSULT NOTE ADULT - ATTENDING COMMENTS
HPI: 69M on eliquis for afib last took y'day, h/o HTN HLD DM, CVA (right frontal and left temporal/occipital regions, resid R homonymous hemianopia, R sided weakness, mixed aphasia) p/w dysarthria, worsening of baseline aphasia, LKW 16:00 3/24/2025. CTA @[facility] w/ severe stenosis L M2. CTH no heme. CTA w/LM2 stenosis w/ distal recon, diffuse ICAD, no new occlusion when compared to prior vascular imaging. CTP no core infarct. MRI with small acute left occipital and temporal strokes, similar to prior stroke territories. NOVA no flow LPCA otw nl.     - Medical optimization per stroke team  - Will discuss case at neurovascular conference given multiple prior events  - Patient should see me in the office to discuss the management of the MCA stenosis after discussion at neurovascular conference

## 2025-03-25 NOTE — ED PROVIDER NOTE - PHYSICAL EXAMINATION
Constitutional: VS reviewed. Alert and orientedx3, well appearing, no apparent distress  HEENT: Atraumatic, EOMI, PERRL   CV: RRR  Lungs: Clear and equal bilaterally, no wheezes, rales or crackles  Abdomen: Soft, nondistended, nontender  MSK: No deformities  Skin: Warm and dry. As visualized no rashes, lesions, bruising or erythema  Neuro: + right facial droop. + right pronator drift. + expressive aphasia. Decreased sensation of right side. + slurred speech. Unable to perform finger to nose.   Lymph: No pitting edema in extremities.

## 2025-03-25 NOTE — ED ADULT TRIAGE NOTE - ARRIVAL FROM
Chest pain starting last night, today increased with breathing, and now feeling shortness of breath. No heart history.  
[facility]ospitals[facility]

## 2025-03-25 NOTE — PHYSICAL THERAPY INITIAL EVALUATION ADULT - ADDITIONAL COMMENTS
Pt lives with his spouse in an apartment, 2 steps to enter elevator access inside. PTA pt was ambulating (I) without an AD and was (I) with all ADLs

## 2025-03-25 NOTE — SPEECH LANGUAGE PATHOLOGY EVALUATION - COMMENTS
IMAGING: CT PERFUSION: No core infarct identified. Diffusely positive intracranial hypoperfusion map, likely artifactual. CTA NECK: No evidence of significant stenosis or occlusion. CTA HEAD: Multifocal bilateral middle and posterior cerebral artery stenoses, similar to prior examination of 11/2/2024. Left P2 segment occlusion, also seen on prior examination. No evidence of new proximal large vessel occlusion.    *Known to this service. Seen at [facility] 11/2024 for bedside swallow evaluation with rx -> regular solids with thin liquids. THIS ADMISSION: Passed dysphagia screen 3/25. requires ongoing assessment d/w patient; daughter; MD Chan did not assess; patient does not have reading glasses at this time long term goal: 1) the patient will improve expressive/receptive language skills in order to enhance functional communication of wants/needs; 2) the patient will improve cognitive skills in order to enhance functional communication of wants/needs.     d/w patient; daughter; MD Chan significant expressive language deficits as evidenced by absent/delayed responses, inappropriate responses. patient does benefit from moderate-maximum semantic/phonemic cues throughout eval unable to complete all portions of cognitive-linguistic assessment, patient will require ongoing cognitive assessment while in-house. suspect expressive language deficits may be impacting overall performance in assessment. long term goal: 1) the patient will improve expressive/receptive language skills in order to enhance functional communication of wants/needs; 2) the patient will improve cognitive skills in order to enhance functional communication of wants/needs.     d/w patient; daughter; MD Chan; TIM Beltre

## 2025-03-25 NOTE — SPEECH LANGUAGE PATHOLOGY EVALUATION - SLP AUTOMATIC SPEECH
fillers, pausing - provided with initial "Sunday-" and trouble with completing, slow response time. similar response with MOW/days of the week/months of the year

## 2025-03-25 NOTE — PHYSICAL THERAPY INITIAL EVALUATION ADULT - PERTINENT HX OF CURRENT PROBLEM, REHAB EVAL
69RHM with PMHx of HTN, HLD, T2DM, Afib (on apixaban, last dose 6:30 AM), prior strokes (right frontal and left temporal/occipital regions with residual R homonymous hemianopia, R sided weakness and mixed aphasia) presenting as transfer from [facility] due to L M2 occlusion and for possible thrombectomy. LKW 16:00 on 3/24/2025. Family reported that patient had sudden onset slurred speech and difficulty producing words. Per family at bedside, patient was having difficulty producing words after his initial stroke and that has persisted since but family noticed worsening of those symptoms on 3/24/2025 as well as new onset slurred speech. CTA H/N on 3/24/2025 at [facility] revealed near total occlusion of the origin of L M2 branch. Perfusion imaging was unavailable at [facility]. Reports adherence to medications. Ambulates independently at baseline, handles ADLs independently, and has never handled iADLs independently.  CT Brain stroke protocol 3/25: No acute parenchymal hemorrhage, mass effect, or midline shift. If clinical symptoms persist or worsen, more sensitive evaluation with brain MRI may be obtained, if no contraindications exist. CT PERFUSION: No core infarct identified. Diffusely positive intracranial hypoperfusion map, likely artifactual. CTA NECK: No evidence of significant stenosis or occlusion. CTA HEAD: Multifocal bilateral middle and posterior cerebral artery stenoses, similar to prior examination of 11/2/2024. Left P2 segment occlusion, also seen on prior examination. No evidence of new proximal large vessel occlusion.

## 2025-03-25 NOTE — ED PROVIDER NOTE - PROGRESS NOTE DETAILS
Attending Masom:  neuro evaluated pt upon arrival at [facility], requesting repeat ct's Shahida Real PGY3: Patient with multiple blood pressures with systolic above 105.  Nicardipine drip initiated. Shahida Real PGY3: Neurology reviewed imaging and does not recommend thrombectomy at this time.  They recommend admission to the stroke unit for every 2 neuro checks.

## 2025-03-25 NOTE — ED ADULT NURSE REASSESSMENT NOTE - NS ED NURSE REASSESS COMMENT FT1
ED RN attempting to give report. RN hung up on x2 times, on hold [id number], 4cohen RN told she will call back later for report. Pt being sent up without report given.

## 2025-03-25 NOTE — H&P ADULT - HISTORY OF PRESENT ILLNESS
69RHM with PMHx of HTN, HLD, T2DM, Afib (on apixaban, last dose 6:30 AM), prior strokes (right frontal and left temporal/occipital regions with residual R homonymous hemianopia, R sided weakness and mixed aphasia) presenting as transfer from [facility] due to L M2 occlusion and for possible thrombectomy. LKW 16:00 on 3/24/2025. Family reported that patient had sudden onset slurred speech and difficulty producing words. Per family at bedside, patient was having difficulty producing words after his initial stroke and that has persisted since but family noticed worsening of those symptoms on 3/24/2025 as well as new onset slurred speech. CTA H/N on 3/24/2025 at [facility] revealed near total occlusion of the origin of L M2 branch. Perfusion imaging was unavailable at [facility]. Reports adherence to medications. Ambulates independently at baseline, handles ADLs independently, and has never handled iADLs independently.

## 2025-03-25 NOTE — ED ADULT NURSE NOTE - OBJECTIVE STATEMENT
Pt is a 70y/o F PMH CVA, HLD, HTN, DM, presents to [facility] ED via EMS from [facility] for neuro eval. As per EMS, last known normal 1600 yesterday evening, at approximately 1930 pt family member noted slurred speech, pt was seen at [facility] CTA preformed, found to have left hemisphere stroke, transfer for thrombectomy eval. Pt arrived with b/l PIVs. Code stroke activated, ED RN, Neuro MD, ED MD at bedside for transport to CT. Upon physical assessment right sided facial droop noted, following commands, pt skin warm and dry, airway patent, afebrile. As per family, pt baseline right sided weakness from previous CVA. Pt is currently denying any HA, visual deficits, SOB, cough, CP, palpitations, abd pain, urinary symptoms, n/v/d/c, fever/chills, weakness/fatigue. Pt ambulates independently at baseline. Bedrail's up and comfort measures provided

## 2025-03-25 NOTE — SPEECH LANGUAGE PATHOLOGY EVALUATION - YES/NO QUESTIONS: COMPLEX
0/4 on auditory comprehension task; repetition of stimuli not successful in achieving target response/no

## 2025-03-25 NOTE — ED ADULT NURSE NOTE - NSFALLHARMRISKINTERV_ED_ALL_ED
Assistance OOB with selected safe patient handling equipment if applicable/Assistance with ambulation/Communicate risk of Fall with Harm to all staff, patient, and family/Monitor gait and stability/Monitor for mental status changes and reorient to person, place, and time, as needed/Provide visual cue: red socks, yellow wristband, yellow gown, etc/Reinforce activity limits and safety measures with patient and family/Toileting schedule using arm’s reach rule for commode and bathroom/Use of alarms - bed, stretcher, chair and/or video monitoring/Bed in lowest position, wheels locked, appropriate side rails in place/Call bell, personal items and telephone in reach/Instruct patient to call for assistance before getting out of bed/chair/stretcher/Non-slip footwear applied when patient is off stretcher/[place] to call system/Physically safe environment - no spills, clutter or unnecessary equipment/Purposeful Proactive Rounding/Room/bathroom lighting operational, light cord in reach

## 2025-03-25 NOTE — CONSULT NOTE ADULT - TIME BILLING
Patient seen and examined  Imaging reviewed  Plan of care developed and discussed  Care Coordination and counseling  Medical Decision Making

## 2025-03-25 NOTE — ED PROVIDER NOTE - OBJECTIVE STATEMENT
69-year-old male past medical history of weakness, DM, HTN, dementia, CVA with mild right-sided deficits presents emergency department as transfer for evaluation by stroke team for possible thrombectomy.  Last known normal 4 PM on 3/24/2020 patient started having getting his words out around 7 PM prompting them to call EMS.  Patient found to have left-sided M2 occlusion on CT imaging at [facility].  Patient transferred here for possible IR intervention.  Patient arrives with expressive aphasia and right-sided with slurred speech and mild right-sided facial droop.  Neuro at bedside.  Patient brought directly to CT scan for repeat imaging.

## 2025-03-25 NOTE — H&P ADULT - CRITICAL CARE ATTENDING COMMENT
69M with HTN. HLD, AFib on Eliquis (wife and daughter note good compliance), prior strokes affecting R frontal and L PCA with residual R hemiparesis and R homonymous hemianopsia and ?aphasia who presnted with possible distal L M2 occlusion vs severe stenosis to OSH and transferred to [facility] for further eval, possible thrombectomy.   He appears to have some baseline language difficulties but was noted to have more difficulty with fluency and word retrieval in addition to dysarthria which was new per family  CTH with chronic R frontal and L PCA infarcts  CTA with 3mm R supraclinoid aneurysm vs infundibulum, mo to severe L MCA inferior division stenosis without clear occlusion per read however this may be completely occluded, also mod R M2 stenosis , L P2 occlusion, mil to mod R P1 and mod to severe R P2 stenosis  CTP with global hypoperfusion, likely artifactual.  MRI braiin with subacute infarct in the L basal ganglia and acute infarct in L inferior M2 division  A1c 12.9, LDL 73  Etiology ABDIRAHMAN with artery to artery embolism vs cardioembolic  Suggest:  Resume Eliquis 5mg BID  Check ARU to determine if aspirin is therapeutic , if not will consider switching to plavix and check p2y12   -180 , avoid hypotension  check tte  pt/ot

## 2025-03-25 NOTE — SPEECH LANGUAGE PATHOLOGY EVALUATION - SLP DIAGNOSIS
Patient is a 69 year old male with PMHx of HTN, T2DM, Afib, prior strokes (right frontal and left temporal/occipital regions with residual R homonymous hemianopia, R sided weakness and mixed aphasia) presenting as tx for acute L M2 occlusion. Patient now presents with 1) expressive aphasia; 2) receptive aphasia; 3) cognitive deficits in the areas of memory and attention. Further cognitive assessment is warranted while in-house. Patient benefits from semantic/phonemic cues throughout evaluation. ST services are warranted upon d/c. This service will continue to follow. Patient is a 69 year old male with PMHx of HTN, T2DM, Afib, prior strokes (right frontal and left temporal/occipital regions with residual R homonymous hemianopia, R sided weakness and mixed aphasia) presenting as tx for acute L M2 occlusion. Patient now presents with 1) mixed expressive-receptive aphasia, expressive seems to be more impaired than receptive at this time. Deficits notable for severe word finding difficulties p/w anomia and inappropriate responses; answering moderate-complex yes/no questions, following commands (2+ steps); 2) cognitive deficits in the areas of memory and attention. Suspect expressive deficits may be impacting patient's overall performance on this assessment. Further cognitive assessment is warranted while in-house. Patient benefits from semantic/phonemic cues throughout evaluation. ST services are warranted upon d/c. This service will continue to follow. Patient is a 69 year old male with PMHx of HTN, T2DM, Afib, prior strokes (right frontal and left temporal/occipital regions with residual R homonymous hemianopia, R sided weakness and mixed aphasia) presenting as tx for acute L M2 occlusion. Patient now presents with 1) mixed expressive-receptive aphasia, expressive seems to be more impaired than receptive at this time. Deficits notable for severe word finding difficulties p/w anomia and inappropriate responses; answering moderate-complex yes/no questions, following commands (2+ steps); 2) cognitive deficits in the areas of memory and attention. Suspect expressive deficits may be impacting patient's overall performance on this assessment. Further cognitive assessment is warranted while in-house. Patient benefits from semantic/phonemic/visual cues throughout evaluation. ST services are warranted upon d/c. This service will continue to follow.

## 2025-03-25 NOTE — OCCUPATIONAL THERAPY INITIAL EVALUATION ADULT - LIVES WITH, PROFILE
Pt lives in apartment with wife, 2 steps to enter, +elevator, walk in shower. Pt I in ADLs and ambulation prior to admission

## 2025-03-25 NOTE — SPEECH LANGUAGE PATHOLOGY EVALUATION - SLP GENERAL OBSERVATIONS
Patient received lying in bed, alert; on room air; daughter present and served as reliable informant regarding patient's hx. Daughter stating her father has had 4 prior strokes (most recent October), with deficits that have since resolved. Only remaining deficit per daughter is he is "forgetful." Patient AAOx1 (self - y/n cue); +expressive>receptive aphasia; agreeable to evaluation.

## 2025-03-25 NOTE — SPEECH LANGUAGE PATHOLOGY EVALUATION - SLP ORIENTATION
AAOx1 (self - yes/no) AAOx1 (self - yes/no); delayed/absent responses to other orientation questions; requiring reorientation to time, place, situation. daughter reports post recent CVA (october 2024), patient not oriented to time

## 2025-03-25 NOTE — OCCUPATIONAL THERAPY INITIAL EVALUATION ADULT - PERTINENT HX OF CURRENT PROBLEM, REHAB EVAL
69RHM with PMHx of HTN, HLD, T2DM, Afib (on apixaban, last dose 6:30 AM), prior strokes (right frontal and left temporal/occipital regions with residual R homonymous hemianopia, R sided weakness and mixed aphasia) presenting as transfer from [facility] due to L M2 occlusion and for possible thrombectomy. LKW 16:00 on 3/24/2025. Family reported that patient had sudden onset slurred speech and difficulty producing words. Per family at bedside, patient was having difficulty producing words after his initial stroke and that has persisted since but family noticed worsening of those symptoms on 3/24/2025 as well as new onset slurred speech. CTA H/N on 3/24/2025 at [facility] revealed near total occlusion of the origin of L M2 branch. Perfusion imaging was unavailable at [facility]. Reports adherence to medications. Ambulates independently at baseline, handles ADLs independently, and has never handled iADLs independently.     CT PERFUSION: No core infarct identified. Diffusely positive intracranial hypoperfusion map, likely artifactual.  CTA NECK: No evidence of significant stenosis or occlusion.  CTA HEAD: Multifocal bilateral middle and posterior cerebral artery stenoses, similar to prior examination of 11/2/2024. Left P2 segment occlusion, also seen on prior examination. No evidence of new proximal large vessel occlusion.  CT L Knee: (-)  CT Cervical Spine: C3-C4 and C5-C6: Moderate spinal canal stenosis. Multilevel neural foraminal stenosis, most severe at C5-C6.

## 2025-03-25 NOTE — ED PROVIDER NOTE - CLINICAL SUMMARY MEDICAL DECISION MAKING FREE TEXT BOX
69-year-old male past medical history of weakness, DM, HTN, dementia, CVA with mild right-sided deficits presents emergency department as transfer for evaluation by stroke team for possible thrombectomy. Patient started having expressive aphasia, last known normal 4 PM yesterday.  Patient was not in the window for thrombolytics.  Patient transferred here for evaluation for possible thrombectomy due to M2 occlusion found on CT imaging.  Patient arrives with right-sided pronator drift, right facial droop, as measured by aphasia and slurred speech.  Patient brought directly to CT for repeat imaging.  Neuro at bedside.  Pending final recommendations.  Will keep blood pressure below systolic of 180 and diastolic of 105.

## 2025-03-25 NOTE — ED PROVIDER NOTE - ATTENDING CONTRIBUTION TO CARE
MD Wall:  patient seen and evaluated personally.   I agree with the History & Physical,  Impression & Plan other than what was detailed in my note.  MD Wall  68 y/o M with past medical history of HTN, type 2 DM (on insulin), gout, pafib, prior hx CVAs s/p loop recorder, reported on eliquis, some r sided wekaness at baseline, last normal 4 pm, presented to osh as he had slurred speech that started at 19:30 20:00 today, pt is unable to provide much hx d/t critical illness, only follows some commands,  ,  conjunctiva non conjected, sclera anicteric, moist mucous membranes, neck supple, heart sounds, normal, no mrg, lungs cta b/l no wrr, abd soft non distended w/ no tenderness, no visual deformities of extremities, alert [id number], power 3/5 in rue unable to perform finger to nose, r sided facial droop, ct osh showed l m2 oclusion, sent here for eval for thombectomy

## 2025-03-25 NOTE — ED PROVIDER NOTE - NS ED MD DISPO ISOLATION TYPES
Please follow up in the below listed clinic in 2-3 days for re-evaluation of your symptoms and further treatment.     Please take all medications as instructed.     Please note that your care in the Emergency Department today is not a substitute for good follow-up and a comprehensive evaluation through a primary care physician. There are no immediate, life threatening causes for your symptoms at this time. Symptoms can change and new problems may arise after your evaluation. Please do not hesitate to return for further care.     Please follow up as an outpatient as directed. Please return to the nearest ED if your symptoms worsen or if you experience any chest pain, problems breathing, fevers, chills, nausea, vomiting, abdominal pain, weakness, abnormal swelling, severe headache, worsening pain, change in your pain, or other concerning symptoms. Please take all medications as directed for the full prescribed course and stay well hydrated.     If you had radiographic studies performed in the ED, an attending radiologist will over-read your results within 24 hours. You will be called with any changes in the initial impression and results.    
None

## 2025-03-25 NOTE — SPEECH LANGUAGE PATHOLOGY EVALUATION - CONFRONTATIONAL NAMING
impaired named "apple" in place of cup; absent response when shown watch; phonemic cues benefit patient in achieving target response/impaired

## 2025-03-26 ENCOUNTER — RESULT REVIEW (OUTPATIENT)
Age: 70
End: 2025-03-26

## 2025-03-26 LAB
ANION GAP SERPL CALC-SCNC: 12 MMOL/L — SIGNIFICANT CHANGE UP (ref 5–17)
BUN SERPL-MCNC: 18 MG/DL — SIGNIFICANT CHANGE UP (ref 7–23)
CALCIUM SERPL-MCNC: 8.9 MG/DL — SIGNIFICANT CHANGE UP (ref 8.4–10.5)
CHLORIDE SERPL-SCNC: 106 MMOL/L — SIGNIFICANT CHANGE UP (ref 96–108)
CO2 SERPL-SCNC: 20 MMOL/L — LOW (ref 22–31)
CREAT SERPL-MCNC: 1.1 MG/DL — SIGNIFICANT CHANGE UP (ref 0.5–1.3)
EGFR: 74 ML/MIN/1.73M2 — SIGNIFICANT CHANGE UP
EGFR: 74 ML/MIN/1.73M2 — SIGNIFICANT CHANGE UP
GLUCOSE BLDC GLUCOMTR-MCNC: 143 MG/DL — HIGH (ref 70–99)
GLUCOSE BLDC GLUCOMTR-MCNC: 160 MG/DL — HIGH (ref 70–99)
GLUCOSE BLDC GLUCOMTR-MCNC: 175 MG/DL — HIGH (ref 70–99)
GLUCOSE BLDC GLUCOMTR-MCNC: 197 MG/DL — HIGH (ref 70–99)
GLUCOSE SERPL-MCNC: 192 MG/DL — HIGH (ref 70–99)
HCT VFR BLD CALC: 40.2 % — SIGNIFICANT CHANGE UP (ref 39–50)
HGB BLD-MCNC: 12.9 G/DL — LOW (ref 13–17)
MCHC RBC-ENTMCNC: 28.7 PG — SIGNIFICANT CHANGE UP (ref 27–34)
MCHC RBC-ENTMCNC: 32.1 G/DL — SIGNIFICANT CHANGE UP (ref 32–36)
MCV RBC AUTO: 89.3 FL — SIGNIFICANT CHANGE UP (ref 80–100)
NRBC BLD AUTO-RTO: 0 /100 WBCS — SIGNIFICANT CHANGE UP (ref 0–0)
PLATELET # BLD AUTO: 190 K/UL — SIGNIFICANT CHANGE UP (ref 150–400)
POTASSIUM SERPL-MCNC: 4.3 MMOL/L — SIGNIFICANT CHANGE UP (ref 3.5–5.3)
POTASSIUM SERPL-SCNC: 4.3 MMOL/L — SIGNIFICANT CHANGE UP (ref 3.5–5.3)
RBC # BLD: 4.5 M/UL — SIGNIFICANT CHANGE UP (ref 4.2–5.8)
RBC # FLD: 14.3 % — SIGNIFICANT CHANGE UP (ref 10.3–14.5)
SODIUM SERPL-SCNC: 138 MMOL/L — SIGNIFICANT CHANGE UP (ref 135–145)
WBC # BLD: 6.93 K/UL — SIGNIFICANT CHANGE UP (ref 3.8–10.5)
WBC # FLD AUTO: 6.93 K/UL — SIGNIFICANT CHANGE UP (ref 3.8–10.5)

## 2025-03-26 PROCEDURE — 93306 TTE W/DOPPLER COMPLETE: CPT | Mod: 26

## 2025-03-26 RX ORDER — ONDANSETRON HCL/PF 4 MG/2 ML
4 VIAL (ML) INJECTION ONCE
Refills: 0 | Status: COMPLETED | OUTPATIENT
Start: 2025-03-26 | End: 2025-03-26

## 2025-03-26 RX ORDER — APIXABAN 2.5 MG/1
5 TABLET, FILM COATED ORAL EVERY 12 HOURS
Refills: 0 | Status: DISCONTINUED | OUTPATIENT
Start: 2025-03-26 | End: 2025-03-27

## 2025-03-26 RX ORDER — CLOPIDOGREL BISULFATE 75 MG/1
75 TABLET, FILM COATED ORAL DAILY
Refills: 0 | Status: DISCONTINUED | OUTPATIENT
Start: 2025-03-26 | End: 2025-03-27

## 2025-03-26 RX ORDER — APIXABAN 2.5 MG/1
5 TABLET, FILM COATED ORAL EVERY 12 HOURS
Refills: 0 | Status: DISCONTINUED | OUTPATIENT
Start: 2025-03-26 | End: 2025-03-26

## 2025-03-26 RX ORDER — MAGNESIUM, ALUMINUM HYDROXIDE 200-200 MG
30 TABLET,CHEWABLE ORAL EVERY 4 HOURS
Refills: 0 | Status: DISCONTINUED | OUTPATIENT
Start: 2025-03-26 | End: 2025-03-27

## 2025-03-26 RX ADMIN — Medication 40 MILLIGRAM(S): at 06:54

## 2025-03-26 RX ADMIN — Medication 30 MILLILITER(S): at 01:20

## 2025-03-26 RX ADMIN — GABAPENTIN 300 MILLIGRAM(S): 400 CAPSULE ORAL at 17:00

## 2025-03-26 RX ADMIN — APIXABAN 5 MILLIGRAM(S): 2.5 TABLET, FILM COATED ORAL at 10:05

## 2025-03-26 RX ADMIN — TAMSULOSIN HYDROCHLORIDE 0.4 MILLIGRAM(S): 0.4 CAPSULE ORAL at 22:20

## 2025-03-26 RX ADMIN — INSULIN LISPRO 1: 100 INJECTION, SOLUTION INTRAVENOUS; SUBCUTANEOUS at 17:00

## 2025-03-26 RX ADMIN — APIXABAN 5 MILLIGRAM(S): 2.5 TABLET, FILM COATED ORAL at 17:00

## 2025-03-26 RX ADMIN — Medication 200 MILLIGRAM(S): at 12:23

## 2025-03-26 RX ADMIN — GABAPENTIN 300 MILLIGRAM(S): 400 CAPSULE ORAL at 06:54

## 2025-03-26 RX ADMIN — INSULIN LISPRO 1: 100 INJECTION, SOLUTION INTRAVENOUS; SUBCUTANEOUS at 11:57

## 2025-03-26 RX ADMIN — DULOXETINE 30 MILLIGRAM(S): 20 CAPSULE, DELAYED RELEASE ORAL at 11:56

## 2025-03-26 RX ADMIN — CLOPIDOGREL BISULFATE 75 MILLIGRAM(S): 75 TABLET, FILM COATED ORAL at 11:57

## 2025-03-26 RX ADMIN — Medication 4 MILLIGRAM(S): at 01:20

## 2025-03-26 RX ADMIN — ATORVASTATIN CALCIUM 80 MILLIGRAM(S): 80 TABLET, FILM COATED ORAL at 22:20

## 2025-03-26 NOTE — PROGRESS NOTE ADULT - ASSESSMENT
Agree with above assessment and plan as outlined above.    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 Eliquis failure    Alec Breaux MD, [facility]  BEEPER [phone]

## 2025-03-26 NOTE — PROGRESS NOTE ADULT - SUBJECTIVE AND OBJECTIVE BOX
C A R D I O L O G Y  **********************************     DATE OF SERVICE: 03-26-25    Patient still has expressive aphasia but improving per family. denies chest pain or shortness of breath.   Review of systems otherwise negative.  	  MEDICATIONS:  MEDICATIONS  (STANDING):  allopurinol 200 milliGRAM(s) Oral daily  apixaban 5 milliGRAM(s) Oral every 12 hours  atorvastatin 80 milliGRAM(s) Oral at bedtime  clopidogrel Tablet 75 milliGRAM(s) Oral daily  dextrose 5%. 1000 milliLiter(s) (50 mL/Hr) IV Continuous <Continuous>  dextrose 5%. 1000 milliLiter(s) (100 mL/Hr) IV Continuous <Continuous>  dextrose 50% Injectable 25 Gram(s) IV Push once  dextrose 50% Injectable 12.5 Gram(s) IV Push once  dextrose 50% Injectable 25 Gram(s) IV Push once  DULoxetine 30 milliGRAM(s) Oral daily  gabapentin 300 milliGRAM(s) Oral two times a day  glucagon  Injectable 1 milliGRAM(s) IntraMuscular once  influenza  Vaccine (HIGH DOSE) 0.5 milliLiter(s) IntraMuscular once  insulin lispro (ADMELOG) corrective regimen sliding scale   SubCutaneous at bedtime  insulin lispro (ADMELOG) corrective regimen sliding scale   SubCutaneous three times a day before meals  pantoprazole    Tablet 40 milliGRAM(s) Oral before breakfast  tamsulosin 0.4 milliGRAM(s) Oral at bedtime      LABS:	 	    CARDIAC MARKERS:                                12.9   6.93  )-----------( 190      ( 26 Mar 2025 05:42 )             40.2     Hemoglobin: 12.9 g/dL (03-26 @ 05:42)  Hemoglobin: 10.8 g/dL (03-25 @ 03:13)      03-26    138  |  106  |  18  ----------------------------<  192[H]  4.3   |  20[L]  |  1.10    Ca    8.9      26 Mar 2025 05:42    TPro  5.7[L]  /  Alb  3.2[L]  /  TBili  0.7  /  DBili  x   /  AST  115[H]  /  ALT  97[H]  /  AlkPhos  256[H]  03-25    Creatinine Trend: 1.10<--, 1.19<--    COAGS:       proBNP:   Lipid Profile:   HgA1c:   TSH:       PHYSICAL EXAM:  T(C): 36.6 (03-26-25 @ 10:00), Max: 37 (03-25-25 @ 20:00)  HR: 85 (03-26-25 @ 10:00) (69 - 85)  BP: 160/103 (03-26-25 @ 10:00) (139/90 - 185/107)  RR: 22 (03-26-25 @ 10:00) (16 - 26)  SpO2: 99% (03-26-25 @ 10:00) (93% - 99%)  Wt(kg): --  I&O's Summary    25 Mar 2025 07:01  -  26 Mar 2025 07:00  --------------------------------------------------------  IN: 1280 mL / OUT: 1100 mL / NET: 180 mL          Gen: NAD  HEENT:  (-)icterus (-)pallor  CV: N S1 S2 1/6 BRENDA (+)2 Pulses B/l  Resp:  Clear to auscultation B/L, normal effort  GI: (+) BS Soft, NT, ND  Lymph:  (-)Edema, (-)obvious lymphadenopathy  Skin: Warm to touch, Normal turgor  Psych: Appropriate mood and affect      TELEMETRY: NSR	    < from: MR Head No Cont (03.25.25 @ 14:02) >  IMPRESSION: Old right frontal cortical and left posterior cerebral artery   infarcts. Recent infarct left basal ganglia and left parieto-occipital   cortex. 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 Noninvasive flow MR angiography as   above.    < end of copied text >      ASSESSMENT/PLAN: Patient is a 70 y/o Male well known to our office with PMH of HTN, type 2 DM, gout, and multiple CVA s/p loop recorder which found PAF on Eliquis who presented with slurred speech and expressive aphasia transferred from [facility] due to concern for acute stroke. Cardiology consulted for further evaluation.    #CVA  - MRI Brain noted above  - Stroke management per neuro  - TTE w/bubble study results pending  - Patient had CT C/A/P in 11/2024 with no evidence of malignancy in the chest, abdomen or pelvis  - ASA changed to Plavix per neuro, continue Lipitor    #PAF  - Eliquis 5mg BID restarted per neuro  - EP consult appreciated    #HTN  - Home Coreg and Losartan on hold for permissive HTN per neuro - restart when ok with neuro    - Patient to f/u with Dr. Salinas after discharge    Louie Aquino PA-C  Pager: [phone]

## 2025-03-26 NOTE — PROGRESS NOTE ADULT - SUBJECTIVE AND OBJECTIVE BOX
THE PATIENT WAS SEEN AND EXAMINED BY ME WITH THE HOUSESTAFF AND STROKE TEAM DURING MORNING ROUNDS.   HPI:  69RHM with PMHx of HTN, HLD, T2DM, Afib (on apixaban, last dose 6:30 AM), prior strokes (right frontal and left temporal/occipital regions with residual R homonymous hemianopia, R sided weakness and mixed aphasia) presenting as transfer from [facility] due to L M2 occlusion and for possible thrombectomy. LKW 16:00 on 3/24/2025. Family reported that patient had sudden onset slurred speech and difficulty producing words. Per family at bedside, patient was having difficulty producing words after his initial stroke and that has persisted since but family noticed worsening of those symptoms on 3/24/2025 as well as new onset slurred speech. CTA H/N on 3/24/2025 at [facility] revealed near total occlusion of the origin of L M2 branch. Perfusion imaging was unavailable at [facility]. Reports adherence to medications. Ambulates independently at baseline, handles ADLs independently, and has never handled iADLs independently.      SUBJECTIVE: No events overnight.  No new neurologic complaints, ROS reported negative unless otherwise noted.      allopurinol 200 milliGRAM(s) Oral daily  aluminum hydroxide/magnesium hydroxide/simethicone Suspension 30 milliLiter(s) Oral every 4 hours PRN  aspirin enteric coated 81 milliGRAM(s) Oral daily  atorvastatin 80 milliGRAM(s) Oral at bedtime  dextrose 5%. 1000 milliLiter(s) IV Continuous <Continuous>  dextrose 5%. 1000 milliLiter(s) IV Continuous <Continuous>  dextrose 50% Injectable 25 Gram(s) IV Push once  dextrose 50% Injectable 12.5 Gram(s) IV Push once  dextrose 50% Injectable 25 Gram(s) IV Push once  dextrose Oral Gel 15 Gram(s) Oral once PRN  DULoxetine 30 milliGRAM(s) Oral daily  enoxaparin Injectable 40 milliGRAM(s) SubCutaneous every 24 hours  gabapentin 300 milliGRAM(s) Oral two times a day  glucagon  Injectable 1 milliGRAM(s) IntraMuscular once  influenza  Vaccine (HIGH DOSE) 0.5 milliLiter(s) IntraMuscular once  insulin lispro (ADMELOG) corrective regimen sliding scale   SubCutaneous at bedtime  insulin lispro (ADMELOG) corrective regimen sliding scale   SubCutaneous three times a day before meals  pantoprazole    Tablet 40 milliGRAM(s) Oral before breakfast  tamsulosin 0.4 milliGRAM(s) Oral at bedtime      PHYSICAL EXAM:   Vital Signs Last 24 Hrs  T(C): 37 (25 Mar 2025 20:00), Max: 37 (25 Mar 2025 20:00)  T(F): 98.6 (25 Mar 2025 20:00), Max: 98.6 (25 Mar 2025 20:00)  HR: 70 (26 Mar 2025 06:00) (68 - 81)  BP: 171/94 (26 Mar 2025 06:00) (139/90 - 185/107)  BP(mean): 126 (26 Mar 2025 06:00) (111 - 142)  RR: 16 (26 Mar 2025 06:00) (16 - 28)  SpO2: 95% (26 Mar 2025 06:00) (94% - 99%)    Parameters below as of 26 Mar 2025 06:00  Patient On (Oxygen Delivery Method): room air        General: No acute distress  HEENT: EOM intact, RHH  Abdomen: Soft, nontender, nondistended   Extremities: No edema    NEUROLOGICAL EXAM:  Mental status: Eyes open, awake, alert, oriented x3, no aphasia, no neglect, unable to follow commands, unable to ID objects  Cranial Nerves: Right facial droop, RHH (at baseline), no nystagmus, subtle dysarthria,   Motor exam: Normal tone, Right hemiparesis RUE subtle drift 4/5 (at baseline), RLE 5/5, LUE 5/5, LLE 5/5, normal fine finger movements.  Sensation: Intact to light touch   Coordination/ Gait: No dysmetria,     LABS:                        12.9   6.93  )-----------( 190      ( 26 Mar 2025 05:42 )             40.2    03-26    138  |  106  |  18  ----------------------------<  192[H]  4.3   |  20[L]  |  1.10    Ca    8.9      26 Mar 2025 05:42    TPro  5.7[L]  /  Alb  3.2[L]  /  TBili  0.7  /  DBili  x   /  AST  115[H]  /  ALT  97[H]  /  AlkPhos  256[H]  03-25  PT/INR - ( 25 Mar 2025 03:13 )   PT: 13.5 sec;   INR: 1.19 ratio         PTT - ( 25 Mar 2025 03:13 )  PTT:33.8 sec      IMAGING: Reviewed by me.     Brain MRI, MRA Head/Neck 03/25/25: Old right frontal cortical and left posterior cerebral artery infarcts. Recent infarct left basal ganglia and left parieto-occipital cortex. 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 Noninvasive flow MR angiography     CT PERFUSION 03/25/25: No core infarct identified. Diffusely positive intracranial hypoperfusion map, likely artifactual.    CTA NECK:CT PERFUSION 03/25/25: No core infarct identified. Diffusely positive intracranial hypoperfusion  map, likely artifactual.    CTA NECK 03/25/25: No evidence of significant stenosis or occlusion.    CTA HEAD 03/25/25: Multifocal bilateral middle and posterior cerebral artery stenoses, similar to prior examination of 11/2/2024. Left P2 segment occlusion, also seen on prior examination. No evidence of new proximal large vessel   occlusion.       THE PATIENT WAS SEEN AND EXAMINED BY ME WITH THE HOUSESTAFF AND STROKE TEAM DURING MORNING ROUNDS.   HPI:  69RHM with PMHx of HTN, HLD, T2DM, Afib (on apixaban, last dose 6:30 AM), prior strokes (right frontal and left temporal/occipital regions with residual R homonymous hemianopia, R sided weakness and mixed aphasia) presenting as transfer from [facility] due to L M2 occlusion and for possible thrombectomy. LKW 16:00 on 3/24/2025. Family reported that patient had sudden onset slurred speech and difficulty producing words. Per family at bedside, patient was having difficulty producing words after his initial stroke and that has persisted since but family noticed worsening of those symptoms on 3/24/2025 as well as new onset slurred speech. CTA H/N on 3/24/2025 at [facility] revealed near total occlusion of the origin of L M2 branch. Perfusion imaging was unavailable at [facility]. Reports adherence to medications. Ambulates independently at baseline, handles ADLs independently, and has never handled iADLs independently.      SUBJECTIVE: No events overnight.  No new neurologic complaints, ROS reported negative unless otherwise noted.      allopurinol 200 milliGRAM(s) Oral daily  aluminum hydroxide/magnesium hydroxide/simethicone Suspension 30 milliLiter(s) Oral every 4 hours PRN  aspirin enteric coated 81 milliGRAM(s) Oral daily  atorvastatin 80 milliGRAM(s) Oral at bedtime  dextrose 5%. 1000 milliLiter(s) IV Continuous <Continuous>  dextrose 5%. 1000 milliLiter(s) IV Continuous <Continuous>  dextrose 50% Injectable 25 Gram(s) IV Push once  dextrose 50% Injectable 12.5 Gram(s) IV Push once  dextrose 50% Injectable 25 Gram(s) IV Push once  dextrose Oral Gel 15 Gram(s) Oral once PRN  DULoxetine 30 milliGRAM(s) Oral daily  enoxaparin Injectable 40 milliGRAM(s) SubCutaneous every 24 hours  gabapentin 300 milliGRAM(s) Oral two times a day  glucagon  Injectable 1 milliGRAM(s) IntraMuscular once  influenza  Vaccine (HIGH DOSE) 0.5 milliLiter(s) IntraMuscular once  insulin lispro (ADMELOG) corrective regimen sliding scale   SubCutaneous at bedtime  insulin lispro (ADMELOG) corrective regimen sliding scale   SubCutaneous three times a day before meals  pantoprazole    Tablet 40 milliGRAM(s) Oral before breakfast  tamsulosin 0.4 milliGRAM(s) Oral at bedtime      PHYSICAL EXAM:   Vital Signs Last 24 Hrs  T(C): 37 (25 Mar 2025 20:00), Max: 37 (25 Mar 2025 20:00)  T(F): 98.6 (25 Mar 2025 20:00), Max: 98.6 (25 Mar 2025 20:00)  HR: 70 (26 Mar 2025 06:00) (68 - 81)  BP: 171/94 (26 Mar 2025 06:00) (139/90 - 185/107)  BP(mean): 126 (26 Mar 2025 06:00) (111 - 142)  RR: 16 (26 Mar 2025 06:00) (16 - 28)  SpO2: 95% (26 Mar 2025 06:00) (94% - 99%)    Parameters below as of 26 Mar 2025 06:00  Patient On (Oxygen Delivery Method): room air        General: No acute distress  HEENT: EOM intact, RHH  Abdomen: Soft, nontender, nondistended   Extremities: No edema    NEUROLOGICAL EXAM:  Mental status: Eyes open, awake, alert, oriented to name and place only, dysfluent speech, generates single words only, no neglect, able to follow 2 step commands,  R-L confusion, unable to ID objects, repetition mildly impaired, able to recognize daughter at bedside  Cranial Nerves: Right facial droop, RHH (at baseline), no nystagmus, subtle dysarthria,   Motor exam: Normal tone, no drift ,Right hemiparesis RUE  4/5 (at baseline), RLE 5/5, LUE 5/5, LLE 5/5.  Sensation: Intact to light touch   Coordination/ Gait: No dysmetria,     LABS:                        12.9   6.93  )-----------( 190      ( 26 Mar 2025 05:42 )             40.2    03-26    138  |  106  |  18  ----------------------------<  192[H]  4.3   |  20[L]  |  1.10    Ca    8.9      26 Mar 2025 05:42    TPro  5.7[L]  /  Alb  3.2[L]  /  TBili  0.7  /  DBili  x   /  AST  115[H]  /  ALT  97[H]  /  AlkPhos  256[H]  03-25  PT/INR - ( 25 Mar 2025 03:13 )   PT: 13.5 sec;   INR: 1.19 ratio         PTT - ( 25 Mar 2025 03:13 )  PTT:33.8 sec      IMAGING: Reviewed by me.     Brain MRI, MRA Head/Neck 03/25/25: Old right frontal cortical and left posterior cerebral artery infarcts. Recent infarct left basal ganglia and left parieto-occipital cortex. 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 Noninvasive flow MR angiography     CT PERFUSION 03/25/25: No core infarct identified. Diffusely positive intracranial hypoperfusion map, likely artifactual.    CTA NECK:CT PERFUSION 03/25/25: No core infarct identified. Diffusely positive intracranial hypoperfusion  map, likely artifactual.    CTA NECK 03/25/25: No evidence of significant stenosis or occlusion.    CTA HEAD 03/25/25: Multifocal bilateral middle and posterior cerebral artery stenoses, similar to prior examination of 11/2/2024. Left P2 segment occlusion, also seen on prior examination. No evidence of new proximal large vessel   occlusion.

## 2025-03-26 NOTE — PROGRESS NOTE ADULT - ASSESSMENT
ASSESSMENT: 69M with HTN. HLD, AFib on Eliquis (wife and daughter note good compliance), prior strokes affecting R frontal and L PCA with residual R hemiparesis and R homonymous hemianopsia and ?aphasia who presented with possible distal L M2 occlusion vs severe stenosis to OSH and transferred to [facility] for further eval, possible thrombectomy. CTH with chronic R frontal and L PCA infarcts. CTA with 3mm R supraclinoid aneurysm vs infundibulum, moderate to severe L MCA inferior division stenosis without clear occlusion per read however this may be completely occluded, also mod R M2 stenosis , L P2 occlusion, mil to mod R P1 and mod to severe R P2 stenosis, CTP with global hypoperfusion, likely artifactual. MRI brain with subacute infarct in the L basal ganglia and acute infarct in L inferior M2 division    Impression:  Worsening aphasia L basal ganglia and acute infarct in L inferior M2 division, Etiology ABDIRAHMAN with artery to artery embolism vs cardioembolic    NEURO: Neuro exam unchanged, Continue close monitoring for neurologic deterioration, permissive HTN with slow titration to normotensive, Pt started on high dose statin for secondary stroke prevention, titrate statin to LDL goal less than 70, CTA head/Neck CTH,MRI Brain w/o, MRA Head w/o and Neck w/contrast results as above. Physical therapy/OT eval with recommendations for oupt services.     ANTITHROMBOTIC THERAPY: ASA for secondary stroke prevention, Eliquis on hold at this time    PULMONARY: Protecting airway, saturating well     CARDIOVASCULAR: check TTE, cardiac monitoring: No events, Dr Breaux (cardio) eval appreciated                              SBP goal: 110-180mmHg    GASTROINTESTINAL: Dysphagia screen passed, tolerating diet     Diet: Regular    RENAL: BUN/Cr stable, good urine output      Na Goal: Greater than 135     Mark: No    HEMATOLOGY: H/H stable, Platelets normal      DVT ppx: LMWH      ID: afebrile, no leukocytosis     OTHER: Plan/goals of care discussed with pt and pt's family at bedside    DISPOSITION: D/C home with outpt PT/OT once stable and workup is complete    CORE MEASURES:        Admission NIHSS:  8     TPA:  NO      LDL/HDL: 73/55     Depression Screen: 0     Statin Therapy: Y     Dysphagia Screen: PASS      Smoking: NO      Afib YES     Stroke Education  YES    Obtain screening ultrasound in patients who meet 1 or more of the following criteria as patient is high risk for DVT/PE upon admission:   [] History of DVT/PE  []Hypercoagulable states (Factor V Leiden, Cancer, OCP, etc. )  []Prolonged immobility (hemiplegia/hemiparesis/post operative or any other extended immobilization)   [] Transferred from outside facility (Rehab or Long term care)  [] Age </= to 50 ASSESSMENT: 69M with HTN. HLD, AFib on Eliquis (wife and daughter note good compliance), prior strokes affecting R frontal and L PCA with residual R hemiparesis and R homonymous hemianopsia and ?aphasia who presented with possible distal L M2 occlusion vs severe stenosis to OSH and transferred to [facility] for further eval, possible thrombectomy. CTH with chronic R frontal and L PCA infarcts. CTA with 3mm R supraclinoid aneurysm vs infundibulum, moderate to severe L MCA inferior division stenosis without clear occlusion per read however this may be completely occluded, also mod R M2 stenosis , L P2 occlusion, mil to mod R P1 and mod to severe R P2 stenosis, CTP with global hypoperfusion, likely artifactual. MRI brain with subacute infarct in the L basal ganglia and acute infarct in L inferior M2 division    Impression:  Worsening aphasia L basal ganglia and acute infarct in L inferior M2 division, Etiology ABDIRAHMAN with artery to artery embolism vs cardioembolic    NEURO: Neuro exam unchanged, Continue close monitoring for neurologic deterioration, permissive HTN with slow titration to normotensive, Pt started on high dose statin for secondary stroke prevention, titrate statin to LDL goal less than 70, CTA head/Neck CTH,MRI Brain w/o, MRA Head w/o and Neck w/contrast results as above. Physical therapy/OT eval with recommendations for oupt services.     ANTITHROMBOTIC THERAPY: D/C ASA will start Plavix for secondary stroke prevention, and Eliquis for afib    PULMONARY: Protecting airway, saturating well     CARDIOVASCULAR: check TTE, cardiac monitoring: No events, Dr Breaux (cardio) eval appreciated                              SBP goal: 110-180mmHg    GASTROINTESTINAL: Dysphagia screen passed, tolerating diet     Diet: Regular    RENAL: BUN/Cr stable, good urine output      Na Goal: Greater than 135     Mark: No    HEMATOLOGY: H/H stable, Platelets normal      DVT ppx: No need as pt is on Eliquis for Afib    ID: afebrile, no leukocytosis     OTHER: Plan/goals of care discussed with pt and pt's family at bedside    DISPOSITION: D/C home with outpt PT/OT once stable and workup is complete    CORE MEASURES:        Admission NIHSS:  8     TPA:  NO      LDL/HDL: 73/55     Depression Screen: 0     Statin Therapy: Y     Dysphagia Screen: PASS      Smoking: NO      Afib YES     Stroke Education  YES    Obtain screening ultrasound in patients who meet 1 or more of the following criteria as patient is high risk for DVT/PE upon admission:   [] History of DVT/PE  []Hypercoagulable states (Factor V Leiden, Cancer, OCP, etc. )  []Prolonged immobility (hemiplegia/hemiparesis/post operative or any other extended immobilization)   [] Transferred from outside facility (Rehab or Long term care)  [] Age </= to 50 ASSESSMENT: 69M with HTN. HLD, AFib on Eliquis (wife and daughter note good compliance), prior strokes affecting R frontal and L PCA with residual R hemiparesis and R homonymous hemianopsia and ?aphasia who presented with possible distal L M2 occlusion vs severe stenosis to OSH and transferred to [facility] for further eval, possible thrombectomy. CTH with chronic R frontal and L PCA infarcts. CTA with 3mm R supraclinoid aneurysm vs infundibulum, moderate to severe L MCA inferior division stenosis without clear occlusion per read however this may be completely occluded, also mod R M2 stenosis , L P2 occlusion, mil to mod R P1 and mod to severe R P2 stenosis, CTP with global hypoperfusion, likely artifactual. MRI brain with subacute infarct in the L basal ganglia and acute infarct in L inferior M2 division. Not a tenecteplase candidate due to presentation outside of window. Not a thrombectomy candidate due to chronic occlusion that is too distal.     Impression:  Worsening aphasia due to left basal ganglia and acute infarct in left inferior M2 division, Etiology ABDIRAHMAN with artery to artery embolism vs cardioembolic in the setting of Afib    NEURO: Neuro exam unchanged, Continue close monitoring for neurologic deterioration, permissive HTN with slow titration to normotensive, Pt started on high dose statin for secondary stroke prevention, titrate statin to LDL goal less than 70, as per neurosx team: no acute neurosurgical intervention, CTA head/Neck CTH,MRI Brain w/o, MRA Head w/o and Neck w/contrast results as above. Physical therapy/OT eval with recommendations for oupt services.     ANTITHROMBOTIC THERAPY: D/C ASA will start Plavix for secondary stroke prevention, and Eliquis for afib    PULMONARY: Protecting airway, saturating well     CARDIOVASCULAR: check TTE, cardiac monitoring: No events, Dr Breaux (cardio) eval appreciated                              SBP goal: 110-180mmHg    GASTROINTESTINAL: Dysphagia screen passed, tolerating diet     Diet: Regular    RENAL: BUN/Cr stable, good urine output      Na Goal: Greater than 135     Mark: No    HEMATOLOGY: H/H stable, Platelets normal      DVT ppx: No need as pt is on Eliquis for Afib    ID: afebrile, no leukocytosis     OTHER: Plan/goals of care discussed with pt and pt's family at bedside. HgA1C 12.9: will contact endocrine erlinda for further eval/recommendations    DISPOSITION: D/C home with outpt PT/OT on 03/27/25 once workup is complete    CORE MEASURES:        Admission NIHSS:  8     TPA:  NO      LDL/HDL: 73/55     Depression Screen: 0     Statin Therapy: Y     Dysphagia Screen: PASS      Smoking: NO      Afib YES     Stroke Education  YES    Obtain screening ultrasound in patients who meet 1 or more of the following criteria as patient is high risk for DVT/PE upon admission:   [] History of DVT/PE  []Hypercoagulable states (Factor V Leiden, Cancer, OCP, etc. )  []Prolonged immobility (hemiplegia/hemiparesis/post operative or any other extended immobilization)   [] Transferred from outside facility (Rehab or Long term care)  [] Age </= to 50 ASSESSMENT: 69M with HTN. HLD, AFib on Eliquis (wife and daughter note good compliance), prior strokes affecting R frontal and L PCA with residual R hemiparesis and R homonymous hemianopsia and ?aphasia who presented with possible distal L M2 occlusion vs severe stenosis to OSH and transferred to [facility] for further eval, possible thrombectomy. CTH with chronic R frontal and L PCA infarcts. CTA with 3mm R supraclinoid aneurysm vs infundibulum, moderate to severe L MCA inferior division stenosis without clear occlusion per read however this may be completely occluded, also mod R M2 stenosis , L P2 occlusion, mil to mod R P1 and mod to severe R P2 stenosis, CTP with global hypoperfusion, likely artifactual. MRI brain with subacute infarct in the L basal ganglia and acute infarct in L inferior M2 division. Not a tenecteplase candidate due to presentation outside of window. Not a thrombectomy candidate due to chronic occlusion that is too distal.     Impression:  Worsening aphasia due to left basal ganglia and acute infarct in left inferior M2 division, Etiology ABDIRAHMAN with artery to artery embolism in the setting of poorly controlled ICAD, afib is also a competing mechanism but less likely    NEURO: Neuro exam unchanged, Continue close monitoring for neurologic deterioration, permissive HTN with slow titration to normotensive, Pt started on high dose statin for secondary stroke prevention, titrate statin to LDL goal less than 70, as per neurosx team: no acute neurosurgical intervention, CTA head/Neck CTH,MRI Brain w/o, MRA Head w/o and Neck w/contrast results as above. Physical therapy/OT eval with recommendations for oupt services.   Planned for presentation at [facility] conference on Monday - may benefit from outpatient angio +/- stent pending how he fares on med therapy    ANTITHROMBOTIC THERAPY: D/C ASA due to subtherapeutic ARU, will start Plavix for secondary stroke prevention, and Eliquis for afib    PULMONARY: Protecting airway, saturating well     CARDIOVASCULAR: check TTE, cardiac monitoring: No events, Dr Breaux (cardio) eval appreciated                              SBP goal: 110-180mmHg    GASTROINTESTINAL: Dysphagia screen passed, tolerating diet     Diet: Regular    RENAL: BUN/Cr stable, good urine output      Na Goal: Greater than 135     Mark: No    HEMATOLOGY: H/H stable, Platelets normal      DVT ppx: No need as pt is on Eliquis for Afib    ID: afebrile, no leukocytosis     OTHER: Plan/goals of care discussed with pt and pt's family at bedside. HgA1C 12.9: will contact endocrine team for further eval/recommendations    DISPOSITION: D/C home with outpt PT/OT on 03/27/25 once workup is complete    CORE MEASURES:        Admission NIHSS:  8     TPA:  NO      LDL/HDL: 73/55     Depression Screen: 0     Statin Therapy: Y     Dysphagia Screen: PASS      Smoking: NO      Afib YES     Stroke Education  YES    Obtain screening ultrasound in patients who meet 1 or more of the following criteria as patient is high risk for DVT/PE upon admission:   [] History of DVT/PE  []Hypercoagulable states (Factor V Leiden, Cancer, OCP, etc. )  []Prolonged immobility (hemiplegia/hemiparesis/post operative or any other extended immobilization)   [] Transferred from outside facility (Rehab or Long term care)  [] Age </= to 50

## 2025-03-26 NOTE — CHART NOTE - NSCHARTNOTEFT_GEN_A_CORE
69M with HTN. HLD, AFib prior strokes affecting R frontal and L PCA with residual R hemiparesis and R homonymous hemianopsia and ?aphasia who presented with possible distal L M2 occlusion vs severe stenosis to OSH and transferred to [facility] for further eval, now w/ left basal ganglia and acute infarct in left inferior M2 division, Etiology ABDIRAHMAN with artery to artery embolism vs cardioembolic in the setting of Afib.    Speech Hx: Patient seen for initial speech-language pathology evaluation, 3/25. SLP services warranted with plan for ongoing cognitive assessment while patient is in-house. Team and SW aware.     TODAY, 3/26, patient seen for ongoing language and cognitive assessment. Patient received upright in bed; AAOx2 (self, place) which is an improvement from yesterday (AAOx1 w/ choices); on room air; daughter present. Slightly wet vocal quality noted, provided cues to throat clear which improved vocal quality. Daughter endorses this is patient's baseline voice, no hx of dysphagia. Per RN, tolerating regular diet, no c/f aspiration. Pleasant and agreeable to ongoing assessment. Able to identify pictures in a field of 6+ with 25% accuracy independently, improves to 75% with repetition of cues and semantic cues. Reducing field choice (2-3) appears to benefit patient in achieving target responses. Patient names pictures/objects in a field of 4 with 10% accuracy independently, seemingly aware of deficits stating "I know what the word is I just can't say it." Performance improves to 100% accuracy given maximum semantic/phonemic cues.  Similar demonstration with written expression task. Able to hold pencil appropriately with difficulty encoding cued words. SLP outlined patient's first name, able to trace with 60% accuracy independently. Cognitive deficits in the area of memory and organization as seen by difficulty recalling information from picture description task and forming organized, complete thoughts. Suspect expressive language deficits may be impacting patient's ability to perform cognitive tasks, as he is observed w/ word-finding difficulties throughout tasks. Purposeful proactive rounding reinforced and 5 Ps addressed. Pt left in no distress.     Impressions: Patient presents with mixed expressive-receptive aphasia. Patient presents with cognitive deficits which may be exacerbated by expressive language deficits.     Recommendations:   SLP services are warranted upon d/c to target language and cognition  This service will continue to follow while in-house for SLP tx as schedule permits     d/w patient; daughter; MD Dustin Acosta, CCC-SLP (TEAMS)
Pt. seen at bedside. Daughter reports his primary endocrinologist is Dr. Zaira Oakes. Recommend the team call him for full consult.       Moises Sotelo D.O  [phone]

## 2025-03-27 ENCOUNTER — TRANSCRIPTION ENCOUNTER (OUTPATIENT)
Age: 70
End: 2025-03-27

## 2025-03-27 VITALS
RESPIRATION RATE: 20 BRPM | TEMPERATURE: 98 F | SYSTOLIC BLOOD PRESSURE: 136 MMHG | HEART RATE: 84 BPM | DIASTOLIC BLOOD PRESSURE: 94 MMHG | OXYGEN SATURATION: 91 %

## 2025-03-27 LAB
ANION GAP SERPL CALC-SCNC: 11 MMOL/L — SIGNIFICANT CHANGE UP (ref 5–17)
BUN SERPL-MCNC: 21 MG/DL — SIGNIFICANT CHANGE UP (ref 7–23)
CALCIUM SERPL-MCNC: 9 MG/DL — SIGNIFICANT CHANGE UP (ref 8.4–10.5)
CHLORIDE SERPL-SCNC: 104 MMOL/L — SIGNIFICANT CHANGE UP (ref 96–108)
CO2 SERPL-SCNC: 22 MMOL/L — SIGNIFICANT CHANGE UP (ref 22–31)
CREAT SERPL-MCNC: 1.25 MG/DL — SIGNIFICANT CHANGE UP (ref 0.5–1.3)
EGFR: 63 ML/MIN/1.73M2 — SIGNIFICANT CHANGE UP
EGFR: 63 ML/MIN/1.73M2 — SIGNIFICANT CHANGE UP
GLUCOSE BLDC GLUCOMTR-MCNC: 157 MG/DL — HIGH (ref 70–99)
GLUCOSE BLDC GLUCOMTR-MCNC: 188 MG/DL — HIGH (ref 70–99)
GLUCOSE SERPL-MCNC: 162 MG/DL — HIGH (ref 70–99)
HCT VFR BLD CALC: 39.8 % — SIGNIFICANT CHANGE UP (ref 39–50)
HGB BLD-MCNC: 12.9 G/DL — LOW (ref 13–17)
MCHC RBC-ENTMCNC: 29.1 PG — SIGNIFICANT CHANGE UP (ref 27–34)
MCHC RBC-ENTMCNC: 32.4 G/DL — SIGNIFICANT CHANGE UP (ref 32–36)
MCV RBC AUTO: 89.6 FL — SIGNIFICANT CHANGE UP (ref 80–100)
NRBC BLD AUTO-RTO: 0 /100 WBCS — SIGNIFICANT CHANGE UP (ref 0–0)
PLATELET # BLD AUTO: 193 K/UL — SIGNIFICANT CHANGE UP (ref 150–400)
POTASSIUM SERPL-MCNC: 4.6 MMOL/L — SIGNIFICANT CHANGE UP (ref 3.5–5.3)
POTASSIUM SERPL-SCNC: 4.6 MMOL/L — SIGNIFICANT CHANGE UP (ref 3.5–5.3)
RBC # BLD: 4.44 M/UL — SIGNIFICANT CHANGE UP (ref 4.2–5.8)
RBC # FLD: 14.5 % — SIGNIFICANT CHANGE UP (ref 10.3–14.5)
SODIUM SERPL-SCNC: 137 MMOL/L — SIGNIFICANT CHANGE UP (ref 135–145)
WBC # BLD: 6.64 K/UL — SIGNIFICANT CHANGE UP (ref 3.8–10.5)
WBC # FLD AUTO: 6.64 K/UL — SIGNIFICANT CHANGE UP (ref 3.8–10.5)

## 2025-03-27 PROCEDURE — 70551 MRI BRAIN STEM W/O DYE: CPT | Mod: MC

## 2025-03-27 PROCEDURE — 83036 HEMOGLOBIN GLYCOSYLATED A1C: CPT

## 2025-03-27 PROCEDURE — 99285 EMERGENCY DEPT VISIT HI MDM: CPT

## 2025-03-27 PROCEDURE — 85027 COMPLETE CBC AUTOMATED: CPT

## 2025-03-27 PROCEDURE — 96374 THER/PROPH/DIAG INJ IV PUSH: CPT

## 2025-03-27 PROCEDURE — 70498 CT ANGIOGRAPHY NECK: CPT | Mod: MC

## 2025-03-27 PROCEDURE — 70544 MR ANGIOGRAPHY HEAD W/O DYE: CPT

## 2025-03-27 PROCEDURE — 87637 SARSCOV2&INF A&B&RSV AMP PRB: CPT

## 2025-03-27 PROCEDURE — 93306 TTE W/DOPPLER COMPLETE: CPT

## 2025-03-27 PROCEDURE — 70450 CT HEAD/BRAIN W/O DYE: CPT | Mod: MC

## 2025-03-27 PROCEDURE — 80053 COMPREHEN METABOLIC PANEL: CPT

## 2025-03-27 PROCEDURE — 0042T: CPT | Mod: MC

## 2025-03-27 PROCEDURE — 97161 PT EVAL LOW COMPLEX 20 MIN: CPT

## 2025-03-27 PROCEDURE — 97116 GAIT TRAINING THERAPY: CPT

## 2025-03-27 PROCEDURE — 70496 CT ANGIOGRAPHY HEAD: CPT | Mod: MC

## 2025-03-27 PROCEDURE — 97530 THERAPEUTIC ACTIVITIES: CPT

## 2025-03-27 PROCEDURE — 80061 LIPID PANEL: CPT

## 2025-03-27 PROCEDURE — 80048 BASIC METABOLIC PNL TOTAL CA: CPT

## 2025-03-27 PROCEDURE — 97165 OT EVAL LOW COMPLEX 30 MIN: CPT

## 2025-03-27 PROCEDURE — 84484 ASSAY OF TROPONIN QUANT: CPT

## 2025-03-27 PROCEDURE — 92507 TX SP LANG VOICE COMM INDIV: CPT

## 2025-03-27 PROCEDURE — 82962 GLUCOSE BLOOD TEST: CPT

## 2025-03-27 PROCEDURE — 70547 MR ANGIOGRAPHY NECK W/O DYE: CPT

## 2025-03-27 PROCEDURE — 85576 BLOOD PLATELET AGGREGATION: CPT

## 2025-03-27 PROCEDURE — 92523 SPEECH SOUND LANG COMPREHEN: CPT

## 2025-03-27 PROCEDURE — 85730 THROMBOPLASTIN TIME PARTIAL: CPT

## 2025-03-27 PROCEDURE — 85025 COMPLETE CBC W/AUTO DIFF WBC: CPT

## 2025-03-27 PROCEDURE — 85610 PROTHROMBIN TIME: CPT

## 2025-03-27 RX ORDER — ATORVASTATIN CALCIUM 80 MG/1
1 TABLET, FILM COATED ORAL
Qty: 60 | Refills: 0
Start: 2025-03-27 | End: 2025-05-25

## 2025-03-27 RX ORDER — CLOPIDOGREL BISULFATE 75 MG/1
1 TABLET, FILM COATED ORAL
Qty: 60 | Refills: 0
Start: 2025-03-27 | End: 2025-05-25

## 2025-03-27 RX ORDER — TAMSULOSIN HYDROCHLORIDE 0.4 MG/1
1 CAPSULE ORAL
Qty: 0 | Refills: 0 | DISCHARGE
Start: 2025-03-27

## 2025-03-27 RX ORDER — CARVEDILOL 3.12 MG/1
1 TABLET, FILM COATED ORAL
Refills: 0 | DISCHARGE

## 2025-03-27 RX ORDER — EMPAGLIFLOZIN 25 MG/1
1 TABLET, FILM COATED ORAL
Refills: 0 | DISCHARGE

## 2025-03-27 RX ORDER — METFORMIN HYDROCHLORIDE 850 MG/1
1 TABLET ORAL
Refills: 0 | DISCHARGE

## 2025-03-27 RX ORDER — LOSARTAN POTASSIUM 100 MG/1
25 TABLET, FILM COATED ORAL DAILY
Refills: 0 | Status: DISCONTINUED | OUTPATIENT
Start: 2025-03-27 | End: 2025-03-27

## 2025-03-27 RX ORDER — APIXABAN 2.5 MG/1
1 TABLET, FILM COATED ORAL
Qty: 0 | Refills: 0 | DISCHARGE
Start: 2025-03-27

## 2025-03-27 RX ORDER — GABAPENTIN 400 MG/1
1 CAPSULE ORAL
Qty: 0 | Refills: 0 | DISCHARGE
Start: 2025-03-27

## 2025-03-27 RX ADMIN — Medication 40 MILLIGRAM(S): at 05:50

## 2025-03-27 RX ADMIN — GABAPENTIN 300 MILLIGRAM(S): 400 CAPSULE ORAL at 05:50

## 2025-03-27 RX ADMIN — INSULIN LISPRO 1: 100 INJECTION, SOLUTION INTRAVENOUS; SUBCUTANEOUS at 11:55

## 2025-03-27 RX ADMIN — CLOPIDOGREL BISULFATE 75 MILLIGRAM(S): 75 TABLET, FILM COATED ORAL at 11:53

## 2025-03-27 RX ADMIN — LOSARTAN POTASSIUM 25 MILLIGRAM(S): 100 TABLET, FILM COATED ORAL at 10:07

## 2025-03-27 RX ADMIN — INSULIN LISPRO 1: 100 INJECTION, SOLUTION INTRAVENOUS; SUBCUTANEOUS at 07:38

## 2025-03-27 RX ADMIN — APIXABAN 5 MILLIGRAM(S): 2.5 TABLET, FILM COATED ORAL at 05:50

## 2025-03-27 RX ADMIN — DULOXETINE 30 MILLIGRAM(S): 20 CAPSULE, DELAYED RELEASE ORAL at 11:53

## 2025-03-27 RX ADMIN — Medication 200 MILLIGRAM(S): at 11:53

## 2025-03-27 NOTE — PROGRESS NOTE ADULT - ASSESSMENT
ASSESSMENT: 69M with HTN. HLD, AFib on Eliquis (wife and daughter note good compliance), prior strokes affecting R frontal and L PCA with residual R hemiparesis and R homonymous hemianopsia and ?aphasia who presented with possible distal L M2 occlusion vs severe stenosis to OSH and transferred to [facility] for further eval, possible thrombectomy. CTH with chronic R frontal and L PCA infarcts. CTA with 3mm R supraclinoid aneurysm vs infundibulum, moderate to severe L MCA inferior division stenosis without clear occlusion per read however this may be completely occluded, also mod R M2 stenosis , L P2 occlusion, mil to mod R P1 and mod to severe R P2 stenosis, CTP with global hypoperfusion, likely artifactual. MRI brain with subacute infarct in the L basal ganglia and acute infarct in L inferior M2 division. Not a tenecteplase candidate due to presentation outside of window. Not a thrombectomy candidate due to chronic occlusion that is too distal.     Impression:  Worsening aphasia due to left basal ganglia and acute infarct in left inferior M2 division, Etiology ABDIRAHMAN with artery to artery embolism in the setting of poorly controlled ICAD, afib is also a competing mechanism but less likely    NEURO: Neuro exam unchanged, Continue close monitoring for neurologic deterioration, permissive HTN with slow titration to normotensive, Pt started on high dose statin for secondary stroke prevention, titrate statin to LDL goal less than 70, as per neurosx team: no acute neurosurgical intervention, CTA head/Neck CTH,MRI Brain w/o, MRA Head w/o and Neck w/contrast results as above. Physical therapy/OT eval with recommendations for oupt services.   Planned for presentation at [facility] conference on Monday - may benefit from outpatient angio +/- stent pending how he fares on med therapy    ANTITHROMBOTIC THERAPY: D/C ASA due to subtherapeutic ARU, will start Plavix for secondary stroke prevention, and Eliquis for afib    PULMONARY: Protecting airway, saturating well     CARDIOVASCULAR: TTE, results as noted above, continue with cardiac monitoring: No events, Dr Breaux (cardio) eval appreciated                              SBP goal: 110-180mmHg    GASTROINTESTINAL: Dysphagia screen passed, tolerating diet     Diet: Regular    RENAL: BUN/Cr stable, good urine output      Na Goal: Greater than 135     Mark: No    HEMATOLOGY: H/H stable, Platelets 193     DVT ppx: No need as pt is on Eliquis for Afib    ID: afebrile, no leukocytosis     OTHER: Plan/goals of care discussed with pt and pt's family at bedside. HgA1C 12.9: will contact endocrine team for further eval/recommendations    DISPOSITION: D/C home with outpt PT/OT on 03/27/25 once workup is complete    CORE MEASURES:        Admission NIHSS:  8     TPA:  NO      LDL/HDL: 73/55     Depression Screen: 0     Statin Therapy: Y     Dysphagia Screen: PASS      Smoking: NO      Afib YES     Stroke Education  YES    Obtain screening ultrasound in patients who meet 1 or more of the following criteria as patient is high risk for DVT/PE upon admission:   [] History of DVT/PE  []Hypercoagulable states (Factor V Leiden, Cancer, OCP, etc. )  []Prolonged immobility (hemiplegia/hemiparesis/post operative or any other extended immobilization)   [] Transferred from outside facility (Rehab or Long term care)  [] Age </= to 50   ASSESSMENT: 69M with HTN. HLD, AFib on Eliquis (wife and daughter note good compliance), prior strokes affecting R frontal and L PCA with residual R hemiparesis and R homonymous hemianopsia and ?aphasia who presented with possible distal L M2 occlusion vs severe stenosis to OSH and transferred to [facility] for further eval, possible thrombectomy. CTH with chronic R frontal and L PCA infarcts. CTA with 3mm R supraclinoid aneurysm vs infundibulum, moderate to severe L MCA inferior division stenosis without clear occlusion per read however this may be completely occluded, also mod R M2 stenosis , L P2 occlusion, mil to mod R P1 and mod to severe R P2 stenosis, CTP with global hypoperfusion, likely artifactual. MRI brain with subacute infarct in the L basal ganglia and acute infarct in L inferior M2 division. Not a tenecteplase candidate due to presentation outside of window. Not a thrombectomy candidate due to chronic occlusion that is too distal.     Impression:  Worsening aphasia due to left basal ganglia and acute infarct in left inferior M2 division, Etiology ABDIRAHMAN with artery to artery embolism in the setting of poorly controlled ICAD, afib is also a competing mechanism but less likely    NEURO: Neuro exam unchanged, Continue close monitoring for neurologic deterioration, permissive HTN with slow titration to normotensive, Pt started on high dose statin for secondary stroke prevention, titrate statin to LDL goal less than 70, as per neurosx team: no acute neurosurgical intervention, will plan for outpatient angio with neurosurgery.  CTA head/Neck CTH,MRI Brain w/o, MRA Head w/o and Neck w/contrast results as above. Physical therapy/OT eval with recommendations for oupt services.   Planned for presentation [facility] Monday - may benefit from outpatient angio +/- stent pending how he fares on med therapy    ANTITHROMBOTIC THERAPY: D/C ASA due to subtherapeutic ARU, will start Plavix for secondary stroke prevention, and Eliquis for afib    PULMONARY: Protecting airway, saturating well     CARDIOVASCULAR: TTE, results as noted above, continue with cardiac monitoring: No events, Dr Breaux (cardio) eval appreciated                              SBP goal: 110-180mmHg    GASTROINTESTINAL: Dysphagia screen passed, tolerating diet     Diet: Regular    RENAL: BUN/Cr stable, good urine output      Na Goal: Greater than 135     Mark: No    HEMATOLOGY: H/H stable, Platelets 193     DVT ppx: No need as pt is on Eliquis for Afib    ID: afebrile, no leukocytosis     OTHER: Plan/goals of care discussed with pt and pt's family at bedside. HgA1C 12.9: will contact endocrine team for further eval/recommendations    DISPOSITION: D/C home with outpt PT/OT on 03/27/25 once workup is complete    CORE MEASURES:        Admission NIHSS:  8     TPA:  NO      LDL/HDL: 73/55     Depression Screen: 0     Statin Therapy: Y     Dysphagia Screen: PASS      Smoking: NO      Afib YES     Stroke Education  YES    Obtain screening ultrasound in patients who meet 1 or more of the following criteria as patient is high risk for DVT/PE upon admission:   [] History of DVT/PE  []Hypercoagulable states (Factor V Leiden, Cancer, OCP, etc. )  []Prolonged immobility (hemiplegia/hemiparesis/post operative or any other extended immobilization)   [] Transferred from outside facility (Rehab or Long term care)  [] Age </= to 50

## 2025-03-27 NOTE — DISCHARGE NOTE PROVIDER - HOSPITAL COURSE
69RHM with PMHx of HTN, HLD, T2DM, Afib (on apixaban, last dose 6:30 AM), prior strokes (right frontal and left temporal/occipital regions with residual R homonymous hemianopia, R sided weakness and mixed aphasia) presenting as transfer from [facility] due to L M2 occlusion and for possible thrombectomy. LKW 16:00 on 3/24/2025. Family reported that patient had sudden onset slurred speech and difficulty producing words. Per family at bedside, patient was having difficulty producing words after his initial stroke and that has persisted since but family noticed worsening of those symptoms on 3/24/2025 as well as new onset slurred speech. CTA H/N on 3/24/2025 at [facility] revealed near total occlusion of the origin of L M2 branch. Perfusion imaging was unavailable at [facility]. Reports adherence to medications. Ambulates independently at baseline, handles ADLs independently, and has never handled iADLs independently.    Imaging:  CT PERFUSION:  No core infarct identified. Diffusely positive intracranial hypoperfusion   map, likely artifactual.    CTA NECK:  No evidence of significant stenosis or occlusion.    CTA HEAD:  Multifocal bilateral middle and posterior cerebral artery stenoses,   similar to prior examination of 11/2/2024. Left P2 segment occlusion,   also seen on prior examination. No evidence of new proximal large vessel   occlusion.    CTH 3/25  No acute parenchymal hemorrhage, mass effect, or midline shift. If   clinical symptoms persist or worsen, more sensitive evaluation with brain   MRI may be obtained, if no contraindications exist.    MRI/MRA:   Old right frontal cortical and left posterior cerebral artery infarcts. Recent infarct left basal ganglia and left parieto-occipital cortex. 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 Noninvasive flow MR angiography as above.    TTE   1. Left ventricular cavity is normal in size. Left ventricular systolic function is normal with an ejection fraction of 63 % by Peterson's method of disks. There are no regional wall motion abnormalities seen.   2. The left ventricular diastolic function is indeterminate, with indeterminate left ventricular filling pressure.   3. Mildly enlarged right ventricular cavity size, with normal wall thickness, and normal right ventricular systolic function.   4. Left atrium is severely dilated.   5. The right atrium is moderately dilated.   6. No pericardial effusion seen.    Impression: Worsening aphasia due to left basal ganglia and acute infarct in left inferior M2 division, Etiology ABDIRAHMAN with artery to artery embolism in the setting of poorly controlled ICAD, afib is also a competing mechanism but less likely.  will continue Eliquis 5 mg BID in addition to Plavix 75 mg daily.  LDL 73: started on high dose statin.   DM: A1c: 12.6, discharge of Jardiance 25 mg daily and metformin 1,000 mg BID with close outpatient follow up.   Neurosurgery consulted: no acute neurosurgical intervention, will plan for outpatient angiogram with neurosurgery.     Evaluated by PT/OT and was recommended home with home care. Patient stable for discharge.

## 2025-03-27 NOTE — PROGRESS NOTE ADULT - ASSESSMENT
Agree with above assessment and plan as outlined above.    - d/w neuro not elquis failure cva due ot intracranial atherosclerosis  - remains on elquis  - D/C per neuro    Alec Breaux MD, [facility]  BEEPER [phone]

## 2025-03-27 NOTE — DISCHARGE NOTE PROVIDER - CARE PROVIDER_API CALL
Tabby Eduardo  Neurology  [address] [facility] [address] [place], [address]  [place] [address]  Phone: [phone]  Fax: [phone]  Follow Up Time: 2 weeks    Zaira Oakes)  Endocrinology/Metab/Diabetes  [address] [place] [id number]  Phone: [phone]  Fax: [phone]  Follow Up Time: 2 weeks

## 2025-03-27 NOTE — PROGRESS NOTE ADULT - SUBJECTIVE AND OBJECTIVE BOX
C A R D I O L O G Y  **********************************     DATE OF SERVICE: 03-27-25    Patient reports speech improving. denies chest pain or shortness of breath.   Review of symptoms otherwise negative.    MEDICATIONS:  allopurinol 200 milliGRAM(s) Oral daily  aluminum hydroxide/magnesium hydroxide/simethicone Suspension 30 milliLiter(s) Oral every 4 hours PRN  apixaban 5 milliGRAM(s) Oral every 12 hours  atorvastatin 80 milliGRAM(s) Oral at bedtime  clopidogrel Tablet 75 milliGRAM(s) Oral daily  dextrose 5%. 1000 milliLiter(s) IV Continuous <Continuous>  dextrose 5%. 1000 milliLiter(s) IV Continuous <Continuous>  dextrose 50% Injectable 25 Gram(s) IV Push once  dextrose 50% Injectable 12.5 Gram(s) IV Push once  dextrose 50% Injectable 25 Gram(s) IV Push once  dextrose Oral Gel 15 Gram(s) Oral once PRN  DULoxetine 30 milliGRAM(s) Oral daily  gabapentin 300 milliGRAM(s) Oral two times a day  glucagon  Injectable 1 milliGRAM(s) IntraMuscular once  influenza  Vaccine (HIGH DOSE) 0.5 milliLiter(s) IntraMuscular once  insulin lispro (ADMELOG) corrective regimen sliding scale   SubCutaneous at bedtime  insulin lispro (ADMELOG) corrective regimen sliding scale   SubCutaneous three times a day before meals  losartan 25 milliGRAM(s) Oral daily  pantoprazole    Tablet 40 milliGRAM(s) Oral before breakfast  tamsulosin 0.4 milliGRAM(s) Oral at bedtime      LABS:                        12.9   6.64  )-----------( 193      ( 27 Mar 2025 04:56 )             39.8       Hemoglobin: 12.9 g/dL (03-27 @ 04:56)  Hemoglobin: 12.9 g/dL (03-26 @ 05:42)  Hemoglobin: 10.8 g/dL (03-25 @ 03:13)      03-27    137  |  104  |  21  ----------------------------<  162[H]  4.6   |  22  |  1.25    Ca    9.0      27 Mar 2025 04:56      Creatinine Trend: 1.25<--, 1.10<--, 1.19<--    COAGS:           PHYSICAL EXAM:  T(C): 36.8 (03-27-25 @ 10:00), Max: 37.2 (03-27-25 @ 04:00)  HR: 84 (03-27-25 @ 10:00) (69 - 84)  BP: 136/94 (03-27-25 @ 10:00) (136/94 - 169/105)  RR: 20 (03-27-25 @ 10:00) (18 - 25)  SpO2: 91% (03-27-25 @ 10:00) (91% - 96%)  Wt(kg): --    I&O's Summary    26 Mar 2025 07:01  -  27 Mar 2025 07:00  --------------------------------------------------------  IN: 0 mL / OUT: 650 mL / NET: -650 mL      Gen: NAD  HEENT:  (-)icterus (-)pallor  CV: N S1 S2 1/6 BRENDA (+)2 Pulses B/l  Resp:  Clear to auscultation B/L, normal effort  GI: (+) BS Soft, NT, ND  Lymph:  (-)Edema, (-)obvious lymphadenopathy  Skin: Warm to touch, Normal turgor  Psych: Appropriate mood and affect      TELEMETRY: NSR	    < from: MR Head No Cont (03.25.25 @ 14:02) >  IMPRESSION: Old right frontal cortical and left posterior cerebral artery   infarcts. Recent infarct left basal ganglia and left parieto-occipital   cortex. 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 Noninvasive flow MR angiography as   above.    < end of copied text >    < from: TTE W or WO Ultrasound Enhancing Agent (03.26.25 @ 08:33) >  CONCLUSIONS:      1. Left ventricular cavity is normal in size. Left ventricular systolic function is normal with an ejection fraction of 63 % by Peterson's method of disks. There are no regional wall motion abnormalities seen.   2. The left ventricular diastolic function is indeterminate, with indeterminate left ventricular filling pressure.   3. Mildly enlarged right ventricular cavity size, with normal wall thickness, and normal right ventricular systolic function.   4. Left atrium is severely dilated.   5. The right atrium is moderately dilated.   6. No pericardial effusion seen.   7. No prior echocardiogram is available for comparison.    < end of copied text >    ASSESSMENT/PLAN: Patient is a 68 y/o Male well known to our office with PMH of HTN, type 2 DM, gout, and multiple CVA s/p loop recorder which found PAF on Eliquis who presented with slurred speech and expressive aphasia transferred from [facility] due to concern for acute stroke. Cardiology consulted for further evaluation.    #CVA  - MRI Brain noted above  - Stroke management per neuro  - TTE noted with normal LV function, no pertinent findings  - Patient had CT C/A/P in 11/2024 with no evidence of malignancy in the chest, abdomen or pelvis  - ASA changed to Plavix per neuro, continue Lipitor    #PAF  - Eliquis 5mg BID restarted per neuro as they suspect stroke from intracranial atherosclerosis with uncontrolled diabetes  - EP consult appreciated    #HTN  - Home Coreg and Losartan on hold for permissive HTN per neuro - restart when ok with neuro    - No further inpatient cardiac w/u planned  - Patient to f/u with Dr. Salinas after discharge    ASHLEE CansecoC  Pager: [phone]

## 2025-03-27 NOTE — DISCHARGE NOTE NURSING/CASE MANAGEMENT/SOCIAL WORK - NSDCVIVACCINE_GEN_ALL_CORE_FT
Tdap; 19-Sep-2018 19:44; Magdalena Peralta (ROBERTO); [employer]; [id number] (Exp. Date: 13-Feb-2021); IntraMuscular; Deltoid Right.; 0.5 milliLiter(s); VIS (VIS Published: 09-May-2013, VIS Presented: 19-Sep-2018);

## 2025-03-27 NOTE — PROGRESS NOTE ADULT - SUBJECTIVE AND OBJECTIVE BOX
EP Attending  HISTORY OF PRESENT ILLNESS: HPI:  69RHM with PMHx of HTN, HLD, T2DM, Afib (on apixaban, last dose 6:30 AM), prior strokes (right frontal and left temporal/occipital regions with residual R homonymous hemianopia, R sided weakness and mixed aphasia) presenting as transfer from [facility] due to L M2 occlusion and for possible thrombectomy. LKW 16:00 on 3/24/2025. Family reported that patient had sudden onset slurred speech and difficulty producing words. Per family at bedside, patient was having difficulty producing words after his initial stroke and that has persisted since but family noticed worsening of those symptoms on 3/24/2025 as well as new onset slurred speech. CTA H/N on 3/24/2025 at [facility] revealed near total occlusion of the origin of L M2 branch. Perfusion imaging was unavailable at [facility]. Reports adherence to medications. Ambulates independently at baseline, handles ADLs independently, and has never handled iADLs independently.  (25 Mar 2025 04:48)    No new complaints. Awaiting updated blood clotting workup and advice on anticoagulation.  No angina or palpitations.  A 10 pt ROS is otherwise negative.  Date of service 3/27- resting in bed, no new issues.    PAST MEDICAL & SURGICAL HISTORY:  DM (diabetes mellitus)  HTN (hypertension)  HLD (hyperlipidemia)  Obese  HTN (hypertension)  Gout  CVA (cerebrovascular accident)  Cervical disc herniation  History of arthroscopy of right shoulder  No significant past surgical history    allopurinol 200 milliGRAM(s) Oral daily  aluminum hydroxide/magnesium hydroxide/simethicone Suspension 30 milliLiter(s) Oral every 4 hours PRN  apixaban 5 milliGRAM(s) Oral every 12 hours  atorvastatin 80 milliGRAM(s) Oral at bedtime  clopidogrel Tablet 75 milliGRAM(s) Oral daily  dextrose 5%. 1000 milliLiter(s) IV Continuous <Continuous>  dextrose 5%. 1000 milliLiter(s) IV Continuous <Continuous>  dextrose 50% Injectable 25 Gram(s) IV Push once  dextrose 50% Injectable 12.5 Gram(s) IV Push once  dextrose 50% Injectable 25 Gram(s) IV Push once  dextrose Oral Gel 15 Gram(s) Oral once PRN  DULoxetine 30 milliGRAM(s) Oral daily  gabapentin 300 milliGRAM(s) Oral two times a day  glucagon  Injectable 1 milliGRAM(s) IntraMuscular once  influenza  Vaccine (HIGH DOSE) 0.5 milliLiter(s) IntraMuscular once  insulin lispro (ADMELOG) corrective regimen sliding scale   SubCutaneous at bedtime  insulin lispro (ADMELOG) corrective regimen sliding scale   SubCutaneous three times a day before meals  losartan 25 milliGRAM(s) Oral daily  pantoprazole    Tablet 40 milliGRAM(s) Oral before breakfast  tamsulosin 0.4 milliGRAM(s) Oral at bedtime                         12.9   6.64  )-----------( 193      ( 27 Mar 2025 04:56 )             39.8       03-27    137  |  104  |  21  ----------------------------<  162[H]  4.6   |  22  |  1.25    Ca    9.0      27 Mar 2025 04:56    T(C): 36.8 (03-27-25 @ 10:00), Max: 37.2 (03-27-25 @ 04:00)  HR: 84 (03-27-25 @ 10:00) (69 - 95)  BP: 136/94 (03-27-25 @ 10:00) (136/94 - 176/108)  RR: 20 (03-27-25 @ 10:00) (14 - 25)  SpO2: 91% (03-27-25 @ 10:00) (91% - 96%)  Wt(kg): --    I&O's Summary    26 Mar 2025 07:01  -  27 Mar 2025 07:00  --------------------------------------------------------  IN: 0 mL / OUT: 650 mL / NET: -650 mL        Appearance: Normal appearing adult male in no acute distress	  HEENT:   Normal oral mucosa, PERRL, EOMI	  Lymphatic: No lymphadenopathy , no edema  Cardiovascular: Normal S1 S2, No JVD, No murmurs , Peripheral pulses palpable 2+ bilaterally  Respiratory: Lungs clear to auscultation, normal effort 	  Gastrointestinal:  Soft, Non-tender, + BS	  Skin: No rashes, No ecchymoses, No cyanosis, warm to touch  Musculoskeletal: Normal range of motion, normal strength  Psychiatry:  Mood & affect appropriate    TELEMETRY: NSR	    ECG: NSR 	  Echo:  < from: TTE with Doppler (w/Cont) (06.12.23 @ 07:48) >  DIMENSIONS:  Dimensions:     NormalValues:  LA:     4.4 cm    2.0 - 4.0 cm  Ao:     3.9 cm    2.0 - 3.8 cm  SEPTUM: 1.1 cm    0.6 - 1.2 cm  PWT:    1.0 cm    0.6 - 1.1 cm  LVIDd:  5.0 cm    3.0 - 5.6 cm  LVIDs:  3.7 cm    1.8 - 4.0 cm  Derived Variables:  LVMI: 78 g/m2  RWT: 0.40  Fractional short: 26 %  Ejection Fraction (Modified Peterson Rule): 50 %  ------------------------------------------------------------------------  OBSERVATIONS:  Mitral Valve: Mitral annular calcification, otherwise  normal mitral valve. Mild-moderate mitral regurgitation.  Aortic Root: Normal size aortic root. (Ao:3.9 cm).  Aortic Valve: Calcified trileaflet aortic valve with normal  opening. Mild-moderate aortic regurgitation.  Left Atrium: Mildly dilated left atrium.  LA volume index =  37 cc/m2.  Left Ventricle: Mild global left ventricular systolic  dysfunction.  Endocardial visualization enhanced with  intravenous injection of echo contrast (Definity). Normal  left ventricular internal dimensions and wall thicknesses.  Right Heart: Normal right atrium. Normal right ventricular  size and function. Normal tricuspid valve.  Minimal  tricuspid regurgitation. Normal pulmonic valve.  Minimal  pulmonic regurgitation.  Pericardium/PleuraNormal pericardium with no pericardial  effusion.  Hemodynamic: Estimated right ventricular systolic pressure  equals 24 mm Hg, assuming right atrial pressure equals 10  mm Hg, consistent with normal pulmonary pressures. Agitated  saline injection demonstrates no evidence of a patent  foramen ovale.    < end of copied text >  	      ASSESSMENT/PLAN: Mr Oakes is a very pleasant 69y Male with history of multiple prior strokes, and recent dx of paroxysmal AFib. He is anticoagulated w/ apixaban.  Presented with a new stroke.  Has history of HTN, HLD and diabetes mellitus that are uncontrolled.  Continue apixaban 5mg BID.  States he has been compliant with it.  (If he isn't taking his medication , there is no other anticoagulant that would work better if not taken appropriately...)  Agree w/ possibility of hematology workup for clotting disorder.  Recommend repeat echo w/ definity, look for LV thrombi.  Continue clopidogrel.  Will follow with you.      Chinmay Lopez M.D.  Cardiac Electrophysiology    office [phone]  pager [phone]

## 2025-03-27 NOTE — CONSULT NOTE ADULT - PROBLEM SELECTOR RECOMMENDATION 9
Will continue current insulin regimen for now. Will continue monitoring  blood sugars, will Follow up.  Patient counseled for compliance with consistent low carb diet and physical activity as tolerated.    DC: May get DC home on Jardiance 25 mg daily, Metformin 1000mg PO BID. FU 2 weeks
? cardioembolic   s/p previous ILR   interrogate device   asked daughter at bedside to obtain outpt cardiologist and records   Monitor on Tele   Check TTE

## 2025-03-27 NOTE — CONSULT NOTE ADULT - ASSESSMENT
Assessment  DMT2: 69y Male with DM T2 with hyperglycemia admitted with CVA,  A1C is 12.2% , patient was on oral hypoglycemic agents at home, now on low dose insulin coverage, blood sugars are improving, no hypoglycemic episode,  eating meals,  compliant with low carb diet.  Patient is high risk with high level decision making due to uncontrolled diabetes, has increased risk for complications.   CVA: on medications, monitored, no acute events..  HTN: On antihypertensive medications, monitored, stable..        Zaira Oakes MD  Cell:  [phone]  Office: [phone]          
LKW: 16:00 on 3/24/2025  NIHSS: 8  Baseline MRS: 1  Not a tenecteplase candidate due to presentation outside of window.   Not a thrombectomy candidate due to chronic occlusion that is too distal.     Impression:  Wernicke aphasia and residual right homonymous hemianopia due to recrudescence of prior infarcts in left occipital lobe and medial temporal lobe vs acute ischemic stroke. Mechanism cardioembolic from Afib. 
69M stroke    -no acute neurosurgical intervention  -stroke optimization per stroke neuro  -AC/DAPT per stroke neuro  -trial of medical mgmt prior to intervention
Patient seen and examined, agree with above assessment and plan as transcribed above.    - F/u Neuro rec if recurrent embolic CVA may need consider switching AC and a hypercoagulable w/u    Alec Breaux MD, [facility]  BEEPER [phone]

## 2025-03-27 NOTE — CONSULT NOTE ADULT - SUBJECTIVE AND OBJECTIVE BOX
C A R D I O L O G Y  *********************    DATE OF SERVICE: 03-25-25    HISTORY OF PRESENT ILLNESS: HPI:  Patient is a 70 y/o Male well known to our office with PMH of HTN, type 2 DM, gout, and multiple CVA s/p loop recorder which found PAF on Eliquis who presented with slurred speech and expressive aphasia transferred from [facility] due to concern for acute stroke. Cardiology consulted for further evaluation. Family reports patient compliant with Eliquis. Denies chest pain, SOB, palpitations, dizziness, or syncope.    PAST MEDICAL & SURGICAL HISTORY:  DM (diabetes mellitus)      HTN (hypertension)      HLD (hyperlipidemia)      Obese      HTN (hypertension)      Gout      CVA (cerebrovascular accident)      Cervical disc herniation      History of arthroscopy of right shoulder      No significant past surgical history      MEDICATIONS:  MEDICATIONS  (STANDING):  allopurinol 200 milliGRAM(s) Oral daily  aspirin enteric coated 81 milliGRAM(s) Oral daily  atorvastatin 80 milliGRAM(s) Oral at bedtime  dextrose 5%. 1000 milliLiter(s) (50 mL/Hr) IV Continuous <Continuous>  dextrose 5%. 1000 milliLiter(s) (100 mL/Hr) IV Continuous <Continuous>  dextrose 50% Injectable 25 Gram(s) IV Push once  dextrose 50% Injectable 12.5 Gram(s) IV Push once  dextrose 50% Injectable 25 Gram(s) IV Push once  DULoxetine 30 milliGRAM(s) Oral daily  enoxaparin Injectable 40 milliGRAM(s) SubCutaneous every 24 hours  gabapentin 300 milliGRAM(s) Oral two times a day  glucagon  Injectable 1 milliGRAM(s) IntraMuscular once  insulin lispro (ADMELOG) corrective regimen sliding scale   SubCutaneous three times a day before meals  pantoprazole    Tablet 40 milliGRAM(s) Oral before breakfast  tamsulosin 0.4 milliGRAM(s) Oral at bedtime      Allergies    No Known Allergies    Intolerances        FAMILY HISTORY:  No pertinent family history in first degree relatives      Non-contributary for premature coronary disease or sudden cardiac death    SOCIAL HISTORY:    [x ] Non-smoker  [ ] Smoker  [ ] Alcohol    FLU VACCINE THIS YEAR STARTS IN AUGUST:  [ ] Yes    [ ] No    IF OVER 65 HAVE YOU EVER HAD A PNA VACCINE:  [ ] Yes    [ ] No       [ ] N/A      REVIEW OF SYSTEMS:  [ ]chest pain  [  ]shortness of breath  [  ]palpitations  [  ]syncope  [ ]near syncope [ ]upper extremity weakness   [ ] lower extremity weakness  [  ]diplopia  [  ]altered mental status   [  ]fevers  [ ]chills [ ]nausea  [ ]vomiting  [  ]dysphagia    [ ]abdominal pain  [ ]melena  [ ]BRBPR    [  ]epistaxis  [  ]rash    [ ]lower extremity edema    +slurred speech  +expressive aphasia    [X] All others negative	  [ ] Unable to obtain      LABS:	 	    CARDIAC MARKERS:                              10.8   7.11  )-----------( 165      ( 25 Mar 2025 03:13 )             33.2     Hb Trend: 10.8<--    03-25    137  |  104  |  17  ----------------------------<  129[H]  4.5   |  23  |  1.19    Ca    8.2[L]      25 Mar 2025 03:13    TPro  5.7[L]  /  Alb  3.2[L]  /  TBili  0.7  /  DBili  x   /  AST  115[H]  /  ALT  97[H]  /  AlkPhos  256[H]  03-25    Creatinine Trend: 1.19<--    Coags:  PT/INR - ( 25 Mar 2025 03:13 )   PT: 13.5 sec;   INR: 1.19 ratio         PTT - ( 25 Mar 2025 03:13 )  PTT:33.8 sec    proBNP:   Lipid Profile:   HgA1c:   TSH:         PHYSICAL EXAM:  T(C): 36.7 (03-25-25 @ 07:05), Max: 36.8 (03-25-25 @ 03:37)  HR: 69 (03-25-25 @ 14:00) (63 - 71)  BP: 185/107 (03-25-25 @ 14:00) (147/88 - 185/107)  RR: 18 (03-25-25 @ 14:00) (16 - 28)  SpO2: 98% (03-25-25 @ 14:00) (95% - 99%)  Wt(kg): --     I&O's Summary      Gen: NAD  HEENT:  (-)icterus (-)pallor  CV: N S1 S2 1/6 BRENDA (+)2 Pulses B/l  Resp:  Clear to auscultation B/L, normal effort  GI: (+) BS Soft, NT, ND  Lymph:  (-)Edema, (-)obvious lymphadenopathy  Skin: Warm to touch, Normal turgor  Psych: Appropriate mood and affect      TELEMETRY: NSR 	      ECG: Pending 	    RADIOLOGY:  < from: MR Angio Head No Cont (03.25.25 @ 14:02) >  IMPRESSION: Old right frontal cortical and left posterior cerebral artery   infarcts. Recent infarct left basal ganglia and left parieto-occipital   cortex. 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 Noninvasive flow MR angiography as   above.    < end of copied text >      ASSESSMENT/PLAN: Patient is a 70 y/o Male well known to our office with PMH of HTN, type 2 DM, gout, and multiple CVA s/p loop recorder which found PAF on Eliquis who presented with slurred speech and expressive aphasia transferred from [facility] due to concern for acute stroke. Cardiology consulted for further evaluation.    #R/O CVA  - MRI Brain noted above - f/u neuro recs  - TTE w/bubble study pending  - Patient had CT C/A/P in 11/2024 with no evidence of malignancy in the chest, abdomen or pelvis  - Continue ASA and Lipitor    #PAF  - Eliquis on hold per neuro  - EP consult with Dr. Lopez called for further management - if confirmed acute CVA on Eliquis, will need to discuss alternative AC    #HTN  - Home Coreg and Losartan on hold for permissive HTN per neuro    - Patient to f/u with Dr. Salinas after discharge    Loiue Aquino PA-C  Pager: [phone]  
p ([phone])     HPI: 69M on eliquis for afib last took y'day, h/o HTN HLD DM, CVA (right frontal and left temporal/occipital regions, resid R homonymous hemianopia, R sided weakness, mixed aphasia) p/w dysarthria, worsening of baseline aphasia, LKW 16:00 3/24/2025. CTA @[facility] w/ near total occlusion L M2. CTH no heme. CTA w/LM2 stenosis w/ distal recon, diffuse ICAD, no new occlusion. CTP no core infarct. MRI with small acute left occipital and temporal strokes. NOVA no flow LPCA otw nl.     Exam: Awake, oriented to self and place, mild dysarthria, PERRL, EOMI, right HH, right facial, RUE 4/5 with mild drift, LUE 5/5, BLE 5/5    =====================  PAST MEDICAL HISTORY   DM (diabetes mellitus)    HTN (hypertension)    HLD (hyperlipidemia)    Obese    HTN (hypertension)    Gout    CVA (cerebrovascular accident)      PAST SURGICAL HISTORY   No significant past surgical history    Cervical disc herniation    History of arthroscopy of right shoulder    No significant past surgical history      No Known Allergies      MEDICATIONS:  Antibiotics:    Neuro:  DULoxetine 30 milliGRAM(s) Oral daily  gabapentin 300 milliGRAM(s) Oral two times a day    Other:  allopurinol 200 milliGRAM(s) Oral daily  atorvastatin 80 milliGRAM(s) Oral at bedtime  dextrose 5%. 1000 milliLiter(s) IV Continuous <Continuous>  dextrose 5%. 1000 milliLiter(s) IV Continuous <Continuous>  dextrose 50% Injectable 25 Gram(s) IV Push once  dextrose 50% Injectable 12.5 Gram(s) IV Push once  dextrose 50% Injectable 25 Gram(s) IV Push once  dextrose Oral Gel 15 Gram(s) Oral once PRN  glucagon  Injectable 1 milliGRAM(s) IntraMuscular once  insulin lispro (ADMELOG) corrective regimen sliding scale   SubCutaneous three times a day before meals  pantoprazole    Tablet 40 milliGRAM(s) Oral before breakfast  tamsulosin 0.4 milliGRAM(s) Oral at bedtime      SOCIAL HISTORY:   Occupation:   Marital Status:     FAMILY HISTORY:  No pertinent family history in first degree relatives    No pertinent family history in first degree relatives        ROS: Negative except per HPI    LABS:  PT/INR - ( 25 Mar 2025 03:13 )   PT: 13.5 sec;   INR: 1.19 ratio         PTT - ( 25 Mar 2025 03:13 )  PTT:33.8 sec                        10.8   7.11  )-----------( 165      ( 25 Mar 2025 03:13 )             33.2     03-25    137  |  104  |  17  ----------------------------<  129[H]  4.5   |  23  |  1.19    Ca    8.2[L]      25 Mar 2025 03:13    TPro  5.7[L]  /  Alb  3.2[L]  /  TBili  0.7  /  DBili  x   /  AST  115[H]  /  ALT  97[H]  /  AlkPhos  256[H]  03-25      
EP Attending  HISTORY OF PRESENT ILLNESS: HPI:  69RHM with PMHx of HTN, HLD, T2DM, Afib (on apixaban, last dose 6:30 AM), prior strokes (right frontal and left temporal/occipital regions with residual R homonymous hemianopia, R sided weakness and mixed aphasia) presenting as transfer from [facility] due to L M2 occlusion and for possible thrombectomy. LKW 16:00 on 3/24/2025. Family reported that patient had sudden onset slurred speech and difficulty producing words. Per family at bedside, patient was having difficulty producing words after his initial stroke and that has persisted since but family noticed worsening of those symptoms on 3/24/2025 as well as new onset slurred speech. CTA H/N on 3/24/2025 at [facility] revealed near total occlusion of the origin of L M2 branch. Perfusion imaging was unavailable at [facility]. Reports adherence to medications. Ambulates independently at baseline, handles ADLs independently, and has never handled iADLs independently.  (25 Mar 2025 04:48)    No new complaints. Awaiting updated blood clotting workup and advice on anticoagulation.  No angina or palpitations.  A 10 pt ROS is otherwise negative.    PAST MEDICAL & SURGICAL HISTORY:  DM (diabetes mellitus)  HTN (hypertension)  HLD (hyperlipidemia)  Obese  HTN (hypertension)  Gout  CVA (cerebrovascular accident)  Cervical disc herniation  History of arthroscopy of right shoulder  No significant past surgical history    MEDICATIONS  (STANDING):  allopurinol 200 milliGRAM(s) Oral daily  apixaban 5 milliGRAM(s) Oral every 12 hours  atorvastatin 80 milliGRAM(s) Oral at bedtime  clopidogrel Tablet 75 milliGRAM(s) Oral daily  dextrose 5%. 1000 milliLiter(s) (50 mL/Hr) IV Continuous <Continuous>  dextrose 5%. 1000 milliLiter(s) (100 mL/Hr) IV Continuous <Continuous>  dextrose 50% Injectable 25 Gram(s) IV Push once  dextrose 50% Injectable 12.5 Gram(s) IV Push once  dextrose 50% Injectable 25 Gram(s) IV Push once  DULoxetine 30 milliGRAM(s) Oral daily  gabapentin 300 milliGRAM(s) Oral two times a day  glucagon  Injectable 1 milliGRAM(s) IntraMuscular once  influenza  Vaccine (HIGH DOSE) 0.5 milliLiter(s) IntraMuscular once  insulin lispro (ADMELOG) corrective regimen sliding scale   SubCutaneous at bedtime  insulin lispro (ADMELOG) corrective regimen sliding scale   SubCutaneous three times a day before meals  pantoprazole    Tablet 40 milliGRAM(s) Oral before breakfast  tamsulosin 0.4 milliGRAM(s) Oral at bedtime    Allergies    No Known Allergies    Intolerances    FAMILY HISTORY:  No pertinent family history in first degree relatives    Non-contributary for premature coronary disease or sudden cardiac death    SOCIAL HISTORY:    [ x] Non-smoker  [ ] Smoker  [ ] Alcohol    PHYSICAL EXAM:  T(C): 36.6 (03-26-25 @ 10:00), Max: 37 (03-25-25 @ 20:00)  HR: 85 (03-26-25 @ 10:00) (69 - 85)  BP: 160/103 (03-26-25 @ 10:00) (139/90 - 185/107)  RR: 22 (03-26-25 @ 10:00) (16 - 26)  SpO2: 99% (03-26-25 @ 10:00) (93% - 99%)  Wt(kg): --    Appearance: Normal appearing adult male in no acute distress	  HEENT:   Normal oral mucosa, PERRL, EOMI	  Lymphatic: No lymphadenopathy , no edema  Cardiovascular: Normal S1 S2, No JVD, No murmurs , Peripheral pulses palpable 2+ bilaterally  Respiratory: Lungs clear to auscultation, normal effort 	  Gastrointestinal:  Soft, Non-tender, + BS	  Skin: No rashes, No ecchymoses, No cyanosis, warm to touch  Musculoskeletal: Normal range of motion, normal strength  Psychiatry:  Mood & affect appropriate    TELEMETRY: NSR	    ECG: NSR 	  Echo:  < from: TTE with Doppler (w/Cont) (06.12.23 @ 07:48) >  DIMENSIONS:  Dimensions:     NormalValues:  LA:     4.4 cm    2.0 - 4.0 cm  Ao:     3.9 cm    2.0 - 3.8 cm  SEPTUM: 1.1 cm    0.6 - 1.2 cm  PWT:    1.0 cm    0.6 - 1.1 cm  LVIDd:  5.0 cm    3.0 - 5.6 cm  LVIDs:  3.7 cm    1.8 - 4.0 cm  Derived Variables:  LVMI: 78 g/m2  RWT: 0.40  Fractional short: 26 %  Ejection Fraction (Modified Peterson Rule): 50 %  ------------------------------------------------------------------------  OBSERVATIONS:  Mitral Valve: Mitral annular calcification, otherwise  normal mitral valve. Mild-moderate mitral regurgitation.  Aortic Root: Normal size aortic root. (Ao:3.9 cm).  Aortic Valve: Calcified trileaflet aortic valve with normal  opening. Mild-moderate aortic regurgitation.  Left Atrium: Mildly dilated left atrium.  LA volume index =  37 cc/m2.  Left Ventricle: Mild global left ventricular systolic  dysfunction.  Endocardial visualization enhanced with  intravenous injection of echo contrast (Definity). Normal  left ventricular internal dimensions and wall thicknesses.  Right Heart: Normal right atrium. Normal right ventricular  size and function. Normal tricuspid valve.  Minimal  tricuspid regurgitation. Normal pulmonic valve.  Minimal  pulmonic regurgitation.  Pericardium/PleuraNormal pericardium with no pericardial  effusion.  Hemodynamic: Estimated right ventricular systolic pressure  equals 24 mm Hg, assuming right atrial pressure equals 10  mm Hg, consistent with normal pulmonary pressures. Agitated  saline injection demonstrates no evidence of a patent  foramen ovale.    < end of copied text >  	  	  LABS:	 	                          12.9   6.93  )-----------( 190      ( 26 Mar 2025 05:42 )             40.2     03-26    138  |  106  |  18  ----------------------------<  192[H]  4.3   |  20[L]  |  1.10    Ca    8.9      26 Mar 2025 05:42    TPro  5.7[L]  /  Alb  3.2[L]  /  TBili  0.7  /  DBili  x   /  AST  115[H]  /  ALT  97[H]  /  AlkPhos  256[H]  03-25    ASSESSMENT/PLAN: Mr Oakes is a very pleasant 69y Male with history of multiple prior strokes, and recent dx of paroxysmal AFib. He is anticoagulated w/ apixaban.  Presented with a new stroke.  Has history of HTN, HLD and diabetes mellitus that are uncontrolled.  Continue apixaban 5mg BID.  States he has been compliant with it.  (If he isn't taking his medication , there is no other anticoagulant that would work better if not taken appropriately...)  Agree w/ possibility of hematology workup for clotting disorder.  Recommend repeat echo w/ definity, look for LV thrombi.  Continue clopidogrel.  Will follow with you.      Chinmay Lopez M.D.  Cardiac Electrophysiology    office [phone]  pager [phone]
      HPI:  69RHM with PMHx of HTN, HLD, T2DM, Afib (on apixaban, last dose 6:30 AM), prior strokes (right frontal and left temporal/occipital regions with residual R homonymous hemianopia, R sided weakness and mixed aphasia) presenting as transfer from [facility] due to L M2 occlusion and for possible thrombectomy. LKW 16:00 on 3/24/2025. Family reported that patient had sudden onset slurred speech and difficulty producing words. Per family at bedside, patient was having difficulty producing words after his initial stroke and that has persisted since but family noticed worsening of those symptoms on 3/24/2025 as well as new onset slurred speech. CTA H/N on 3/24/2025 at [facility] revealed near total occlusion of the origin of L M2 branch. Perfusion imaging was unavailable at [facility]. Reports adherence to medications. Ambulates independently at baseline, handles ADLs independently, and has never handled iADLs independently.  (25 Mar 2025 04:48)  Patient has history of diabetes, was on oral diabetes medications at home, no recent hypoglycemic episodes. Patient follows up with ENDO for diabetes management.    PAST MEDICAL & SURGICAL HISTORY:  DM (diabetes mellitus)      HTN (hypertension)      HLD (hyperlipidemia)      Obese      HTN (hypertension)      Gout      CVA (cerebrovascular accident)      Cervical disc herniation      History of arthroscopy of right shoulder      No significant past surgical history          FAMILY HISTORY:  No pertinent family history in first degree relatives        Social History:    Outpatient Medications:    MEDICATIONS  (STANDING):  allopurinol 200 milliGRAM(s) Oral daily  apixaban 5 milliGRAM(s) Oral every 12 hours  atorvastatin 80 milliGRAM(s) Oral at bedtime  clopidogrel Tablet 75 milliGRAM(s) Oral daily  dextrose 5%. 1000 milliLiter(s) (50 mL/Hr) IV Continuous <Continuous>  dextrose 5%. 1000 milliLiter(s) (100 mL/Hr) IV Continuous <Continuous>  dextrose 50% Injectable 25 Gram(s) IV Push once  dextrose 50% Injectable 12.5 Gram(s) IV Push once  dextrose 50% Injectable 25 Gram(s) IV Push once  DULoxetine 30 milliGRAM(s) Oral daily  gabapentin 300 milliGRAM(s) Oral two times a day  glucagon  Injectable 1 milliGRAM(s) IntraMuscular once  influenza  Vaccine (HIGH DOSE) 0.5 milliLiter(s) IntraMuscular once  insulin lispro (ADMELOG) corrective regimen sliding scale   SubCutaneous at bedtime  insulin lispro (ADMELOG) corrective regimen sliding scale   SubCutaneous three times a day before meals  losartan 25 milliGRAM(s) Oral daily  pantoprazole    Tablet 40 milliGRAM(s) Oral before breakfast  tamsulosin 0.4 milliGRAM(s) Oral at bedtime    MEDICATIONS  (PRN):  aluminum hydroxide/magnesium hydroxide/simethicone Suspension 30 milliLiter(s) Oral every 4 hours PRN Dyspepsia  dextrose Oral Gel 15 Gram(s) Oral once PRN Blood Glucose LESS THAN 70 milliGRAM(s)/deciliter      Allergies    No Known Allergies    Intolerances        ALL  SYSTEMS REVIEWED.    PHYSICAL EXAM:  VITALS: T(C): 37.2 (03-27-25 @ 04:00)  T(F): 98.9 (03-27-25 @ 04:00), Max: 98.9 (03-27-25 @ 04:00)  HR: 83 (03-27-25 @ 08:00) (69 - 95)  BP: 159/105 (03-27-25 @ 08:00) (153/107 - 176/108)  RR:  (14 - 25)  SpO2:  (92% - 99%)  Wt(kg): --  THYROID: Normal size, no palpable nodules  RESPIRATORY: Clear to auscultation bilaterally.  CARDIOVASCULAR: Si S2, No murmurs;  GI: Soft, non distended.      POCT Blood Glucose.: 157 mg/dL (03-27-25 @ 07:34)  POCT Blood Glucose.: 175 mg/dL (03-26-25 @ 22:20)  POCT Blood Glucose.: 160 mg/dL (03-26-25 @ 16:54)  POCT Blood Glucose.: 197 mg/dL (03-26-25 @ 11:52)  POCT Blood Glucose.: 143 mg/dL (03-26-25 @ 09:34)  POCT Blood Glucose.: 139 mg/dL (03-25-25 @ 21:46)  POCT Blood Glucose.: 178 mg/dL (03-25-25 @ 16:50)  POCT Blood Glucose.: 127 mg/dL (03-25-25 @ 11:59)  POCT Blood Glucose.: 117 mg/dL (03-25-25 @ 07:54)  POCT Blood Glucose.: 131 mg/dL (03-25-25 @ 03:08)                            12.9   6.64  )-----------( 193      ( 27 Mar 2025 04:56 )             39.8       03-27    137  |  104  |  21  ----------------------------<  162[H]  4.6   |  22  |  1.25    eGFR: 63    Ca    9.0      03-27    TPro  5.7[L]  /  Alb  3.2[L]  /  TBili  0.7  /  DBili  x   /  AST  115[H]  /  ALT  97[H]  /  AlkPhos  256[H]  03-25      Thyroid Function Tests:      03-25 Chol 140 Direct LDL -- LDL calculated 73 HDL 55 Trig 56    Radiology:             
CHIEF COMPLAINT:  Sees a cardiologist but does not recall name     HISTORY OF PRESENT ILLNESS:  69RHM with PMHx of ? cardiomyopathy,  HTN, HLD, T2DM, Afib s/p ILR (on apixaban, last dose 6:30 AM), prior strokes (right frontal and left temporal/occipital regions with residual R homonymous hemianopia, R sided weakness and mixed aphasia) presenting as transfer from [facility] due to L M2 occlusion and for possible thrombectomy. LKW 16:00 on 3/24/2025. Family reported that patient had sudden onset slurred speech and difficulty producing words. Per family at bedside, patient was having difficulty producing words after his initial stroke and that has persisted since but family noticed worsening of those symptoms on 3/24/2025 as well as new onset slurred speech. CTA H/N on 3/24/2025 at [facility] revealed near total occlusion of the origin of L M2 branch. Perfusion imaging was unavailable at [facility]. Reports adherence to medications. Ambulates independently at baseline, handles ADLs independently, and has never handled iADLs independently.       PAST MEDICAL & SURGICAL HISTORY:  DM (diabetes mellitus)      HTN (hypertension)      HLD (hyperlipidemia)      Obese      HTN (hypertension)      Gout      CVA (cerebrovascular accident)      Cervical disc herniation      History of arthroscopy of right shoulder      No significant past surgical history              MEDICATIONS:  aspirin enteric coated 81 milliGRAM(s) Oral daily  enoxaparin Injectable 40 milliGRAM(s) SubCutaneous every 24 hours        DULoxetine 30 milliGRAM(s) Oral daily  gabapentin 300 milliGRAM(s) Oral two times a day    pantoprazole    Tablet 40 milliGRAM(s) Oral before breakfast    allopurinol 200 milliGRAM(s) Oral daily  atorvastatin 80 milliGRAM(s) Oral at bedtime  dextrose 50% Injectable 25 Gram(s) IV Push once  dextrose 50% Injectable 12.5 Gram(s) IV Push once  dextrose 50% Injectable 25 Gram(s) IV Push once  dextrose Oral Gel 15 Gram(s) Oral once PRN  glucagon  Injectable 1 milliGRAM(s) IntraMuscular once  insulin lispro (ADMELOG) corrective regimen sliding scale   SubCutaneous three times a day before meals    dextrose 5%. 1000 milliLiter(s) IV Continuous <Continuous>  dextrose 5%. 1000 milliLiter(s) IV Continuous <Continuous>  tamsulosin 0.4 milliGRAM(s) Oral at bedtime      FAMILY HISTORY:  No pertinent family history in first degree relatives        SOCIAL HISTORY:    [ ] Non-smoker  [ ] Smoker  [ ] Alcohol    Allergies    No Known Allergies    Intolerances    	    REVIEW OF SYSTEMS:  CONSTITUTIONAL: No fever, weight loss, or fatigue  EYES: No eye pain, visual disturbances, or discharge  ENMT:  No difficulty hearing, tinnitus, vertigo; No sinus or throat pain  NECK: No pain or stiffness  RESPIRATORY: No cough, wheezing, chills or hemoptysis; No Shortness of Breath  CARDIOVASCULAR: No chest pain, palpitations, passing out, dizziness, or leg swelling  GASTROINTESTINAL: No abdominal or epigastric pain. No nausea, vomiting, or hematemesis; No diarrhea or constipation. No melena or hematochezia.  GENITOURINARY: No dysuria, frequency, hematuria, or incontinence  NEUROLOGICAL: No headaches, memory loss, loss of strength, numbness, or tremors  SKIN: No itching, burning, rashes, or lesions   LYMPH Nodes: No enlarged glands  ENDOCRINE: No heat or cold intolerance; No hair loss  MUSCULOSKELETAL: No joint pain or swelling; No muscle, back, or extremity pain  PSYCHIATRIC: No depression, anxiety, mood swings, or difficulty sleeping  HEME/LYMPH: No easy bruising, or bleeding gums  ALLERY AND IMMUNOLOGIC: No hives or eczema	    [ ] All others negative	  [ ] Unable to obtain    PHYSICAL EXAM:  T(C): 36.7 (03-25-25 @ 07:05), Max: 36.8 (03-25-25 @ 03:37)  HR: 70 (03-25-25 @ 08:00) (63 - 71)  BP: 175/114 (03-25-25 @ 08:00) (147/88 - 175/114)  RR: 23 (03-25-25 @ 08:00) (16 - 23)  SpO2: 96% (03-25-25 @ 08:00) (95% - 99%)  Wt(kg): --  I&O's Summary      Appearance: Normal	  HEENT:   Normal oral mucosa, PERRL, EOMI	  Lymphatic: No lymphadenopathy  Cardiovascular: Normal S1 S2, No JVD, No murmurs, No edema  Respiratory: Lungs clear to auscultation	  Psychiatry: A & O x 3, Mood & affect appropriate  Gastrointestinal:  Soft, Non-tender, + BS	  Skin: No rashes, No ecchymoses, No cyanosis	  Neurologic Exam:  Mental status - Awake, Alert, Not answering any orientation questions. Minimal spontaneous verbal output. Speech fluent, repetition and naming not intact (glove, phone, elbow). Not following simple commands.     Cranial nerves:  CN II: Left visual fields intact to threat. R visual fields not intact to threat. PERRL.    CN III, IV, VI: EOMI  CN V: ARTEMIO due to patient participation  CN VII: Face is symmetric with normal eye closure and smile.  CN VII: Hearing is normal to rubbing fingers  CN IX, X: Palate elevates symmetrically. Phonation is normal.  CN XI: Head turning intact  CN XII: Tongue is midline with normal movements and no atrophy.    Motor - Normal bulk throughout. No pronator drift of out-stretched arms.  Strength testing  RUE, LUE: maintains antigravity for at least 10 seconds with no drift  RLE, LLE: maintains antigravity for at least 5 seconds with no drift    Sensation - Grimaces in response to painful stimuli in all 4 extremities    Coordination - ARTEMIO due to patient participation    Gait and station - ARTEMIO due to patient participation  Extremities: Normal range of motion, No clubbing, cyanosis or edema  Vascular: Peripheral pulses palpable 2+ bilaterally    TELEMETRY: 	SR     ECG:  	  RADIOLOGY:  < from: CT Angio Neck Stroke Protocol w/ IV Cont (03.25.25 @ 03:06) >    ACC: [id number] EXAM:  CT BRAIN PERFUSION MAPS STROKE   ORDERED BY:  VASHTI ROMANO     ACC: [id number] EXAM:  CT ANGIO NECK STROKE PROTCL IC   ORDERED BY:  VASHTI ROMANO     ACC: [id number] EXAM:  CT ANGIO BRAIN STROKE PROTC IC   ORDERED BY:  VASHTI ROMANO     PROCEDURE DATE:  03/25/2025          INTERPRETATION:  CLINICAL INFORMATION: Stroke transfer.    COMPARISON: CTA head/neck 6/9/2023, 11/2/2024.    CONTRAST:  IV Contrast: Omnipaque 300  70 cc administered (accession [id number]), 50   cc administered (accession [id number]), 70 cc administered (accession   [id number])  30 cc discarded (accession [id number]), 50 cc discarded   (accession [id number]), 30 cc discarded (accession [id number])  .    TECHNIQUE: CT perfusion and CTA of the head and neck were performed   following the intravenous administration of IV contrast. MIP   reconstructions were performed on a separate workstation and reviewed.   RAPID software was utilized for perfusion analysis.    FINDINGS:    CT PERFUSION:    TECHNICAL LIMITATIONS: Moderate motion limitation.    CBF<30%/CORE INFARCTION: 0 mL  TMAX>6s/UNDERPERFUSED TISSUE: 792 mL  MISMATCH VOLUME/TISSUE AT RISK: 792 mL  MISMATCH RATIO: Infinite.    MISCELLANEOUS: None.      CTA NECK:    AORTIC ARCH AND VISUALIZED GREAT VESSELS: Ectatic appearing ascending   thoracic aorta, similar to prior CT chest of 11/1/2024. Left anterior   chest wall implantable loop recorder.    RIGHT:  COMMON CAROTID ARTERY: No significant stenosis to the carotid bifurcation.  INTERNAL CAROTID ARTERY: No significant stenosis based on NASCET criteria.  VERTEBRAL ARTERY: Normal in course and caliber to the intracranial   circulation.    LEFT:  COMMON CAROTID ARTERY: No significant stenosis to the carotid bifurcation.  INTERNAL CAROTID ARTERY: No significant stenosis based on NASCET criteria.  VERTEBRAL ARTERY: Normal in course and caliber to the intracranial   circulation.    VISUALIZED LUNGS: Interlobular septal thickening with trace visualized   bilateral pleural effusions, suggestive of pulmonary edema.    MISCELLANEOUS: None.    CAROTID STENOSIS REFERENCE: Percent (%) stenosis is expressed in terms of   NASCET Criteria. (NASCET = 100x1-(N/D)). N=greatest narrowing. D=normal   distal diameter - MILD = <50% stenosis. - MODERATE = 50-69%stenosis. -   SEVERE = 70-89% stenosis. - HAIRLINE/CRITICAL = 90-99% stenosis. -   OCCLUDED = 100% stenosis.      CTA HEAD:    INTERNAL CAROTID ARTERIES: Bilateral petrous, precavernous, and cavernous   regions are patent without significant stenosis. Atherosclerotic   calcifications of the bilateral supraclinoid segments without evidence of   significant stenosis.    [place]: No aneurysm identified. Stable 3 mm conical outpouching   off the right supraclinoid internal carotid artery, which may reflect a   infundibulum versus small aneurysm..    ANTERIOR CEREBRAL ARTERIES: No significant stenosis or occlusion.  MIDDLE CEREBRAL ARTERIES: Multifocal moderate to severe stenoses of the   inferior division of the left middle cerebral artery. Additional mild to   moderate left middle cerebral artery stenoses. No evidence of overt   occlusion. Moderate stenosis of the right M2 segment distal flow   visualized.  POSTERIOR CEREBRAL ARTERIES: Left proximal P2 segment stenosis followed   byocclusion, similar to prior examination. Mild to moderate right T1 and   moderate to severe P2 stenoses.    DISTAL VERTEBRAL / BASILAR ARTERIES: Atherosclerotic calcifications   without significant stenosis or occlusion.    VENOUS STRUCTURES: Visualized superficial and deep venous structures   appear patent.    MISCELLANEOUS: No other vascular abnormality is seen.      IMPRESSION:    CT PERFUSION:  No core infarct identified. Diffusely positive intracranial hypoperfusion   map, likely artifactual.    CTA NECK:  No evidence of significant stenosis or occlusion.    CTA HEAD:  Multifocal bilateral middle and posterior cerebral artery stenoses,   similar to prior examination of 11/2/2024. Left P2 segment occlusion,   also seen on prior examination. No evidence of new proximal large vessel   occlusion.      --- End of Report ---           RANDALL CASON MD; Resident Radiologist  This document has been electronically signed.  MG DUNCAN MD; Attending Radiologist  This document has been electronically signed. Mar 25 2025  4:05AM    < end of copied text >    OTHER: 	  	  LABS:	 	    CARDIAC MARKERS:                                10.8   7.11  )-----------( 165      ( 25 Mar 2025 03:13 )             33.2     03-25    137  |  104  |  17  ----------------------------<  129[H]  4.5   |  23  |  1.19    Ca    8.2[L]      25 Mar 2025 03:13    TPro  5.7[L]  /  Alb  3.2[L]  /  TBili  0.7  /  DBili  x   /  AST  115[H]  /  ALT  97[H]  /  AlkPhos  256[H]  03-25    proBNP:   Lipid Profile:   HgA1c:   TSH:

## 2025-03-27 NOTE — DISCHARGE NOTE NURSING/CASE MANAGEMENT/SOCIAL WORK - PATIENT PORTAL LINK FT
You can access the FollowMyHealth Patient Portal offered by [facility] by registering at the following website: [email or web address]. By joining [email or web address]’s FollowMyHealth portal, you will also be able to view your health information using other applications (apps) compatible with our system.

## 2025-03-27 NOTE — DISCHARGE NOTE NURSING/CASE MANAGEMENT/SOCIAL WORK - FINANCIAL ASSISTANCE
[facility] provides services at a reduced cost to those who are determined to be eligible through [facility]’s financial assistance program. Information regarding [facility]’s financial assistance program can be found by going to [email or web address] or by calling [phone].

## 2025-03-27 NOTE — DISCHARGE NOTE PROVIDER - NSDCFUSCHEDAPPT_GEN_ALL_CORE_FT
Shane Parra  [facility] Physician [facility]  NEUROSURG [address] [place] [address]  Scheduled Appointment: 05/21/2025

## 2025-03-27 NOTE — DISCHARGE NOTE PROVIDER - PROVIDER TOKENS
PROVIDER:[TOKEN:[[id number]],FOLLOWUP:[2 weeks]],PROVIDER:[TOKEN:[[id number]],FOLLOWUP:[2 weeks]]

## 2025-03-27 NOTE — DISCHARGE NOTE PROVIDER - NSDCMRMEDTOKEN_GEN_ALL_CORE_FT
allopurinol 200 mg oral tablet: 1 tab(s) orally once a day  apixaban 5 mg oral tablet: 1 tab(s) orally every 12 hours  atorvastatin 80 mg oral tablet: 1 tab(s) orally once a day (at bedtime)  carvedilol 12.5 mg oral tablet: 1 tab(s) orally 2 times a day  clopidogrel 75 mg oral tablet: 1 tab(s) orally once a day  DULoxetine 30 mg oral delayed release capsule: 1 cap(s) orally once a day  ergocalciferol 1.25 mg (50,000 intl units) oral capsule: 1 cap(s) orally every other week  gabapentin 300 mg oral capsule: 1 cap(s) orally 2 times a day  Jardiance 25 mg oral tablet: 1 tab(s) orally once a day  losartan 25 mg oral tablet: 1 tab(s) orally once a day  metFORMIN 1000 mg oral tablet: 1 tab(s) orally 2 times a day  omeprazole 40 mg oral delayed release capsule: 1 cap(s) orally once a day  tamsulosin 0.4 mg oral capsule: 1 cap(s) orally once a day (at bedtime)

## 2025-03-27 NOTE — PROGRESS NOTE ADULT - SUBJECTIVE AND OBJECTIVE BOX
THE PATIENT WAS SEEN AND EXAMINED BY ME WITH THE HOUSESTAFF AND STROKE TEAM DURING MORNING ROUNDS.   HPI:[id number] with PMHx of HTN, HLD, T2DM, Afib (on apixaban, last dose 6:30 AM), prior strokes (right frontal and left temporal/occipital regions with residual R homonymous hemianopia, R sided weakness and mixed aphasia) presenting as transfer from [facility] due to L M2 occlusion and for possible thrombectomy. LKW 16:00 on 3/24/2025. Family reported that patient had sudden onset slurred speech and difficulty producing words. Per family at bedside, patient was having difficulty producing words after his initial stroke and that has persisted since but family noticed worsening of those symptoms on 3/24/2025 as well as new onset slurred speech. CTA H/N on 3/24/2025 at [facility] revealed near total occlusion of the origin of L M2 branch. Perfusion imaging was unavailable at [facility]. Reports adherence to medications. Ambulates independently at baseline, handles ADLs independently, and has never handled iADLs independently.  (25 Mar 2025 04:48)    SUBJECTIVE: No events overnight.  No new neurologic complaints.      allopurinol 200 milliGRAM(s) Oral daily  aluminum hydroxide/magnesium hydroxide/simethicone Suspension 30 milliLiter(s) Oral every 4 hours PRN  apixaban 5 milliGRAM(s) Oral every 12 hours  atorvastatin 80 milliGRAM(s) Oral at bedtime  clopidogrel Tablet 75 milliGRAM(s) Oral daily  dextrose 5%. 1000 milliLiter(s) IV Continuous <Continuous>  dextrose 5%. 1000 milliLiter(s) IV Continuous <Continuous>  dextrose 50% Injectable 25 Gram(s) IV Push once  dextrose 50% Injectable 12.5 Gram(s) IV Push once  dextrose 50% Injectable 25 Gram(s) IV Push once  dextrose Oral Gel 15 Gram(s) Oral once PRN  DULoxetine 30 milliGRAM(s) Oral daily  gabapentin 300 milliGRAM(s) Oral two times a day  glucagon  Injectable 1 milliGRAM(s) IntraMuscular once  influenza  Vaccine (HIGH DOSE) 0.5 milliLiter(s) IntraMuscular once  insulin lispro (ADMELOG) corrective regimen sliding scale   SubCutaneous at bedtime  insulin lispro (ADMELOG) corrective regimen sliding scale   SubCutaneous three times a day before meals  pantoprazole    Tablet 40 milliGRAM(s) Oral before breakfast  tamsulosin 0.4 milliGRAM(s) Oral at bedtime      PHYSICAL EXAM:   Vital Signs Last 24 Hrs  T(C): 37.2 (27 Mar 2025 04:00), Max: 37.2 (27 Mar 2025 04:00)  T(F): 98.9 (27 Mar 2025 04:00), Max: 98.9 (27 Mar 2025 04:00)  HR: 71 (27 Mar 2025 06:00) (69 - 95)  BP: 164/86 (27 Mar 2025 06:00) (153/107 - 176/108)  BP(mean): 117 (27 Mar 2025 06:00) (117 - 137)  RR: 18 (27 Mar 2025 06:00) (14 - 25)  SpO2: 93% (27 Mar 2025 06:00) (92% - 99%)    Parameters below as of 27 Mar 2025 06:00  Patient On (Oxygen Delivery Method): room air      General: No acute distress  HEENT: EOM intact, RHH  Abdomen: Soft, nontender, nondistended   Extremities: No edema    NEUROLOGICAL EXAM:  Mental status: Eyes open, awake, alert, oriented to name and place only, dysfluent speech, generates single words only, no neglect, able to follow 2 step commands,  R-L confusion, unable to ID objects, repetition mildly impaired, able to recognize daughter at bedside  Cranial Nerves: Right facial droop, RHH (at baseline), no nystagmus, subtle dysarthria,   Motor exam: Normal tone, no drift ,Right hemiparesis RUE  4/5 (at baseline), RLE 5/5, LUE 5/5, LLE 5/5.  Sensation: Intact to light touch   Coordination/ Gait: No dysmetria    LABS:                        12.9   6.64  )-----------( 193      ( 27 Mar 2025 04:56 )             39.8    03-27    137  |  104  |  21  ----------------------------<  162[H]  4.6   |  22  |  1.25    Ca    9.0      27 Mar 2025 04:56      IMAGING: Reviewed by me.     CT PERFUSION:  No core infarct identified. Diffusely positive intracranial hypoperfusion   map, likely artifactual.    CTA NECK:  No evidence of significant stenosis or occlusion.    CTA HEAD:  Multifocal bilateral middle and posterior cerebral artery stenoses,   similar to prior examination of 11/2/2024. Left P2 segment occlusion,   also seen on prior examination. No evidence of new proximal large vessel   occlusion.    CTH 3/25  No acute parenchymal hemorrhage, mass effect, or midline shift. If   clinical symptoms persist or worsen, more sensitive evaluation with brain   MRI may be obtained, if no contraindications exist.    MRI/MRA:   Old right frontal cortical and left posterior cerebral artery infarcts. Recent infarct left basal ganglia and left parieto-occipital cortex. 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 Noninvasive flow MR angiography as above.    TTE   1. Left ventricular cavity is normal in size. Left ventricular systolic function is normal with an ejection fraction of 63 % by Peterson's method of disks. There are no regional wall motion abnormalities seen.   2. The left ventricular diastolic function is indeterminate, with indeterminate left ventricular filling pressure.   3. Mildly enlarged right ventricular cavity size, with normal wall thickness, and normal right ventricular systolic function.   4. Left atrium is severely dilated.   5. The right atrium is moderately dilated.   6. No pericardial effusion seen.

## 2025-03-27 NOTE — DISCHARGE NOTE PROVIDER - NSDCCPCAREPLAN_GEN_ALL_CORE_FT
PRINCIPAL DISCHARGE DIAGNOSIS  Diagnosis: Stroke  Assessment and Plan of Treatment: Please follow up with neurologist in 1 week. The office will call you to schedule an appointment, if you do not hear from them please call [phone]. Continue taking medications as prescribed. If you were prescribed a statin for your cholesterol please make sure you have your liver enzymes checked with your primary care physician. Monitor your blood pressure. Reduce fat, cholesterol and salt in your diet. Increase intake of fruits and vegetables. Limit alcohol to minimum and do not smoke. You may be at risk for falling, make changes to your home to help you walk easier. Keep up to date on vaccinations.  If you experience any symptoms of facial drooping, slurred speech, arm or leg weakness, severe headache, vision changes or any worsening symptoms, notify provider immediately and return to ER.

## 2025-04-11 ENCOUNTER — TRANSCRIPTION ENCOUNTER (OUTPATIENT)
Age: 70
End: 2025-04-11

## 2025-05-08 NOTE — PROGRESS NOTE ADULT - PROBLEM/PLAN-7
----- Message from Hillary LINO sent at 5/8/2025  9:22 AM EDT -----  Regarding: Quality Update  /05/08/25 9:22 AM    Hello, our patient attached above has had HIV completed/performed. Please assist in updating the patient chart by pulling the Care Everywhere (CE) document. The date of service is  9/13/2024    Thank you,  Hillary JALLOH RD [facility]   /  
DISPLAY PLAN FREE TEXT

## 2025-05-13 NOTE — ED ADULT NURSE NOTE - ISOLATION TYPE:
[facility] [address] Hematology Oncology In Office Subsequent Encounter Note    5/13/25    Subjective:      Patient ID:   Cory Musa  59 y.o. male  1966  Dr. Astorga,       Chief Complaint:   L leg DVT    HPI:  He follows up in the office today for a scheduled appointment, May 13, 2025.  He has history of left leg DVT.  At this time he does not have residual pain or swelling at the left leg and continues on Xarelto 20 mg p.o. daily.  Present ultrasound of the left leg from April 28, 2025 shows that the extensive DVT is improved but not yet completely resolved.  He will continue on his Xarelto 20 mg p.o. daily and he will get a follow-up ultrasound of the left leg towards the end of July and see me in August.    He does have history of prostatism symptoms and is on Flomax, symptoms are improved his PSA is 4.71.  He is followed per Dr. Angela of Urology for this.    59 y.o. male gently traveled by [employer] to [place], 12 hours there and 12 hours coming back home.  He reported gradually increasing pain in the left calf over several weeks, leading to increased pain and swelling in the calf and his going to the emergency room October 15, 2024.  Ultrasound of the left leg showed femoral vein, popliteal vein, anterior tibial vein DVT.  He was managed with Xarelto 15 mg b.i.d. times 21 days and will convert over to 20 mg daily thereafter.  Symptoms at the left leg continued without improvement and he was seen in the emergency room on Friday October 18th, repeat ultrasound at that time was essentially unchanged after being on the Xarelto for 3-4 days.  He tells me now that he gets intermittent swelling in the leg on standing for some period of time, he also has some improving calf tenderness.  He denies chest pain or shortness of breath or hemoptysis symptoms during his recent DVT event dating back to October 15, 2024.    He does have a history of right leg tendon being torn several times, he feels that when  this happens with symptoms, that it places stress on the left leg .  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  X-rays of the left foot were negative.  He does have history of gout symptoms primarily in the big toe, for which she takes colchicine.    He is on [id number] mg 1 daily.  Repeat U/S is improved but not resolved.  Partially obstructing thrombus  In venous system now.  Continue Xarelto 20 mg 1 daily.  Repeat U/S and RTC 4 months.    He carries a diagnosis of BPH, his PSA runs 3.8-4.1.  He has seen the the urologist for symptoms of nocturia and frequency of urination.    He has had 2 ft surgeries for the Woodson's neuroma at the left foot, status post vasectomy x1, he recently had a skin cancer removed from the scalp per Dr. Conteh of Dermatology approximately 2 weeks ago.  He told me that he did have prolonged bleeding symptoms at the time of the surgery to remove the skin cancer on his scalp.  He was not on Xarelto at that time.    He smokes a cigar weekly with his son, he drinks 2 cocktails at night and beer on weekends.  He gives a history of allergy to Mobic with rash and Keflex with rash.  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    His dad was a free bleeder .  His paternal uncle carried a diagnosis of hemophilia, requiring transfusion with blood products and eventually  of hepatitis complications.    His maternal grandmother was a smoker and had multiple CVAs.  His maternal grandfather was a smoker and had myocardial infarction.  His mother had decreased memory, miscarriage history x1, and melanoma at the ear.  His father  at 74 and was a smoker who had obesity, COPD and myocardial infarction.  His brother is obese at 400 lb and has history of coronary artery disease and myocardial infarction and staph infection at the leg.  He has a son alive and well.      ROS:   GEN:  normal without any fever, night sweats or weight loss  HEENT: See HPI  CV: normal with no CP, SOB, PND, MENEZES or orthopnea  PULM: normal with no SOB, cough, hemoptysis, sputum or pleuritic pain  GI: normal with no abdominal pain, nausea, vomiting, constipation, diarrhea, melanotic stools, BRBPR, or hematemesis  : normal with no hematuria, dysuria  BREAST: normal with no mass, discharge, pain  SKIN: normal with no rash, erythema, bruising, or swelling     Past Medical History:   Diagnosis Date    GERD (gastroesophageal reflux disease)     Hernia of unspecified site of abdominal cavity without mention of obstruction or gangrene     hiatal    Hiatal hernia     Insomnia     Mariscal's neuroma     Sciatica     l4-l5     Past Surgical History:   Procedure Laterality Date    EPIDURAL STEROID INJECTION Left 5/7/2024    Procedure: CERVICAL TF-BAYRON LEFT C5-C6;  Surgeon: Shena Andrew MD;  Location: [facility];  Service: Pain Management;  Laterality: Left;    EPIDURAL STEROID INJECTION INTO CERVICAL SPINE Left 3/5/2024    Procedure: CERVICAL BAYRON C6-C7 INTERLAMINAR LEFT PARAMEDIAN;  Surgeon: Shena Andrew MD;  Location: [facility];  Service: Pain Management;  Laterality: Left;    FOOT SURGERY      left  excision mariscal's neuroma    SURGICAL REMOVAL OF MARISCAL'S NEUROMA Left 10/6/2020    Procedure: EXCISION, MARISCAL'S NEUROMA;  Surgeon: Jagdeep Bustamante DPM;  Location: [facility];  Service: Podiatry;  Laterality: Left;       Review of patient's allergies indicates:   Allergen Reactions    Mobic [meloxicam]      Rash and itching    Keflex [cephalexin] Rash     Social History     Socioeconomic History    Marital status:    Occupational History    Occupation: [employer]     Employer: [employer]   Tobacco Use    Smoking status: Never   Substance and Sexual Activity    Alcohol use: Yes     Alcohol/week: 24.0 standard drinks of alcohol     Types: 24 Cans of beer per week    Sexual activity: Yes     Partners: Female     Birth  control/protection: None     Social Drivers of Health     Financial Resource Strain: Medium Risk (3/17/2025)    Overall Financial Resource Strain (CARDIA)     Difficulty of Paying Living Expenses: Somewhat hard   Food Insecurity: No Food Insecurity (3/17/2025)    Hunger Vital Sign     Worried About Running Out of Food in the Last Year: Never true     Ran Out of Food in the Last Year: Never true   Transportation Needs: No Transportation Needs (3/17/2025)    PRAPARE - Transportation     Lack of Transportation (Medical): No     Lack of Transportation (Non-Medical): No   Physical Activity: Insufficiently Active (3/17/2025)    Exercise Vital Sign     Days of Exercise per Week: 2 days     Minutes of Exercise per Session: 10 min   Stress: Stress Concern Present (3/17/2025)    [employer] - Occupational Stress Questionnaire     Feeling of Stress : To some extent   Housing Stability: Low Risk  (3/17/2025)    Housing Stability Vital Sign     Unable to Pay for Housing in the Last Year: No     Number of Times Moved in the Last Year: 0     Homeless in the Last Year: No         Current Outpatient Medications:     cetirizine (ZYRTEC) 10 MG tablet, Take 10 mg by mouth., Disp: , Rfl:     enoxaparin (LOVENOX) 40 mg/0.4 mL Syrg, Inject 0.4 mLs (40 mg total) into the skin once daily. Two days and 1 day prior to upcoming procedure., Disp: 2 each, Rfl: 0    ergocalciferol (ERGOCALCIFEROL) 50,000 unit Cap, Take 50,000 Units by mouth every 7 days., Disp: , Rfl:     ibuprofen (ADVIL,MOTRIN) 800 MG tablet, TAKE 1 TABLET(800 MG) BY MOUTH THREE TIMES DAILY, Disp: 30 tablet, Rfl: 1    pantoprazole (PROTONIX) 40 MG tablet, Take 40 mg by mouth once daily., Disp: , Rfl:     tamsulosin (FLOMAX) 0.4 mg Cap, Take 0.4 mg by mouth once daily., Disp: , Rfl:     traMADoL (ULTRAM) 50 mg tablet, Take 1 tablet every 6 hours by oral route as needed., Disp: , Rfl:     XARELTO 20 mg Tab, Take 20 mg by mouth., Disp: , Rfl:      "baclofen (LIORESAL) 10 MG tablet, Take 1 tablet (10 mg total) by mouth 3 (three) times daily. for 10 days, Disp: 30 tablet, Rfl: 0    benzonatate (TESSALON) 200 MG capsule, , Disp: , Rfl:   No current facility-administered medications for this visit.    Facility-Administered Medications Ordered in Other Visits:     lactated ringers infusion, , Intravenous, Continuous, Shena Andrew MD, Last Rate: 25 mL/hr at 05/07/24 0709, New Bag at 05/07/24 0709          Objective:   Vitals:  Blood pressure 122/87, pulse 72, temperature 98.3 °F (36.8 °C), temperature source Temporal, resp. rate 16, height 5' 7" (1.702 m), weight 78.5 kg (173 lb), SpO2 97%.    Physical Examination:   GEN: no apparent distress, comfortable  HEAD: atraumatic and normocephalic  EYES: no pallor, no icterus  ENT: no pharyngeal erythema, external ears WNL; no nasal discharge  NECK: no masses, thyroid normal,  no LAD/LN's, supple  CV: RRR with no murmur; normal pulse  CHEST: Normal respiratory effort; CTAB; normal breath sounds; no wheeze or crackles  ABDOM: nontender and nondistended; soft; no rebound/guarding, L/S NP  MUSC/Skeletal: ROM normal; no crepitus; joints normal  EXTREM:  The left leg does have some hyperemia noted and 1+ edema without calf tenderness and without palpable venous cord.  The right leg is nontender without swelling and without palpable venous cord  SKIN: no rashes, lesions, ulcers, petechiae   : no CVAT, circumcised with out testicular mass L or R  NEURO: grossly intact; motor/sensory WNL;  no tremors  PSYCH: normal mood, affect and behavior  LYMPH: normal cervical, supraclavicular, axillary and groin LN's      Labs:   Lab Results   Component Value Date    WBC 8.01 10/05/2020    HGB 15.8 10/05/2020    HCT 47.8 10/05/2020    MCV 92 10/05/2020     10/05/2020    CMP  Sodium   Date Value Ref Range Status   10/05/2020 139 136 - 145 mmol/L Final     Potassium   Date Value Ref Range Status   10/05/2020 4.5 3.5 - 5.1 mmol/L " Final     Chloride   Date Value Ref Range Status   10/05/2020 102 95 - 110 mmol/L Final     CO2   Date Value Ref Range Status   10/05/2020 27 23 - 29 mmol/L Final     Glucose   Date Value Ref Range Status   10/05/2020 115 (H) 70 - 110 mg/dL Final     BUN   Date Value Ref Range Status   10/05/2020 13 6 - 20 mg/dL Final     Creatinine   Date Value Ref Range Status   10/05/2020 1.0 0.5 - 1.4 mg/dL Final     Calcium   Date Value Ref Range Status   10/05/2020 9.6 8.7 - 10.5 mg/dL Final     Total Protein   Date Value Ref Range Status   10/05/2020 7.1 6.0 - 8.4 g/dL Final     Albumin   Date Value Ref Range Status   10/05/2020 4.5 3.5 - 5.2 g/dL Final     Total Bilirubin   Date Value Ref Range Status   10/05/2020 0.9 0.1 - 1.0 mg/dL Final     Comment:     For infants and newborns, interpretation of results should be based  on gestational age, weight and in agreement with clinical  observations.  Premature Infant recommended reference ranges:  Up to 24 hours.............<8.0 mg/dL  Up to 48 hours............<12.0 mg/dL  3-5 days..................<15.0 mg/dL  6-29 days.................<15.0 mg/dL       Alkaline Phosphatase   Date Value Ref Range Status   10/05/2020 42 (L) 55 - 135 U/L Final     AST   Date Value Ref Range Status   10/05/2020 46 (H) 10 - 40 U/L Final     ALT   Date Value Ref Range Status   10/05/2020 81 (H) 10 - 44 U/L Final     Anion Gap   Date Value Ref Range Status   10/05/2020 10 8 - 16 mmol/L Final     eGFR if    Date Value Ref Range Status   10/05/2020 >60.0 >60 mL/min/1.73 m^2 Final     eGFR if non    Date Value Ref Range Status   10/05/2020 >60.0 >60 mL/min/1.73 m^2 Final     Comment:     Calculation used to obtain the estimated glomerular filtration  rate (eGFR) is the CKD-EPI equation.        LFT's increased, mildly.    Assessment:   (1) 59 y.o. male with extensive DVT involving the femoral, popliteal, anterior tibial veins of the left leg.  This acute clot event was  probably precipitated by the prolonged automobile trip to and from [place], 12 hours going there in 12 hours coming home.  He has been managed with Xarelto 15 mg b.i.d., which she will take for 21 days, and then will convert over to 20 mg p.o. daily thereafter.  Clinically he appears to be improving on the Xarelto as above.  He will continue on the Xarelto for at least 4-6 months.    (2) we discussed today that for the 1st 7-10 days he should have limited activity, keeping his leg elevated as feasible, trying to reduce the risk of pulmonary embolus here.  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  As the DVT begins to dry out and crack, blood flow will pass through the affected area and the DVT clot should begin to erode away and resolve slowly over weeks to months.  He will continue on Xarelto 20 mg daily during this time.    (3) at 4 months out from the clot event, I plan to repeat the venous Doppler study of the left leg to check on the status of the DVT, to see if the clot is improved or resolved?  Symptoms of peripheral edema and calf tenderness and pain should gradually improve and resolve over this time.  At some point in the future, probably around 4-6 months, will plan to interrupt the Xarelto x3 days, cover him with a Lovenox bridge on day -1 and day -2.  Thereafter hypercoagulation lab evaluation will be done while he is off Xarelto and Lovenox.  After the lab is drawn, he will then resume his Xarelto 20 mg p.o. daily until we get the results of the pending lab.    (4) after the lab studies for hypercoagulation have returned, we can then make a decision on perhaps stopping the Xarelto at 6 months or continuing the Xarelto prophylaxis thereafter.    (5) he does have some mild elevation of his LFTs, he may have fatty liver, he may have alcoholic liver disease secondary to  "his hard liquor and beer intake.  When we check his clotting evaluation, will check the vitamin K dependent factors of 2, 7, 9, and 10.  These factors are synthesized in the liver with vitamin K.    (6) his father was a free bleeder by history" and his paternal uncle was labeled as being a hemophiliac .  It would be unlikely that he would make it to age 58 without being diagnosed as having hemophilia a or hemophilia B.  There are other coagulopathies including dysfibrinogenemia, platelet dysfunction, and von Willebrand's disease, which could eventually be diagnosed as an adult.    (7) at the point when we get him off of the Xarelto, will plan to do a clotting evaluation for liver dependent clotting proteins, hemophilia a and hemophilia B, Von Willebrand's disease, fibrinogen abnormality, and platelet dysfunction.    (8) he has BPH by history with symptoms of nocturia and frequency of urination.  With borderline increased PSA at 3.8 to 4.1, he is seeing Urology for this condition.    (9)Improved thrombus  per current U/S, continue Xarelto 20 mg daily.  Repeat U/S 4 months and RTC 4 months.    Akbar Dempsey MD  [facility]  1/29/25            " None

## 2025-05-21 ENCOUNTER — APPOINTMENT (OUTPATIENT)
Dept: NEUROSURGERY | Facility: CLINIC | Age: 70
End: 2025-05-21

## 2025-05-22 ENCOUNTER — APPOINTMENT (OUTPATIENT)
Dept: NEUROSURGERY | Facility: CLINIC | Age: 70
End: 2025-05-22
Payer: MEDICARE

## 2025-05-22 DIAGNOSIS — I67.1 CEREBRAL ANEURYSM, NONRUPTURED: ICD-10-CM

## 2025-05-22 DIAGNOSIS — H53.461 HOMONYMOUS BILATERAL FIELD DEFECTS, RIGHT SIDE: ICD-10-CM

## 2025-05-22 DIAGNOSIS — I63.9 CEREBRAL INFARCTION, UNSPECIFIED: ICD-10-CM

## 2025-05-22 PROCEDURE — 99213 OFFICE O/P EST LOW 20 MIN: CPT | Mod: 93,25

## 2025-06-26 ENCOUNTER — EMERGENCY (EMERGENCY)
Facility: HOSPITAL | Age: 70
LOS: 1 days | End: 2025-06-26
Attending: STUDENT IN AN ORGANIZED HEALTH CARE EDUCATION/TRAINING PROGRAM | Admitting: STUDENT IN AN ORGANIZED HEALTH CARE EDUCATION/TRAINING PROGRAM
Payer: MEDICARE

## 2025-06-26 ENCOUNTER — NON-APPOINTMENT (OUTPATIENT)
Age: 70
End: 2025-06-26

## 2025-06-26 VITALS
TEMPERATURE: 98 F | OXYGEN SATURATION: 98 % | HEART RATE: 96 BPM | RESPIRATION RATE: 19 BRPM | DIASTOLIC BLOOD PRESSURE: 77 MMHG | WEIGHT: 302.92 LBS | SYSTOLIC BLOOD PRESSURE: 137 MMHG

## 2025-06-26 DIAGNOSIS — M50.20 OTHER CERVICAL DISC DISPLACEMENT, UNSPECIFIED CERVICAL REGION: Chronic | ICD-10-CM

## 2025-06-26 DIAGNOSIS — Z98.890 OTHER SPECIFIED POSTPROCEDURAL STATES: Chronic | ICD-10-CM

## 2025-06-26 PROCEDURE — 99285 EMERGENCY DEPT VISIT HI MDM: CPT

## 2025-06-26 RX ORDER — ACETAMINOPHEN 500 MG/5ML
1000 LIQUID (ML) ORAL ONCE
Refills: 0 | Status: COMPLETED | OUTPATIENT
Start: 2025-06-26 | End: 2025-06-26

## 2025-06-26 RX ORDER — ONDANSETRON HCL/PF 4 MG/2 ML
4 VIAL (ML) INJECTION ONCE
Refills: 0 | Status: COMPLETED | OUTPATIENT
Start: 2025-06-26 | End: 2025-06-26

## 2025-06-26 RX ORDER — MAGNESIUM, ALUMINUM HYDROXIDE 200-200 MG
30 TABLET,CHEWABLE ORAL ONCE
Refills: 0 | Status: COMPLETED | OUTPATIENT
Start: 2025-06-26 | End: 2025-06-26

## 2025-06-26 RX ADMIN — Medication 4 MILLIGRAM(S): at 23:32

## 2025-06-26 RX ADMIN — Medication 1000 MILLILITER(S): at 23:33

## 2025-06-26 RX ADMIN — Medication 30 MILLILITER(S): at 23:32

## 2025-06-26 RX ADMIN — Medication 20 MILLIGRAM(S): at 23:32

## 2025-06-26 RX ADMIN — Medication 400 MILLIGRAM(S): at 23:32

## 2025-06-26 NOTE — ED ADULT TRIAGE NOTE - CHIEF COMPLAINT QUOTE
Pt sent from urgent care for dark emesis and stools x5 days, Hx CVA no deficits, Afib on plavix, T2DM. denies abd pain. 18G left hand received 4mg zofran with EMS. Pt sent from urgent care for dark stools/vomit x5 days, Hx CVA no deficits, Afib on plavix, T2DM. Noted 18G left hand received 4mg zofran with EMS. Denies abd pain, chest pain or sob.

## 2025-06-26 NOTE — ED ADULT NURSE NOTE - NSFALLHARMRISKINTERV_ED_ALL_ED
Assistance OOB with selected safe patient handling equipment if applicable/Assistance with ambulation/Communicate risk of Fall with Harm to all staff, patient, and family/Monitor gait and stability/Monitor for mental status changes and reorient to person, place, and time, as needed/Provide visual cue: red socks, yellow wristband, yellow gown, etc/Reinforce activity limits and safety measures with patient and family/Toileting schedule using arm’s reach rule for commode and bathroom/Use of alarms - bed, stretcher, chair and/or video monitoring/Bed in lowest position, wheels locked, appropriate side rails in place/Call bell, personal items and telephone in reach/Instruct patient to call for assistance before getting out of bed/chair/stretcher/Non-slip footwear applied when patient is off stretcher/[place] to call system/Physically safe environment - no spills, clutter or unnecessary equipment/Purposeful Proactive Rounding/Room/bathroom lighting operational, light cord in reach

## 2025-06-26 NOTE — ED ADULT NURSE NOTE - CHIEF COMPLAINT QUOTE
Pt sent from urgent care for dark stools/vomit x5 days, Hx CVA no deficits, Afib on plavix, T2DM. Noted 18G left hand received 4mg zofran with EMS. Denies abd pain, chest pain or sob. age(85 years old or older)

## 2025-06-26 NOTE — ED ADULT NURSE NOTE - OBJECTIVE STATEMENT
Pt is A+O2 to self and place, unsure of time and situation. Family at bedside states that pt has been having decreased PO intake x 5 days with multiple episodes of dark emesis and dark stools. Endorses that pt takes iron supplements. Speech is clear, pt able to speak in full sentences. Spontaneous respirations are even and unlabored, pt able to maintain SpO2>95% on room air. Abdomen is soft, nondistended, nontender. Normal sinus noted on cardiac monitor. 20G IV placed to right AC, labs collected and sent. Pt arrives with 18G IV to left hand, flushes without difficulty.

## 2025-06-27 VITALS
SYSTOLIC BLOOD PRESSURE: 117 MMHG | TEMPERATURE: 98 F | OXYGEN SATURATION: 98 % | RESPIRATION RATE: 19 BRPM | DIASTOLIC BLOOD PRESSURE: 99 MMHG | HEART RATE: 115 BPM

## 2025-06-27 LAB
ALBUMIN SERPL ELPH-MCNC: 4.1 G/DL — SIGNIFICANT CHANGE UP (ref 3.3–5)
ALP SERPL-CCNC: 126 U/L — HIGH (ref 40–120)
ALT FLD-CCNC: 12 U/L — SIGNIFICANT CHANGE UP (ref 4–41)
ANION GAP SERPL CALC-SCNC: 16 MMOL/L — HIGH (ref 7–14)
AST SERPL-CCNC: 11 U/L — SIGNIFICANT CHANGE UP (ref 4–40)
BASOPHILS # BLD AUTO: 0.03 K/UL — SIGNIFICANT CHANGE UP (ref 0–0.2)
BASOPHILS NFR BLD AUTO: 0.3 % — SIGNIFICANT CHANGE UP (ref 0–2)
BILIRUB SERPL-MCNC: 1.5 MG/DL — HIGH (ref 0.2–1.2)
BUN SERPL-MCNC: 17 MG/DL — SIGNIFICANT CHANGE UP (ref 7–23)
CALCIUM SERPL-MCNC: 8.9 MG/DL — SIGNIFICANT CHANGE UP (ref 8.4–10.5)
CHLORIDE SERPL-SCNC: 104 MMOL/L — SIGNIFICANT CHANGE UP (ref 98–107)
CO2 SERPL-SCNC: 20 MMOL/L — LOW (ref 22–31)
CREAT SERPL-MCNC: 1.3 MG/DL — SIGNIFICANT CHANGE UP (ref 0.5–1.3)
EGFR: 60 ML/MIN/1.73M2 — SIGNIFICANT CHANGE UP
EGFR: 60 ML/MIN/1.73M2 — SIGNIFICANT CHANGE UP
EOSINOPHIL # BLD AUTO: 0.14 K/UL — SIGNIFICANT CHANGE UP (ref 0–0.5)
EOSINOPHIL NFR BLD AUTO: 1.4 % — SIGNIFICANT CHANGE UP (ref 0–6)
GAS PNL BLDV: SIGNIFICANT CHANGE UP
GLUCOSE SERPL-MCNC: 86 MG/DL — SIGNIFICANT CHANGE UP (ref 70–99)
HCT VFR BLD CALC: 44.7 % — SIGNIFICANT CHANGE UP (ref 39–50)
HGB BLD-MCNC: 14.5 G/DL — SIGNIFICANT CHANGE UP (ref 13–17)
IMM GRANULOCYTES # BLD AUTO: 0.03 K/UL — SIGNIFICANT CHANGE UP (ref 0–0.07)
IMM GRANULOCYTES NFR BLD AUTO: 0.3 % — SIGNIFICANT CHANGE UP (ref 0–0.9)
LIDOCAIN IGE QN: 20 U/L — SIGNIFICANT CHANGE UP (ref 7–60)
LYMPHOCYTES # BLD AUTO: 1.38 K/UL — SIGNIFICANT CHANGE UP (ref 1–3.3)
LYMPHOCYTES NFR BLD AUTO: 13.6 % — SIGNIFICANT CHANGE UP (ref 13–44)
MCHC RBC-ENTMCNC: 29.1 PG — SIGNIFICANT CHANGE UP (ref 27–34)
MCHC RBC-ENTMCNC: 32.4 G/DL — SIGNIFICANT CHANGE UP (ref 32–36)
MCV RBC AUTO: 89.6 FL — SIGNIFICANT CHANGE UP (ref 80–100)
MONOCYTES # BLD AUTO: 0.6 K/UL — SIGNIFICANT CHANGE UP (ref 0–0.9)
MONOCYTES NFR BLD AUTO: 5.9 % — SIGNIFICANT CHANGE UP (ref 2–14)
NEUTROPHILS # BLD AUTO: 7.99 K/UL — HIGH (ref 1.8–7.4)
NEUTROPHILS NFR BLD AUTO: 78.5 % — HIGH (ref 43–77)
NRBC # BLD AUTO: 0 K/UL — SIGNIFICANT CHANGE UP (ref 0–0)
NRBC # FLD: 0 K/UL — SIGNIFICANT CHANGE UP (ref 0–0)
NRBC BLD AUTO-RTO: 0 /100 WBCS — SIGNIFICANT CHANGE UP (ref 0–0)
PLATELET # BLD AUTO: 199 K/UL — SIGNIFICANT CHANGE UP (ref 150–400)
PMV BLD: 11.7 FL — SIGNIFICANT CHANGE UP (ref 7–13)
POTASSIUM SERPL-MCNC: 4.2 MMOL/L — SIGNIFICANT CHANGE UP (ref 3.5–5.3)
POTASSIUM SERPL-SCNC: 4.2 MMOL/L — SIGNIFICANT CHANGE UP (ref 3.5–5.3)
PROT SERPL-MCNC: 7.2 G/DL — SIGNIFICANT CHANGE UP (ref 6–8.3)
RBC # BLD: 4.99 M/UL — SIGNIFICANT CHANGE UP (ref 4.2–5.8)
RBC # FLD: 13.7 % — SIGNIFICANT CHANGE UP (ref 10.3–14.5)
SODIUM SERPL-SCNC: 140 MMOL/L — SIGNIFICANT CHANGE UP (ref 135–145)
WBC # BLD: 10.17 K/UL — SIGNIFICANT CHANGE UP (ref 3.8–10.5)
WBC # FLD AUTO: 10.17 K/UL — SIGNIFICANT CHANGE UP (ref 3.8–10.5)

## 2025-06-27 PROCEDURE — 74177 CT ABD & PELVIS W/CONTRAST: CPT | Mod: 26

## 2025-06-27 PROCEDURE — 93010 ELECTROCARDIOGRAM REPORT: CPT

## 2025-06-27 RX ORDER — AMOXICILLIN AND CLAVULANATE POTASSIUM 500; 125 MG/1; MG/1
1 TABLET, FILM COATED ORAL
Qty: 10 | Refills: 0
Start: 2025-06-27 | End: 2025-07-01

## 2025-06-27 RX ORDER — KETOROLAC TROMETHAMINE 30 MG/ML
15 INJECTION, SOLUTION INTRAMUSCULAR; INTRAVENOUS ONCE
Refills: 0 | Status: DISCONTINUED | OUTPATIENT
Start: 2025-06-27 | End: 2025-06-27

## 2025-06-27 RX ORDER — METRONIDAZOLE 250 MG
500 TABLET ORAL ONCE
Refills: 0 | Status: COMPLETED | OUTPATIENT
Start: 2025-06-27 | End: 2025-06-27

## 2025-06-27 RX ORDER — CIPROFLOXACIN HCL 250 MG
400 TABLET ORAL ONCE
Refills: 0 | Status: COMPLETED | OUTPATIENT
Start: 2025-06-27 | End: 2025-06-27

## 2025-06-27 RX ORDER — DEXTROSE 50 % IN WATER 50 %
50 SYRINGE (ML) INTRAVENOUS ONCE
Refills: 0 | Status: COMPLETED | OUTPATIENT
Start: 2025-06-27 | End: 2025-06-27

## 2025-06-27 RX ADMIN — Medication 100 MILLIGRAM(S): at 03:42

## 2025-06-27 RX ADMIN — Medication 50 MILLILITER(S): at 00:32

## 2025-06-27 RX ADMIN — KETOROLAC TROMETHAMINE 15 MILLIGRAM(S): 30 INJECTION, SOLUTION INTRAMUSCULAR; INTRAVENOUS at 03:38

## 2025-06-27 RX ADMIN — Medication 200 MILLIGRAM(S): at 04:58

## 2025-06-27 RX ADMIN — Medication 1000 MILLILITER(S): at 03:38

## 2025-06-27 NOTE — ED PROVIDER NOTE - PHYSICAL EXAMINATION
GENERAL: Awake, alert, NAD  HEENT: NC/AT, moist mucous membranes,   LUNGS: CTAB, no wheezes or crackles   CARDIAC: RRR, no m/r/g  ABDOMEN: Soft, non tender, non distended, reducible inguinal hernia  BACK: No midline spinal tenderness, no CVA tenderness  NEURO: A&Ox3. Moving all extremities.  SKIN: Warm and dry. No rash.  PSYCH: Normal affect. GENERAL: Awake, alert, NAD  HEENT: NC/AT, moist mucous membranes,   LUNGS: CTAB, no wheezes or crackles   CARDIAC: RRR, no m/r/g  ABDOMEN: Soft, non tender, non distended, reducible inguinal hernia  BACK: No midline spinal tenderness, no CVA tenderness  NEURO: A&Ox2. Moving all extremities.  SKIN: Warm and dry. No rash.  PSYCH: Normal affect.

## 2025-06-27 NOTE — PROVIDER CONTACT NOTE (OTHER) - SITUATION
TIM assistance with d/c transport requested by team. Writer met with pt at bedside. Pt phone not working reported needing to call his wife. Writer assisted pt in calling spouse, Dalia, at [phone].

## 2025-06-27 NOTE — ED PROVIDER NOTE - ATTENDING CONTRIBUTION TO CARE
69-year-old man past medical history of hypertension, hyperlipidemia, type 2 diabetes, A-fib presenting to the ED with 5 days of nausea vomiting and diarrhea.  Endorses vomitus is black in color and stool is darker brown.  Denies any abdominal pain, fevers, chest pain, shortness of breath, headache, dizziness.  On exam patient is well-appearing no acute distress.  Vital stable.  Afebrile.  Alert and oriented x 2,. Rectal vault empty unable to obtain any stool for evaluation of melena.  Abdomen is soft nontender to palpation.  Reducible umbilical hernia.  Given unreliable mental status will obtain CT scan to eval for acute intra-abdominal pathology.  Will obtain urine to evaluate for UTI, labs to evaluate for severe anemia, give fluids and antiemetics and reassess.

## 2025-06-27 NOTE — ED ADULT NURSE REASSESSMENT NOTE - NS ED NURSE REASSESS COMMENT FT1
Pt noted to have FS of 72, MD Cuello made aware. Pt asymptomatic at this time. Spontaneous respirations are even and unlabored on room air, opt able to maintain SpO2>95% on room air. Normal sinus noted on cardiac monitor. Denies abdominal pain at this time

## 2025-06-27 NOTE — ED PROVIDER NOTE - DISCHARGE REVIEW MATERIAL PRESENTED
[Dear  ___] : Dear  [unfilled], [Consult Letter:] : I had the pleasure of evaluating your patient, [unfilled]. [Please see my note below.] : Please see my note below. [Consult Closing:] : Thank you very much for allowing me to participate in the care of this patient.  If you have any questions, please do not hesitate to contact me. [Sincerely,] : Sincerely, [FreeTextEntry3] : \par \par Elizabeth Grande MD\par Pediatric Pulmonology and Sleep Medicine\par Director [facility]\par , Pediatric Sleep Disorders,\par  of Pediatrics, [facility],\par [address] [place] [address]\[place] [address]\par (P)[phone]\par (P) [phone]\par (F) [phone] \par \par  .

## 2025-06-27 NOTE — ED PROVIDER NOTE - PATIENT PORTAL LINK FT
You can access the FollowMyHealth Patient Portal offered by [facility] by registering at the following website: [email or web address]. By joining [employer]’s FollowMyHealth portal, you will also be able to view your health information using other applications (apps) compatible with our system.

## 2025-06-27 NOTE — ED PROVIDER NOTE - CLINICAL SUMMARY MEDICAL DECISION MAKING FREE TEXT BOX
69-year-old man past medical history of hypertension, hyperlipidemia, type 2 diabetes, A-fib presenting to the ED with 5 days of nausea vomiting and diarrhea.  Endorses vomitus is black in color and stool is darker brown.  Denies any abdominal pain, fevers, chest pain, shortness of breath, headache, dizziness.  On exam patient is well-appearing no acute distress.  Vital stable.  Afebrile.  Rectal vault empty unable to obtain any stool for evaluation of melena.  Abdomen is soft nontender to palpation.  Reducible umbilical hernia.  Given  negative abdominal tenderness, low concern for acute intra-abdominal pathology.  Will obtain urine to evaluate for UTI, labs to evaluate for severe anemia, give fluids and antiemetics and reassess. 69-year-old man past medical history of hypertension, hyperlipidemia, type 2 diabetes, A-fib presenting to the ED with 5 days of nausea vomiting and diarrhea.  Endorses vomitus is black in color and stool is darker brown.  Denies any abdominal pain, fevers, chest pain, shortness of breath, headache, dizziness.  On exam patient is well-appearing no acute distress.  Vital stable.  Afebrile.  Alert and oriented x 2,. Rectal vault empty unable to obtain any stool for evaluation of melena.  Abdomen is soft nontender to palpation.  Reducible umbilical hernia.  Given unreliable mental status will obtain CT scan to eval for acute intra-abdominal pathology.  Will obtain urine to evaluate for UTI, labs to evaluate for severe anemia, give fluids and antiemetics and reassess.

## 2025-06-27 NOTE — ED PROVIDER NOTE - PROGRESS NOTE DETAILS
Luly Almanzar DO (PGY-1): Patient tachycardic to 120s.  No change in mental status.  BP stable.  EKG sinus tachycardia. MD Cuello: VS improved. CT significant for uncomplicated acute sigmoid diverticulitis. Discussed results w/ patient's spouse. States patient has HHA and they feel comfortable caring for patient on home antibiotics. Patient currently stable for discharge. Will have SW arrange for ambulance transport home.

## 2025-07-05 ENCOUNTER — EMERGENCY (EMERGENCY)
Facility: HOSPITAL | Age: 70
LOS: 1 days | End: 2025-07-05
Attending: STUDENT IN AN ORGANIZED HEALTH CARE EDUCATION/TRAINING PROGRAM | Admitting: EMERGENCY MEDICINE
Payer: MEDICARE

## 2025-07-05 VITALS
TEMPERATURE: 98 F | HEART RATE: 86 BPM | DIASTOLIC BLOOD PRESSURE: 80 MMHG | RESPIRATION RATE: 17 BRPM | SYSTOLIC BLOOD PRESSURE: 121 MMHG | OXYGEN SATURATION: 95 %

## 2025-07-05 DIAGNOSIS — Z98.890 OTHER SPECIFIED POSTPROCEDURAL STATES: Chronic | ICD-10-CM

## 2025-07-05 DIAGNOSIS — M50.20 OTHER CERVICAL DISC DISPLACEMENT, UNSPECIFIED CERVICAL REGION: Chronic | ICD-10-CM

## 2025-07-05 LAB
ADD ON TEST-SPECIMEN IN LAB: SIGNIFICANT CHANGE UP
ALBUMIN SERPL ELPH-MCNC: 4.4 G/DL — SIGNIFICANT CHANGE UP (ref 3.3–5)
ALP SERPL-CCNC: 111 U/L — SIGNIFICANT CHANGE UP (ref 40–120)
ALT FLD-CCNC: 14 U/L — SIGNIFICANT CHANGE UP (ref 4–41)
ANION GAP SERPL CALC-SCNC: 14 MMOL/L — SIGNIFICANT CHANGE UP (ref 7–14)
AST SERPL-CCNC: 17 U/L — SIGNIFICANT CHANGE UP (ref 4–40)
BASOPHILS # BLD AUTO: 0.03 K/UL — SIGNIFICANT CHANGE UP (ref 0–0.2)
BASOPHILS NFR BLD AUTO: 0.4 % — SIGNIFICANT CHANGE UP (ref 0–2)
BILIRUB SERPL-MCNC: 1.5 MG/DL — HIGH (ref 0.2–1.2)
BLOOD GAS VENOUS COMPREHENSIVE RESULT: SIGNIFICANT CHANGE UP
BUN SERPL-MCNC: 23 MG/DL — SIGNIFICANT CHANGE UP (ref 7–23)
CALCIUM SERPL-MCNC: 9.8 MG/DL — SIGNIFICANT CHANGE UP (ref 8.4–10.5)
CHLORIDE SERPL-SCNC: 103 MMOL/L — SIGNIFICANT CHANGE UP (ref 98–107)
CO2 SERPL-SCNC: 24 MMOL/L — SIGNIFICANT CHANGE UP (ref 22–31)
CREAT SERPL-MCNC: 1.25 MG/DL — SIGNIFICANT CHANGE UP (ref 0.5–1.3)
EGFR: 63 ML/MIN/1.73M2 — SIGNIFICANT CHANGE UP
EGFR: 63 ML/MIN/1.73M2 — SIGNIFICANT CHANGE UP
EOSINOPHIL # BLD AUTO: 0.12 K/UL — SIGNIFICANT CHANGE UP (ref 0–0.5)
EOSINOPHIL NFR BLD AUTO: 1.5 % — SIGNIFICANT CHANGE UP (ref 0–6)
GLUCOSE SERPL-MCNC: 107 MG/DL — HIGH (ref 70–99)
HCT VFR BLD CALC: 45.4 % — SIGNIFICANT CHANGE UP (ref 39–50)
HGB BLD-MCNC: 14.6 G/DL — SIGNIFICANT CHANGE UP (ref 13–17)
IMM GRANULOCYTES # BLD AUTO: 0.03 K/UL — SIGNIFICANT CHANGE UP (ref 0–0.07)
IMM GRANULOCYTES NFR BLD AUTO: 0.4 % — SIGNIFICANT CHANGE UP (ref 0–0.9)
LIDOCAIN IGE QN: 24 U/L — SIGNIFICANT CHANGE UP (ref 7–60)
LYMPHOCYTES # BLD AUTO: 1.56 K/UL — SIGNIFICANT CHANGE UP (ref 1–3.3)
LYMPHOCYTES NFR BLD AUTO: 19.8 % — SIGNIFICANT CHANGE UP (ref 13–44)
MCHC RBC-ENTMCNC: 28.3 PG — SIGNIFICANT CHANGE UP (ref 27–34)
MCHC RBC-ENTMCNC: 32.2 G/DL — SIGNIFICANT CHANGE UP (ref 32–36)
MCV RBC AUTO: 88.2 FL — SIGNIFICANT CHANGE UP (ref 80–100)
MONOCYTES # BLD AUTO: 0.69 K/UL — SIGNIFICANT CHANGE UP (ref 0–0.9)
MONOCYTES NFR BLD AUTO: 8.8 % — SIGNIFICANT CHANGE UP (ref 2–14)
NEUTROPHILS # BLD AUTO: 5.43 K/UL — SIGNIFICANT CHANGE UP (ref 1.8–7.4)
NEUTROPHILS NFR BLD AUTO: 69.1 % — SIGNIFICANT CHANGE UP (ref 43–77)
NRBC # BLD AUTO: 0 K/UL — SIGNIFICANT CHANGE UP (ref 0–0)
NRBC # FLD: 0 K/UL — SIGNIFICANT CHANGE UP (ref 0–0)
NRBC BLD AUTO-RTO: 0 /100 WBCS — SIGNIFICANT CHANGE UP (ref 0–0)
PLATELET # BLD AUTO: 221 K/UL — SIGNIFICANT CHANGE UP (ref 150–400)
PMV BLD: 11.3 FL — SIGNIFICANT CHANGE UP (ref 7–13)
POTASSIUM SERPL-MCNC: 4.3 MMOL/L — SIGNIFICANT CHANGE UP (ref 3.5–5.3)
POTASSIUM SERPL-SCNC: 4.3 MMOL/L — SIGNIFICANT CHANGE UP (ref 3.5–5.3)
PROT SERPL-MCNC: 7.5 G/DL — SIGNIFICANT CHANGE UP (ref 6–8.3)
RBC # BLD: 5.15 M/UL — SIGNIFICANT CHANGE UP (ref 4.2–5.8)
RBC # FLD: 13.5 % — SIGNIFICANT CHANGE UP (ref 10.3–14.5)
SODIUM SERPL-SCNC: 141 MMOL/L — SIGNIFICANT CHANGE UP (ref 135–145)
WBC # BLD: 7.86 K/UL — SIGNIFICANT CHANGE UP (ref 3.8–10.5)
WBC # FLD AUTO: 7.86 K/UL — SIGNIFICANT CHANGE UP (ref 3.8–10.5)

## 2025-07-05 PROCEDURE — 93010 ELECTROCARDIOGRAM REPORT: CPT

## 2025-07-05 PROCEDURE — 99285 EMERGENCY DEPT VISIT HI MDM: CPT

## 2025-07-05 RX ORDER — ONDANSETRON HCL/PF 4 MG/2 ML
1 VIAL (ML) INJECTION
Qty: 1 | Refills: 0
Start: 2025-07-05 | End: 2025-07-07

## 2025-07-05 RX ORDER — AMOXICILLIN AND CLAVULANATE POTASSIUM 500; 125 MG/1; MG/1
1 TABLET, FILM COATED ORAL ONCE
Refills: 0 | Status: COMPLETED | OUTPATIENT
Start: 2025-07-05 | End: 2025-07-05

## 2025-07-05 RX ORDER — ONDANSETRON HCL/PF 4 MG/2 ML
4 VIAL (ML) INJECTION ONCE
Refills: 0 | Status: COMPLETED | OUTPATIENT
Start: 2025-07-05 | End: 2025-07-05

## 2025-07-05 RX ADMIN — Medication 20 MILLIGRAM(S): at 14:01

## 2025-07-05 RX ADMIN — Medication 4 MILLIGRAM(S): at 14:00

## 2025-07-05 RX ADMIN — AMOXICILLIN AND CLAVULANATE POTASSIUM 1 TABLET(S): 500; 125 TABLET, FILM COATED ORAL at 15:46

## 2025-07-05 RX ADMIN — Medication 1000 MILLILITER(S): at 14:01

## 2025-07-05 NOTE — ED PROVIDER NOTE - PROGRESS NOTE DETAILS
Snadra Lieberman DO (PGY-2): Labs unremarkable.  Abdominal exam remains benign.  Patient tolerating PO in the ER.  No vomiting.  Strict return precautions discussed.  Patient stable for discharge home.

## 2025-07-05 NOTE — ED ADULT TRIAGE NOTE - CHIEF COMPLAINT QUOTE
Pt seen and treated in [facility] ED for Diverticulitis on Thursday. Pt was discharged on oral antibiotics with little relief, Pt endorsing persistent nausea, vomiting and abdominal pain. Pt unable to tolerate po. Past hx CVA 2025 with residual left sided facial droop and mild aphasia

## 2025-07-05 NOTE — ED PROVIDER NOTE - OBJECTIVE STATEMENT
70-year-old male with history of hypertension, hyperlipidemia, CVA x 2, atrial fibrillation, on Eliquis and Plavix, presents to the ED complaining of nausea and vomiting.  Patient reports he was evaluated in the ED on 6/26 and diagnosed with acute uncomplicated diverticulitis.  He was discharged on 10 days of Augmentin.  Per wife at bedside, patient is compliant with the antibiotics.  However beginning yesterday patient developed vomiting and was unable to tolerate p.o.  No vomiting today, patient was able to tolerate Ensure prior to arrival.  He is complaining of mild nausea.  Reports his abdominal pain has improved with the antibiotics.  Last bowel movement yesterday morning, denies hematochezia, BRBPR, or melena.  Denies any fever, chills, chest pain, shortness of breath.  Last colonoscopy greater than 10 years ago.

## 2025-07-05 NOTE — ED ADULT NURSE NOTE - OBJECTIVE STATEMENT
Patient received in room 6 from triage for N/V, abdominal pain x1 day. History of CVA x2 (March 2025, April 2025) on Plavix. He is A&Ox3, in no acute distress, calm, cooperative. Patient states he has been unable to tolerate PO, N/V all day yesterday. Per wife, patient was treated for diverticulitis and discharged on Thursday with antibiotics with little relief. At present, denies pain. Denies CP, SOB, diarrhea, constipation. No blood in stool. Pending MD evaluation.

## 2025-07-05 NOTE — ED ADULT NURSE NOTE - CCCP TRG CHIEF CMPLNT
Met with pt at bedside to follow up on discharge concerns. Pt discussed shared concerns of discharging home yesterday evening due to pain and didn't feel safe to be at home. Pt able to work with therapy today and reports that it went well, however she is worried about her pain level when she gets home. Discussed that HH will be arranged to provide added support to her in the home setting and to prevent her from having to return to the hospital. Discussed KAYDEN in detail with pt and she agrees that she would be better at home with HH support. Pt verbalized concerns regarding pain regimen, but feels that taking the medications as prescribed today has been better for her. She understands that she is likely to be medically cleared for discharge tomorrow. Informed hospitalist of concerns regarding pain regimen and updated unit SW on plans for discharge home tomorrow with IV abx/HH.    Avoidable days entered in EMR.    DAYLIN RodriguezN, RN-BC    [id number]         nausea

## 2025-07-05 NOTE — ED PROVIDER NOTE - ATTENDING CONTRIBUTION TO CARE
I personally evaluated the patient. I reviewed the Resident’s or Physician Assistant’s note (as assigned above), and agree with the findings and plan except as documented in my note.    70yoM PMHx HTN, HLD, Afib (on eliquis & plavix), two prior strokes with residual left facial droop presenting for nausea and vomiting x1 day. Pt was diagnosed with uncomplicated diverticulitis on 6/26, discharged on augmentin, which he has been taking as prescribed until yesterday. Pt denies any abdominal pain, reports feeling hungry. labs reviewed, EKG shows sinus rhythm with 1st degree AV block at 76bpm, no ischemic changes. On reeval, pt tolerating PO, took dose of PO abx here, denies any nausea or pain, pt and wife agree with plan for discharge with return precautions

## 2025-07-05 NOTE — ED ADULT TRIAGE NOTE - BP NONINVASIVE SYSTOLIC (MM HG)
121
Render Risk Assessment In Note?: no
Detail Level: Simple
Additional Notes: Patient’s cousin
Additional Notes: Patient’s sisters

## 2025-07-05 NOTE — ED PROVIDER NOTE - PATIENT'S PREFERRED PRONOUN
Caller: Nu Martino    Relationship: Emergency Contact    Best call back number: [phone]     Requested Prescriptions:   Requested Prescriptions     Pending Prescriptions Disp Refills    zolpidem (AMBIEN) 10 MG tablet 30 tablet 0     Sig: Take 1 tablet by mouth At Night As Needed for Sleep.        Pharmacy where request should be sent: [facility] [address] [phone] [facility] [phone]      Last office visit with prescribing clinician: 1/18/2024   Last telemedicine visit with prescribing clinician: Visit date not found   Next office visit with prescribing clinician: 5/24/2024     Additional details provided by patient: REQUESTING 60-90 DAY SUPPLY INSTEAD OF 30 DAY.    Does the patient have less than a 3 day supply:  [] Yes  [x] No    Would you like a call back once the refill request has been completed: [] Yes [] No    If the office needs to give you a call back, can they leave a voicemail: [] Yes [] No    April Mj Brice Rep   03/04/24 10:53 EST           
Rx refilled by dr. Reyes 2 weeks ago.   jw  
Withheld/Decline to Answer

## 2025-07-05 NOTE — ED PROVIDER NOTE - NSFOLLOWUPCLINICS_GEN_ALL_ED_FT
Gastroenterology at [facility]  Gastroenterology  [address] [place] [address]  Phone: [phone]  Fax:     [facility]  Gastroenterology  [address] [place] [id number]  Phone: [phone]  Fax:

## 2025-07-05 NOTE — ED ADULT NURSE NOTE - CHIEF COMPLAINT QUOTE
Pt seen and treated in [facility] ED for Diverticulitis on Thursday. Pt was discharged on oral antibiotics with little relief, Pt endorsing persistent nausea, vomiting and abdominal pain. Pt unable to tolerate po. Past hx CVA 2025 with residual left sided facial droop and mild aphasia

## 2025-07-05 NOTE — ED PROVIDER NOTE - NSFOLLOWUPINSTRUCTIONS_ED_ALL_ED_FT
YOU WERE SEEN IN THE ED FOR: Nausea, vomiting    Hydrate with plenty of fluids. Take small sips frequently. Supplement with electrolytes (Pedialyte, liquid IV) if unable to tolerate food. Slowly advance your diet. Avoid dairy, spicy, or acidic foods until symptoms improve.     YOU WERE PRESCRIBED: Zofran   You may take up to every 8 hours as needed for nausea/vomiting.   FOLLOW THE INSTRUCTIONS ON THE LABEL/CONTAINER    FOR PAIN/FEVER, YOU MAY TAKE TYLENOL (acetaminophen) 1,000mg AND/OR IBUPROFEN (advil or motrin) 600mg every 6 hours as needed. FOLLOW THE INSTRUCTIONS ON THE LABEL/CONTAINER.    PLEASE FOLLOW UP WITH YOUR PRIVATE PHYSICIAN WITHIN THE NEXT 48 HOURS. BRING COPIES OF YOUR RESULTS.     PLEASE FOLLOW UP WITH GASTROENTEROLOGY FOR A COLONOSCOPY.     RETURN TO THE EMERGENCY DEPARTMENT IF YOU EXPERIENCE ANY NEW/CONCERNING/WORSENING SYMPTOMS SUCH AS BUT NOT LIMITED TO: persistent vomiting, blood in stool or vomit, fever, worsening abdominal pain, or any other concerns.

## 2025-07-05 NOTE — ED PROVIDER NOTE - PATIENT PORTAL LINK FT
You can access the FollowMyHealth Patient Portal offered by [facility] by registering at the following website: [email or web address]. By joining [employer]’s FollowMyHealth portal, you will also be able to view your health information using other applications (apps) compatible with our system.

## 2025-07-05 NOTE — ED ADULT NURSE NOTE - NSFALLUNIVINTERV_ED_ALL_ED
Bed/Stretcher in lowest position, wheels locked, appropriate side rails in place/Call bell, personal items and telephone in reach/Instruct patient to call for assistance before getting out of bed/chair/stretcher/Non-slip footwear applied when patient is off stretcher/[place] to call system/Physically safe environment - no spills, clutter or unnecessary equipment/Purposeful proactive rounding/Room/bathroom lighting operational, light cord in reach

## 2025-07-05 NOTE — ED PROVIDER NOTE - CLINICAL SUMMARY MEDICAL DECISION MAKING FREE TEXT BOX
70-year-old male recently diagnosed with diverticulitis on 6/26 on PO Augmentin presenting with nausea vomiting since yesterday.  Tolerated PO prior to arrival, but persistently nauseous.  No abdominal pain or fever.  Abdomen soft and nontender.  Low concern for complication including abscess.  Will obtain labs to evaluate for infection vs electrolyte disturbance given vomiting, hydrate with fluids, and reassess.

## 2025-07-15 ENCOUNTER — APPOINTMENT (OUTPATIENT)
Dept: NEUROLOGY | Facility: CLINIC | Age: 70
End: 2025-07-15

## 2025-07-24 ENCOUNTER — APPOINTMENT (OUTPATIENT)
Dept: GASTROENTEROLOGY | Facility: CLINIC | Age: 70
End: 2025-07-24